# Patient Record
Sex: MALE | Race: WHITE | NOT HISPANIC OR LATINO | Employment: OTHER | URBAN - METROPOLITAN AREA
[De-identification: names, ages, dates, MRNs, and addresses within clinical notes are randomized per-mention and may not be internally consistent; named-entity substitution may affect disease eponyms.]

---

## 2017-03-17 ENCOUNTER — MYC REFILL (OUTPATIENT)
Dept: FAMILY MEDICINE | Facility: CLINIC | Age: 37
End: 2017-03-17

## 2017-03-17 DIAGNOSIS — F41.9 ANXIETY: ICD-10-CM

## 2017-03-17 DIAGNOSIS — E78.5 HYPERLIPIDEMIA LDL GOAL <130: ICD-10-CM

## 2017-03-17 RX ORDER — SIMVASTATIN 40 MG
40 TABLET ORAL AT BEDTIME
Qty: 90 TABLET | Refills: 3 | Status: CANCELLED | OUTPATIENT
Start: 2017-03-17

## 2017-03-17 RX ORDER — FLUOXETINE 40 MG/1
40 CAPSULE ORAL DAILY
Qty: 90 CAPSULE | Refills: 3 | Status: CANCELLED | OUTPATIENT
Start: 2017-03-17

## 2017-03-17 NOTE — TELEPHONE ENCOUNTER
Message from [employer]:  Original authorizing provider: Mayito Lewis MD    James Blanco would like a refill of the following medications:  simvastatin (ZOCOR) 40 MG tablet [Mayito Lewis MD]  FLUoxetine (PROZAC) 40 MG capsule [Mayito Lewis MD]    Preferred pharmacy: Other - [facility] in [place].     Comment:  Can you please forward my prescriptions to [facility] and change my default pharmacy to here. Thanks.

## 2017-03-20 ENCOUNTER — MYC REFILL (OUTPATIENT)
Dept: FAMILY MEDICINE | Facility: CLINIC | Age: 37
End: 2017-03-20

## 2017-03-20 DIAGNOSIS — F41.9 ANXIETY: ICD-10-CM

## 2017-03-20 DIAGNOSIS — E78.5 HYPERLIPIDEMIA LDL GOAL <130: ICD-10-CM

## 2017-03-20 RX ORDER — SIMVASTATIN 40 MG
40 TABLET ORAL AT BEDTIME
Qty: 90 TABLET | Refills: 3 | Status: CANCELLED | OUTPATIENT
Start: 2017-03-20

## 2017-03-20 RX ORDER — FLUOXETINE 40 MG/1
40 CAPSULE ORAL DAILY
Qty: 90 CAPSULE | Refills: 3 | Status: CANCELLED | OUTPATIENT
Start: 2017-03-20

## 2017-03-21 NOTE — TELEPHONE ENCOUNTER
Simvastatin/zocor     Last Written Prescription Date: 9/6/16  Last Fill Quantity: 90, # refills: 3  Last Office Visit with [facility] or [facility] prescribing provider: 12/13/16       Lab Results   Component Value Date    CHOL 209 09/04/2015     Lab Results   Component Value Date    HDL 46 09/04/2015     Lab Results   Component Value Date     09/04/2015     Lab Results   Component Value Date    TRIG 228 09/04/2015     Lab Results   Component Value Date    CHOLHDLRATIO 4.5 09/04/2015     Both requested meds have refills at the pharmacy, advised him via mychart.   Emma Gomes RNC

## 2017-03-21 NOTE — TELEPHONE ENCOUNTER
Message from [employer]:  Original authorizing provider: Mayito Lewis MD    James Blanco would like a refill of the following medications:  simvastatin (ZOCOR) 40 MG tablet [Mayito Lewis MD]  FLUoxetine (PROZAC) 40 MG capsule [Mayito Lewis MD]    Preferred pharmacy: [facility], [place] - [address]    Comment:

## 2017-08-30 ENCOUNTER — TRANSFERRED RECORDS (OUTPATIENT)
Dept: HEALTH INFORMATION MANAGEMENT | Facility: CLINIC | Age: 37
End: 2017-08-30

## 2017-09-11 DIAGNOSIS — E78.5 HYPERLIPIDEMIA LDL GOAL <130: ICD-10-CM

## 2017-09-11 DIAGNOSIS — F41.9 ANXIETY: ICD-10-CM

## 2017-09-11 NOTE — TELEPHONE ENCOUNTER
Simvastatin 40 mg tab     Last Written Prescription Date: 9/6/16  Last Fill Quantity: 90, # refills: 3  Last Office Visit with [facility] or  [facility] prescribing provider: 12/13/16  Next 5 appointments (look out 90 days)     Sep 18, 2017  5:00 PM CDT   MyChart Short with Hector Talbot MD   [facility]    [address] [address]   [phone]                   Lab Results   Component Value Date    CHOL 209 09/04/2015     Lab Results   Component Value Date    HDL 46 09/04/2015     Lab Results   Component Value Date     09/04/2015     Lab Results   Component Value Date    TRIG 228 09/04/2015     Lab Results   Component Value Date    CHOLHDLRATIO 4.5 09/04/2015     Fluoxetine 40 mg cap     Last Written Prescription Date: 9/6/16  Last Fill Quantity: 90, # refills: 3  Last Office Visit with [facility] primary care provider:  12/13/16   Next 5 appointments (look out 90 days)     Sep 18, 2017  5:00 PM CDT   MyChart Short with Hector Talbot MD   [facility]    [address] [address]   [phone]                   Last PHQ-9 score on record=   PHQ-9 SCORE 9/1/2015   Total Score 1

## 2017-09-16 DIAGNOSIS — F41.9 ANXIETY: ICD-10-CM

## 2017-09-17 DIAGNOSIS — E78.5 HYPERLIPIDEMIA LDL GOAL <130: ICD-10-CM

## 2017-09-17 NOTE — TELEPHONE ENCOUNTER
simvastatin (ZOCOR) 40 MG tablet     Last Written Prescription Date: 9/6/16  Last Fill Quantity: 90, # refills: 3  Last Office Visit with [facility], [facility] or [facility] prescribing provider: 12/13/16  Next 5 appointments (look out 90 days)     Sep 18, 2017  5:00 PM CDT   MyChart Short with Hector Talbot MD   [facility]    [address] [address]   [phone]                   Lab Results   Component Value Date    CHOL 209 09/04/2015     Lab Results   Component Value Date    HDL 46 09/04/2015     Lab Results   Component Value Date     09/04/2015     Lab Results   Component Value Date    TRIG 228 09/04/2015     Lab Results   Component Value Date    CHOLHDLRATIO 4.5 09/04/2015     [place])

## 2017-09-18 ENCOUNTER — OFFICE VISIT (OUTPATIENT)
Dept: FAMILY MEDICINE | Facility: CLINIC | Age: 37
End: 2017-09-18
Payer: COMMERCIAL

## 2017-09-18 VITALS
DIASTOLIC BLOOD PRESSURE: 91 MMHG | HEIGHT: 76 IN | WEIGHT: 231.2 LBS | SYSTOLIC BLOOD PRESSURE: 137 MMHG | TEMPERATURE: 96.9 F | BODY MASS INDEX: 28.15 KG/M2 | HEART RATE: 73 BPM

## 2017-09-18 DIAGNOSIS — R80.9 PROTEINURIA, UNSPECIFIED TYPE: ICD-10-CM

## 2017-09-18 DIAGNOSIS — E78.5 HYPERLIPIDEMIA LDL GOAL <130: ICD-10-CM

## 2017-09-18 DIAGNOSIS — R74.8 ELEVATED SERUM GAMMA-GLUTAMYL TRANSFERASE LEVEL: Primary | ICD-10-CM

## 2017-09-18 PROCEDURE — 99213 OFFICE O/P EST LOW 20 MIN: CPT | Performed by: FAMILY MEDICINE

## 2017-09-18 NOTE — NURSING NOTE
"Chief Complaint   Patient presents with     Results     Discuss lab results that were done for life insurance.  Done 8/2017.  Concerned about liver and kidneys.       Initial BP (!) 137/91 (BP Location: Left arm, Patient Position: Chair, Cuff Size: Adult Large)  Pulse 73  Temp 96.9  F (36.1  C) (Tympanic)  Ht 6' 3.75\" (1.924 m)  Wt 231 lb 3.2 oz (104.9 kg)  BMI 28.33 kg/m2 Estimated body mass index is 28.33 kg/(m^2) as calculated from the following:    Height as of this encounter: 6' 3.75\" (1.924 m).    Weight as of this encounter: 231 lb 3.2 oz (104.9 kg).  Medication Reconciliation: complete  "

## 2017-09-18 NOTE — TELEPHONE ENCOUNTER
Routing refill request to provider for review/approval because:  Patient has appointment today at 5pm.    Kaylee Huitron RN

## 2017-09-18 NOTE — TELEPHONE ENCOUNTER
FLUoxetine      Last Written Prescription Date: 9/6/16  Last Fill Quantity: 90, # refills: 3  Last Office Visit with [facility] primary care provider:  12/13/16   Next 5 appointments (look out 90 days)     Sep 18, 2017  5:00 PM CDT   MyChart Short with Hector Talbot MD   [facility]    [address] [address]   [phone]                   Last PHQ-9 score on record=   PHQ-9 SCORE 9/1/2015   Total Score 1

## 2017-09-18 NOTE — PATIENT INSTRUCTIONS
GGT elevation likely related top your history of heavy alcohol consumption.  Continue limiting alcohol drinks to less than 7 a week.  Schedule repeat blood test in 2 months to see trend of liver function.  If with jaundice, abdominal pain or other symptoms, see provider promptly.    Drink 6-8 glasses of water per day.  Schedule lab appointment to repeat urinalysis next week.    Be consistent with low trans fat and low saturated fat diet.  Eat 5 cups of vegetables, fruits and whole grains per day.  Limit starchy food (white rice, white bread, white pasta, white potatoes) to less than a cup per meal.  Minimize sweets, junk food and fastfood.  Exercise: moderate intensity sustained for at least 30 mins per episode, goal of 150 mins per week at least  Weight loss goal: 5-10 % of current weight in 4-6 months.    Thank you for choosing [facility].  You may be receiving a survey in the mail from [employer] regarding your visit today.  Please take a few minutes to complete and return the survey to let us know how we are doing.      If you have questions or concerns, please contact us via [employer] or you can contact your care team at [phone].    Our Clinic hours are:  Monday 6:40 am  to 7:00 pm  Tuesday -Friday 6:40 am to 5:00 pm    The [place] outpatient lab hours are:  Monday - Friday 6:10 am to 4:45 pm  Saturdays 7:00 am to 11:00 am  Appointments are required, call [phone]    If you have clinical questions after hours or would like to schedule an appointment,  call the clinic at [phone].

## 2017-09-18 NOTE — MR AVS SNAPSHOT
After Visit Summary   9/18/2017    James Blanco    MRN: [id number]           Patient Information     Date Of Birth          1980        Visit Information        Provider Department      9/18/2017 5:00 PM Hector Talbot MD [facility]        Today's Diagnoses     Elevated serum gamma-glutamyl transferase level    -  1    Hyperlipidemia LDL goal <130        Proteinuria, unspecified type        BMI 28.0-28.9,adult          Care Instructions    GGT elevation likely related top your history of heavy alcohol consumption.  Continue limiting alcohol drinks to less than 7 a week.  Schedule repeat blood test in 2 months to see trend of liver function.  If with jaundice, abdominal pain or other symptoms, see provider promptly.    Drink 6-8 glasses of water per day.  Schedule lab appointment to repeat urinalysis next week.    Be consistent with low trans fat and low saturated fat diet.  Eat 5 cups of vegetables, fruits and whole grains per day.  Limit starchy food (white rice, white bread, white pasta, white potatoes) to less than a cup per meal.  Minimize sweets, junk food and fastfood.  Exercise: moderate intensity sustained for at least 30 mins per episode, goal of 150 mins per week at least  Weight loss goal: 5-10 % of current weight in 4-6 months.    Thank you for choosing [facility].  You may be receiving a survey in the mail from [employer] regarding your visit today.  Please take a few minutes to complete and return the survey to let us know how we are doing.      If you have questions or concerns, please contact us via [employer] or you can contact your care team at [phone].    Our Clinic hours are:  Monday 6:40 am  to 7:00 pm  Tuesday -Friday 6:40 am to 5:00 pm    The [place] outpatient lab hours are:  Monday - Friday 6:10 am to 4:45 pm  Saturdays 7:00 am to 11:00 am  Appointments are required, call [phone]    If you have clinical questions after hours or  "would like to schedule an appointment,  call the clinic at [phone].            Follow-ups after your visit        Follow-up notes from your care team     Return if symptoms worsen or fail to improve.      Future tests that were ordered for you today     Open Future Orders        Priority Expected Expires Ordered    **UA reflex to Microscopic FUTURE anytime Routine 9/18/2017 9/18/2018 9/18/2017    GGT Routine 9/19/2017 9/18/2018 9/18/2017    **Hepatic panel FUTURE 2mo Routine 11/17/2017 1/16/2018 9/18/2017            Who to contact     If you have questions or need follow up information about today's clinic visit or your schedule please contact [facility] directly at [phone].  Normal or non-critical lab and imaging results will be communicated to you by MyChart, letter or phone within 4 business days after the clinic has received the results. If you do not hear from us within 7 days, please contact the clinic through [employer] or phone. If you have a critical or abnormal lab result, we will notify you by phone as soon as possible.  Submit refill requests through [employer] or call your pharmacy and they will forward the refill request to us. Please allow 3 business days for your refill to be completed.          Additional Information About Your Visit        [employer] Information     [employer] gives you secure access to your electronic health record. If you see a primary care provider, you can also send messages to your care team and make appointments. If you have questions, please call your primary care clinic.  If you do not have a primary care provider, please call [phone] and they will assist you.        Care EveryWhere ID     This is your Care EveryWhere ID. This could be used by other organizations to access your [place] medical records  [id number]        Your Vitals Were     Pulse Temperature Height BMI (Body Mass Index)          73 96.9  F (36.1  C) (Tympanic) 6' 3.75\" (1.924 m) 28.33 " kg/m2         Blood Pressure from Last 3 Encounters:   09/18/17 (!) 137/91   12/13/16 132/72   08/15/16 123/79    Weight from Last 3 Encounters:   09/18/17 231 lb 3.2 oz (104.9 kg)   12/13/16 247 lb 6.4 oz (112.2 kg)   08/15/16 239 lb 3.2 oz (108.5 kg)               Primary Care Provider Office Phone # Fax #    Hector Alexis Talbot [phone] [address] [address]        Equal Access to Services     KRYSTLE MIRELES : Hadii gabriella cruz Sojunito, wafilemonda deborah, qaybta kaalmada merlene, asiya hair . So [facility] [phone].    ATENCIÓN: Si habla español, tiene a carter disposición servicios gratuitos de asistencia lingüística. Llame al [phone].    We comply with applicable federal civil rights laws and [place] laws. We do not discriminate on the basis of race, color, national origin, age, disability sex, sexual orientation or gender identity.            Thank you!     Thank you for choosing [facility]  for your care. Our goal is always to provide you with excellent care. Hearing back from our patients is one way we can continue to improve our services. Please take a few minutes to complete the written survey that you may receive in the mail after your visit with us. Thank you!             Your Updated Medication List - Protect others around you: Learn how to safely use, store and throw away your medicines at www.disposemymeds.org.          This list is accurate as of: 9/18/17  5:23 PM.  Always use your most recent med list.                   Brand Name Dispense Instructions for use Diagnosis    famotidine 40 MG tablet    PEPCID    30 tablet    Take 1 tablet (40 mg) by mouth At Bedtime    Gastroesophageal reflux disease without esophagitis       FLUoxetine 40 MG capsule    PROzac    90 capsule    Take 1 capsule (40 mg) by mouth daily    Anxiety       hydrocortisone 1 % ointment     30 g    Apply sparingly to affected  area 2-3 times daily for not more than 7 days    Perianal itch       omeprazole 20 MG tablet      Take 20 mg by mouth daily        simvastatin 40 MG tablet    ZOCOR    90 tablet    Take 1 tablet (40 mg) by mouth At Bedtime    Hyperlipidemia LDL goal <130

## 2017-09-18 NOTE — PROGRESS NOTES
SUBJECTIVE:   James Blanco is a 36 year old male who presents to clinic today for the following health issues:      Chief Complaint   Patient presents with     Results     Discuss lab results that were done for life insurance.  Done 2017.  Concerned about liver and kidneys.     Patient had a panel of labs done by his life insurance company in 2016.  Patient comes in today to discuss the followin)  (high). Patient admits to heavy drinking for many years. In last 6 months states ETOH drinks of 6 per week. Patient denies abd pain, jaundice, tremors, depression, rashes, GI bleed or confusion. Patient denies hx of DUI.    2) triglycerides 209 - patient states no specific dietary restrictions followed currently. Patient admits he lacks exercise. Patient denies chest pain, abd pain, dyspnea, jaundice, nausea, or BM changes.    3) urinalysis with protein and slightly high USG. Patient denies any urinary symptoms. Patient denies hx of renal or urinary disease.      Problem list and histories reviewed & adjusted, as indicated.  Additional history: as documented    Patient Active Problem List   Diagnosis     Anxiety     Hyperlipidemia LDL goal <130     Past Surgical History:   Procedure Laterality Date     COLONOSCOPY N/A 3/21/2016    Procedure: COLONOSCOPY;  Surgeon: Tavo Wilcox MD;  Location: [facility]     SURGICAL HISTORY OF -       plasty surgery on face, due to injury     SURGICAL HISTORY OF -       left elbow nerve decompression, sound like ulnar     SURGICAL HISTORY OF -       left knee surgery, scope and allograph for cartilege     SURGICAL HISTORY OF -       lasix surgery       Social History   Substance Use Topics     Smoking status: Former Smoker     Packs/day: 1.00     Years: 13.00     Smokeless tobacco: Former User     Alcohol use Yes      Comment: 12-18 weekly     Family History   Problem Relation Age of Onset     Hyperlipidemia Mother      Coronary Artery Disease Father       "Hyperlipidemia Father      Other Cancer Sister      lymphoma-non hodgkins         Current Outpatient Prescriptions   Medication Sig Dispense Refill     omeprazole 20 MG tablet Take 20 mg by mouth daily       FLUoxetine (PROZAC) 40 MG capsule Take 1 capsule (40 mg) by mouth daily 90 capsule 3     simvastatin (ZOCOR) 40 MG tablet Take 1 tablet (40 mg) by mouth At Bedtime 90 tablet 3     famotidine (PEPCID) 40 MG tablet Take 1 tablet (40 mg) by mouth At Bedtime (Patient not taking: Reported on 9/18/2017) 30 tablet 0     hydrocortisone 1 % ointment Apply sparingly to affected area 2-3 times daily for not more than 7 days 30 g 0     Allergies   Allergen Reactions     Ceclor [Cefaclor]      Penicillins          Reviewed and updated as needed this visit by clinical staffTobacco  Allergies  Meds  Problems  Med Hx  Surg Hx  Fam Hx  Soc Hx        Reviewed and updated as needed this visit by Provider  Allergies  Meds  Problems         ROS:  C: NEGATIVE for fever, chills, change in weight  I: NEGATIVE for worrisome rashes, moles or lesions  E: NEGATIVE for vision changes or irritation  E/M: NEGATIVE for ear, mouth and throat problems  R: NEGATIVE for significant cough or SOB  CV: NEGATIVE for chest pain, palpitations or peripheral edema  GI: NEGATIVE for nausea, abdominal pain, heartburn, or change in bowel habits  : NEGATIVE for frequency, dysuria, or hematuria  M: NEGATIVE for significant arthralgias or myalgia  N: NEGATIVE for weakness, dizziness or paresthesias  E: NEGATIVE for temperature intolerance, skin/hair changes  H: NEGATIVE for bleeding problems  P: NEGATIVE for changes in mood or affect    OBJECTIVE:                                                    BP (!) 137/91 (BP Location: Left arm, Patient Position: Chair, Cuff Size: Adult Large)  Pulse 73  Temp 96.9  F (36.1  C) (Tympanic)  Ht 6' 3.75\" (1.924 m)  Wt 231 lb 3.2 oz (104.9 kg)  BMI 28.33 kg/m2  Body mass index is 28.33 kg/(m^2).  GEN: alert, " oriented x 3, NAD  EYES; no icterus; pink conjunctivae  SKIN: no rash/jaundice  ABD: rounded, nontender.    Diagnostic test results:  Diagnostic Test Results:  none        ASSESSMENT/PLAN:                                                        ICD-10-CM    1. Elevated serum gamma-glutamyl transferase level R74.8 GGT     **Hepatic panel FUTURE 2mo  More likely related to hx of heavy alcohol consumption.  Discussed with patient risks of heavy consumption.  Advised to decrease to <7 drinks per week.  Patient states he will keep it at that if not even less.  Repeat labs in 2 months as patient continues to decrease consumption.  Refer to GI if increasing or if symptomatic; possible liver US too.     2. Hyperlipidemia LDL goal <130 E78.5 Discussed course and treatment of condition.    Advised low cholesterol diet.  Increase dietary fiber.  Discussed exercise recommendations.     3. Proteinuria, unspecified type R80.9 **UA reflex to Microscopic FUTURE anytime  Isolated finding.  Push oral fluids.  Repeat test next week.  Return precautions discussed and given to patient.     4. BMI 28.0-28.9,adult Z68.28 Patient was advised healthy diet recommendations.  Patient was advised weekly exercise recommendations and goals.         Follow up with lab 1 week for u/a, 2 months for hepatic panel  Provider follow up prn.   Patient Instructions   GGT elevation likely related top your history of heavy alcohol consumption.  Continue limiting alcohol drinks to less than 7 a week.  Schedule repeat blood test in 2 months to see trend of liver function.  If with jaundice, abdominal pain or other symptoms, see provider promptly.    Drink 6-8 glasses of water per day.  Schedule lab appointment to repeat urinalysis next week.    Be consistent with low trans fat and low saturated fat diet.  Eat 5 cups of vegetables, fruits and whole grains per day.  Limit starchy food (white rice, white bread, white pasta, white potatoes) to less than a cup per  meal.  Minimize sweets, junk food and fastfood.  Exercise: moderate intensity sustained for at least 30 mins per episode, goal of 150 mins per week at least  Weight loss goal: 5-10 % of current weight in 4-6 months.    Thank you for choosing [facility].  You may be receiving a survey in the mail from [employer] regarding your visit today.  Please take a few minutes to complete and return the survey to let us know how we are doing.      If you have questions or concerns, please contact us via [employer] or you can contact your care team at [phone].    Our Clinic hours are:  Monday 6:40 am  to 7:00 pm  Tuesday -Friday 6:40 am to 5:00 pm    The [place] outpatient lab hours are:  Monday - Friday 6:10 am to 4:45 pm  Saturdays 7:00 am to 11:00 am  Appointments are required, call [phone]    If you have clinical questions after hours or would like to schedule an appointment,  call the clinic at [phone].        Hector Talbot MD  [facility]

## 2017-09-19 RX ORDER — FLUOXETINE 40 MG/1
40 CAPSULE ORAL DAILY
Qty: 90 CAPSULE | Refills: 3 | Status: SHIPPED | OUTPATIENT
Start: 2017-09-19 | End: 2018-10-08

## 2017-09-19 RX ORDER — SIMVASTATIN 40 MG
40 TABLET ORAL AT BEDTIME
Qty: 90 TABLET | Refills: 3 | Status: SHIPPED | OUTPATIENT
Start: 2017-09-19 | End: 2018-10-08

## 2017-09-20 RX ORDER — SIMVASTATIN 40 MG
40 TABLET ORAL AT BEDTIME
Qty: 90 TABLET | Refills: 3 | OUTPATIENT
Start: 2017-09-20

## 2017-09-20 RX ORDER — FLUOXETINE 40 MG/1
CAPSULE ORAL
Qty: 90 CAPSULE | Refills: 0 | OUTPATIENT
Start: 2017-09-20

## 2017-09-20 NOTE — TELEPHONE ENCOUNTER
Medication already prescribed by the provider on 9-19-17, will remove the medication due to duplicate.  FRANK Deng

## 2017-09-25 ENCOUNTER — MYC MEDICAL ADVICE (OUTPATIENT)
Dept: FAMILY MEDICINE | Facility: CLINIC | Age: 37
End: 2017-09-25

## 2017-09-28 DIAGNOSIS — R80.9 PROTEINURIA, UNSPECIFIED TYPE: ICD-10-CM

## 2017-09-28 DIAGNOSIS — R74.8 ELEVATED SERUM GAMMA-GLUTAMYL TRANSFERASE LEVEL: ICD-10-CM

## 2017-09-28 LAB
ALBUMIN SERPL-MCNC: 4 G/DL (ref 3.4–5)
ALBUMIN UR-MCNC: NEGATIVE MG/DL
ALP SERPL-CCNC: 64 U/L (ref 40–150)
ALT SERPL W P-5'-P-CCNC: 64 U/L (ref 0–70)
APPEARANCE UR: CLEAR
AST SERPL W P-5'-P-CCNC: 36 U/L (ref 0–45)
BILIRUB DIRECT SERPL-MCNC: 0.1 MG/DL (ref 0–0.2)
BILIRUB SERPL-MCNC: 0.5 MG/DL (ref 0.2–1.3)
BILIRUB UR QL STRIP: NEGATIVE
COLOR UR AUTO: YELLOW
GGT SERPL-CCNC: 355 U/L (ref 0–75)
GLUCOSE UR STRIP-MCNC: NEGATIVE MG/DL
HGB UR QL STRIP: NEGATIVE
KETONES UR STRIP-MCNC: NEGATIVE MG/DL
LEUKOCYTE ESTERASE UR QL STRIP: NEGATIVE
NITRATE UR QL: NEGATIVE
PH UR STRIP: 5.5 PH (ref 5–7)
PROT SERPL-MCNC: 7.2 G/DL (ref 6.8–8.8)
SOURCE: NORMAL
SP GR UR STRIP: >1.03 (ref 1–1.03)
UROBILINOGEN UR STRIP-ACNC: 0.2 EU/DL (ref 0.2–1)

## 2017-09-28 PROCEDURE — 36415 COLL VENOUS BLD VENIPUNCTURE: CPT | Performed by: FAMILY MEDICINE

## 2017-09-28 PROCEDURE — 81003 URINALYSIS AUTO W/O SCOPE: CPT | Performed by: FAMILY MEDICINE

## 2017-09-28 PROCEDURE — 82977 ASSAY OF GGT: CPT | Performed by: FAMILY MEDICINE

## 2017-09-28 PROCEDURE — 80076 HEPATIC FUNCTION PANEL: CPT | Performed by: FAMILY MEDICINE

## 2017-10-27 ENCOUNTER — TRANSFERRED RECORDS (OUTPATIENT)
Dept: HEALTH INFORMATION MANAGEMENT | Facility: CLINIC | Age: 37
End: 2017-10-27

## 2017-10-27 DIAGNOSIS — R79.89 ABNORMAL LIVER FUNCTION TEST: Primary | ICD-10-CM

## 2017-10-27 DIAGNOSIS — Z71.3 DIETARY COUNSELING AND SURVEILLANCE: ICD-10-CM

## 2017-11-06 ENCOUNTER — MYC MEDICAL ADVICE (OUTPATIENT)
Dept: FAMILY MEDICINE | Facility: CLINIC | Age: 37
End: 2017-11-06

## 2017-12-22 ENCOUNTER — APPOINTMENT (OUTPATIENT)
Dept: GENERAL RADIOLOGY | Facility: CLINIC | Age: 37
End: 2017-12-22
Attending: PHYSICIAN ASSISTANT
Payer: OTHER MISCELLANEOUS

## 2017-12-22 ENCOUNTER — HOSPITAL ENCOUNTER (EMERGENCY)
Facility: CLINIC | Age: 37
Discharge: HOME | End: 2017-12-22
Attending: PHYSICIAN ASSISTANT | Admitting: PHYSICIAN ASSISTANT
Payer: OTHER MISCELLANEOUS

## 2017-12-22 VITALS
SYSTOLIC BLOOD PRESSURE: 136 MMHG | WEIGHT: 230 LBS | HEART RATE: 71 BPM | HEIGHT: 76 IN | DIASTOLIC BLOOD PRESSURE: 90 MMHG | RESPIRATION RATE: 18 BRPM | OXYGEN SATURATION: 95 % | TEMPERATURE: 98.1 F | BODY MASS INDEX: 28.01 KG/M2

## 2017-12-22 DIAGNOSIS — S59.912A INJURY OF LEFT FOREARM, INITIAL ENCOUNTER: ICD-10-CM

## 2017-12-22 DIAGNOSIS — S50.12XA CONTUSION OF LEFT FOREARM: ICD-10-CM

## 2017-12-22 DIAGNOSIS — S50.12XS CONTUSION OF LEFT FOREARM, SEQUELA: ICD-10-CM

## 2017-12-22 DIAGNOSIS — S50.12XA TRAUMATIC HEMATOMA OF LEFT FOREARM, INITIAL ENCOUNTER: ICD-10-CM

## 2017-12-22 PROCEDURE — 73090 X-RAY EXAM OF FOREARM: CPT | Mod: LT

## 2017-12-22 PROCEDURE — 99213 OFFICE O/P EST LOW 20 MIN: CPT | Performed by: PHYSICIAN ASSISTANT

## 2017-12-22 PROCEDURE — 99214 OFFICE O/P EST MOD 30 MIN: CPT | Mod: Z6 | Performed by: PHYSICIAN ASSISTANT

## 2017-12-22 RX ORDER — HYDROCODONE BITARTRATE AND ACETAMINOPHEN 5; 325 MG/1; MG/1
1-2 TABLET ORAL EVERY 6 HOURS PRN
Qty: 10 TABLET | Refills: 0 | Status: SHIPPED | OUTPATIENT
Start: 2017-12-22 | End: 2018-01-29

## 2017-12-22 ASSESSMENT — ENCOUNTER SYMPTOMS
CONSTITUTIONAL NEGATIVE: 1
NEUROLOGICAL NEGATIVE: 1

## 2017-12-22 NOTE — ED AVS SNAPSHOT
[facility] Emergency Department    [address] [address]    Phone:  [phone]    Fax:  [phone]                                       James Blanco   MRN: [id number]    Department:  [facility] Emergency Department   Date of Visit:  12/22/2017           After Visit Summary Signature Page     I have received my discharge instructions, and my questions have been answered. I have discussed any challenges I see with this plan with the nurse or doctor.    ..........................................................................................................................................  Patient/Patient Representative Signature      ..........................................................................................................................................  Patient Representative Print Name and Relationship to Patient    ..................................................               ................................................  Date                                            Time    ..........................................................................................................................................  Reviewed by Signature/Title    ...................................................              ..............................................  Date                                                            Time

## 2017-12-22 NOTE — ED AVS SNAPSHOT
[facility] Emergency Department    [address] [facility] [address]    Phone:  [phone]    Fax:  [phone]                                       James Blanco   MRN: [id number]    Department:  [facility] Emergency Department   Date of Visit:  12/22/2017           Patient Information     Date Of Birth          1980        Your diagnoses for this visit were:     Injury of left forearm, initial encounter     Traumatic hematoma of left forearm, initial encounter        You were seen by Juany Morris PA-C.      Follow-up Information     Follow up with [facility]. Call in 5 days.    Specialty:  Orthopedics    Why:  As needed, For persistent symptoms    Contact information:    [facility] [address] [phone]    Additional information:    The medical center is located at   [address] [place] [address] (between [address]   [address] [facility], [place] miles north   of [place]        Follow up with [facility] Emergency Department.    Specialty:  EMERGENCY MEDICINE    Why:  As needed, If symptoms worsen    Contact information:    [address] [facility] [address] [phone]    Additional information:    The medical center is located at   [address] [place] [address] ([address]  in [place], four miles north   of [place]).      Discharge References/Attachments     HEMATOMA (ENGLISH)      Future Appointments        Provider Department Dept Phone Center    1/2/2018 6:15 AM [facility] [phone] [facility]      24 Hour Appointment Hotline       To make an appointment at any [facility], call [phone]). If you don't have a family doctor or clinic, we will help you find one. [facility] are conveniently located to serve the needs of you and your family.             Review of your medicines      START taking        Dose / Directions Last dose taken     HYDROcodone-acetaminophen 5-325 MG per tablet   Commonly known as:  NORCO   Dose:  1-2 tablet   Quantity:  10 tablet        Take 1-2 tablets by mouth every 6 hours as needed for moderate to severe pain   Refills:  0          Our records show that you are taking the medicines listed below. If these are incorrect, please call your family doctor or clinic.        Dose / Directions Last dose taken    famotidine 40 MG tablet   Commonly known as:  PEPCID   Dose:  40 mg   Quantity:  30 tablet        Take 1 tablet (40 mg) by mouth At Bedtime   Refills:  0        FLUoxetine 40 MG capsule   Commonly known as:  PROzac   Dose:  40 mg   Quantity:  90 capsule        Take 1 capsule (40 mg) by mouth daily   Refills:  3        hydrocortisone 1 % ointment   Quantity:  30 g        Apply sparingly to affected area 2-3 times daily for not more than 7 days   Refills:  0        omeprazole 20 MG tablet   Dose:  20 mg        Take 20 mg by mouth daily   Refills:  0        simvastatin 40 MG tablet   Commonly known as:  ZOCOR   Dose:  40 mg   Quantity:  90 tablet        Take 1 tablet (40 mg) by mouth At Bedtime   Refills:  3                Prescriptions were sent or printed at these locations (1 Prescription)                   [facility] [place], [place] - [address] [facility]   [address] [facility] [address]    Telephone:  [phone]   Fax:  [phone]   Hours:                  Printed at Department/Unit printer (1 of 1)         HYDROcodone-acetaminophen (NORCO) 5-325 MG per tablet                Procedures and tests performed during your visit     Radius/Ulna XR,  PA &LAT, left      Orders Needing Specimen Collection     None      Pending Results     No orders found from 12/20/2017 to 12/23/2017.            Pending Culture Results     No orders found from 12/20/2017 to 12/23/2017.            Pending Results Instructions     If you had any lab results that were not finalized at the time of your Discharge, you can call the  ED Lab Result RN at [phone]. You will be contacted by this team for any positive Lab results or changes in treatment. The nurses are available 7 days a week from 10A to 6:30P.  You can leave a message 24 hours per day and they will return your call.        Test Results From Your Hospital Stay        12/22/2017  2:02 PM      Narrative     LEFT FOREARM TWO VIEWS  12/22/2017 1:34 PM    HISTORY:  Arm hit with wood ejected from a table saw at work. Large  hematoma on medial aspect of lower arm.      COMPARISON:  None.        Impression     IMPRESSION:  No evidence for fracture. Soft tissue swelling over the  medial  forearm.    VERNELL MEZA MD                Thank you for choosing [facility]       Thank you for choosing [facility] for your care. Our goal is always to provide you with excellent care. Hearing back from our patients is one way we can continue to improve our services. Please take a few minutes to complete the written survey that you may receive in the mail after you visit with us. Thank you!        [employer] Information     [employer] gives you secure access to your electronic health record. If you see a primary care provider, you can also send messages to your care team and make appointments. If you have questions, please call your primary care clinic.  If you do not have a primary care provider, please call [phone] and they will assist you.        Care EveryWhere ID     This is your Care EveryWhere ID. This could be used by other organizations to access your [facility] medical records  [id number]        Equal Access to Services     KRYSTLE MIRELES : Hadii gabriella Delcid, sri cabrera, asiya woodward. So [facility] [phone].    ATENCIÓN: Si habla español, tiene a carter disposición servicios gratuitos de asistencia lingüística. Llame al [phone].    We comply with applicable federal civil rights laws and [place] laws. We do not discriminate  on the basis of race, color, national origin, age, disability, sex, sexual orientation, or gender identity.            After Visit Summary       This is your record. Keep this with you and show to your community pharmacist(s) and doctor(s) at your next visit.

## 2017-12-22 NOTE — ED PROVIDER NOTES
History     Chief Complaint   Patient presents with     Arm Injury     L forearm hit with saw at work     HPI  James Blanco is a 37 year old male who presents with complaints of left forearm injury.  Patient states he was at work pushing a large piece of wood through a table saw when the wood kicked-back and struck him with significant force in his left forearm.  This occurred a couple hours prior to arrival.  He states that he developed an area of swelling almost immediately to his forearm.  Pt reports worsening pain of this area since the injury.  He denies paresthesias and is moving his fingers and wrist with some associated pain.      Problem List:    Patient Active Problem List    Diagnosis Date Noted     Anxiety 09/01/2015     Priority: Medium     Hyperlipidemia LDL goal <130 09/01/2015     Priority: Medium        Past Medical History:    Past Medical History:   Diagnosis Date     Anxiety      Hyperlipidaemia LDL goal <130        Past Surgical History:    Past Surgical History:   Procedure Laterality Date     COLONOSCOPY N/A 3/21/2016    Procedure: COLONOSCOPY;  Surgeon: Tavo Wilcox MD;  Location: [facility]     SURGICAL HISTORY OF -       plasty surgery on face, due to injury     SURGICAL HISTORY OF -       left elbow nerve decompression, sound like ulnar     SURGICAL HISTORY OF -       left knee surgery, scope and allograph for cartilege     SURGICAL HISTORY OF -       lasix surgery       Family History:    Family History   Problem Relation Age of Onset     Hyperlipidemia Mother      Coronary Artery Disease Father      Hyperlipidemia Father      Other Cancer Sister      lymphoma-non hodgkins       Social History:  Marital Status:   [2]  Social History   Substance Use Topics     Smoking status: Former Smoker     Packs/day: 1.00     Years: 13.00     Smokeless tobacco: Former User     Alcohol use Yes      Comment: 12-18 weekly        Medications:      HYDROcodone-acetaminophen (NORCO) 5-325 MG per  "tablet   FLUoxetine (PROZAC) 40 MG capsule   simvastatin (ZOCOR) 40 MG tablet   famotidine (PEPCID) 40 MG tablet   hydrocortisone 1 % ointment   omeprazole 20 MG tablet         Review of Systems   Constitutional: Negative.    Musculoskeletal:        Left forearm pain and swelling   Skin: Negative.    Neurological: Negative.    All other systems reviewed and are negative.      Physical Exam   BP: 136/90  Pulse: 71  Temp: 98.1  F (36.7  C)  Resp: 18  Height: 193 cm (6' 4\")  Weight: 104.3 kg (230 lb)  SpO2: 95 %      Physical Exam   Constitutional: He appears well-developed and well-nourished. No distress.   HENT:   Head: Normocephalic and atraumatic.   Cardiovascular: Intact distal pulses.    Pulmonary/Chest: Effort normal.   Musculoskeletal:        Left elbow: Normal.        Left wrist: Normal.        Left forearm: He exhibits tenderness and swelling. He exhibits no deformity and no laceration.        Arms:       Left hand: Normal.   Localized hematoma to left forearm.  The area is swollen, tender, and ecchymotic.  Patient is able to move his left wrist but describes some pain in his forearm with doing so.  Compartments of forearm are soft.  No other tenderness or swelling of the forearm.   Neurological: He is alert. He has normal strength. No sensory deficit.   Skin: Skin is warm and dry.       ED Course     ED Course     Procedures    Results for orders placed or performed during the hospital encounter of 12/22/17   Radius/Ulna XR,  PA &LAT, left    Narrative    LEFT FOREARM TWO VIEWS  12/22/2017 1:34 PM    HISTORY:  Arm hit with wood ejected from a table saw at work. Large  hematoma on medial aspect of lower arm.      COMPARISON:  None.      Impression    IMPRESSION:  No evidence for fracture. Soft tissue swelling over the  medial  forearm.    VERNELL MEZA MD       Assessments & Plan (with Medical Decision Making)     Pt is a 37 year old male who presents with complaints of left forearm injury.  Patient states " he was at work pushing a large piece of wood through a table saw when the wood kicked-back and struck him with significant force in his left forearm.  This occurred a couple hours prior to arrival.  He states that he developed an area of swelling almost immediately to his forearm.  Pt reports worsening pain of this area since the injury.  He denies paresthesias and is moving his fingers and wrist with some associated pain.  Pt is afebrile on arrival.  Exam as above.  X-rays of left forearm are negative for fracture or acute pathology.  Soft tissue swelling is noted over the medial forearm.  Discussed results with patient.  No evidence of compartment syndrome on exam today.  We discussed signs and symptoms suggestive of compartment syndrome the patient should be monitoring for.  Encouraged rest, ice, compression, and elevation as well as NSAID use for pain and inflammation.  Hand-outs provided.    Patient was sent with [place] for severe pain and was instructed to follow-up with PCP if no improvement in 5-7 days for continued care and management or sooner if new or worsening symptoms.  He is to return to the ED for persistent and/or worsening symptoms.  Patient expressed understanding of the diagnosis and plan and was discharged home in good condition.    I have reviewed the nursing notes.    I have reviewed the findings, diagnosis, plan and need for follow up with the patient.    Discharge Medication List as of 12/22/2017  2:15 PM      START taking these medications    Details   HYDROcodone-acetaminophen (NORCO) 5-325 MG per tablet Take 1-2 tablets by mouth every 6 hours as needed for moderate to severe pain, Disp-10 tablet, R-0, Local Print             Final diagnoses:   Injury of left forearm, initial encounter   Traumatic hematoma of left forearm, initial encounter       12/22/2017   [facility] EMERGENCY DEPARTMENT     Juany Morris PA-C  12/22/17 2241       Juany Morris PA-C  12/22/17 [id number]        Juany Morris PA-C  12/22/17 4583

## 2018-01-02 DIAGNOSIS — Z71.3 DIETARY COUNSELING AND SURVEILLANCE: ICD-10-CM

## 2018-01-02 DIAGNOSIS — R79.89 ABNORMAL LIVER FUNCTION TEST: ICD-10-CM

## 2018-01-02 LAB
ALBUMIN SERPL-MCNC: 4 G/DL (ref 3.4–5)
ALP SERPL-CCNC: 71 U/L (ref 40–150)
ALT SERPL W P-5'-P-CCNC: 60 U/L (ref 0–70)
AST SERPL W P-5'-P-CCNC: 32 U/L (ref 0–45)
BILIRUB DIRECT SERPL-MCNC: 0.2 MG/DL (ref 0–0.2)
BILIRUB SERPL-MCNC: 0.8 MG/DL (ref 0.2–1.3)
PROT SERPL-MCNC: 7.2 G/DL (ref 6.8–8.8)

## 2018-01-02 PROCEDURE — 36415 COLL VENOUS BLD VENIPUNCTURE: CPT | Performed by: NURSE PRACTITIONER

## 2018-01-02 PROCEDURE — 80076 HEPATIC FUNCTION PANEL: CPT | Performed by: NURSE PRACTITIONER

## 2018-01-04 ENCOUNTER — TRANSFERRED RECORDS (OUTPATIENT)
Dept: HEALTH INFORMATION MANAGEMENT | Facility: CLINIC | Age: 38
End: 2018-01-04

## 2018-01-29 ENCOUNTER — OFFICE VISIT (OUTPATIENT)
Dept: FAMILY MEDICINE | Facility: CLINIC | Age: 38
End: 2018-01-29
Payer: COMMERCIAL

## 2018-01-29 VITALS
HEIGHT: 76 IN | BODY MASS INDEX: 28.48 KG/M2 | TEMPERATURE: 97.1 F | HEART RATE: 83 BPM | WEIGHT: 233.9 LBS | SYSTOLIC BLOOD PRESSURE: 130 MMHG | DIASTOLIC BLOOD PRESSURE: 89 MMHG

## 2018-01-29 DIAGNOSIS — M79.631 PAIN OF RIGHT FOREARM: Primary | ICD-10-CM

## 2018-01-29 PROCEDURE — 99213 OFFICE O/P EST LOW 20 MIN: CPT | Performed by: FAMILY MEDICINE

## 2018-01-29 NOTE — PROGRESS NOTES
SUBJECTIVE:   James Blanco is a 37 year old male who presents to clinic today for the following health issues:  Chief Complaint   Patient presents with     Musculoskeletal Problem     Pt here for right arm pain.         Joint Pain    Onset: 1 month    Description:   Location: right wrist and hand  Character: Sharp, Dull ache, Stabbing, Burning and throbbing  Patient states     Intensity: 3-8/10    Progression of Symptoms: overall worse, felt better for a couple days but pain is back the past couple days again    Accompanying Signs & Symptoms:  Other symptoms: radiation of pain to up arm a little, tingling and weakness of hand    History:   Previous similar pain: nothing recently       Precipitating factors:     Trauma or overuse: no     Patient denies FOOSH.    Alleviating factors:  Improved by: ice and Ibuprofen - temporary    Therapies Tried and outcome: ace wrap helps a little    Verified above history with patient.    Patient is a .  Hobbies: patient denies any  Patient denies playing sports.  Patient denies new activities.    Problem list and histories reviewed & adjusted, as indicated.  Additional history: as documented    Patient Active Problem List   Diagnosis     Anxiety     Hyperlipidemia LDL goal <130     Past Surgical History:   Procedure Laterality Date     COLONOSCOPY N/A 3/21/2016    Procedure: COLONOSCOPY;  Surgeon: Tavo Wilcox MD;  Location: [facility]     SURGICAL HISTORY OF -       plasty surgery on face, due to injury     SURGICAL HISTORY OF -       left elbow nerve decompression, sound like ulnar     SURGICAL HISTORY OF -       left knee surgery, scope and allograph for cartilege     SURGICAL HISTORY OF -       lasix surgery       Social History   Substance Use Topics     Smoking status: Former Smoker     Packs/day: 1.00     Years: 13.00     Smokeless tobacco: Former User     Alcohol use Yes      Comment: 12-18 weekly     Family History   Problem Relation Age of Onset      "Hyperlipidemia Mother      Coronary Artery Disease Father      Hyperlipidemia Father      DIABETES Father      Other Cancer Sister      lymphoma-non hodgkins         Current Outpatient Prescriptions   Medication Sig Dispense Refill     FLUoxetine (PROZAC) 40 MG capsule Take 1 capsule (40 mg) by mouth daily 90 capsule 3     simvastatin (ZOCOR) 40 MG tablet Take 1 tablet (40 mg) by mouth At Bedtime 90 tablet 3     omeprazole 20 MG tablet Take 20 mg by mouth daily       Allergies   Allergen Reactions     Ceclor [Cefaclor]      Penicillins        Reviewed and updated as needed this visit by clinical staff  Tobacco  Allergies  Meds  Problems  Med Hx  Surg Hx  Fam Hx  Soc Hx        Reviewed and updated as needed this visit by Provider  Allergies  Meds  Problems         ROS:  C: NEGATIVE for fever, chills,or change in weight  I: NEGATIVE for worrisome rashes, moles or lesions  MUSCULOSKELETAL:see above  N: NEGATIVE for weakness, dizziness or paresthesias  H: NEGATIVE for bleeding problems    OBJECTIVE:                                                    /89  Pulse 83  Temp 97.1  F (36.2  C) (Tympanic)  Ht 6' 3.75\" (1.924 m)  Wt 233 lb 14.4 oz (106.1 kg)  BMI 28.66 kg/m2  Body mass index is 28.66 kg/(m^2).  GENERAL: healthy, alert and no distress  RUE: no swelling/discoloration; mild TTP ulnar aspect dorsum of distal forearm; full range of motion of all joints with moderate pain on resisted supination/pronation; strong radial pulse; good  strength but with pain on the area mentioned; no radial/ulnar head, medial/lateral epicondyle and olecranon TTP.  SKIN: no suspicious lesions, no rashes    Diagnostic test results:  Diagnostic Test Results:  none      ASSESSMENT/PLAN:                                                        ICD-10-CM    1. Pain of right forearm M79.631 ORTHO  REFERRAL     With no direct trauma, doubt fracture is present.  Strain? Tendinosis?  Ortho consult discussed and " patient concurred.  Supportive and symptomatic measures discussed in detail as below.  Return precautions discussed and given to patient.      Follow up with Provider - at ortho consult   Patient Instructions   Due to persistent symptom and no relief from conservative measures, consult orthopedics.  You will be contacted in 1-2 business days to get a schedule for the Sports Medicine specialist.    May continue elastic wrap to support forearm.    Ibuprofen 200 mg 2-3 tablets with food every 8 hrs as needed for pain.    Icy Hot or Bengay topically as needed for pain.    Warm or Ice compress as needed for pain.    Avoid activities that increase pain.          Hector Talbot MD  [facility]

## 2018-01-29 NOTE — NURSING NOTE
"Chief Complaint   Patient presents with     Musculoskeletal Problem     Pt here for right arm pain.       Initial /89  Pulse 83  Temp 97.1  F (36.2  C) (Tympanic)  Ht 6' 3.75\" (1.924 m)  Wt 233 lb 14.4 oz (106.1 kg)  BMI 28.66 kg/m2 Estimated body mass index is 28.66 kg/(m^2) as calculated from the following:    Height as of this encounter: 6' 3.75\" (1.924 m).    Weight as of this encounter: 233 lb 14.4 oz (106.1 kg).  Medication Reconciliation: complete  Mary Limon CMA    "

## 2018-01-29 NOTE — MR AVS SNAPSHOT
After Visit Summary   1/29/2018    James Blanco    MRN: [id number]           Patient Information     Date Of Birth          1980        Visit Information        Provider Department      1/29/2018 6:00 PM Hector Talbot MD [facility]        Today's Diagnoses     Pain of right forearm    -  1      Care Instructions    Due to persistent symptom and no relief from conservative measures, consult orthopedics.  You will be contacted in 1-2 business days to get a schedule for the Sports Medicine specialist.    May continue elastic wrap to support forearm.    Ibuprofen 200 mg 2-3 tablets with food every 8 hrs as needed for pain.    Icy Hot or Bengay topically as needed for pain.    Warm or Ice compress as needed for pain.    Avoid activities that increase pain.              Follow-ups after your visit        Additional Services     ORTHO  REFERRAL       [facility] is referring you to the Orthopedic  Services at [facility].       The  Representative will assist you in the coordination of your Orthopedic and Musculoskeletal Care as prescribed by your physician.    The  Representative will call you within 1 business day to help schedule your appointment, or you may contact the  Representative at:    All areas ~ [phone]     Type of Referral : Non Surgical - Sports Medicine      Timeframe requested: 1 - 2 days    Coverage of these services is subject to the terms and limitations of your health insurance plan.  Please call member services at your health plan with any benefit or coverage questions.      If X-rays, CT or MRI's have been performed, please contact the facility where they were done to arrange for , prior to your scheduled appointment.  Please bring this referral request to your appointment and present it to your specialist.                  Follow-up notes from your care team   "   Return if symptoms worsen or fail to improve.      Who to contact     If you have questions or need follow up information about today's clinic visit or your schedule please contact [facility] directly at [phone].  Normal or non-critical lab and imaging results will be communicated to you by MyChart, letter or phone within 4 business days after the clinic has received the results. If you do not hear from us within 7 days, please contact the clinic through MyChart or phone. If you have a critical or abnormal lab result, we will notify you by phone as soon as possible.  Submit refill requests through [employer] or call your pharmacy and they will forward the refill request to us. Please allow 3 business days for your refill to be completed.          Additional Information About Your Visit        [employer] Information     [employer] gives you secure access to your electronic health record. If you see a primary care provider, you can also send messages to your care team and make appointments. If you have questions, please call your primary care clinic.  If you do not have a primary care provider, please call [phone] and they will assist you.        Care EveryWhere ID     This is your Care EveryWhere ID. This could be used by other organizations to access your [facility] medical records  [id number]        Your Vitals Were     Pulse Temperature Height BMI (Body Mass Index)          83 97.1  F (36.2  C) (Tympanic) 6' 3.75\" (1.924 m) 28.66 kg/m2         Blood Pressure from Last 3 Encounters:   01/29/18 130/89   12/22/17 136/90   09/18/17 (!) 137/91    Weight from Last 3 Encounters:   01/29/18 233 lb 14.4 oz (106.1 kg)   12/22/17 230 lb (104.3 kg)   09/18/17 231 lb 3.2 oz (104.9 kg)              We Performed the Following     ORTHO  REFERRAL        Primary Care Provider Office Phone # Fax #    Hector Talbot [phone] [address] [facility] " [phone]        Equal Access to Services     [facility]ist Health VallejoFRANCES : Hadii aad ku hadmarcinboogie Miguelangelali, wafilemonda lujezcarmenha, qagrazynacarmen medinajose davidasiya win. So [facility] [phone].    ATENCIÓN: Si habla español, tiene a carter disposición servicios gratuitos de asistencia lingüística. Llame al [phone].    We comply with applicable federal civil rights laws and [place] laws. We do not discriminate on the basis of race, color, national origin, age, disability, sex, sexual orientation, or gender identity.            Thank you!     Thank you for choosing [facility]  for your care. Our goal is always to provide you with excellent care. Hearing back from our patients is one way we can continue to improve our services. Please take a few minutes to complete the written survey that you may receive in the mail after your visit with us. Thank you!             Your Updated Medication List - Protect others around you: Learn how to safely use, store and throw away your medicines at www.disposemymeds.org.          This list is accurate as of 1/29/18  6:30 PM.  Always use your most recent med list.                   Brand Name Dispense Instructions for use Diagnosis    FLUoxetine 40 MG capsule    PROzac    90 capsule    Take 1 capsule (40 mg) by mouth daily    Anxiety       omeprazole 20 MG tablet      Take 20 mg by mouth daily        simvastatin 40 MG tablet    ZOCOR    90 tablet    Take 1 tablet (40 mg) by mouth At Bedtime    Hyperlipidemia LDL goal <130

## 2018-01-30 NOTE — PATIENT INSTRUCTIONS
Due to persistent symptom and no relief from conservative measures, consult orthopedics.  You will be contacted in 1-2 business days to get a schedule for the Sports Medicine specialist.    May continue elastic wrap to support forearm.    Ibuprofen 200 mg 2-3 tablets with food every 8 hrs as needed for pain.    Icy Hot or Bengay topically as needed for pain.    Warm or Ice compress as needed for pain.    Avoid activities that increase pain.

## 2018-01-31 ENCOUNTER — OFFICE VISIT (OUTPATIENT)
Dept: ORTHOPEDICS | Facility: CLINIC | Age: 38
End: 2018-01-31
Payer: COMMERCIAL

## 2018-01-31 VITALS
BODY MASS INDEX: 29.58 KG/M2 | SYSTOLIC BLOOD PRESSURE: 132 MMHG | WEIGHT: 242.9 LBS | DIASTOLIC BLOOD PRESSURE: 88 MMHG | HEIGHT: 76 IN

## 2018-01-31 DIAGNOSIS — S63.091A SUBLUXATION OF RIGHT EXTENSOR CARPI ULNARIS TENDON, INITIAL ENCOUNTER: Primary | ICD-10-CM

## 2018-01-31 PROCEDURE — 99243 OFF/OP CNSLTJ NEW/EST LOW 30: CPT | Performed by: PEDIATRICS

## 2018-01-31 NOTE — PATIENT INSTRUCTIONS
Plan:  - Today's Plan of Care:  Medical Equipment: wrist brace    -We also discussed other future treatment options:  Occupational therapy  MRI/xray    Follow Up: as needed

## 2018-01-31 NOTE — PROGRESS NOTES
"Sports Medicine Clinic Visit    PCP: Hector Talbot    James Blanco is a 37 year old male who is seen  in consultation at the request of  Hector Talbot M.D. presenting with right wrist pain.    Injury: Patient denied injury. Reports gradual onset of ulnar sided wrist pain, does remember one specific instance in the shower scrubbing where he felt a \"pop\" and more pain.    Location of Pain: right wrist  Duration of Pain: 1 month(s)  Rating of Pain at worst: 8/10  Rating of Pain Currently: 6/10  Symptoms are better with: Ice, Ibuprofen and ACE wrap  Symptoms are worse with: rotation, gripping  Additional Features:   Positive: popping and weakness   Negative: swelling, bruising, grinding, catching, locking, instability, paresthesias and numbness  Other evaluation and/or treatments so far consists of: Ice, Heat and ACE wrap  Prior History of related problems: Right forearm fracture in high school    Social History:     Review of Systems  Skin: no bruising, no swelling  Musculoskeletal: as above  Neurologic: no numbness, paresthesias  Remainder of review of systems is negative including constitutional, CV, pulmonary, GI, except as noted in HPI or medical history.    Patient's current problem list, past medical and surgical history, and family history were reviewed.    Patient Active Problem List   Diagnosis     Anxiety     Hyperlipidemia LDL goal <130     Past Medical History:   Diagnosis Date     Anxiety      Hyperlipidaemia LDL goal <130      Past Surgical History:   Procedure Laterality Date     COLONOSCOPY N/A 3/21/2016    Procedure: COLONOSCOPY;  Surgeon: Tavo Wilcox MD;  Location: [facility]     SURGICAL HISTORY OF -       plasty surgery on face, due to injury     SURGICAL HISTORY OF -       left elbow nerve decompression, sound like ulnar     SURGICAL HISTORY OF -       left knee surgery, scope and allograph for cartilege     SURGICAL HISTORY OF -       lasix surgery     Family " "History   Problem Relation Age of Onset     Hyperlipidemia Mother      Coronary Artery Disease Father      Hyperlipidemia Father      DIABETES Father      Other Cancer Sister      lymphoma-non hodgkins         Objective  /88 (BP Location: Left arm, Patient Position: Sitting, Cuff Size: Adult Large)  Ht 6' 4\" (1.93 m)  Wt 242 lb 14.4 oz (110.2 kg)  BMI 29.57 kg/m2    GENERAL APPEARANCE: healthy, alert and no distress   GAIT: NORMAL  SKIN: no suspicious lesions or rashes  HEENT: Sclera clear, anicteric  CV: good peripheral pulses  RESP: Breathing not labored  NEURO: Normal strength and tone, mentation intact and speech normal  PSYCH:  mentation appears normal and affect normal/bright    Bilateral Wrist and Hand exam    Inspection:       No swelling, bruising or deformity bilateral    Tender:       Ulnar wrist over ECU tendon - right    Non Tender:       Remainder of the Wrist and Hand right    ROM:       Full and symmetric active and passive range of motion of the forearm, wrist and digits right    Strength:       5/5 strength in the muscles of the hand, wrist and forearm right  - pain with resisted extension of 4th and 5th tendons    Special Tests:        neg (-) TFCC compression test right    Neurovascular:       2+ radial pulses bilaterally with brisk capillary refill and      normal sensation to light touch in the radial, median and ulnar nerve distributions      Radiology  None    Assessment:  1. Subluxation of right extensor carpi ulnaris tendon, initial encounter      Symptoms localizing to ECU.  We discussed the following treatment options: symptom treatment, activity modification/rest, imaging, rehab and referral. Following discussion, plan: will trial bracing and consider OT pending course.    Plan:  - Today's Plan of Care:  Medical Equipment: wrist brace    -We also discussed other future treatment options:  Occupational therapy  MRI/xray    Follow Up: as needed    Concerning signs and symptoms " were reviewed.  The patient expressed understanding of this management plan and all questions were answered at this time.    Thanks for the opportunity to participate in the care of this patient, I will keep you updated on their progress.    CC: Hector Mon MD CAQ  Primary Care Sports Medicine  [facility]

## 2018-01-31 NOTE — LETTER
"    1/31/2018         RE: James Blanco  [address] [facility] [address]        Dear Colleague,    Thank you for referring your patient, James Blanco, to the [facility]. Please see a copy of my visit note below.    Sports Medicine Clinic Visit    PCP: Hector Talbot    James Blanco is a 37 year old male who is seen  in consultation at the request of  Hector Talbot M.D. presenting with right wrist pain.    Injury: Patient denied injury. Reports gradual onset of ulnar sided wrist pain, does remember one specific instance in the shower scrubbing where he felt a \"pop\" and more pain.    Location of Pain: right wrist  Duration of Pain: 1 month(s)  Rating of Pain at worst: 8/10  Rating of Pain Currently: 6/10  Symptoms are better with: Ice, Ibuprofen and ACE wrap  Symptoms are worse with: rotation, gripping  Additional Features:   Positive: popping and weakness   Negative: swelling, bruising, grinding, catching, locking, instability, paresthesias and numbness  Other evaluation and/or treatments so far consists of: Ice, Heat and ACE wrap  Prior History of related problems: Right forearm fracture in high school    Social History:     Review of Systems  Skin: no bruising, no swelling  Musculoskeletal: as above  Neurologic: no numbness, paresthesias  Remainder of review of systems is negative including constitutional, CV, pulmonary, GI, except as noted in HPI or medical history.    Patient's current problem list, past medical and surgical history, and family history were reviewed.    Patient Active Problem List   Diagnosis     Anxiety     Hyperlipidemia LDL goal <130     Past Medical History:   Diagnosis Date     Anxiety      Hyperlipidaemia LDL goal <130      Past Surgical History:   Procedure Laterality Date     COLONOSCOPY N/A 3/21/2016    Procedure: COLONOSCOPY;  Surgeon: Tavo Wilcox MD;  Location: [facility]     SURGICAL HISTORY OF -       plasty " "surgery on face, due to injury     SURGICAL HISTORY OF -       left elbow nerve decompression, sound like ulnar     SURGICAL HISTORY OF -       left knee surgery, scope and allograph for cartilege     SURGICAL HISTORY OF -       lasix surgery     Family History   Problem Relation Age of Onset     Hyperlipidemia Mother      Coronary Artery Disease Father      Hyperlipidemia Father      DIABETES Father      Other Cancer Sister      lymphoma-non hodgkins         Objective  /88 (BP Location: Left arm, Patient Position: Sitting, Cuff Size: Adult Large)  Ht 6' 4\" (1.93 m)  Wt 242 lb 14.4 oz (110.2 kg)  BMI 29.57 kg/m2    GENERAL APPEARANCE: healthy, alert and no distress   GAIT: NORMAL  SKIN: no suspicious lesions or rashes  HEENT: Sclera clear, anicteric  CV: good peripheral pulses  RESP: Breathing not labored  NEURO: Normal strength and tone, mentation intact and speech normal  PSYCH:  mentation appears normal and affect normal/bright    Bilateral Wrist and Hand exam    Inspection:       No swelling, bruising or deformity bilateral    Tender:       Ulnar wrist over ECU tendon - right    Non Tender:       Remainder of the Wrist and Hand right    ROM:       Full and symmetric active and passive range of motion of the forearm, wrist and digits right    Strength:       5/5 strength in the muscles of the hand, wrist and forearm right  - pain with resisted extension of 4th and 5th tendons    Special Tests:        neg (-) TFCC compression test right    Neurovascular:       2+ radial pulses bilaterally with brisk capillary refill and      normal sensation to light touch in the radial, median and ulnar nerve distributions      Radiology  None    Assessment:  1. Subluxation of right extensor carpi ulnaris tendon, initial encounter      Symptoms localizing to ECU.  We discussed the following treatment options: symptom treatment, activity modification/rest, imaging, rehab and referral. Following discussion, plan: will " trial bracing and consider OT pending course.    Plan:  - Today's Plan of Care:  Medical Equipment: wrist brace    -We also discussed other future treatment options:  Occupational therapy  MRI/xray    Follow Up: as needed    Concerning signs and symptoms were reviewed.  The patient expressed understanding of this management plan and all questions were answered at this time.    Thanks for the opportunity to participate in the care of this patient, I will keep you updated on their progress.    CC: Hector Mon MD CA  Primary Care Sports Medicine  [facility]    Again, thank you for allowing me to participate in the care of your patient.        Sincerely,        Jena Mon MD

## 2018-01-31 NOTE — MR AVS SNAPSHOT
After Visit Summary   1/31/2018    James Blanco    MRN: [id number]           Patient Information     Date Of Birth          1980        Visit Information        Provider Department      1/31/2018 2:20 PM Jena Mon MD [facility]        Today's Diagnoses     Subluxation of right extensor carpi ulnaris tendon, initial encounter    -  1      Care Instructions    Plan:  - Today's Plan of Care:  Medical Equipment: wrist brace    -We also discussed other future treatment options:  Occupational therapy  MRI/xray    Follow Up: as needed              Follow-ups after your visit        Who to contact     If you have questions or need follow up information about today's clinic visit or your schedule please contact [facility] directly at [phone].  Normal or non-critical lab and imaging results will be communicated to you by [employer], letter or phone within 4 business days after the clinic has received the results. If you do not hear from us within 7 days, please contact the clinic through [employer] or phone. If you have a critical or abnormal lab result, we will notify you by phone as soon as possible.  Submit refill requests through [employer] or call your pharmacy and they will forward the refill request to us. Please allow 3 business days for your refill to be completed.          Additional Information About Your Visit        MyChart Information     [employer] gives you secure access to your electronic health record. If you see a primary care provider, you can also send messages to your care team and make appointments. If you have questions, please call your primary care clinic.  If you do not have a primary care provider, please call [phone] and they will assist you.        Care EveryWhere ID     This is your Care EveryWhere ID. This could be used by other organizations to access your [place] medical records  [id number]       "  Your Vitals Were     Height BMI (Body Mass Index)                6' 4\" (1.93 m) 29.57 kg/m2           Blood Pressure from Last 3 Encounters:   01/31/18 132/88   01/29/18 130/89   12/22/17 136/90    Weight from Last 3 Encounters:   01/31/18 242 lb 14.4 oz (110.2 kg)   01/29/18 233 lb 14.4 oz (106.1 kg)   12/22/17 230 lb (104.3 kg)              Today, you had the following     No orders found for display       Primary Care Provider Office Phone # Fax #    Hector Alexis Talbot [phone] [address] [facility] [address]        Equal Access to Services     KRYSTLE MIRELES : Saida Delcid, waaxda luqadaha, qaybta kaalmada adeegyayoung, asiya hair . So [facility] [phone].    ATENCIÓN: Si habla español, tiene a carter disposición servicios gratuitos de asistencia lingüística. [place] al [phone].    We comply with applicable federal civil rights laws and [place] laws. We do not discriminate on the basis of race, color, national origin, age, disability, sex, sexual orientation, or gender identity.            Thank you!     Thank you for choosing [facility]  for your care. Our goal is always to provide you with excellent care. Hearing back from our patients is one way we can continue to improve our services. Please take a few minutes to complete the written survey that you may receive in the mail after your visit with us. Thank you!             Your Updated Medication List - Protect others around you: Learn how to safely use, store and throw away your medicines at www.disposemymeds.org.          This list is accurate as of 1/31/18  2:54 PM.  Always use your most recent med list.                   Brand Name Dispense Instructions for use Diagnosis    FLUoxetine 40 MG capsule    PROzac    90 capsule    Take 1 capsule (40 mg) by mouth daily    Anxiety       omeprazole 20 MG tablet      Take 20 mg by mouth " daily        simvastatin 40 MG tablet    ZOCOR    90 tablet    Take 1 tablet (40 mg) by mouth At Bedtime    Hyperlipidemia LDL goal <130

## 2018-02-01 ENCOUNTER — TRANSFERRED RECORDS (OUTPATIENT)
Dept: HEALTH INFORMATION MANAGEMENT | Facility: CLINIC | Age: 38
End: 2018-02-01

## 2018-08-01 ENCOUNTER — OFFICE VISIT (OUTPATIENT)
Dept: FAMILY MEDICINE | Facility: CLINIC | Age: 38
End: 2018-08-01
Payer: COMMERCIAL

## 2018-08-01 ENCOUNTER — MYC MEDICAL ADVICE (OUTPATIENT)
Dept: FAMILY MEDICINE | Facility: CLINIC | Age: 38
End: 2018-08-01

## 2018-08-01 VITALS
HEIGHT: 76 IN | SYSTOLIC BLOOD PRESSURE: 128 MMHG | DIASTOLIC BLOOD PRESSURE: 88 MMHG | TEMPERATURE: 97.6 F | BODY MASS INDEX: 28.02 KG/M2 | WEIGHT: 230.13 LBS | RESPIRATION RATE: 16 BRPM | HEART RATE: 80 BPM

## 2018-08-01 DIAGNOSIS — T63.441A BEE STING REACTION, ACCIDENTAL OR UNINTENTIONAL, INITIAL ENCOUNTER: Primary | ICD-10-CM

## 2018-08-01 PROCEDURE — 99213 OFFICE O/P EST LOW 20 MIN: CPT | Performed by: FAMILY MEDICINE

## 2018-08-01 RX ORDER — PREDNISONE 20 MG/1
40 TABLET ORAL DAILY
Qty: 10 TABLET | Refills: 0 | Status: SHIPPED | OUTPATIENT
Start: 2018-08-01 | End: 2018-08-06

## 2018-08-01 RX ORDER — EPINEPHRINE 0.3 MG/.3ML
0.3 INJECTION SUBCUTANEOUS PRN
Qty: 0.6 ML | Refills: 1 | Status: SHIPPED | OUTPATIENT
Start: 2018-08-01

## 2018-08-01 NOTE — NURSING NOTE
"Chief Complaint   Patient presents with     Insect Bites     Patient here with wife, Antonette. Was working outside last night at 5:00 pm was stung by bee (believes it was a ground bee).  Has been stung in the past by bees, however, never with this reaction.       Initial /88  Pulse 80  Temp 97.6  F (36.4  C) (Tympanic)  Resp 16  Ht 6' 4\" (1.93 m)  Wt 230 lb 2 oz (104.4 kg)  BMI 28.01 kg/m2 Estimated body mass index is 28.01 kg/(m^2) as calculated from the following:    Height as of this encounter: 6' 4\" (1.93 m).    Weight as of this encounter: 230 lb 2 oz (104.4 kg).    Medication Reconciliation:  complete    Sherlyn Marcum CMA (AAMA)  "

## 2018-08-01 NOTE — PATIENT INSTRUCTIONS
Local Reaction to an Insect Sting   You have been stung or bitten by an insect. The insect s venom or body fluid is causing your skin to react in the area where you were stung or bitten. This often causes redness, itching and swelling. This reaction will fade over a few hours, but it can last a few days. An insect bite or sting can become infected 1 to 3 days later, so watch for the signs below. Sometimes it is hard to tell the difference between a local reaction to the insect bite or sting and an early infection, so you may be given antibiotics.  Common insect stings causing problems are from wasps, bees, yellow jackets, and hornets. Common bites are from spiders, mosquitoes, fleas, or ticks. Other types of insects may be more common in different parts of the country or world.  Most people think of allergic reactions when someone has a rash or itchy skin. Symptoms can include:    Rash, hives, redness, welts, or blisters    Itching, burning, stinging, or pain    Swelling around the sting area.  Sometimes swelling spreads to other areas.  Home care  Medicines  The healthcare provider may prescribe medicines to relieve swelling, itching, and pain. Follow the provider s instructions when taking these medicines.    If you had a severe reaction, the provider may prescribe an epinephrine kit. Epinephrine will stop an allergic reaction from getting worse. Before you leave the hospital, be sure that you understand when and how to use this medicine.    Diphenhydramine is an oral antihistamine available at drugstores and groceries. Unless a prescription antihistamine was given, you can use this medicine to reduce itching if large areas of the skin are involved. The medicine may make you sleepy, so be careful using it in the daytime or when going to school, working, or driving. Don t use diphenhydramine if you have glaucoma or if you are a man with trouble urinating because of an enlarged prostate. Other antihistamines cause  less drowsiness and are good choices for daytime use. Ask your pharmacist for suggestions.    Don t use diphenhydramine cream on your skin. In some people it can cause additional reaction and make you allergic to this medicine.    Calamine lotion or oatmeal baths sometimes help with itching.    You may use acetaminophen or ibuprofen to control pain, unless another pain medicine was prescribed. Talk with your healthcare provider before using these medicines if you have chronic liver or kidney disease. Also talk with your provider if you ve had a stomach ulcer or GI bleeding.  General care      If itching is a problem, don t take hot showers or baths. Stay out of direct sunlight. These heat up your skin and will make the itching worse.    Use an ice pack to reduce local areas of redness and itching. You can make your own ice pack by putting ice cubes in a bag that seals and wrapping it in a thin towel. Don t put the ice directly on your skin, because it can damage the skin.    Try not to scratch any affected areas and damage the skin. This will help prevent an infection.    If oral antibiotics were prescribed, be sure to take them until finished.  Preventing future reactions    Future reactions could be worse than this one, so try to stay away from places where you might be stung again.    Be aware that honeybees nest in trees. Wasps and yellow jackets nest in the ground, trees, or roof eaves.    If you are stung by a honeybee, a stinger will remain in your skin. Wasps, yellow jackets, and hornets don t leave a stinger behind. Move away from the nest area right away. The stinger of a honeybee releases a substance that will attract other bees to you. Once you are away from the nest, then remove the stinger as quickly as possible.    After any sting, you may apply ice and take diphenhydramine or another antihistamine. If you develop any of the warning signs below, seek help right away.    If you are at high risk for  another sting, or if your reaction included dizziness, fainting, or trouble breathing or swallowing, ask your doctor for an insect allergy kit.    Remove any ticks on the skin with a set of fine tweezers.  the tick as close to the skin as possible. Pull back gently but firmly. Use an even, steady pressure. Don t jerk or twist. Don t squeeze, crush, or puncture the body of the tick. The bodily fluids may contain infection-causing germs. Don t use a smoldering match or cigarette, nail polish, petroleum jelly, liquid soap, or kerosene. These may irritate the tick. If any mouthparts of the tick remain in the skin, these should be left alone. They will fall off on their own. Trying to remove these parts may damage the skin unless they can be removed very easily. After the tick is removed, wash the bite area with rubbing alcohol, iodine, or soap and water.  Follow-up care  Follow up with your doctor in 2 days, or as advised, if your symptoms don t start to get better.  Call 911  Call 911 if any of these occur:    Trouble breathing or swallowing, or wheezing    New or worsening swelling in the mouth, throat, or tongue    Hoarse voice or trouble speaking    Confused    Very drowsy or trouble awakening    Fainting or loss of consciousness    Rapid heart rate    Low blood pressure    Feeling of doom    Nausea, vomiting, abdominal pain, or diarrhea    Vomiting blood, or large amounts of blood in stool    Seizure  When to seek medical advice  Call your healthcare provider right away if any of these occur:    Spreading areas of itching, redness or swelling    New or worse swelling in the face, eyelids, or  lips    Dizziness or weakness  Also call your provider right away if you have signs of infection:    Spreading redness    Increased pain or swelling    Fever of 100.4 F (38 C) or higher, or as directed by your healthcare provider    Colored fluid draining from the sting area   Date Last Reviewed: 10/1/2016    3063-1434 [employer]  [employer] [address] [place] [address], [place] [address]. All rights reserved. This information is not intended as a substitute for professional medical care. Always follow your healthcare professional's instructions.

## 2018-08-01 NOTE — MR AVS SNAPSHOT
After Visit Summary   8/1/2018    James Blanco    MRN: [id number]           Patient Information     Date Of Birth          1980        Visit Information        Provider Department      8/1/2018 9:40 AM Hector Talbot MD [facility]        Today's Diagnoses     Bee sting reaction, accidental or unintentional, initial encounter    -  1      Care Instructions      Local Reaction to an Insect Sting   You have been stung or bitten by an insect. The insect s venom or body fluid is causing your skin to react in the area where you were stung or bitten. This often causes redness, itching and swelling. This reaction will fade over a few hours, but it can last a few days. An insect bite or sting can become infected 1 to 3 days later, so watch for the signs below. Sometimes it is hard to tell the difference between a local reaction to the insect bite or sting and an early infection, so you may be given antibiotics.  Common insect stings causing problems are from wasps, bees, yellow jackets, and hornets. Common bites are from spiders, mosquitoes, fleas, or ticks. Other types of insects may be more common in different parts of the country or world.  Most people think of allergic reactions when someone has a rash or itchy skin. Symptoms can include:    Rash, hives, redness, welts, or blisters    Itching, burning, stinging, or pain    Swelling around the sting area.  Sometimes swelling spreads to other areas.  Home care  Medicines  The healthcare provider may prescribe medicines to relieve swelling, itching, and pain. Follow the provider s instructions when taking these medicines.    If you had a severe reaction, the provider may prescribe an epinephrine kit. Epinephrine will stop an allergic reaction from getting worse. Before you leave the hospital, be sure that you understand when and how to use this medicine.    Diphenhydramine is an oral antihistamine available at drugstores and  groceries. Unless a prescription antihistamine was given, you can use this medicine to reduce itching if large areas of the skin are involved. The medicine may make you sleepy, so be careful using it in the daytime or when going to school, working, or driving. Don t use diphenhydramine if you have glaucoma or if you are a man with trouble urinating because of an enlarged prostate. Other antihistamines cause less drowsiness and are good choices for daytime use. Ask your pharmacist for suggestions.    Don t use diphenhydramine cream on your skin. In some people it can cause additional reaction and make you allergic to this medicine.    Calamine lotion or oatmeal baths sometimes help with itching.    You may use acetaminophen or ibuprofen to control pain, unless another pain medicine was prescribed. Talk with your healthcare provider before using these medicines if you have chronic liver or kidney disease. Also talk with your provider if you ve had a stomach ulcer or GI bleeding.  General care      If itching is a problem, don t take hot showers or baths. Stay out of direct sunlight. These heat up your skin and will make the itching worse.    Use an ice pack to reduce local areas of redness and itching. You can make your own ice pack by putting ice cubes in a bag that seals and wrapping it in a thin towel. Don t put the ice directly on your skin, because it can damage the skin.    Try not to scratch any affected areas and damage the skin. This will help prevent an infection.    If oral antibiotics were prescribed, be sure to take them until finished.  Preventing future reactions    Future reactions could be worse than this one, so try to stay away from places where you might be stung again.    Be aware that honeybees nest in trees. Wasps and yellow jackets nest in the ground, trees, or roof eaves.    If you are stung by a honeybee, a stinger will remain in your skin. Wasps, yellow jackets, and hornets don t leave a  stinger behind. Move away from the nest area right away. The stinger of a honeybee releases a substance that will attract other bees to you. Once you are away from the nest, then remove the stinger as quickly as possible.    After any sting, you may apply ice and take diphenhydramine or another antihistamine. If you develop any of the warning signs below, seek help right away.    If you are at high risk for another sting, or if your reaction included dizziness, fainting, or trouble breathing or swallowing, ask your doctor for an insect allergy kit.    Remove any ticks on the skin with a set of fine tweezers.  the tick as close to the skin as possible. Pull back gently but firmly. Use an even, steady pressure. Don t jerk or twist. Don t squeeze, crush, or puncture the body of the tick. The bodily fluids may contain infection-causing germs. Don t use a smoldering match or cigarette, nail polish, petroleum jelly, liquid soap, or kerosene. These may irritate the tick. If any mouthparts of the tick remain in the skin, these should be left alone. They will fall off on their own. Trying to remove these parts may damage the skin unless they can be removed very easily. After the tick is removed, wash the bite area with rubbing alcohol, iodine, or soap and water.  Follow-up care  Follow up with your doctor in 2 days, or as advised, if your symptoms don t start to get better.  Call 911  Call 911 if any of these occur:    Trouble breathing or swallowing, or wheezing    New or worsening swelling in the mouth, throat, or tongue    Hoarse voice or trouble speaking    Confused    Very drowsy or trouble awakening    Fainting or loss of consciousness    Rapid heart rate    Low blood pressure    Feeling of doom    Nausea, vomiting, abdominal pain, or diarrhea    Vomiting blood, or large amounts of blood in stool    Seizure  When to seek medical advice  Call your healthcare provider right away if any of these occur:    Spreading  areas of itching, redness or swelling    New or worse swelling in the face, eyelids, or  lips    Dizziness or weakness  Also call your provider right away if you have signs of infection:    Spreading redness    Increased pain or swelling    Fever of 100.4 F (38 C) or higher, or as directed by your healthcare provider    Colored fluid draining from the sting area   Date Last Reviewed: 10/1/2016    0219-6891 [employer]. [address] [place] [address]. All rights reserved. This information is not intended as a substitute for professional medical care. Always follow your healthcare professional's instructions.                Follow-ups after your visit        Follow-up notes from your care team     Return if symptoms worsen or fail to improve.      Who to contact     If you have questions or need follow up information about today's clinic visit or your schedule please contact [facility] directly at [phone].  Normal or non-critical lab and imaging results will be communicated to you by MyChart, letter or phone within 4 business days after the clinic has received the results. If you do not hear from us within 7 days, please contact the clinic through [employer] or phone. If you have a critical or abnormal lab result, we will notify you by phone as soon as possible.  Submit refill requests through [facility] or call your pharmacy and they will forward the refill request to us. Please allow 3 business days for your refill to be completed.          Additional Information About Your Visit        MyChart Information     [facility] gives you secure access to your electronic health record. If you see a primary care provider, you can also send messages to your care team and make appointments. If you have questions, please call your primary care clinic.  If you do not have a primary care provider, please call [phone] and they will assist you.        Care EveryWhere ID     This is your Care  "EveryWhere ID. This could be used by other organizations to access your [facility] medical records  [id number]        Your Vitals Were     Pulse Temperature Respirations Height BMI (Body Mass Index)       80 97.6  F (36.4  C) (Tympanic) 16 6' 4\" (1.93 m) 28.01 kg/m2        Blood Pressure from Last 3 Encounters:   08/01/18 128/88   01/31/18 132/88   01/29/18 130/89    Weight from Last 3 Encounters:   08/01/18 230 lb 2 oz (104.4 kg)   01/31/18 242 lb 14.4 oz (110.2 kg)   01/29/18 233 lb 14.4 oz (106.1 kg)              Today, you had the following     No orders found for display         Today's Medication Changes          These changes are accurate as of 8/1/18 10:28 AM.  If you have any questions, ask your nurse or doctor.               Start taking these medicines.        Dose/Directions    EPINEPHrine 0.3 MG/0.3ML injection 2-pack   Commonly known as:  EPIPEN 2-CHAIM   Used for:  Bee sting reaction, accidental or unintentional, initial encounter   Started by:  Hector Talbot MD        Dose:  0.3 mg   Inject 0.3 mLs (0.3 mg) into the muscle as needed for anaphylaxis   Quantity:  0.6 mL   Refills:  1       predniSONE 20 MG tablet   Commonly known as:  DELTASONE   Used for:  Bee sting reaction, accidental or unintentional, initial encounter   Started by:  Hector Talbot MD        Dose:  40 mg   Take 2 tablets (40 mg) by mouth daily for 5 days   Quantity:  10 tablet   Refills:  0            Where to get your medicines      These medications were sent to [facility] [place], [place] - [address] [facility]  [address] [place] [address]     Phone:  [phone]     predniSONE 20 MG tablet         Call your pharmacy to confirm that your medication is ready for pickup. It may take up to 24 hours for them to receive the prescription. If the prescription is not ready within 3 business days, please contact your clinic or your provider.     We will let you know when these medications are " ready. If you don't hear back within 3 business days, please contact us.     EPINEPHrine 0.3 MG/0.3ML injection 2-pack                Primary Care Provider Office Phone # Fax #    Hector Talbot [phone] [address] [facility] [address]        Equal Access to Services     KRYSTLE MIRELES : Hadii aad ku hadasho Soomaali, waaxda luqadaha, qaybta kaalmada adeegyada, waxay julianain hayaan adegonzalez khclarash lanadira . So [facility] [phone].    ATENCIÓN: Si habla español, tiene a carter disposición servicios gratuitos de asistencia lingüística. Marg[place] al [phone].    We comply with applicable federal civil rights laws and [place] laws. We do not discriminate on the basis of race, color, national origin, age, disability, sex, sexual orientation, or gender identity.            Thank you!     Thank you for choosing [facility]  for your care. Our goal is always to provide you with excellent care. Hearing back from our patients is one way we can continue to improve our services. Please take a few minutes to complete the written survey that you may receive in the mail after your visit with us. Thank you!             Your Updated Medication List - Protect others around you: Learn how to safely use, store and throw away your medicines at www.disposemymeds.org.          This list is accurate as of 8/1/18 10:28 AM.  Always use your most recent med list.                   Brand Name Dispense Instructions for use Diagnosis    EPINEPHrine 0.3 MG/0.3ML injection 2-pack    EPIPEN 2-CHAIM    0.6 mL    Inject 0.3 mLs (0.3 mg) into the muscle as needed for anaphylaxis    Bee sting reaction, accidental or unintentional, initial encounter       FLUoxetine 40 MG capsule    PROzac    90 capsule    Take 1 capsule (40 mg) by mouth daily    Anxiety       omeprazole 20 MG tablet      Take 20 mg by mouth daily        order for DME     1 Device    Equipment being ordered: wrist quickfit right     Subluxation of right extensor carpi ulnaris tendon, initial encounter       predniSONE 20 MG tablet    DELTASONE    10 tablet    Take 2 tablets (40 mg) by mouth daily for 5 days    Bee sting reaction, accidental or unintentional, initial encounter       simvastatin 40 MG tablet    ZOCOR    90 tablet    Take 1 tablet (40 mg) by mouth At Bedtime    Hyperlipidemia LDL goal <130

## 2018-08-01 NOTE — PROGRESS NOTES
SUBJECTIVE:   James Blanco is a 37 year old male who presents to clinic today for the following health issues:      Bee Sting      Duration: Last night at 5:00 pm    Description (location/character/radiation): stung in left eyelid.    Intensity:  severe    Accompanying signs and symptoms: Left eye is totally shut; swelling along left jaw line and cheek.    History (similar episodes/previous evaluation): Has been stung before by bees, however, stings have been on patient's back.      Precipitating or alleviating factors: None    Therapies tried and outcome: OTC Benadryl, icing, ibuprofen.       Verified above history with patient.  Patient denies throat symptoms, dyspnea, wheezing, dysphagia, nasal congestion, dizziness, chest pain, palpitation or body rash    Problem list and histories reviewed & adjusted, as indicated.  Additional history: as documented    Patient Active Problem List   Diagnosis     Anxiety     Hyperlipidemia LDL goal <130     Past Surgical History:   Procedure Laterality Date     COLONOSCOPY N/A 3/21/2016    Procedure: COLONOSCOPY;  Surgeon: Tavo Wilcox MD;  Location: [facility]     SURGICAL HISTORY OF -       plasty surgery on face, due to injury     SURGICAL HISTORY OF -       left elbow nerve decompression, sound like ulnar     SURGICAL HISTORY OF -       left knee surgery, scope and allograph for cartilege     SURGICAL HISTORY OF -       lasix surgery       Social History   Substance Use Topics     Smoking status: Former Smoker     Packs/day: 1.00     Years: 13.00     Smokeless tobacco: Former User     Alcohol use Yes      Comment: 12-18 weekly     Family History   Problem Relation Age of Onset     Hyperlipidemia Mother      Coronary Artery Disease Father      Hyperlipidemia Father      Diabetes Father      Other Cancer Sister      lymphoma-non hodgkins         Current Outpatient Prescriptions   Medication Sig Dispense Refill     EPINEPHrine (EPIPEN 2-CHAIM) 0.3 MG/0.3ML injection 2-pack Inject  "0.3 mLs (0.3 mg) into the muscle as needed for anaphylaxis 0.6 mL 1     FLUoxetine (PROZAC) 40 MG capsule Take 1 capsule (40 mg) by mouth daily 90 capsule 3     omeprazole 20 MG tablet Take 20 mg by mouth daily       predniSONE (DELTASONE) 20 MG tablet Take 2 tablets (40 mg) by mouth daily for 5 days 10 tablet 0     simvastatin (ZOCOR) 40 MG tablet Take 1 tablet (40 mg) by mouth At Bedtime 90 tablet 3     order for DME Equipment being ordered: wrist quickfit right 1 Device 0     Allergies   Allergen Reactions     Ceclor [Cefaclor]      Penicillins        Reviewed and updated as needed this visit by clinical staff  Tobacco  Allergies       Reviewed and updated as needed this visit by Provider         ROS:  C: NEGATIVE for fever, chills or change in weight  I: NEGATIVE for worrisome rashes, moles or lesions  E: NEGATIVE for vision changes except for the swelling of the left eye  ENT/MOUTH: see above  RESP:as above  CV: NEGATIVE for chest pain, palpitations or peripheral edema  GI: NEGATIVE for nausea, abdominal pain, heartburn, or change in bowel habits  M: NEGATIVE for significant arthralgias or myalgia    OBJECTIVE:                                                    /88  Pulse 80  Temp 97.6  F (36.4  C) (Tympanic)  Resp 16  Ht 6' 4\" (1.93 m)  Wt 230 lb 2 oz (104.4 kg)  BMI 28.01 kg/m2  Body mass index is 28.01 kg/(m^2).  GEN: alert, oriented x 3, NAD  EYES: moderate left periorbital hyperemia with complete closure of eyelids with only mild tenderness on palpation, swelling extends to left forehead to left TMJ; right eye with no swelling at all; left eye with mild bulbar conjunctival injection with no active discharge, good pupillary reflex  FACE: as above; no nasal swelling  ENT: no nasal congestion, throat clear with no swelling/hypermia  LUNGS: clear to auscultation bilaterally, no wheezing  CV: normal rate, regular rhythm, no murmur      Diagnostic test results:  Diagnostic Test Results:  none  "     ASSESSMENT/PLAN:                                                        ICD-10-CM    1. Bee sting reaction, accidental or unintentional, initial encounter T63.441A predniSONE (DELTASONE) 20 MG tablet     EPINEPHrine (EPIPEN 2-CHAIM) 0.3 MG/0.3ML injection 2-pack     Moderate-severe local reaction to bee sting, more than his usual reaction.  Discussed role of prednisone in treatment.  No anaphylaxis to warrant emergent treatment.  Discussed prevention of future reactions.  Discussed use of epinephrine injection - patient concurred to obtain supply for possible more severe reaction in future.  Cold packs to left eye.  Return precautions discussed and given to patient.      Follow up with Provider - 2 days if no improvement   Patient Instructions     Local Reaction to an Insect Sting   You have been stung or bitten by an insect. The insect s venom or body fluid is causing your skin to react in the area where you were stung or bitten. This often causes redness, itching and swelling. This reaction will fade over a few hours, but it can last a few days. An insect bite or sting can become infected 1 to 3 days later, so watch for the signs below. Sometimes it is hard to tell the difference between a local reaction to the insect bite or sting and an early infection, so you may be given antibiotics.  Common insect stings causing problems are from wasps, bees, yellow jackets, and hornets. Common bites are from spiders, mosquitoes, fleas, or ticks. Other types of insects may be more common in different parts of the country or world.  Most people think of allergic reactions when someone has a rash or itchy skin. Symptoms can include:    Rash, hives, redness, welts, or blisters    Itching, burning, stinging, or pain    Swelling around the sting area.  Sometimes swelling spreads to other areas.  Home care  Medicines  The healthcare provider may prescribe medicines to relieve swelling, itching, and pain. Follow the provider s  instructions when taking these medicines.    If you had a severe reaction, the provider may prescribe an epinephrine kit. Epinephrine will stop an allergic reaction from getting worse. Before you leave the hospital, be sure that you understand when and how to use this medicine.    Diphenhydramine is an oral antihistamine available at drugstores and groceries. Unless a prescription antihistamine was given, you can use this medicine to reduce itching if large areas of the skin are involved. The medicine may make you sleepy, so be careful using it in the daytime or when going to school, working, or driving. Don t use diphenhydramine if you have glaucoma or if you are a man with trouble urinating because of an enlarged prostate. Other antihistamines cause less drowsiness and are good choices for daytime use. Ask your pharmacist for suggestions.    Don t use diphenhydramine cream on your skin. In some people it can cause additional reaction and make you allergic to this medicine.    Calamine lotion or oatmeal baths sometimes help with itching.    You may use acetaminophen or ibuprofen to control pain, unless another pain medicine was prescribed. Talk with your healthcare provider before using these medicines if you have chronic liver or kidney disease. Also talk with your provider if you ve had a stomach ulcer or GI bleeding.  General care      If itching is a problem, don t take hot showers or baths. Stay out of direct sunlight. These heat up your skin and will make the itching worse.    Use an ice pack to reduce local areas of redness and itching. You can make your own ice pack by putting ice cubes in a bag that seals and wrapping it in a thin towel. Don t put the ice directly on your skin, because it can damage the skin.    Try not to scratch any affected areas and damage the skin. This will help prevent an infection.    If oral antibiotics were prescribed, be sure to take them until finished.  Preventing future  reactions    Future reactions could be worse than this one, so try to stay away from places where you might be stung again.    Be aware that honeybees nest in trees. Wasps and yellow jackets nest in the ground, trees, or roof eaves.    If you are stung by a honeybee, a stinger will remain in your skin. Wasps, yellow jackets, and hornets don t leave a stinger behind. Move away from the nest area right away. The stinger of a honeybee releases a substance that will attract other bees to you. Once you are away from the nest, then remove the stinger as quickly as possible.    After any sting, you may apply ice and take diphenhydramine or another antihistamine. If you develop any of the warning signs below, seek help right away.    If you are at high risk for another sting, or if your reaction included dizziness, fainting, or trouble breathing or swallowing, ask your doctor for an insect allergy kit.    Remove any ticks on the skin with a set of fine tweezers.  the tick as close to the skin as possible. Pull back gently but firmly. Use an even, steady pressure. Don t jerk or twist. Don t squeeze, crush, or puncture the body of the tick. The bodily fluids may contain infection-causing germs. Don t use a smoldering match or cigarette, nail polish, petroleum jelly, liquid soap, or kerosene. These may irritate the tick. If any mouthparts of the tick remain in the skin, these should be left alone. They will fall off on their own. Trying to remove these parts may damage the skin unless they can be removed very easily. After the tick is removed, wash the bite area with rubbing alcohol, iodine, or soap and water.  Follow-up care  Follow up with your doctor in 2 days, or as advised, if your symptoms don t start to get better.  Call 911  Call 911 if any of these occur:    Trouble breathing or swallowing, or wheezing    New or worsening swelling in the mouth, throat, or tongue    Hoarse voice or trouble  speaking    Confused    Very drowsy or trouble awakening    Fainting or loss of consciousness    Rapid heart rate    Low blood pressure    Feeling of doom    Nausea, vomiting, abdominal pain, or diarrhea    Vomiting blood, or large amounts of blood in stool    Seizure  When to seek medical advice  Call your healthcare provider right away if any of these occur:    Spreading areas of itching, redness or swelling    New or worse swelling in the face, eyelids, or  lips    Dizziness or weakness  Also call your provider right away if you have signs of infection:    Spreading redness    Increased pain or swelling    Fever of 100.4 F (38 C) or higher, or as directed by your healthcare provider    Colored fluid draining from the sting area   Date Last Reviewed: 10/1/2016    9294-7960 [employer]. [address] [place] [address], [place] [address]. All rights reserved. This information is not intended as a substitute for professional medical care. Always follow your healthcare professional's instructions.            Hector Talbot MD  [facility]

## 2018-08-04 ENCOUNTER — MYC REFILL (OUTPATIENT)
Dept: FAMILY MEDICINE | Facility: CLINIC | Age: 38
End: 2018-08-04

## 2018-08-04 DIAGNOSIS — T63.441A BEE STING REACTION, ACCIDENTAL OR UNINTENTIONAL, INITIAL ENCOUNTER: ICD-10-CM

## 2018-08-04 RX ORDER — EPINEPHRINE 0.3 MG/.3ML
0.3 INJECTION SUBCUTANEOUS PRN
Qty: 0.6 ML | Refills: 1 | Status: CANCELLED | OUTPATIENT
Start: 2018-08-04

## 2018-08-06 NOTE — TELEPHONE ENCOUNTER
Message from [employer]:  Original authorizing provider: Hector Talbot MD    James Blanco would like a refill of the following medications:  EPINEPHrine (EPIPEN 2-CHAIM) 0.3 MG/0.3ML injection 2-pack [Hector Talbot MD]    Preferred pharmacy: [facility], [place] - [address] [facility]    Comment:  would like to get this filled with you guys. was quoted around $340. please let me know when you have it ready. would like to  later today. thanks.

## 2018-08-22 DIAGNOSIS — K70.0 ALCOHOLIC FATTY LIVER: Primary | ICD-10-CM

## 2018-08-22 DIAGNOSIS — K76.0 FATTY METAMORPHOSIS OF LIVER: ICD-10-CM

## 2018-09-05 ENCOUNTER — MYC MEDICAL ADVICE (OUTPATIENT)
Dept: FAMILY MEDICINE | Facility: CLINIC | Age: 38
End: 2018-09-05

## 2018-09-06 DIAGNOSIS — K76.0 FATTY METAMORPHOSIS OF LIVER: ICD-10-CM

## 2018-09-06 DIAGNOSIS — K70.0 ALCOHOLIC FATTY LIVER: ICD-10-CM

## 2018-09-06 LAB
ALBUMIN SERPL-MCNC: 4.2 G/DL (ref 3.4–5)
ALP SERPL-CCNC: 68 U/L (ref 40–150)
ALT SERPL W P-5'-P-CCNC: 62 U/L (ref 0–70)
AST SERPL W P-5'-P-CCNC: 41 U/L (ref 0–45)
BILIRUB DIRECT SERPL-MCNC: 0.2 MG/DL (ref 0–0.2)
BILIRUB SERPL-MCNC: 0.9 MG/DL (ref 0.2–1.3)
PROT SERPL-MCNC: 7.6 G/DL (ref 6.8–8.8)

## 2018-09-06 PROCEDURE — 36415 COLL VENOUS BLD VENIPUNCTURE: CPT | Performed by: NURSE PRACTITIONER

## 2018-09-06 PROCEDURE — 80076 HEPATIC FUNCTION PANEL: CPT | Performed by: NURSE PRACTITIONER

## 2018-09-11 ENCOUNTER — TELEPHONE (OUTPATIENT)
Dept: FAMILY MEDICINE | Facility: CLINIC | Age: 38
End: 2018-09-11

## 2018-09-11 DIAGNOSIS — E78.5 HYPERLIPIDEMIA LDL GOAL <130: ICD-10-CM

## 2018-09-11 DIAGNOSIS — F41.9 ANXIETY: ICD-10-CM

## 2018-09-12 ENCOUNTER — TRANSFERRED RECORDS (OUTPATIENT)
Dept: HEALTH INFORMATION MANAGEMENT | Facility: CLINIC | Age: 38
End: 2018-09-12

## 2018-09-12 NOTE — TELEPHONE ENCOUNTER
"Requested Prescriptions   Pending Prescriptions Disp Refills     simvastatin (ZOCOR) 40 MG tablet [Pharmacy Med Name: SIMVASTATIN 40 MG   TAB ACCO]  Last Written Prescription Date:  09/19/2017  Last Fill Quantity: 90,  # refills: 3   Last office visit: 8/1/2018 with prescribing provider:  junie   Future Office Visit:     90 tablet 2     Sig: TAKE ONE TABLET BY MOUTH AT BEDTIME    Statins Protocol Failed    9/11/2018  1:42 PM       Failed - LDL on file in past 12 months    Recent Labs   Lab Test  09/04/15   0818   LDL  117            Passed - No abnormal creatine kinase in past 12 months    No lab results found.            Passed - Recent (12 mo) or future (30 days) visit within the authorizing provider's specialty    Patient had office visit in the last 12 months or has a visit in the next 30 days with authorizing provider or within the authorizing provider's specialty.  See \"Patient Info\" tab in inbasket, or \"Choose Columns\" in Meds & Orders section of the refill encounter.           Passed - Patient is age 18 or older        FLUoxetine (PROZAC) 40 MG capsule [Pharmacy Med Name: FLUOXETINE 40 MG    CAP AURO]  Last Written Prescription Date:  09/19/2017  Last Fill Quantity: 90,  # refills: 3   Last office visit: 8/1/2018 with prescribing provider:  junie   Future Office Visit:     90 capsule 2     Sig: TAKE ONE CAPSULE BY MOUTH ONE TIME DAILY    SSRIs Protocol Passed    9/11/2018  1:42 PM       Passed - Recent (12 mo) or future (30 days) visit within the authorizing provider's specialty    Patient had office visit in the last 12 months or has a visit in the next 30 days with authorizing provider or within the authorizing provider's specialty.  See \"Patient Info\" tab in inbasket, or \"Choose Columns\" in Meds & Orders section of the refill encounter.           Passed - Patient is age 18 or older        Jose Raul LANDRUM (R)    "

## 2018-09-13 RX ORDER — FLUOXETINE 40 MG/1
CAPSULE ORAL
Qty: 90 CAPSULE | Refills: 2 | OUTPATIENT
Start: 2018-09-13

## 2018-09-13 RX ORDER — SIMVASTATIN 40 MG
TABLET ORAL
Qty: 90 TABLET | Refills: 2 | OUTPATIENT
Start: 2018-09-13

## 2018-09-13 NOTE — TELEPHONE ENCOUNTER
Patient is due for anxiety recheck on prozac med - appt scheduled.  He has enough med to get to appt.  He is also due for FLP and states he will schedule this.  Also has enough med to get to appt.  Liz LANE RN

## 2018-09-20 DIAGNOSIS — E78.5 HYPERLIPIDEMIA LDL GOAL <130: ICD-10-CM

## 2018-09-20 LAB
CHOLEST SERPL-MCNC: 214 MG/DL
HDLC SERPL-MCNC: 52 MG/DL
LDLC SERPL CALC-MCNC: 113 MG/DL
NONHDLC SERPL-MCNC: 162 MG/DL
TRIGL SERPL-MCNC: 244 MG/DL

## 2018-09-20 PROCEDURE — 36415 COLL VENOUS BLD VENIPUNCTURE: CPT | Performed by: FAMILY MEDICINE

## 2018-09-20 PROCEDURE — 80061 LIPID PANEL: CPT | Performed by: FAMILY MEDICINE

## 2018-09-26 ENCOUNTER — OFFICE VISIT (OUTPATIENT)
Dept: FAMILY MEDICINE | Facility: CLINIC | Age: 38
End: 2018-09-26
Payer: COMMERCIAL

## 2018-09-26 VITALS
WEIGHT: 236.8 LBS | TEMPERATURE: 97.9 F | OXYGEN SATURATION: 98 % | DIASTOLIC BLOOD PRESSURE: 78 MMHG | HEIGHT: 76 IN | BODY MASS INDEX: 28.84 KG/M2 | RESPIRATION RATE: 16 BRPM | SYSTOLIC BLOOD PRESSURE: 124 MMHG | HEART RATE: 90 BPM

## 2018-09-26 DIAGNOSIS — Z23 NEED FOR PROPHYLACTIC VACCINATION AND INOCULATION AGAINST INFLUENZA: ICD-10-CM

## 2018-09-26 DIAGNOSIS — K70.0 FATTY LIVER, ALCOHOLIC: ICD-10-CM

## 2018-09-26 DIAGNOSIS — Z00.00 ENCOUNTER FOR ROUTINE ADULT HEALTH EXAMINATION WITHOUT ABNORMAL FINDINGS: Primary | ICD-10-CM

## 2018-09-26 DIAGNOSIS — E78.2 MIXED HYPERLIPIDEMIA: ICD-10-CM

## 2018-09-26 DIAGNOSIS — Z11.4 SCREENING FOR HUMAN IMMUNODEFICIENCY VIRUS: ICD-10-CM

## 2018-09-26 DIAGNOSIS — F41.9 ANXIETY: ICD-10-CM

## 2018-09-26 PROCEDURE — 90686 IIV4 VACC NO PRSV 0.5 ML IM: CPT | Performed by: FAMILY MEDICINE

## 2018-09-26 PROCEDURE — 99395 PREV VISIT EST AGE 18-39: CPT | Mod: 25 | Performed by: FAMILY MEDICINE

## 2018-09-26 PROCEDURE — 90471 IMMUNIZATION ADMIN: CPT | Performed by: FAMILY MEDICINE

## 2018-09-26 RX ORDER — LOPERAMIDE HCL 2 MG
2 CAPSULE ORAL 4 TIMES DAILY PRN
COMMUNITY
End: 2020-01-09

## 2018-09-26 RX ORDER — BACLOFEN 10 MG/1
TABLET ORAL
COMMUNITY
Start: 2018-09-14 | End: 2019-10-16

## 2018-09-26 RX ORDER — OMEGA-3 FATTY ACIDS/FISH OIL 300-1000MG
200 CAPSULE ORAL EVERY 4 HOURS PRN
COMMUNITY
End: 2019-10-16 | Stop reason: ALTCHOICE

## 2018-09-26 RX ORDER — MAGNESIUM OXIDE 500 MG
5 TABLET ORAL AT BEDTIME
COMMUNITY
Start: 2018-09-26 | End: 2020-01-09

## 2018-09-26 ASSESSMENT — PATIENT HEALTH QUESTIONNAIRE - PHQ9: 5. POOR APPETITE OR OVEREATING: SEVERAL DAYS

## 2018-09-26 ASSESSMENT — ANXIETY QUESTIONNAIRES
2. NOT BEING ABLE TO STOP OR CONTROL WORRYING: NOT AT ALL
6. BECOMING EASILY ANNOYED OR IRRITABLE: NOT AT ALL
1. FEELING NERVOUS, ANXIOUS, OR ON EDGE: NEARLY EVERY DAY
7. FEELING AFRAID AS IF SOMETHING AWFUL MIGHT HAPPEN: NOT AT ALL
IF YOU CHECKED OFF ANY PROBLEMS ON THIS QUESTIONNAIRE, HOW DIFFICULT HAVE THESE PROBLEMS MADE IT FOR YOU TO DO YOUR WORK, TAKE CARE OF THINGS AT HOME, OR GET ALONG WITH OTHER PEOPLE: NOT DIFFICULT AT ALL
GAD7 TOTAL SCORE: 5
5. BEING SO RESTLESS THAT IT IS HARD TO SIT STILL: SEVERAL DAYS
3. WORRYING TOO MUCH ABOUT DIFFERENT THINGS: NOT AT ALL

## 2018-09-26 NOTE — PATIENT INSTRUCTIONS
Schedule fasting lab appointment in [place] [address] for cholesterol and HIV testing.    Be consistent with low trans fat and saturated fat diet.  Eat food rich in omega-3-fatty acids as you tolerate. (salmon, olive oil)  Eat 5 cups of vegetables, fruits and whole grains per day.  Limit starchy food (white rice, white bread, white pasta, white potatoes) to less than a cup per meal.  Minimize sweets, junk food and fastfood. Limit soda beverages to one serving per day; best to avoid it altogether though.  Exercise: moderate intensity sustained for at least 30 mins per episode, goal of 150 mins per week at least  Combine cardiovascular and resistance exercises.  These exercise recommendations are in addition to your daily activity at work or home.  Work on losing weight if you are above your goal body mass index.    Continue  to decrease alcohol intake as discussed by your liver specialist.    Flu shot today.    Feeling of anxiety can be from decreasing alcohol intake.  Continue taking the muscle relaxer.  Continue fluoxetine for anxiety.  If worsening after next few weeks, see provider again.  Preventive Health Recommendations  Male Ages 26 - 39    Yearly exam:             See your health care provider every year in order to  o   Review health changes.   o   Discuss preventive care.    o   Review your medicines if your doctor has prescribed any.    You should be tested each year for STDs (sexually transmitted diseases), if you re at risk.     After age 35, talk to your provider about cholesterol testing. If you are at risk for heart disease, have your cholesterol tested at least every 5 years.     If you are at risk for diabetes, you should have a diabetes test (fasting glucose).  Shots: Get a flu shot each year. Get a tetanus shot every 10 years.     Nutrition:    Eat at least 5 servings of fruits and vegetables daily.     Eat whole-grain bread, whole-wheat pasta and brown rice instead of white grains and rice.     Get  adequate Calcium and Vitamin D.     Lifestyle    Exercise for at least 150 minutes a week (30 minutes a day, 5 days a week). This will help you control your weight and prevent disease.     Limit alcohol to one drink per day.     No smoking.     Wear sunscreen to prevent skin cancer.     See your dentist every six months for an exam and cleaning.           Thank you for choosing [facility].  You may be receiving a survey in the mail from [email or web address] regarding your visit today.  Please take a few minutes to complete and return the survey to let us know how we are doing.      If you have questions or concerns, please contact us via [employer] or you can contact your care team at [phone].    Our Clinic hours are:  Monday 6:40 am  to 7:00 pm  Tuesday -Friday 6:40 am to 5:00 pm    The [place] outpatient lab hours are:  Monday - Friday 6:10 am to 4:45 pm  Saturdays 7:00 am to 11:00 am  Appointments are required, call [phone]    If you have clinical questions after hours or would like to schedule an appointment,  call the clinic at [phone].

## 2018-09-26 NOTE — MR AVS SNAPSHOT
After Visit Summary   9/26/2018    James Blanco    MRN: [id number]           Patient Information     Date Of Birth          1980        Visit Information        Provider Department      9/26/2018 4:00 PM Hector Talbot MD [facility]        Today's Diagnoses     Encounter for routine adult health examination without abnormal findings    -  1    Mixed hyperlipidemia        Anxiety        Fatty liver, alcoholic        Screening for human immunodeficiency virus          Care Instructions    Schedule fasting lab appointment in [facility] 2019 for cholesterol and HIV testing.    Be consistent with low trans fat and saturated fat diet.  Eat food rich in omega-3-fatty acids as you tolerate. (salmon, olive oil)  Eat 5 cups of vegetables, fruits and whole grains per day.  Limit starchy food (white rice, white bread, white pasta, white potatoes) to less than a cup per meal.  Minimize sweets, junk food and fastfood. Limit soda beverages to one serving per day; best to avoid it altogether though.  Exercise: moderate intensity sustained for at least 30 mins per episode, goal of 150 mins per week at least  Combine cardiovascular and resistance exercises.  These exercise recommendations are in addition to your daily activity at work or home.  Work on losing weight if you are above your goal body mass index.    Continue  to decrease alcohol intake as discussed by your liver specialist.    Flu shot today.    Feeling of anxiety can be from decreasing alcohol intake.  Continue taking the muscle relaxer.  Continue fluoxetine for anxiety.  If worsening after next few weeks, see provider again.  Preventive Health Recommendations  Male Ages 26 - 39    Yearly exam:             See your health care provider every year in order to  o   Review health changes.   o   Discuss preventive care.    o   Review your medicines if your doctor has prescribed any.    You should be tested each year for STDs  (sexually transmitted diseases), if you re at risk.     After age 35, talk to your provider about cholesterol testing. If you are at risk for heart disease, have your cholesterol tested at least every 5 years.     If you are at risk for diabetes, you should have a diabetes test (fasting glucose).  Shots: Get a flu shot each year. Get a tetanus shot every 10 years.     Nutrition:    Eat at least 5 servings of fruits and vegetables daily.     Eat whole-grain bread, whole-wheat pasta and brown rice instead of white grains and rice.     Get adequate Calcium and Vitamin D.     Lifestyle    Exercise for at least 150 minutes a week (30 minutes a day, 5 days a week). This will help you control your weight and prevent disease.     Limit alcohol to one drink per day.     No smoking.     Wear sunscreen to prevent skin cancer.     See your dentist every six months for an exam and cleaning.           Thank you for choosing [facility].  You may be receiving a survey in the mail from [facility] regarding your visit today.  Please take a few minutes to complete and return the survey to let us know how we are doing.      If you have questions or concerns, please contact us via [facility] or you can contact your care team at [phone].    Our Clinic hours are:  Monday 6:40 am  to 7:00 pm  Tuesday -Friday 6:40 am to 5:00 pm    The [place] outpatient lab hours are:  Monday - Friday 6:10 am to 4:45 pm  Saturdays 7:00 am to 11:00 am  Appointments are required, call [phone]    If you have clinical questions after hours or would like to schedule an appointment,  call the clinic at [phone].            Follow-ups after your visit        Future tests that were ordered for you today     Open Future Orders        Priority Expected Expires Ordered    **HIV Antigen Antibody Combo FUTURE anytime Routine 1/1/2019 9/26/2019 9/26/2018            Who to contact     If you have questions or need follow up information about today's  "clinic visit or your schedule please contact [facility] directly at [phone].  Normal or non-critical lab and imaging results will be communicated to you by MyChart, letter or phone within 4 business days after the clinic has received the results. If you do not hear from us within 7 days, please contact the clinic through "[employer]hart or phone. If you have a critical or abnormal lab result, we will notify you by phone as soon as possible.  Submit refill requests through [employer] or call your pharmacy and they will forward the refill request to us. Please allow 3 business days for your refill to be completed.          Additional Information About Your Visit        "[employer]har[employer]itani Information     [employer] gives you secure access to your electronic health record. If you see a primary care provider, you can also send messages to your care team and make appointments. If you have questions, please call your primary care clinic.  If you do not have a primary care provider, please call [phone] and they will assist you.        Care EveryWhere ID     This is your Care EveryWhere ID. This could be used by other organizations to access your [place] medical records  [id number]        Your Vitals Were     Pulse Temperature Respirations Height Pulse Oximetry BMI (Body Mass Index)    90 97.9  F (36.6  C) (Tympanic) 16 6' 4\" (1.93 m) 98% 28.82 kg/m2       Blood Pressure from Last 3 Encounters:   09/26/18 124/78   08/01/18 128/88   01/31/18 132/88    Weight from Last 3 Encounters:   09/26/18 236 lb 12.8 oz (107.4 kg)   08/01/18 230 lb 2 oz (104.4 kg)   01/31/18 242 lb 14.4 oz (110.2 kg)               Primary Care Provider Office Phone # Fax #    Hector Talbot [phone] [address] [facility] [address]        Equal Access to Services     KRYSTLE MIRELES AH: Saida Delcid, wafilemonda luqadaha, qaybta asiya phan. " So [facility] [phone].    ATENCIÓN: Si wilfredo matthews, tiene a carter disposición servicios gratuitos de asistencia lingüística. Margoth panchal [phone].    We comply with applicable federal civil rights laws and [place] laws. We do not discriminate on the basis of race, color, national origin, age, disability, sex, sexual orientation, or gender identity.            Thank you!     Thank you for choosing [facility]  for your care. Our goal is always to provide you with excellent care. Hearing back from our patients is one way we can continue to improve our services. Please take a few minutes to complete the written survey that you may receive in the mail after your visit with us. Thank you!             Your Updated Medication List - Protect others around you: Learn how to safely use, store and throw away your medicines at www.disposemymeds.org.          This list is accurate as of 9/26/18  4:36 PM.  Always use your most recent med list.                   Brand Name Dispense Instructions for use Diagnosis    baclofen 10 MG tablet    LIORESAL          EPINEPHrine 0.3 MG/0.3ML injection 2-pack    EPIPEN 2-CHAIM    0.6 mL    Inject 0.3 mLs (0.3 mg) into the muscle as needed for anaphylaxis    Bee sting reaction, accidental or unintentional, initial encounter       FLUoxetine 40 MG capsule    PROzac    90 capsule    Take 1 capsule (40 mg) by mouth daily    Anxiety       ibuprofen 200 MG capsule      Take 200 mg by mouth every 4 hours as needed for pain        IMODIUM A-D 2 MG capsule   Generic drug:  loperamide      Take 2 mg by mouth 4 times daily as needed for diarrhea        Melatonin ER 5 MG Tbcr      Take 5 mg by mouth At Bedtime        omeprazole 20 MG tablet      Take 20 mg by mouth daily        order for DME     1 Device    Equipment being ordered: wrist quickfit right    Subluxation of right extensor carpi ulnaris tendon, initial encounter       simvastatin 40 MG tablet    ZOCOR    90 tablet    Take 1 tablet  (40 mg) by mouth At Bedtime    Hyperlipidemia LDL goal <130

## 2018-09-26 NOTE — PROGRESS NOTES
SUBJECTIVE:   CC: James Blanco is an 37 year old male who presents for preventative health visit.     Healthy Habits:    Do you get at least three servings of calcium containing foods daily (dairy, green leafy vegetables, etc.)? yes    Amount of exercise or daily activities, outside of work: working on increasing activity    Problems taking medications regularly No    Medication side effects: No    Have you had an eye exam in the past two years? yes    Do you see a dentist twice per year? yes    Do you have sleep apnea, excessive snoring or daytime drowsiness? yes    Has been seeing provider at [facility], has had some scans done.     Hyperlipidemia Follow-Up      Rate your low fat/cholesterol diet?: fair    Taking statin?  Yes, no muscle aches from statin    Other lipid medications/supplements?:  none    Anxiety Follow-Up    Status since last visit: Worsened in the last month    Other associated symptoms: anxious feeling in chest - feels like pressure and jittery feeling from epigastrium going up middle of chest to neck    Complicating factors:   Significant life event: No   Current substance abuse: None  Depression symptoms: No  LINNEA-7 SCORE 9/1/2015 9/26/2018   Total Score 0 5     Patient denies significant stressors, life changes or worries.  Patient denies specific triggers or instance that increase symptoms.    LINNEA-7    Today's PHQ-2 Score:   PHQ-2 ( 1999 [employer]) 9/18/2017 12/13/2016   Q1: Little interest or pleasure in doing things 0 0   Q2: Feeling down, depressed or hopeless 0 0   PHQ-2 Score 0 0       Abuse: Current or Past(Physical, Sexual or Emotional)- No  Do you feel safe in your environment - Yes    Social History   Substance Use Topics     Smoking status: Former Smoker     Packs/day: 1.00     Years: 13.00     Smokeless tobacco: Former User     Alcohol use Yes      Comment: 6-10 drinks weekly      If you drink alcohol do you typically have >3 drinks per day or >7 drinks per week? Yes - AUDIT SCORE:   16  AUDIT - Alcohol Use Disorders Identification Test - Reproduced from the World Health Organization Audit 2001 (Second Edition) 9/26/2018   1.  How often do you have a drink containing alcohol? 2 to 3 times a week   2.  How many drinks containing alcohol do you have on a typical day when you are drinking? 3 or 4   3.  How often do you have five or more drinks on one occasion? Weekly   4.  How often during the last year have you found that you were not able to stop drinking once you had started? Weekly   5.  How often during the last year have you failed to do what was normally expected of you because of drinking? Never   6.  How often during the last year have you needed a first drink in the morning to get yourself going after a heavy drinking session? Never   7.  How often during the last year have you had a feeling of guilt or remorse after drinking? Less than monthly   8.  How often during the last year have you been unable to remember what happened the night before because of your drinking? Less than monthly   9.  Have you or someone else been injured because of your drinking? No   10. Has a relative, friend, doctor or other health care worker been concerned about your drinking or suggested you cut down? Yes, during the last year   TOTAL SCORE 16                         Last PSA: No results found for: PSA    Reviewed orders with patient. Reviewed health maintenance and updated orders accordingly - Yes  Labs reviewed in EPIC  BP Readings from Last 3 Encounters:   09/26/18 124/78   08/01/18 128/88   01/31/18 132/88    Wt Readings from Last 3 Encounters:   09/26/18 236 lb 12.8 oz (107.4 kg)   08/01/18 230 lb 2 oz (104.4 kg)   01/31/18 242 lb 14.4 oz (110.2 kg)                  Patient Active Problem List   Diagnosis     Anxiety     Mixed hyperlipidemia     Fatty liver, alcoholic     Past Surgical History:   Procedure Laterality Date     COLONOSCOPY N/A 3/21/2016    Procedure: COLONOSCOPY;  Surgeon: Tavo Wilcox  MD;  Location: [facility]     SURGICAL HISTORY OF -       plasty surgery on face, due to injury     SURGICAL HISTORY OF -       left elbow nerve decompression, sound like ulnar     SURGICAL HISTORY OF -       left knee surgery, scope and allograph for cartilege     SURGICAL HISTORY OF -       lasix surgery       Social History   Substance Use Topics     Smoking status: Former Smoker     Packs/day: 1.00     Years: 13.00     Smokeless tobacco: Former User     Alcohol use Yes      Comment: 6-10 drinks weekly     Family History   Problem Relation Age of Onset     Hyperlipidemia Mother      Coronary Artery Disease Father      Hyperlipidemia Father      Diabetes Father      Other Cancer Sister      lymphoma-non hodgkins         Current Outpatient Prescriptions   Medication Sig Dispense Refill     baclofen (LIORESAL) 10 MG tablet        EPINEPHrine (EPIPEN 2-CHAIM) 0.3 MG/0.3ML injection 2-pack Inject 0.3 mLs (0.3 mg) into the muscle as needed for anaphylaxis 0.6 mL 1     FLUoxetine (PROZAC) 40 MG capsule Take 1 capsule (40 mg) by mouth daily 90 capsule 3     ibuprofen 200 MG capsule Take 200 mg by mouth every 4 hours as needed for pain       loperamide (IMODIUM A-D) 2 MG capsule Take 2 mg by mouth 4 times daily as needed for diarrhea       Melatonin ER 5 MG TBCR Take 5 mg by mouth At Bedtime       omeprazole 20 MG tablet Take 20 mg by mouth daily       simvastatin (ZOCOR) 40 MG tablet Take 1 tablet (40 mg) by mouth At Bedtime 90 tablet 3     order for DME Equipment being ordered: wrist quickfit right 1 Device 0     Allergies   Allergen Reactions     Ceclor [Cefaclor]      Penicillins        Reviewed and updated as needed this visit by clinical staff  Tobacco  Allergies  Meds  Problems  Med Hx  Surg Hx  Fam Hx  Soc Hx          Reviewed and updated as needed this visit by Provider  Allergies  Meds  Problems        Past Medical History:   Diagnosis Date     Anxiety      Hyperlipidaemia LDL goal <130       Past Surgical  "History:   Procedure Laterality Date     COLONOSCOPY N/A 3/21/2016    Procedure: COLONOSCOPY;  Surgeon: Tavo Wilcox MD;  Location: [facility]     SURGICAL HISTORY OF -       plasty surgery on face, due to injury     SURGICAL HISTORY OF -       left elbow nerve decompression, sound like ulnar     SURGICAL HISTORY OF -       left knee surgery, scope and allograph for cartilege     SURGICAL HISTORY OF -       lasix surgery       ROS:  CONSTITUTIONAL: NEGATIVE for fever, chills, change in weight  INTEGUMENTARY/SKIN: NEGATIVE for worrisome rashes, moles or lesions  EYES: NEGATIVE for vision changes or irritation  ENT: NEGATIVE for ear, mouth and throat problems  RESP: NEGATIVE for significant cough or SOB  CV: NEGATIVE for chest pain, palpitations or peripheral edema  GI: NEGATIVE for nausea, abdominal pain, heartburn, or change in bowel habits   male: negative for dysuria, hematuria, decreased urinary stream, erectile dysfunction, urethral discharge  MUSCULOSKELETAL: NEGATIVE for significant arthralgias or myalgia  NEURO: NEGATIVE for weakness, dizziness or paresthesias  ENDOCRINE: NEGATIVE for temperature intolerance, skin/hair changes  HEME/ALLERGY/IMMUNE: NEGATIVE for bleeding problems  PSYCHIATRIC: see above    OBJECTIVE:   /78 (BP Location: Right arm, Patient Position: Chair, Cuff Size: Adult Large)  Pulse 90  Temp 97.9  F (36.6  C) (Tympanic)  Resp 16  Ht 6' 4\" (1.93 m)  Wt 236 lb 12.8 oz (107.4 kg)  SpO2 98%  BMI 28.82 kg/m2  EXAM:  GENERAL APPEARANCE: overweight,  alert and no distress  EYES: pink conj, no icterus, PERRL, EOMI  HENT: ear canals and TM's normal, nose and mouth without ulcers or lesions, oropharynx clear and oral mucous membranes moist  NECK: no adenopathy, no asymmetry, masses, or scars and thyroid normal to palpation  RESP: lungs clear to auscultation - no rales, rhonchi or wheezes  CV: regular rates and rhythm, normal S1 S2, no S3 or S4, no murmur, click or rub, no peripheral edema " and peripheral pulses strong  ABDOMEN: soft, nontender, no hepatosplenomegaly, no masses and bowel sounds normal  RECTAL: normal sphincter tone, no rectal masses, prostate slightly enlarged but smooth, soft and nontender  MS: no musculoskeletal defects are noted and gait is age appropriate without ataxia  SKIN: no suspicious lesions or rashes  NEURO: Normal strength and tone, sensory exam grossly normal, mentation intact and speech normal    Diagnostic Test Results:  Results for orders placed or performed in visit on 09/20/18   Lipid panel reflex to direct LDL Fasting   Result Value Ref Range    Cholesterol 214 (H) <200 mg/dL    Triglycerides 244 (H) <150 mg/dL    HDL Cholesterol 52 >39 mg/dL    LDL Cholesterol Calculated 113 (H) <100 mg/dL    Non HDL Cholesterol 162 (H) <130 mg/dL       ASSESSMENT/PLAN:   James was seen today for physical, anxiety, lipids and flu shot.    Diagnoses and all orders for this visit:    Encounter for routine adult health examination without abnormal findings  Patient was advised on recommended screening and preventive health recommendations.  He verbalized understanding and agreed to the plans below.    Mixed hyperlipidemia  Patient was advised in detail of nutrition changes recommended to reduce his cholesterol levels.  Exercise: patient will be starting to use Nordic trac regularly; advised to add resistance exercises.  Continue statin.    Anxiety  Continue fluoxetine.  Patient said mood overall controlled.  The reported symptom above can be from decreasing alcohol consumption after patient and this provider discussed his sx and history.  Gradually decrease alcohol further.  Take muscle relaxer given by GI - may help with jitteriness.    Fatty liver, alcoholic  Reinforced nutrition and exercise and lifestyle recommendations advised by GI.    Screening for human immunodeficiency virus  -     **HIV Antigen Antibody Combo FUTURE anytime; Future    Need for prophylactic vaccination and  "inoculation against influenza  -     FLU VACCINE, SPLIT VIRUS, IM (QUADRIVALENT) [30417]- >3 YRS  -     Vaccine Administration, Initial [52326]        COUNSELING:  Reviewed preventive health counseling, as reflected in patient instructions       Regular exercise       Healthy diet/nutrition       Vision screening       Hearing screening       Immunizations    Vaccinated for: Influenza             Alcohol Use       Safe sex practices/STD prevention       HIV screeninx in teen years, 1x in adult years, and at intervals if high risk       Osteoporosis Prevention/Bone Health    BP Readings from Last 1 Encounters:   18 124/78     Estimated body mass index is 28.82 kg/(m^2) as calculated from the following:    Height as of this encounter: 6' 4\" (1.93 m).    Weight as of this encounter: 236 lb 12.8 oz (107.4 kg).    BP Screening:   Last 3 BP Readings:    BP Readings from Last 3 Encounters:   18 124/78   18 128/88   18 132/88       The following was recommended to the patient:  Re-screen BP within a year and recommended lifestyle modifications  Weight management plan: Discussed healthy diet and exercise guidelines and patient will follow up in 12 months in clinic to re-evaluate.     reports that he has quit smoking. He has a 13.00 pack-year smoking history. He has quit using smokeless tobacco.      Counseling Resources:  ATP IV Guidelines  Pooled Cohorts Equation Calculator  FRAX Risk Assessment  ICSI Preventive Guidelines  Dietary Guidelines for Americans, 2010  USDA's MyPlate  ASA Prophylaxis  Lung CA Screening    Hector Talbot MD  [facility]  "

## 2018-09-27 ASSESSMENT — PATIENT HEALTH QUESTIONNAIRE - PHQ9: SUM OF ALL RESPONSES TO PHQ QUESTIONS 1-9: 7

## 2018-09-27 ASSESSMENT — ANXIETY QUESTIONNAIRES: GAD7 TOTAL SCORE: 5

## 2018-10-08 ENCOUNTER — MYC MEDICAL ADVICE (OUTPATIENT)
Dept: FAMILY MEDICINE | Facility: CLINIC | Age: 38
End: 2018-10-08

## 2018-10-08 DIAGNOSIS — F41.9 ANXIETY: ICD-10-CM

## 2018-10-08 DIAGNOSIS — E78.5 HYPERLIPIDEMIA LDL GOAL <130: ICD-10-CM

## 2018-10-08 RX ORDER — SIMVASTATIN 40 MG
40 TABLET ORAL AT BEDTIME
Qty: 90 TABLET | Refills: 0 | Status: SHIPPED | OUTPATIENT
Start: 2018-10-08 | End: 2019-01-13

## 2018-10-08 RX ORDER — FLUOXETINE 40 MG/1
40 CAPSULE ORAL DAILY
Qty: 90 CAPSULE | Refills: 3 | Status: SHIPPED | OUTPATIENT
Start: 2018-10-08 | End: 2019-10-20

## 2018-10-08 NOTE — TELEPHONE ENCOUNTER
Prescription for simvastatin approved per [facility] Refill Protocol.    Lab Results   Component Value Date    CHOL 214 09/20/2018     Lab Results   Component Value Date    HDL 52 09/20/2018     Lab Results   Component Value Date     09/20/2018     Lab Results   Component Value Date    TRIG 244 09/20/2018     Lab Results   Component Value Date    CHOLHDLRATIO 4.5 09/04/2015     Result note:  Schedule repeat fasting cholesterol lab appointment blood draw in January 2019.     Prescription for fluoxetine approved per [facility] Refill Protocol.  Assoc dx: ángel BRITTON RN

## 2019-01-13 DIAGNOSIS — E78.5 HYPERLIPIDEMIA LDL GOAL <130: ICD-10-CM

## 2019-01-14 NOTE — TELEPHONE ENCOUNTER
"Requested Prescriptions   Pending Prescriptions Disp Refills     simvastatin (ZOCOR) 40 MG tablet [Pharmacy Med Name: SIMVASTATIN 40MG    TAB] 90 tablet 0    Last Written Prescription Date:  10/8/18  Last Fill Quantity: 90,  # refills: 0   Last office visit: 9/26/2018 with prescribing provider:  Hector Talbot     Future Office Visit:     Sig: TAKE 1 TABLET BY MOUTH AT BEDTIME *DUE  FOR  LABS  AND  BLOOD  DRAW*    Statins Protocol Passed - 1/13/2019  6:01 PM       Passed - LDL on file in past 12 months    Recent Labs   Lab Test 09/20/18  0608   *            Passed - No abnormal creatine kinase in past 12 months    No lab results found.            Passed - Recent (12 mo) or future (30 days) visit within the authorizing provider's specialty    Patient had office visit in the last 12 months or has a visit in the next 30 days with authorizing provider or within the authorizing provider's specialty.  See \"Patient Info\" tab in inbasket, or \"Choose Columns\" in Meds & Orders section of the refill encounter.             Passed - Medication is active on med list       Passed - Patient is age 18 or older          "

## 2019-01-15 NOTE — TELEPHONE ENCOUNTER
Patient to follow up for fasting labs January 2019, labs ordered by MD at [facility]  Attempted to call patient, busy signal x2  Sending my chart message to patient today regarding labs and med request    Lillian LANE Rn

## 2019-01-17 DIAGNOSIS — E78.5 HYPERLIPIDEMIA LDL GOAL <130: ICD-10-CM

## 2019-01-17 DIAGNOSIS — Z11.4 SCREENING FOR HUMAN IMMUNODEFICIENCY VIRUS: ICD-10-CM

## 2019-01-17 LAB
CHOLEST SERPL-MCNC: 237 MG/DL
HDLC SERPL-MCNC: 23 MG/DL
LDLC SERPL CALC-MCNC: ABNORMAL MG/DL
LDLC SERPL DIRECT ASSAY-MCNC: 77 MG/DL
NONHDLC SERPL-MCNC: 214 MG/DL
TRIGL SERPL-MCNC: 2454 MG/DL

## 2019-01-17 PROCEDURE — 36415 COLL VENOUS BLD VENIPUNCTURE: CPT | Performed by: FAMILY MEDICINE

## 2019-01-17 PROCEDURE — 83721 ASSAY OF BLOOD LIPOPROTEIN: CPT | Performed by: FAMILY MEDICINE

## 2019-01-17 PROCEDURE — 80061 LIPID PANEL: CPT | Performed by: FAMILY MEDICINE

## 2019-01-17 PROCEDURE — 87389 HIV-1 AG W/HIV-1&-2 AB AG IA: CPT | Performed by: FAMILY MEDICINE

## 2019-01-18 LAB — HIV 1+2 AB+HIV1 P24 AG SERPL QL IA: NONREACTIVE

## 2019-01-18 RX ORDER — SIMVASTATIN 40 MG
TABLET ORAL
Qty: 90 TABLET | Refills: 0 | Status: SHIPPED | OUTPATIENT
Start: 2019-01-18 | End: 2019-02-06

## 2019-01-18 NOTE — TELEPHONE ENCOUNTER
Routing refill request to provider for review/approval because:  Recently had LDL done.    Notes recorded by Bina Quiroz, ELHAM CNP on 1/17/2019 at 11:34 AM CST  Please let patient know that his cholesterol and triglycerides are greatly elevated  Patient needs appointment with PCP or other provider to discuss and start medications.     JEIMY SanchezN, RN

## 2019-02-06 ENCOUNTER — OFFICE VISIT (OUTPATIENT)
Dept: FAMILY MEDICINE | Facility: CLINIC | Age: 39
End: 2019-02-06
Payer: COMMERCIAL

## 2019-02-06 VITALS
TEMPERATURE: 96.5 F | WEIGHT: 234 LBS | OXYGEN SATURATION: 96 % | DIASTOLIC BLOOD PRESSURE: 82 MMHG | RESPIRATION RATE: 12 BRPM | HEIGHT: 76 IN | SYSTOLIC BLOOD PRESSURE: 130 MMHG | HEART RATE: 91 BPM | BODY MASS INDEX: 28.49 KG/M2

## 2019-02-06 DIAGNOSIS — E78.5 HYPERLIPIDEMIA LDL GOAL <130: ICD-10-CM

## 2019-02-06 DIAGNOSIS — E78.1 HYPERTRIGLYCERIDEMIA: Primary | ICD-10-CM

## 2019-02-06 PROCEDURE — 99214 OFFICE O/P EST MOD 30 MIN: CPT | Performed by: FAMILY MEDICINE

## 2019-02-06 RX ORDER — SIMVASTATIN 40 MG
40 TABLET ORAL AT BEDTIME
Qty: 90 TABLET | Refills: 3 | Status: SHIPPED | OUTPATIENT
Start: 2019-02-06 | End: 2020-04-13

## 2019-02-06 RX ORDER — FENOFIBRATE 145 MG/1
145 TABLET, COATED ORAL DAILY
Qty: 90 TABLET | Refills: 0 | Status: SHIPPED | OUTPATIENT
Start: 2019-02-06 | End: 2019-04-29

## 2019-02-06 ASSESSMENT — MIFFLIN-ST. JEOR: SCORE: 2077.92

## 2019-02-06 ASSESSMENT — PAIN SCALES - GENERAL: PAINLEVEL: NO PAIN (0)

## 2019-02-06 NOTE — PATIENT INSTRUCTIONS
Be consistent with low trans fat and saturated fat diet.  Eat food rich in omega-3-fatty acids as you tolerate. (salmon, olive oil)  Eat 5 cups of vegetables, fruits and whole grains per day.  Limit starchy food (white rice, white bread, white pasta, white potatoes) to less than a cup per meal.  Minimize sweets, junk food and fastfood. Limit soda beverages to one serving per day; best to avoid it altogether though.  Exercise: moderate intensity sustained for at least 30 mins per episode, goal of 150 mins per week at least  Combine cardiovascular and resistance exercises.  These exercise recommendations are in addition to your daily activity at work or home.  Work on losing weight if you are above your goal body mass index.

## 2019-02-06 NOTE — PROGRESS NOTES
SUBJECTIVE:   James Blanco is a 38 year old male who presents to clinic today for the following health issues:      Pt is here to go over lab results.    Hyperlipidemia Follow-Up      Rate your low fat/cholesterol diet?: good    Taking statin?  Yes, no muscle aches from statin    Other lipid medications/supplements?:  None    See lab tests below.  Severely elevated triglycerides.    Patient denies chest pain, abd pain, dyspnea, jaundice, nausea, or BM changes.  Patient is unaware of familial hypercholesterolemia in his family.      Problem list and histories reviewed & adjusted, as indicated.  Additional history: as documented    Patient Active Problem List   Diagnosis     Anxiety     Mixed hyperlipidemia     Fatty liver, alcoholic     Past Surgical History:   Procedure Laterality Date     COLONOSCOPY N/A 3/21/2016    Procedure: COLONOSCOPY;  Surgeon: Tavo Wilcox MD;  Location: [facility]     SURGICAL HISTORY OF -       plasty surgery on face, due to injury     SURGICAL HISTORY OF -       left elbow nerve decompression, sound like ulnar     SURGICAL HISTORY OF -       left knee surgery, scope and allograph for cartilege     SURGICAL HISTORY OF -       lasix surgery       Social History     Tobacco Use     Smoking status: Former Smoker     Packs/day: 1.00     Years: 13.00     Pack years: 13.00     Smokeless tobacco: Former User   Substance Use Topics     Alcohol use: Yes     Comment: 6-10 drinks weekly     Family History   Problem Relation Age of Onset     Hyperlipidemia Mother      Coronary Artery Disease Father      Hyperlipidemia Father      Diabetes Father      Other Cancer Sister         lymphoma-non hodgkins         Current Outpatient Medications   Medication Sig Dispense Refill     baclofen (LIORESAL) 10 MG tablet        fenofibrate (TRICOR) 145 MG tablet Take 1 tablet (145 mg) by mouth daily 90 tablet 0     FLUoxetine (PROZAC) 40 MG capsule Take 1 capsule (40 mg) by mouth daily 90 capsule 3     ibuprofen 200  "MG capsule Take 200 mg by mouth every 4 hours as needed for pain       loperamide (IMODIUM A-D) 2 MG capsule Take 2 mg by mouth 4 times daily as needed for diarrhea       Melatonin ER 5 MG TBCR Take 5 mg by mouth At Bedtime       omeprazole 20 MG tablet Take 20 mg by mouth daily       order for DME Equipment being ordered: wrist quickfit right 1 Device 0     simvastatin (ZOCOR) 40 MG tablet Take 1 tablet (40 mg) by mouth At Bedtime 90 tablet 3     EPINEPHrine (EPIPEN 2-CHAIM) 0.3 MG/0.3ML injection 2-pack Inject 0.3 mLs (0.3 mg) into the muscle as needed for anaphylaxis (Patient not taking: Reported on 2/6/2019) 0.6 mL 1     Allergies   Allergen Reactions     Ceclor [Cefaclor]      Penicillins        Reviewed and updated as needed this visit by clinical staff  Tobacco  Allergies  Meds  Problems  Med Hx  Surg Hx  Fam Hx  Soc Hx        Reviewed and updated as needed this visit by Provider  Tobacco  Allergies  Meds  Problems  Med Hx  Surg Hx  Fam Hx         ROS:  C: NEGATIVE for fever, chills, change in weight  I: NEGATIVE for worrisome rashes, moles or lesions  E: NEGATIVE for vision changes or irritation  R: NEGATIVE for significant cough or SOB  CV: NEGATIVE for chest pain, palpitations or peripheral edema  GI: NEGATIVE for nausea, abdominal pain, heartburn, or change in bowel habits  : NEGATIVE for frequency, dysuria, or hematuria  M: NEGATIVE for significant arthralgias or myalgia  N: NEGATIVE for weakness, dizziness or paresthesias  E: NEGATIVE for temperature intolerance, skin/hair changes  H: NEGATIVE for bleeding problems    OBJECTIVE:                                                    /82 (BP Location: Right arm, Patient Position: Chair, Cuff Size: Adult Large)   Pulse 91   Temp 96.5  F (35.8  C) (Tympanic)   Resp 12   Ht 1.93 m (6' 4\")   Wt 106.1 kg (234 lb)   SpO2 96%   BMI 28.48 kg/m    Body mass index is 28.48 kg/m .   GEN: alert, oriented x 3, overweight, NAD  EYES: no " icterus  SKIN: no jaundice  ABD: rounded  EXT: no edema; full range of motion x 4    Diagnostic test results:  Diagnostic Test Results:  Results for orders placed or performed in visit on 01/17/19   **HIV Antigen Antibody Combo FUTURE anytime   Result Value Ref Range    HIV Antigen Antibody Combo Nonreactive NR^Nonreactive       Lipid panel reflex to direct LDL Fasting   Result Value Ref Range    Cholesterol 237 (H) <200 mg/dL    Triglycerides 2,454 (H) <150 mg/dL    HDL Cholesterol 23 (L) >39 mg/dL    LDL Cholesterol Calculated  <100 mg/dL     Cannot estimate LDL when triglyceride exceeds 400 mg/dL    Non HDL Cholesterol 214 (H) <130 mg/dL   LDL cholesterol direct   Result Value Ref Range    LDL Cholesterol Direct 77 <100 mg/dL        ASSESSMENT/PLAN:                                                        ICD-10-CM    1. Hypertriglyceridemia E78.1 Lipid panel reflex to direct LDL Fasting     **ALT FUTURE 2mo     **AST FUTURE 2mo     fenofibrate (TRICOR) 145 MG tablet  Severe elevation. Discussed possible complication include pancreatitis, ischemic heart and other vessel disease, fatty liver.  Discussed causes,course, possible complications, and treatment of condition.    Advised low cholesterol diet.  Increase dietary fiber.  Advised exercise recommendations.     2. Hyperlipidemia LDL goal <130 E78.5 simvastatin (ZOCOR) 40 MG tablet       Follow up with lab - 2 months   Patient Instructions   Be consistent with low trans fat and saturated fat diet.  Eat food rich in omega-3-fatty acids as you tolerate. (salmon, olive oil)  Eat 5 cups of vegetables, fruits and whole grains per day.  Limit starchy food (white rice, white bread, white pasta, white potatoes) to less than a cup per meal.  Minimize sweets, junk food and fastfood. Limit soda beverages to one serving per day; best to avoid it altogether though.  Exercise: moderate intensity sustained for at least 30 mins per episode, goal of 150 mins per week at  least  Combine cardiovascular and resistance exercises.  These exercise recommendations are in addition to your daily activity at work or home.  Work on losing weight if you are above your goal body mass index.      Hector Talbot MD  [facility]

## 2019-04-05 DIAGNOSIS — K70.0 ALCOHOLIC FATTY LIVER: ICD-10-CM

## 2019-04-05 DIAGNOSIS — K76.0 FATTY METAMORPHOSIS OF LIVER: ICD-10-CM

## 2019-04-05 DIAGNOSIS — E78.1 HYPERTRIGLYCERIDEMIA: ICD-10-CM

## 2019-04-05 LAB
ALBUMIN SERPL-MCNC: 4.2 G/DL (ref 3.4–5)
ALP SERPL-CCNC: 45 U/L (ref 40–150)
ALT SERPL W P-5'-P-CCNC: 35 U/L (ref 0–70)
AST SERPL W P-5'-P-CCNC: 24 U/L (ref 0–45)
BILIRUB DIRECT SERPL-MCNC: 0.2 MG/DL (ref 0–0.2)
BILIRUB SERPL-MCNC: 0.5 MG/DL (ref 0.2–1.3)
CHOLEST SERPL-MCNC: 177 MG/DL
HDLC SERPL-MCNC: 50 MG/DL
LDLC SERPL CALC-MCNC: 99 MG/DL
NONHDLC SERPL-MCNC: 127 MG/DL
PROT SERPL-MCNC: 7.2 G/DL (ref 6.8–8.8)
TRIGL SERPL-MCNC: 140 MG/DL

## 2019-04-05 PROCEDURE — 80061 LIPID PANEL: CPT | Performed by: NURSE PRACTITIONER

## 2019-04-05 PROCEDURE — 36415 COLL VENOUS BLD VENIPUNCTURE: CPT | Performed by: NURSE PRACTITIONER

## 2019-04-05 PROCEDURE — 80076 HEPATIC FUNCTION PANEL: CPT | Performed by: NURSE PRACTITIONER

## 2019-04-09 ENCOUNTER — TRANSFERRED RECORDS (OUTPATIENT)
Dept: HEALTH INFORMATION MANAGEMENT | Facility: CLINIC | Age: 39
End: 2019-04-09

## 2019-04-27 ENCOUNTER — MYC MEDICAL ADVICE (OUTPATIENT)
Dept: FAMILY MEDICINE | Facility: CLINIC | Age: 39
End: 2019-04-27

## 2019-04-27 DIAGNOSIS — E78.1 HYPERTRIGLYCERIDEMIA: ICD-10-CM

## 2019-04-29 RX ORDER — FENOFIBRATE 145 MG/1
145 TABLET, COATED ORAL DAILY
Qty: 90 TABLET | Refills: 2 | Status: SHIPPED | OUTPATIENT
Start: 2019-04-29 | End: 2020-02-14

## 2019-04-29 NOTE — TELEPHONE ENCOUNTER
Per last lab results: Your cholesterol panel is now within normal range! This is great news!     Please continue the advice and treatment we discussed on your visit.     No change in medications.

## 2019-09-16 ENCOUNTER — MYC MEDICAL ADVICE (OUTPATIENT)
Dept: FAMILY MEDICINE | Facility: CLINIC | Age: 39
End: 2019-09-16

## 2019-10-16 ENCOUNTER — OFFICE VISIT (OUTPATIENT)
Dept: FAMILY MEDICINE | Facility: CLINIC | Age: 39
End: 2019-10-16
Payer: COMMERCIAL

## 2019-10-16 VITALS
DIASTOLIC BLOOD PRESSURE: 86 MMHG | WEIGHT: 236.5 LBS | HEIGHT: 76 IN | BODY MASS INDEX: 28.8 KG/M2 | SYSTOLIC BLOOD PRESSURE: 119 MMHG | OXYGEN SATURATION: 97 % | RESPIRATION RATE: 18 BRPM | HEART RATE: 78 BPM | TEMPERATURE: 97.3 F

## 2019-10-16 DIAGNOSIS — Z23 NEED FOR PROPHYLACTIC VACCINATION AND INOCULATION AGAINST INFLUENZA: ICD-10-CM

## 2019-10-16 DIAGNOSIS — S39.012A LOW BACK STRAIN, INITIAL ENCOUNTER: Primary | ICD-10-CM

## 2019-10-16 PROCEDURE — 99213 OFFICE O/P EST LOW 20 MIN: CPT | Mod: 25 | Performed by: FAMILY MEDICINE

## 2019-10-16 PROCEDURE — 90686 IIV4 VACC NO PRSV 0.5 ML IM: CPT | Performed by: FAMILY MEDICINE

## 2019-10-16 PROCEDURE — 90471 IMMUNIZATION ADMIN: CPT | Performed by: FAMILY MEDICINE

## 2019-10-16 RX ORDER — ACETAMINOPHEN 500 MG
500-1000 TABLET ORAL EVERY 4 HOURS PRN
Start: 2019-10-16 | End: 2020-02-10

## 2019-10-16 RX ORDER — BACLOFEN 10 MG/1
10 TABLET ORAL 3 TIMES DAILY PRN
Qty: 40 TABLET | Refills: 0 | Status: SHIPPED | OUTPATIENT
Start: 2019-10-16 | End: 2020-01-09

## 2019-10-16 RX ORDER — NAPROXEN SODIUM 220 MG
220 TABLET ORAL 2 TIMES DAILY PRN
Start: 2019-10-16 | End: 2020-02-10

## 2019-10-16 ASSESSMENT — MIFFLIN-ST. JEOR: SCORE: 2084.26

## 2019-10-16 NOTE — PATIENT INSTRUCTIONS
Baclofen as prescribed for spasms. Do not take when driving or operating machines.  Acetaminophen 500 mg orally 1-2 tabs every 4-6 hrs as needed for pain.  May take Naproxen 220 mg orally with food every 12 hrs as needed for pain not relieved by tylenol. Do not take ibuprofen if taking naproxen      Patient Education     Lumbar Stretch (Flexibility)    1. Lie on your back on the floor, with your knees bent and your feet flat on the floor. Don t press your neck or lower back to the floor.  2. Pull one knee up toward your chest. Clasp your hands under your thigh to help pull.  3. Hold for 30 to 60 seconds. Lower your leg back down to the floor.  4. Repeat 2 times, or as instructed.  5. Switch legs and repeat.  Date Last Reviewed: 3/10/2016    [id number] [employer]. [address] [place] [address]. All rights reserved. This information is not intended as a substitute for professional medical care. Always follow your healthcare professional's instructions.           Patient Education     Lumbar Flexion (Flexibility)    1. Lie on your back on the floor, with your knees bent and your feet flat on the floor.  2. Gently pull your knees up toward your chest. Put your hands under your thighs to help pull your knees up.  3. Press your lower back down to the floor. Hold for 20 seconds.  4. Lower your legs back down to the floor and relax.  5. Repeat 2 times, or as instructed.  Date Last Reviewed: 3/10/2016    6617-1230 [employer]. [address] [place] [place] [address]. All rights reserved. This information is not intended as a substitute for professional medical care. Always follow your healthcare professional's instructions.           Patient Education     Lumbar Extension (Flexibility)    1. Lie face down on your stomach, forehead on the floor. You can lie on a mat or towel.  2. Bend your arms next to your body and lift your upper body up on your forearms. Your palms and forearms  should be flat on the floor. Keep your stomach and hips on the floor.  3. Hold your upper body up with your forearms for 20 seconds. Slowly lower back down to the floor.  4. Repeat 2 times, or as instructed.  Date Last Reviewed: 3/10/2016    8858-4331 [employer]. [address] [place] [id number]. All rights reserved. This information is not intended as a substitute for professional medical care. Always follow your healthcare professional's instructions.           Patient Education     Back Sprain or Strain    Injury to the muscles (strain) or ligaments (sprain) around the spine can be troubling. Injury may occur after a sudden forceful twisting or bending force such as in a car accident, after a simple awkward movement, or after lifting something heavy with poor body positioning. In any case, muscle spasm is often present and adds to the pain.  Thankfully, most people feel better in 1 to 2 weeks, and most of the rest in 1 to 2 months. Most people can remain active. Unless you had a forceful or traumatic physical injury such as a car accident or fall, X-rays may not be ordered for the first evaluation of a back sprain or strain. If pain continues and does not respond to medical treatment, your healthcare provider may then order X-rays and other tests.  Home care  The following guidelines will help you care for your injury at home:    When in bed, try to find a comfortable position. A firm mattress is best. Try lying flat on your back with pillows under your knees. You can also try lying on your side with your knees bent up toward your chest and a pillow between your knees.    Don't sit for long periods. Try not to take long car rides or take other trips that have you sitting for a long time. This puts more stress on the lower back than standing or walking.    During the first 24 to 72 hours after an injury or flare-up, apply an ice pack to the painful area for 20 minutes. Then remove it for 20  minutes. Do this for 60 to 90 minutes, or several times a day. This will reduce swelling and pain. Be sure to wrap the ice pack in a thin towel or plastic to protect your skin.    You can start with ice, then switch to heat. Heat from a hot shower, hot bath, or heating pad reduces pain and works well for muscle spasms. Put heat on the painful area for 20 minutes, then remove for 20 minutes. Do this for 60 to 90 minutes, or several times a day. Do not use a heating pad while sleeping. It can burn the skin.    You can alternate the ice and heat. Talk with your healthcare provider to find out the best treatment or therapy for your back pain.    Therapeutic massage will help relax the back muscles without stretching them.    Be aware of safe lifting methods. Do not lift anything over 15 pounds until all of the pain is gone.  Medicines  Talk to your healthcare provider before using medicines, especially if you have other health problems or are taking other medicines.    You may use acetaminophen or ibuprofen to control pain, unless another pain medicine was prescribed. If you have chronic conditions like diabetes, liver or kidney disease, stomach ulcers, or gastrointestinal bleeding, or are taking blood-thinner medicines, talk with your doctor before taking any medicines.    Be careful if you are given prescription medicines, narcotics, or medicine for muscle spasm. They can cause drowsiness, and affect your coordination, reflexes, and judgment. Do not drive or operate heavy machinery when taking these types of medicines. Only take pain medicine as prescribed by your healthcare provider.  Follow-up care  Follow up with your healthcare provider, or as advised. You may need physical therapy or more tests if your symptoms get worse.  If you had X-rays your healthcare provider may be checking for any broken bones, breaks, or fractures. Bruises and sprains can sometimes hurt as much as a fracture. These injuries can take time  to heal completely. If your symptoms don t improve or they get worse, talk with your healthcare provider. You may need a repeat X-ray or other tests.  Call 911  Call 911 if any of the following occur:    Trouble breathing    Confused    Very drowsy or trouble awakening    Fainting or loss of consciousness    Rapid or very slow heart rate    Loss of bowel or bladder control  When to seek medical advice  Call your healthcare provider right away if any of the following occur:    Pain gets worse or spreads to your arms or legs    Weakness or numbness in one or both arms or legs    Numbness in the groin or genital area  Date Last Reviewed: 6/1/2016 2000-2018 [employer]. [address] [place] [id number]. All rights reserved. This information is not intended as a substitute for professional medical care. Always follow your healthcare professional's instructions.           Patient Education     Patient Education    Baclofen Oral tablet    Baclofen Solution for injection  Baclofen Oral tablet  What is this medicine?  BACLOFEN (FILI penelope fen) helps relieve spasms and cramping of muscles. It may be used to treat symptoms of multiple sclerosis or spinal cord injury.  This medicine may be used for other purposes; ask your health care provider or pharmacist if you have questions.  What should I tell my health care provider before I take this medicine?  They need to know if you have any of these conditions:    kidney disease    seizures    stroke    an unusual or allergic reaction to baclofen, other medicines, foods, dyes, or preservatives    pregnant or trying to get pregnant    breast-feeding  How should I use this medicine?  Take this medicine by mouth. Swallow it with a drink of water. Follow the directions on the prescription label. Do not take more medicine than you are told to take.  Talk to your pediatrician regarding the use of this medicine in children. Special care may be needed.  Overdosage: If  you think you have taken too much of this medicine contact a poison control center or emergency room at once.  NOTE: This medicine is only for you. Do not share this medicine with others.  What if I miss a dose?  If you miss a dose, take it as soon as you can. If it is almost time for your next dose, take only that dose. Do not take double or extra doses.  What may interact with this medicine?    alcohol    antihistamines    medicines for depression, anxiety, and other mental conditions    medicines for pain like codeine, oxycodone, tramadol, and propoxyphene    medicines for sleep    phenobarbital  This list may not describe all possible interactions. Give your health care provider a list of all the medicines, herbs, non-prescription drugs, or dietary supplements you use. Also tell them if you smoke, drink alcohol, or use illegal drugs. Some items may interact with your medicine.  What should I watch for while using this medicine?  Do not suddenly stop taking your medicine. If you do, you may develop a severe reaction. If your doctor wants you to stop the medicine, the dose will be slowly lowered over time to avoid any side effects. Follow the advice of your doctor.  Baclofen can affect blood sugar levels. If you have diabetes, talk with your doctor or health care professional before you take the medicine.  You may get drowsy or dizzy when you first start taking the medicine or change doses. Do not drive, use machinery, or do anything that may be dangerous until you know how the medicine affects you. Stand or sit up slowly.  What side effects may I notice from receiving this medicine?  Side effects that you should report to your doctor or health care professional as soon as possible:    allergic reactions like skin rash, itching or hives, swelling of the face, lips, or tongue    chest pain    hallucinations    seizure  Side effects that usually do not require medical attention (report to your doctor or health care  professional if they continue or are bothersome):    confusion    difficulty sleeping    headache    nausea  This list may not describe all possible side effects. Call your doctor for medical advice about side effects. You may report side effects to FDA at 7-871-UIB-1195.  Where should I keep my medicine?  Keep out of the reach of children.  Store at room temperature between 15 and 30 degrees C (59 and 86 degrees F). Keep container tightly closed. Throw away any unused medicine after the expiration date.  NOTE:This sheet is a summary. It may not cover all possible information. If you have questions about this medicine, talk to your doctor, pharmacist, or health care provider. Copyright  2016 [employer]

## 2019-10-16 NOTE — PROGRESS NOTES
Subjective     James Blanco is a 39 year old male who presents to clinic today for the following health issues:    HPI   Back Pain       Duration: 10/11/2019        Specific cause: lifting a large potted citrus tree, turning/bending    Description:   Location of pain: low back bilateral  Character of pain: sharp, dull ache, stabbing, cramping and constant  Pain radiation:radiates into the right buttocks and radiates into the left buttocks  New numbness or weakness in legs, not attributed to pain:  no     Intensity: Currently 7/10, At its worst 10/10 while struggling to get out of bed in the mornings and driving for an hour.    History:   Pain interferes with job: YES  History of back problems: recurrent self limited episodes of low back pain in the past  Any previous MRI or X-rays: None  Sees a specialist for back pain:  No  Therapies tried without relief: Icy Hot, cold, heat and NSAIDs    Alleviating factors:   Improved by: very short term relief      Precipitating factors:  Worsened by: Lifting, Bending, Standing, Sitting and Lying Flat          Accompanying Signs & Symptoms:  Risk of Fracture:  None  Risk of Cauda Equina:  None  Risk of Infection:  None  Risk of Cancer:  None  Risk of Ankylosing Spondylitis:  Onset at age <35, male, AND morning back stiffness. no       Verified above history with patient.      Patient Active Problem List   Diagnosis     Anxiety     Mixed hyperlipidemia     Fatty liver, alcoholic     Past Surgical History:   Procedure Laterality Date     COLONOSCOPY N/A 3/21/2016    Procedure: COLONOSCOPY;  Surgeon: Tavo Wilcox MD;  Location: [facility]     SURGICAL HISTORY OF -       plasty surgery on face, due to injury     SURGICAL HISTORY OF -       left elbow nerve decompression, sound like ulnar     SURGICAL HISTORY OF -       left knee surgery, scope and allograph for cartilege     SURGICAL HISTORY OF -       lasix surgery       Social History     Tobacco Use     Smoking status: Former  Smoker     Packs/day: 1.00     Years: 13.00     Pack years: 13.00     Smokeless tobacco: Former User   Substance Use Topics     Alcohol use: Yes     Comment: 6-10 drinks weekly     Family History   Problem Relation Age of Onset     Hyperlipidemia Mother      Coronary Artery Disease Father      Hyperlipidemia Father      Diabetes Father      Other Cancer Sister         lymphoma-non hodgkins         Current Outpatient Medications   Medication Sig Dispense Refill     fenofibrate (TRICOR) 145 MG tablet Take 1 tablet (145 mg) by mouth daily 90 tablet 2     FLUoxetine (PROZAC) 40 MG capsule Take 1 capsule (40 mg) by mouth daily 90 capsule 3     ibuprofen 200 MG capsule Take 200 mg by mouth every 4 hours as needed for pain       loperamide (IMODIUM A-D) 2 MG capsule Take 2 mg by mouth 4 times daily as needed for diarrhea       omeprazole 20 MG tablet Take 20 mg by mouth daily       simvastatin (ZOCOR) 40 MG tablet Take 1 tablet (40 mg) by mouth At Bedtime 90 tablet 3     baclofen (LIORESAL) 10 MG tablet        EPINEPHrine (EPIPEN 2-CHAIM) 0.3 MG/0.3ML injection 2-pack Inject 0.3 mLs (0.3 mg) into the muscle as needed for anaphylaxis (Patient not taking: Reported on 2/6/2019) 0.6 mL 1     Melatonin ER 5 MG TBCR Take 5 mg by mouth At Bedtime       order for DME Equipment being ordered: wrist quickfit right (Patient not taking: Reported on 10/16/2019) 1 Device 0     Allergies   Allergen Reactions     Ceclor [Cefaclor]      Penicillins        Reviewed and updated as needed this visit by Provider         Review of Systems   C: NEGATIVE for fever, chills, change in weight  I: NEGATIVE for worrisome rashes, moles or lesions  GI: NEGATIVE for nausea, abdominal pain, heartburn, or change in bowel habits  : NEGATIVE for frequency, dysuria, or hematuria  MUSCULOSKELETAL: see above  N: NEGATIVE for weakness, dizziness or paresthesias  H: NEGATIVE for bleeding problems      Objective    /86   Pulse 78   Temp 97.3  F (36.3  C)  "(Tympanic)   Resp 18   Ht 1.93 m (6' 4\")   Wt 107.3 kg (236 lb 8 oz)   SpO2 97%   BMI 28.79 kg/m    Body mass index is 28.79 kg/m .  Physical Exam   GENERAL: overweight, alert and no distress  NECK: no tenderness, no adenopathy, no asymmetry, no masses, no stiffness  MS: extremities- no gross deformities noted, no edema  SKIN: no suspicious lesions, no rashes  NEURO: strength and tone- normal, sensory exam- grossly normal, reflexes- symmetric  BACK: normal curvature, no deformity, no skin changes, mild diffuse bilateral sacroiliac tendernes on palpation, range of motion full but moderately painful on extension, mildly painful on forward flexion, and mild pain on lateral flexion; SLR negative bilaterally    Diagnostic Test Results:  none         Assessment & Plan     James was seen today for back pain, health maintenance and imm/inj.    Diagnoses and all orders for this visit:    Low back strain, initial encounter  -     baclofen (LIORESAL) 10 MG tablet; Take 1 tablet (10 mg) by mouth 3 times daily as needed for muscle spasms  -     naproxen sodium (ANAPROX) 220 MG tablet; Take 1 tablet (220 mg) by mouth 2 times daily as needed for moderate pain (if not relieved by acetaminophen initially)  -     acetaminophen (TYLENOL) 500 MG tablet; Take 1-2 tablets (500-1,000 mg) by mouth every 4 hours as needed for pain  No cauda equina red flags.  Discussed course and tx of muscle spasms.  Symptomatic measures as in AVS.  Advised judicious use of baclofen, including possible ADR to the med.  Consider imaging if with new symptoms or if worsening in spite of 2-3 weeks of conservative treatment.    Need for prophylactic vaccination and inoculation against influenza  -     INFLUENZA VACCINE IM > 6 MONTHS VALENT IIV4 [25343]  -     Vaccine Administration, Initial [26721]           Patient Instructions   Baclofen as prescribed for spasms. Do not take when driving or operating machines.  Acetaminophen 500 mg orally 1-2 tabs " every 4-6 hrs as needed for pain.  May take Naproxen 220 mg orally with food every 12 hrs as needed for pain not relieved by tylenol. Do not take ibuprofen if taking naproxen      Patient Education     Lumbar Stretch (Flexibility)    1. Lie on your back on the floor, with your knees bent and your feet flat on the floor. Don t press your neck or lower back to the floor.  2. Pull one knee up toward your chest. Clasp your hands under your thigh to help pull.  3. Hold for 30 to 60 seconds. Lower your leg back down to the floor.  4. Repeat 2 times, or as instructed.  5. Switch legs and repeat.  Date Last Reviewed: 3/10/2016    2925-4749 [employer]. [address] [place] [address]. All rights reserved. This information is not intended as a substitute for professional medical care. Always follow your healthcare professional's instructions.           Patient Education     Lumbar Flexion (Flexibility)    1. Lie on your back on the floor, with your knees bent and your feet flat on the floor.  2. Gently pull your knees up toward your chest. Put your hands under your thighs to help pull your knees up.  3. Press your lower back down to the floor. Hold for 20 seconds.  4. Lower your legs back down to the floor and relax.  5. Repeat 2 times, or as instructed.  Date Last Reviewed: 3/10/2016    5546-7509 [employer]. [address] [place] [address]. All rights reserved. This information is not intended as a substitute for professional medical care. Always follow your healthcare professional's instructions.           Patient Education     Lumbar Extension (Flexibility)    1. Lie face down on your stomach, forehead on the floor. You can lie on a mat or towel.  2. Bend your arms next to your body and lift your upper body up on your forearms. Your palms and forearms should be flat on the floor. Keep your stomach and hips on the floor.  3. Hold your upper body up with your forearms  for 20 seconds. Slowly lower back down to the floor.  4. Repeat 2 times, or as instructed.  Date Last Reviewed: 3/10/2016    4393-2483 [employer]. [address] [place] [address], [place] [address]. All rights reserved. This information is not intended as a substitute for professional medical care. Always follow your healthcare professional's instructions.           Patient Education     Back Sprain or Strain    Injury to the muscles (strain) or ligaments (sprain) around the spine can be troubling. Injury may occur after a sudden forceful twisting or bending force such as in a car accident, after a simple awkward movement, or after lifting something heavy with poor body positioning. In any case, muscle spasm is often present and adds to the pain.  Thankfully, most people feel better in 1 to 2 weeks, and most of the rest in 1 to 2 months. Most people can remain active. Unless you had a forceful or traumatic physical injury such as a car accident or fall, X-rays may not be ordered for the first evaluation of a back sprain or strain. If pain continues and does not respond to medical treatment, your healthcare provider may then order X-rays and other tests.  Home care  The following guidelines will help you care for your injury at home:    When in bed, try to find a comfortable position. A firm mattress is best. Try lying flat on your back with pillows under your knees. You can also try lying on your side with your knees bent up toward your chest and a pillow between your knees.    Don't sit for long periods. Try not to take long car rides or take other trips that have you sitting for a long time. This puts more stress on the lower back than standing or walking.    During the first 24 to 72 hours after an injury or flare-up, apply an ice pack to the painful area for 20 minutes. Then remove it for 20 minutes. Do this for 60 to 90 minutes, or several times a day. This will reduce swelling and pain. Be sure to wrap  the ice pack in a thin towel or plastic to protect your skin.    You can start with ice, then switch to heat. Heat from a hot shower, hot bath, or heating pad reduces pain and works well for muscle spasms. Put heat on the painful area for 20 minutes, then remove for 20 minutes. Do this for 60 to 90 minutes, or several times a day. Do not use a heating pad while sleeping. It can burn the skin.    You can alternate the ice and heat. Talk with your healthcare provider to find out the best treatment or therapy for your back pain.    Therapeutic massage will help relax the back muscles without stretching them.    Be aware of safe lifting methods. Do not lift anything over 15 pounds until all of the pain is gone.  Medicines  Talk to your healthcare provider before using medicines, especially if you have other health problems or are taking other medicines.    You may use acetaminophen or ibuprofen to control pain, unless another pain medicine was prescribed. If you have chronic conditions like diabetes, liver or kidney disease, stomach ulcers, or gastrointestinal bleeding, or are taking blood-thinner medicines, talk with your doctor before taking any medicines.    Be careful if you are given prescription medicines, narcotics, or medicine for muscle spasm. They can cause drowsiness, and affect your coordination, reflexes, and judgment. Do not drive or operate heavy machinery when taking these types of medicines. Only take pain medicine as prescribed by your healthcare provider.  Follow-up care  Follow up with your healthcare provider, or as advised. You may need physical therapy or more tests if your symptoms get worse.  If you had X-rays your healthcare provider may be checking for any broken bones, breaks, or fractures. Bruises and sprains can sometimes hurt as much as a fracture. These injuries can take time to heal completely. If your symptoms don t improve or they get worse, talk with your healthcare provider. You may  need a repeat X-ray or other tests.  Call 911  Call 911 if any of the following occur:    Trouble breathing    Confused    Very drowsy or trouble awakening    Fainting or loss of consciousness    Rapid or very slow heart rate    Loss of bowel or bladder control  When to seek medical advice  Call your healthcare provider right away if any of the following occur:    Pain gets worse or spreads to your arms or legs    Weakness or numbness in one or both arms or legs    Numbness in the groin or genital area  Date Last Reviewed: 6/1/2016 2000-2018 [employer]. [address] [place] [address]. All rights reserved. This information is not intended as a substitute for professional medical care. Always follow your healthcare professional's instructions.           Patient Education     Patient Education    Baclofen Oral tablet    Baclofen Solution for injection  Baclofen Oral tablet  What is this medicine?  BACLOFEN (FILI penelope fen) helps relieve spasms and cramping of muscles. It may be used to treat symptoms of multiple sclerosis or spinal cord injury.  This medicine may be used for other purposes; ask your health care provider or pharmacist if you have questions.  What should I tell my health care provider before I take this medicine?  They need to know if you have any of these conditions:    kidney disease    seizures    stroke    an unusual or allergic reaction to baclofen, other medicines, foods, dyes, or preservatives    pregnant or trying to get pregnant    breast-feeding  How should I use this medicine?  Take this medicine by mouth. Swallow it with a drink of water. Follow the directions on the prescription label. Do not take more medicine than you are told to take.  Talk to your pediatrician regarding the use of this medicine in children. Special care may be needed.  Overdosage: If you think you have taken too much of this medicine contact a poison control center or emergency room at  once.  NOTE: This medicine is only for you. Do not share this medicine with others.  What if I miss a dose?  If you miss a dose, take it as soon as you can. If it is almost time for your next dose, take only that dose. Do not take double or extra doses.  What may interact with this medicine?    alcohol    antihistamines    medicines for depression, anxiety, and other mental conditions    medicines for pain like codeine, oxycodone, tramadol, and propoxyphene    medicines for sleep    phenobarbital  This list may not describe all possible interactions. Give your health care provider a list of all the medicines, herbs, non-prescription drugs, or dietary supplements you use. Also tell them if you smoke, drink alcohol, or use illegal drugs. Some items may interact with your medicine.  What should I watch for while using this medicine?  Do not suddenly stop taking your medicine. If you do, you may develop a severe reaction. If your doctor wants you to stop the medicine, the dose will be slowly lowered over time to avoid any side effects. Follow the advice of your doctor.  Baclofen can affect blood sugar levels. If you have diabetes, talk with your doctor or health care professional before you take the medicine.  You may get drowsy or dizzy when you first start taking the medicine or change doses. Do not drive, use machinery, or do anything that may be dangerous until you know how the medicine affects you. Stand or sit up slowly.  What side effects may I notice from receiving this medicine?  Side effects that you should report to your doctor or health care professional as soon as possible:    allergic reactions like skin rash, itching or hives, swelling of the face, lips, or tongue    chest pain    hallucinations    seizure  Side effects that usually do not require medical attention (report to your doctor or health care professional if they continue or are bothersome):    confusion    difficulty  sleeping    headache    nausea  This list may not describe all possible side effects. Call your doctor for medical advice about side effects. You may report side effects to FDA at 7-940-JZR-2578.  Where should I keep my medicine?  Keep out of the reach of children.  Store at room temperature between 15 and 30 degrees C (59 and 86 degrees F). Keep container tightly closed. Throw away any unused medicine after the expiration date.  NOTE:This sheet is a summary. It may not cover all possible information. If you have questions about this medicine, talk to your doctor, pharmacist, or health care provider. Copyright  2016 [employer]               Return in about 2 weeks (around 10/30/2019) for if worsening pain; sooner if with shooting pain to legs/foot, numbness or urinary changes.    Hector Talbot MD  [facility]

## 2019-10-20 ENCOUNTER — MYC REFILL (OUTPATIENT)
Dept: FAMILY MEDICINE | Facility: CLINIC | Age: 39
End: 2019-10-20

## 2019-10-20 DIAGNOSIS — F41.9 ANXIETY: ICD-10-CM

## 2019-10-21 NOTE — TELEPHONE ENCOUNTER
"Requested Prescriptions   Pending Prescriptions Disp Refills     FLUoxetine (PROZAC) 40 MG capsule 90 capsule 3     Sig: Take 1 capsule (40 mg) by mouth daily       SSRIs Protocol Passed - 10/21/2019  3:39 PM  LINNEA-7 SCORE 9/1/2015 9/26/2018   Total Score 0 5     PHQ-9 SCORE 9/1/2015 9/26/2018   PHQ-9 Total Score 1 7           Passed - Recent (12 mo) or future (30 days) visit within the authorizing provider's specialty     Patient has had an office visit with the authorizing provider or a provider within the authorizing providers department within the previous 12 mos or has a future within next 30 days. See \"Patient Info\" tab in inbasket, or \"Choose Columns\" in Meds & Orders section of the refill encounter.              Passed - Medication is active on med list        Passed - Patient is age 18 or older        Last Written Prescription Date:  10/8/2018  Last Fill Quantity: 90,  # refills: 3   Last office visit: 10/16/2019 with prescribing provider:  Talbot    Future Office Visit:      "

## 2019-10-22 ENCOUNTER — MYC MEDICAL ADVICE (OUTPATIENT)
Dept: FAMILY MEDICINE | Facility: CLINIC | Age: 39
End: 2019-10-22

## 2019-10-22 RX ORDER — FLUOXETINE 40 MG/1
40 CAPSULE ORAL DAILY
Qty: 90 CAPSULE | Refills: 1 | Status: SHIPPED | OUTPATIENT
Start: 2019-10-22 | End: 2020-04-07

## 2019-10-22 ASSESSMENT — ANXIETY QUESTIONNAIRES
7. FEELING AFRAID AS IF SOMETHING AWFUL MIGHT HAPPEN: NOT AT ALL
1. FEELING NERVOUS, ANXIOUS, OR ON EDGE: NOT AT ALL
2. NOT BEING ABLE TO STOP OR CONTROL WORRYING: NOT AT ALL
4. TROUBLE RELAXING: NOT AT ALL
6. BECOMING EASILY ANNOYED OR IRRITABLE: NOT AT ALL
GAD7 TOTAL SCORE: 0
GAD7 TOTAL SCORE: 0
5. BEING SO RESTLESS THAT IT IS HARD TO SIT STILL: NOT AT ALL
3. WORRYING TOO MUCH ABOUT DIFFERENT THINGS: NOT AT ALL
GAD7 TOTAL SCORE: 0
7. FEELING AFRAID AS IF SOMETHING AWFUL MIGHT HAPPEN: NOT AT ALL

## 2019-10-22 ASSESSMENT — PATIENT HEALTH QUESTIONNAIRE - PHQ9
10. IF YOU CHECKED OFF ANY PROBLEMS, HOW DIFFICULT HAVE THESE PROBLEMS MADE IT FOR YOU TO DO YOUR WORK, TAKE CARE OF THINGS AT HOME, OR GET ALONG WITH OTHER PEOPLE: NOT DIFFICULT AT ALL
SUM OF ALL RESPONSES TO PHQ QUESTIONS 1-9: 1
SUM OF ALL RESPONSES TO PHQ QUESTIONS 1-9: 1

## 2019-10-22 NOTE — TELEPHONE ENCOUNTER
Left message for the patient to call the clinic.  Sent PHQ-9 and LINNEA 7 through my chart also.  Needs to update these questionnaires for refill. Holley MOSES RN

## 2019-10-22 NOTE — TELEPHONE ENCOUNTER
LINNEA-7   [employer], 2002; Used with Permission) 10/22/2019   1. Feeling nervous, anxious, or on edge Not at all   2. Not being able to stop or control worrying Not at all   3. Worrying too much about different things Not at all   4. Trouble relaxing Not at all   5. Being so restless that it is hard to sit still Not at all   6. Becoming easily annoyed or irritable Not at all   7. Feeling afraid, as if something awful might happen Not at all   LINNEA 7 TOTAL SCORE 0 (minimal anxiety)   1. Feeling nervous, anxious, or on edge 0   2. Not being able to stop or control worrying 0   3. Worrying too much about different things 0   4. Trouble relaxing 0   5. Being so restless that it is hard to sit still 0   6. Becoming easily annoyed or irritable 0   7. Feeling afraid, as if something awful might happen 0   LINNEA-7 Total Score 0   If you checked any problems, how difficult have they made it for you to do your work, take care of things at home, or get along with other people?    PHQ-9 ([employer]) 10/22/2019   1.  Little interest or pleasure in doing things 0   2.  Feeling down, depressed, or hopeless 0   3.  Trouble falling or staying asleep, or sleeping too much 0   4.  Feeling tired or having little energy 1   5.  Poor appetite or overeating 0   6.  Feeling bad about yourself 0   7.  Trouble concentrating 0   8.  Moving slowly or restless 0   9.  Suicidal or self-harm thoughts 0   PHQ-9 Total Score 1   Difficulty at work, home, or with people    1.  Little interest or pleasure in doing things Not at all   2.  Feeling down, depressed, or hopeless Not at all   3.  Trouble falling or staying asleep, or sleeping too much Not at all   4.  Feeling tired or having little energy Several days   5.  Poor appetite or overeating Not at all   6.  Feeling bad about yourself Not at all   7.  Trouble concentrating Not at all   8.  Moving slowly or restless Not at all   9.  Suicidal or self-harm thoughts Not at all   PHQ-9 via [employer] TOTAL  SCORE-----> 1 (Minimal depression)   Difficulty at work, home, or with people Not difficult at all

## 2019-10-23 ASSESSMENT — ANXIETY QUESTIONNAIRES: GAD7 TOTAL SCORE: 0

## 2019-10-23 ASSESSMENT — PATIENT HEALTH QUESTIONNAIRE - PHQ9: SUM OF ALL RESPONSES TO PHQ QUESTIONS 1-9: 1

## 2020-01-06 ENCOUNTER — MYC MEDICAL ADVICE (OUTPATIENT)
Dept: FAMILY MEDICINE | Facility: CLINIC | Age: 40
End: 2020-01-06

## 2020-01-07 NOTE — TELEPHONE ENCOUNTER
Dr. Talbot,    Patient sends a my chart message stating his depression is worse since putting his pet to sleep. Wondering if you can adjust his medication.  Office visit, telephone visit or my chart visit with you?  Holley MOSES RN

## 2020-01-07 NOTE — TELEPHONE ENCOUNTER
Attempted to contact patient, no answer, left voice message to call back.    Also sent patient a [employer] message to call back.

## 2020-01-08 NOTE — TELEPHONE ENCOUNTER
Left message for the patient to call the clinic. He has reviewed the my chart message.  No telephone appt is made yet. Holley MOSES RN

## 2020-01-09 ENCOUNTER — TELEPHONE (OUTPATIENT)
Dept: FAMILY MEDICINE | Facility: CLINIC | Age: 40
End: 2020-01-09

## 2020-01-09 ENCOUNTER — VIRTUAL VISIT (OUTPATIENT)
Dept: FAMILY MEDICINE | Facility: CLINIC | Age: 40
End: 2020-01-09
Payer: COMMERCIAL

## 2020-01-09 DIAGNOSIS — F43.21 GRIEF REACTION: Primary | ICD-10-CM

## 2020-01-09 DIAGNOSIS — F41.9 ANXIETY: ICD-10-CM

## 2020-01-09 DIAGNOSIS — F10.90 HEAVY ALCOHOL CONSUMPTION: ICD-10-CM

## 2020-01-09 PROBLEM — F43.20 GRIEF REACTION: Status: ACTIVE | Noted: 2020-01-09

## 2020-01-09 PROCEDURE — 99442 ZZC PHYSICIAN TELEPHONE EVALUATION 11-20 MIN: CPT | Performed by: FAMILY MEDICINE

## 2020-01-09 RX ORDER — BUPROPION HYDROCHLORIDE 150 MG/1
150 TABLET ORAL EVERY MORNING
Qty: 30 TABLET | Refills: 0 | Status: SHIPPED | OUTPATIENT
Start: 2020-01-09 | End: 2020-02-10

## 2020-01-09 ASSESSMENT — PATIENT HEALTH QUESTIONNAIRE - PHQ9
SUM OF ALL RESPONSES TO PHQ QUESTIONS 1-9: 7
5. POOR APPETITE OR OVEREATING: NOT AT ALL

## 2020-01-09 ASSESSMENT — ANXIETY QUESTIONNAIRES
IF YOU CHECKED OFF ANY PROBLEMS ON THIS QUESTIONNAIRE, HOW DIFFICULT HAVE THESE PROBLEMS MADE IT FOR YOU TO DO YOUR WORK, TAKE CARE OF THINGS AT HOME, OR GET ALONG WITH OTHER PEOPLE: NOT DIFFICULT AT ALL
5. BEING SO RESTLESS THAT IT IS HARD TO SIT STILL: SEVERAL DAYS
7. FEELING AFRAID AS IF SOMETHING AWFUL MIGHT HAPPEN: NOT AT ALL
6. BECOMING EASILY ANNOYED OR IRRITABLE: NOT AT ALL
1. FEELING NERVOUS, ANXIOUS, OR ON EDGE: SEVERAL DAYS
GAD7 TOTAL SCORE: 2
3. WORRYING TOO MUCH ABOUT DIFFERENT THINGS: NOT AT ALL
2. NOT BEING ABLE TO STOP OR CONTROL WORRYING: NOT AT ALL

## 2020-01-09 NOTE — Clinical Note
Ramya Rothman.[facility] has been [facility]. However, he agreed to meet you in the interim as it may take a few weeks to get into counseling.  He agreed to be called and eventually see you in clinic. Thanks!

## 2020-01-09 NOTE — TELEPHONE ENCOUNTER
Patient is referred to Ramya Echevarria for therapy as a new patient.  When he returns call, please assist him in scheduling a 60 minute appointment with Ramya as a new patient.  Thanks.

## 2020-01-09 NOTE — PROGRESS NOTES
Subjective     James Blanco is a 39 year old male who presents to clinic today for the following health issues:  Chief Complaint   Patient presents with     Anxiety     f/u         HPI   Start: 11:10 am    Anxiety Follow-Up    How are you doing with your anxiety since your last visit? No change    Are you having other symptoms that might be associated with anxiety? No    Have you had a significant life event? Grief or Loss, loss of dog back in June     Are you feeling depressed? Yes:  getting worse    Do you have any concerns with your use of alcohol or other drugs? Yes:  pt feels he should drink less,  drinking about 6 beers daily    Social History     Tobacco Use     Smoking status: Former Smoker     Packs/day: 1.00     Years: 13.00     Pack years: 13.00     Smokeless tobacco: Former User   Substance Use Topics     Alcohol use: Yes     Comment: 6-10 drinks weekly     Drug use: No     Comment: h/o street drunk use 15 yrs ago     LINNEA-7 SCORE 9/26/2018 10/22/2019 1/9/2020   Total Score - 0 (minimal anxiety) -   Total Score 5 0 2     PHQ 9/26/2018 10/22/2019 1/9/2020   PHQ-9 Total Score 7 1 7   Q9: Thoughts of better off dead/self-harm past 2 weeks Not at all Not at all Not at all     Last PHQ-9 1/9/2020   1.  Little interest or pleasure in doing things 1   2.  Feeling down, depressed, or hopeless 2   3.  Trouble falling or staying asleep, or sleeping too much 1   4.  Feeling tired or having little energy 1   5.  Poor appetite or overeating 0   6.  Feeling bad about yourself 2   7.  Trouble concentrating 0   8.  Moving slowly or restless 0   Q9: Thoughts of better off dead/self-harm past 2 weeks 0   PHQ-9 Total Score 7   Difficulty at work, home, or with people Somewhat difficult     LINNEA-7  1/9/2020   1. Feeling nervous, anxious, or on edge 1   2. Not being able to stop or control worrying 0   3. Worrying too much about different things 0   4. Trouble relaxing 0   5. Being so restless that it is hard to sit still 1    6. Becoming easily annoyed or irritable 0   7. Feeling afraid, as if something awful might happen 0   LINNEA-7 Total Score 2   If you checked any problems, how difficult have they made it for you to do your work, take care of things at home, or get along with other people? Not difficult at all       Patient Active Problem List   Diagnosis     Anxiety     Mixed hyperlipidemia     Fatty liver, alcoholic     Grief reaction     Heavy alcohol consumption     Past Surgical History:   Procedure Laterality Date     COLONOSCOPY N/A 3/21/2016    Procedure: COLONOSCOPY;  Surgeon: Tavo Wilcox MD;  Location: [facility]     SURGICAL HISTORY OF -       plasty surgery on face, due to injury     SURGICAL HISTORY OF -       left elbow nerve decompression, sound like ulnar     SURGICAL HISTORY OF -       left knee surgery, scope and allograph for cartilege     SURGICAL HISTORY OF -       lasix surgery       Social History     Tobacco Use     Smoking status: Former Smoker     Packs/day: 1.00     Years: 13.00     Pack years: 13.00     Smokeless tobacco: Former User   Substance Use Topics     Alcohol use: Yes     Comment: 6-10 drinks weekly     Family History   Problem Relation Age of Onset     Hyperlipidemia Mother      Coronary Artery Disease Father      Hyperlipidemia Father      Diabetes Father      Other Cancer Sister         lymphoma-non hodgkins         Current Outpatient Medications   Medication Sig Dispense Refill     buPROPion (WELLBUTRIN XL) 150 MG 24 hr tablet Take 1 tablet (150 mg) by mouth every morning 30 tablet 0     fenofibrate (TRICOR) 145 MG tablet Take 1 tablet (145 mg) by mouth daily 90 tablet 2     FLUoxetine (PROZAC) 40 MG capsule Take 1 capsule (40 mg) by mouth daily 90 capsule 1     omeprazole 20 MG tablet Take 20 mg by mouth daily       simvastatin (ZOCOR) 40 MG tablet Take 1 tablet (40 mg) by mouth At Bedtime 90 tablet 3     acetaminophen (TYLENOL) 500 MG tablet Take 1-2 tablets (500-1,000 mg) by mouth every 4  hours as needed for pain (Patient not taking: Reported on 1/9/2020)       EPINEPHrine (EPIPEN 2-CHAIM) 0.3 MG/0.3ML injection 2-pack Inject 0.3 mLs (0.3 mg) into the muscle as needed for anaphylaxis (Patient not taking: Reported on 2/6/2019) 0.6 mL 1     naproxen sodium (ANAPROX) 220 MG tablet Take 1 tablet (220 mg) by mouth 2 times daily as needed for moderate pain (if not relieved by acetaminophen initially) (Patient not taking: Reported on 1/9/2020)       order for DME Equipment being ordered: wrist quickfit right (Patient not taking: Reported on 10/16/2019) 1 Device 0     Allergies   Allergen Reactions     Ceclor [Cefaclor]      Penicillins        Reviewed and updated as needed this visit by Provider  Tobacco  Allergies  Meds  Problems  Med Hx  Surg Hx  Fam Hx         Review of Systems   C: NEGATIVE for fever, chills, change in weight  I: NEGATIVE for worrisome rashes, moles or lesions  E: NEGATIVE for vision changes or irritation  E/M: NEGATIVE for ear, mouth and throat problems  R: NEGATIVE for significant cough or SOB  CV: NEGATIVE for chest pain, palpitations or peripheral edema  GI: NEGATIVE for nausea, abdominal pain, heartburn, or change in bowel habits  : NEGATIVE for frequency, dysuria, or hematuria  N: NEGATIVE for weakness, dizziness or paresthesias  E: NEGATIVE for temperature intolerance, skin/hair changes  PSYCHIATRIC: see above      Objective    There were no vitals taken for this visit.  There is no height or weight on file to calculate BMI.  Physical Exam   Patient was very tearful on phone encounter when he talked about losing his pet dog, how he felt in the last several months, and how it has affected his ADLs.  Patient denied suicidal thoughts on encounter.      Diagnostic Test Results:  none         Assessment & Plan     James was seen today for anxiety.    Diagnoses and all orders for this visit:    Grief reaction  -     buPROPion (WELLBUTRIN XL) 150 MG 24 hr tablet; Take 1 tablet  (150 mg) by mouth every morning    Anxiety  -     buPROPion (WELLBUTRIN XL) 150 MG 24 hr tablet; Take 1 tablet (150 mg) by mouth every morning    Heavy alcohol consumption      Significantly increased symptoms since loss of pet.  Discussed with patient cause, course, and treatment of grief reaction.  Maxed out on serotonin specific reuptake inhibitor. Discussed adjunct bupropion XL. Patient concurred.  Advised grief counseling. Patient concurred.  [facility] in the interim.  Suicidal precautions discussed in detail.  Advised to reduce alcohol consumption to 0-1 drink a day, and not to drink alcohol when med is taken. Patient states he will follow advice.  Return precautions discussed and given to patient.         END 11:23 am    Patient Instructions   After our discussion today, treatment plan is as follows:    1) continue fluoxetine 40 mg daily  2) start bupropion  mg daily.  3) you will be referred for grief counseling - you will be called to schedule this.]  4) Ramya Echevarria will be calling you in next few days for help with your symptoms while waiting for the behavior therapist appointment.  5) reduce alcohol consumption; avoid consuming alcohol at same time of taking meds.      Patient Education     Grief Reaction  Grief is the feeling that we all have when we lose someone or something that has been important in our life. Grief is an unavoidable and normal reaction to this loss, and can last from months to years. The amount of time depends on different factors. These include how close the person was to you, and how much support you have through the grief process. Symptoms can be both physical and emotional.  Physical reactions  Reactions to grief of a physical nature include:     Loss of appetite or overeating    Changes in weight    Trouble getting to sleep or staying asleep    Hair loss    Upset stomach, indigestion, heart burn, belly pain, cramping, diarrhea    Sense of trouble breathing    Trembling,  shakiness  Emotional reactions  Reactions to grief of an emotional nature include:     Sadness    Anxiety    Feeling depressed or helpless    Difficulty concentrating    Detachment or withdrawal from those around you    Loss of interest in your normal life and work  Home care  Suggestions to care for yourself at home include the following:     Allow yourself to feel the pain of your loss. For some, this can be a key part of healing grief. Talk about your pain with others who understand. Share good memories that involve the person, pet, or possession you lost.    Take time for yourself. Make it a point to do things that you enjoy (gardening, walking in nature, going to a movie, etc.).    Take care of your physical body. Eat a balanced diet (low in saturated fat and high in fruits and vegetables) and establish an exercise plan at least 3 times a week for 30 minutes. Even mild-moderate exercise (like brisk walking) can make you feel better. Get plenty of sleep.    Don't use alcohol or drugs to cover your emotional pain. This only slows down the emotional healing process.    Don't isolate yourself from others. Have daily contact with family or friends. Talk about your loss to those closest to you.    For additional support, meet with your , , or rabbi, a counselor or therapist, or your own healthcare provider.    Consider joining a grief support group. Ask your healthcare provider or our staff for information on how to find one in your area.    If you have been prescribed a medicine to help with your symptoms, take it only as directed. Do not use it with alcohol.  Follow-up care  Follow up with your healthcare provider, or as advised.  Call 911  Call 911 if any of these happen:    Trouble breathing    Very confused    Very drowsy or trouble awakening    Fainting or loss of consciousness    Rapid heart rate    Seizure    New chest pain that becomes more severe, lasts longer, or spreads into your shoulder,  arm, neck, jaw, or back    Have suicidal thoughts, a suicide plan, and the means to carry out the plan; or serious thoughts of hurting someone else   When to seek medical advice  Call your healthcare provider right away if any of these happen:    Worsening symptoms    Unable to eat or sleep for three days in a row    Feeling extreme depression, fear, anxiety, or anger toward yourself or others    Feeling out of control    Feeling that you may try to harm yourself    Family or friends express concern over your behavior and ask you to get help  Date Last Reviewed: 10/1/2017    1588-7838 [employer]. [address] [place] [address]. All rights reserved. This information is not intended as a substitute for professional medical care. Always follow your healthcare professional's instructions.               Return in about 3 weeks (around 1/30/2020) for telephone visit with Dr. Talbot for follow up on medication .    Hector Talbot MD  [facility]

## 2020-01-09 NOTE — PATIENT INSTRUCTIONS
After our discussion today, treatment plan is as follows:    1) continue fluoxetine 40 mg daily  2) start bupropion  mg daily.  3) you will be referred for grief counseling - you will be called to schedule this.]  4) Ramya Echevarria will be calling you in next few days for help with your symptoms while waiting for the behavior therapist appointment.  5) reduce alcohol consumption; avoid consuming alcohol at same time of taking meds.      Patient Education     Grief Reaction  Grief is the feeling that we all have when we lose someone or something that has been important in our life. Grief is an unavoidable and normal reaction to this loss, and can last from months to years. The amount of time depends on different factors. These include how close the person was to you, and how much support you have through the grief process. Symptoms can be both physical and emotional.  Physical reactions  Reactions to grief of a physical nature include:     Loss of appetite or overeating    Changes in weight    Trouble getting to sleep or staying asleep    Hair loss    Upset stomach, indigestion, heart burn, belly pain, cramping, diarrhea    Sense of trouble breathing    Trembling, shakiness  Emotional reactions  Reactions to grief of an emotional nature include:     Sadness    Anxiety    Feeling depressed or helpless    Difficulty concentrating    Detachment or withdrawal from those around you    Loss of interest in your normal life and work  Home care  Suggestions to care for yourself at home include the following:     Allow yourself to feel the pain of your loss. For some, this can be a key part of healing grief. Talk about your pain with others who understand. Share good memories that involve the person, pet, or possession you lost.    Take time for yourself. Make it a point to do things that you enjoy (gardening, walking in nature, going to a movie, etc.).    Take care of your physical body. Eat a balanced diet (low in  saturated fat and high in fruits and vegetables) and establish an exercise plan at least 3 times a week for 30 minutes. Even mild-moderate exercise (like brisk walking) can make you feel better. Get plenty of sleep.    Don't use alcohol or drugs to cover your emotional pain. This only slows down the emotional healing process.    Don't isolate yourself from others. Have daily contact with family or friends. Talk about your loss to those closest to you.    For additional support, meet with your , , or rabbi, a counselor or therapist, or your own healthcare provider.    Consider joining a grief support group. Ask your healthcare provider or our staff for information on how to find one in your area.    If you have been prescribed a medicine to help with your symptoms, take it only as directed. Do not use it with alcohol.  Follow-up care  Follow up with your healthcare provider, or as advised.  Call 911  Call 911 if any of these happen:    Trouble breathing    Very confused    Very drowsy or trouble awakening    Fainting or loss of consciousness    Rapid heart rate    Seizure    New chest pain that becomes more severe, lasts longer, or spreads into your shoulder, arm, neck, jaw, or back    Have suicidal thoughts, a suicide plan, and the means to carry out the plan; or serious thoughts of hurting someone else   When to seek medical advice  Call your healthcare provider right away if any of these happen:    Worsening symptoms    Unable to eat or sleep for three days in a row    Feeling extreme depression, fear, anxiety, or anger toward yourself or others    Feeling out of control    Feeling that you may try to harm yourself    Family or friends express concern over your behavior and ask you to get help  Date Last Reviewed: 10/1/2017    0637-1940 [employer]. [address] [place] [address], [place] [address]. All rights reserved. This information is not intended as a substitute for professional medical  care. Always follow your healthcare professional's instructions.

## 2020-01-10 ASSESSMENT — ANXIETY QUESTIONNAIRES: GAD7 TOTAL SCORE: 2

## 2020-01-13 NOTE — TELEPHONE ENCOUNTER
Message given to patient he will call back for the number to schedule he was driving at the time.  Anna Ray on 1/13/2020 at 9:17 AM

## 2020-01-27 ENCOUNTER — OFFICE VISIT (OUTPATIENT)
Dept: BEHAVIORAL HEALTH | Facility: CLINIC | Age: 40
End: 2020-01-27
Payer: COMMERCIAL

## 2020-01-27 DIAGNOSIS — F41.1 GAD (GENERALIZED ANXIETY DISORDER): Primary | ICD-10-CM

## 2020-01-27 DIAGNOSIS — Z63.4 BEREAVEMENT: ICD-10-CM

## 2020-01-27 DIAGNOSIS — F10.10 ALCOHOL USE DISORDER, MILD, ABUSE: ICD-10-CM

## 2020-01-27 PROCEDURE — 90832 PSYTX W PT 30 MINUTES: CPT | Performed by: SOCIAL WORKER

## 2020-01-27 SDOH — SOCIAL STABILITY - SOCIAL INSECURITY: DISSAPEARANCE AND DEATH OF FAMILY MEMBER: Z63.4

## 2020-01-27 ASSESSMENT — ANXIETY QUESTIONNAIRES
7. FEELING AFRAID AS IF SOMETHING AWFUL MIGHT HAPPEN: NOT AT ALL
6. BECOMING EASILY ANNOYED OR IRRITABLE: SEVERAL DAYS
IF YOU CHECKED OFF ANY PROBLEMS ON THIS QUESTIONNAIRE, HOW DIFFICULT HAVE THESE PROBLEMS MADE IT FOR YOU TO DO YOUR WORK, TAKE CARE OF THINGS AT HOME, OR GET ALONG WITH OTHER PEOPLE: NOT DIFFICULT AT ALL
3. WORRYING TOO MUCH ABOUT DIFFERENT THINGS: NOT AT ALL
5. BEING SO RESTLESS THAT IT IS HARD TO SIT STILL: SEVERAL DAYS
GAD7 TOTAL SCORE: 4
2. NOT BEING ABLE TO STOP OR CONTROL WORRYING: NOT AT ALL
1. FEELING NERVOUS, ANXIOUS, OR ON EDGE: SEVERAL DAYS

## 2020-01-27 ASSESSMENT — PATIENT HEALTH QUESTIONNAIRE - PHQ9
SUM OF ALL RESPONSES TO PHQ QUESTIONS 1-9: 13
5. POOR APPETITE OR OVEREATING: SEVERAL DAYS

## 2020-01-28 ASSESSMENT — ANXIETY QUESTIONNAIRES: GAD7 TOTAL SCORE: 4

## 2020-02-01 ENCOUNTER — MYC MEDICAL ADVICE (OUTPATIENT)
Dept: FAMILY MEDICINE | Facility: CLINIC | Age: 40
End: 2020-02-01

## 2020-02-03 ENCOUNTER — MYC MEDICAL ADVICE (OUTPATIENT)
Dept: FAMILY MEDICINE | Facility: CLINIC | Age: 40
End: 2020-02-03

## 2020-02-05 ENCOUNTER — OFFICE VISIT (OUTPATIENT)
Dept: PSYCHOLOGY | Facility: CLINIC | Age: 40
End: 2020-02-05
Payer: COMMERCIAL

## 2020-02-05 DIAGNOSIS — F43.23 ADJUSTMENT DISORDER WITH MIXED ANXIETY AND DEPRESSED MOOD: ICD-10-CM

## 2020-02-05 DIAGNOSIS — F33.1 MAJOR DEPRESSIVE DISORDER, RECURRENT EPISODE, MODERATE (H): Primary | ICD-10-CM

## 2020-02-05 PROCEDURE — 90791 PSYCH DIAGNOSTIC EVALUATION: CPT | Performed by: MARRIAGE & FAMILY THERAPIST

## 2020-02-05 ASSESSMENT — ANXIETY QUESTIONNAIRES
GAD7 TOTAL SCORE: 1
7. FEELING AFRAID AS IF SOMETHING AWFUL MIGHT HAPPEN: NOT AT ALL
3. WORRYING TOO MUCH ABOUT DIFFERENT THINGS: NOT AT ALL
6. BECOMING EASILY ANNOYED OR IRRITABLE: NOT AT ALL
2. NOT BEING ABLE TO STOP OR CONTROL WORRYING: NOT AT ALL
1. FEELING NERVOUS, ANXIOUS, OR ON EDGE: SEVERAL DAYS
5. BEING SO RESTLESS THAT IT IS HARD TO SIT STILL: NOT AT ALL
IF YOU CHECKED OFF ANY PROBLEMS ON THIS QUESTIONNAIRE, HOW DIFFICULT HAVE THESE PROBLEMS MADE IT FOR YOU TO DO YOUR WORK, TAKE CARE OF THINGS AT HOME, OR GET ALONG WITH OTHER PEOPLE: NOT DIFFICULT AT ALL

## 2020-02-05 ASSESSMENT — PATIENT HEALTH QUESTIONNAIRE - PHQ9
SUM OF ALL RESPONSES TO PHQ QUESTIONS 1-9: 10
5. POOR APPETITE OR OVEREATING: NOT AT ALL

## 2020-02-05 ASSESSMENT — COLUMBIA-SUICIDE SEVERITY RATING SCALE - C-SSRS
1. IN THE PAST MONTH, HAVE YOU WISHED YOU WERE DEAD OR WISHED YOU COULD GO TO SLEEP AND NOT WAKE UP?: NO
1. IN THE PAST MONTH, HAVE YOU WISHED YOU WERE DEAD OR WISHED YOU COULD GO TO SLEEP AND NOT WAKE UP?: NO
ATTEMPT PAST THREE MONTHS: NO
2. HAVE YOU ACTUALLY HAD ANY THOUGHTS OF KILLING YOURSELF?: NO
2. HAVE YOU ACTUALLY HAD ANY THOUGHTS OF KILLING YOURSELF LIFETIME?: NO
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
## 2020-02-05 NOTE — Clinical Note
Client is getting started in the counseling center and will be addressing grief and loss.  Thank you!

## 2020-02-06 ASSESSMENT — ANXIETY QUESTIONNAIRES: GAD7 TOTAL SCORE: 1

## 2020-02-08 NOTE — PROGRESS NOTES
[facility] Primary Care: Integrated Behavioral Health  January 27 , 2020      Behavioral Health Clinician Progress Note    Patient Name: James Blanco           Service Type: Individual      Service Location:  in clinic       Session Start Time: 5:30 PM session End Time: 6:05 PM      Session Length: 16 - 37      Attendees: Patient    Visit Activities (Refresh list every visit): NEW and [facility] Only    Diagnostic Assessment Date: Not completed  Treatment Plan Review Date: Not completed  See Flowsheets for today's PHQ-9 and LINNEA-7 results  Previous PHQ-9:   PHQ-9 SCORE 1/9/2020 1/27/2020 2/5/2020   PHQ-9 Total Score MyChart - - -   PHQ-9 Total Score 7 13 10     Previous LINNEA-7:   LINNEA-7 SCORE 1/9/2020 1/27/2020 2/5/2020   Total Score - - -   Total Score 2 4 1       SHUBHAM LEVEL:  No flowsheet data found.    DATA  Extended Session (60+ minutes): No  Interactive Complexity: No  Crisis: No    Treatment Objective(s) Addressed in This Session:  Target Behavior(s): alcohol use and disease management/lifestyle changes Decreased depression    Depressed Mood: Increase interest, engagement, and pleasure in doing things  Decrease frequency and intensity of feeling down, depressed, hopeless  Improve quantity and quality of night time sleep / decrease daytime naps  Improve diet, appetite, mindful eating, and / or meal planning  Identify negative self-talk and behaviors: challenge core beliefs, myths, and actions  Improve concentration, focus, and mindfulness in daily activities   Feel less fidgety, restless or slow in daily activities / interpersonal interactions  Functional Impairment: will effectively address problems that interfere with adaptive functioning  Grief / Loss: will increase understanding of normal grieving process  Alcohol / Substance Use: will make healthier choices in regard to substance use    Current Stressors / Issues:  First session with patient.  He is referred by primary care provider.  Patient will  "see [facility] therapist in approximately 10 days.  Patient has increased depression due to loss of long term pet.  He reports struggling at another time in his life with the loss of a pet.  Patient reports daily alcohol use and that it is \"too much\".  He also reports not being sure if he wants to decrease at this time.  Patient reports no suicidal ideation or plan at this time.  He reports he will go to the emergency room if this changed.     Progress on Treatment Objective(s) / Homework:  None established    Motivational Interviewing    MI Intervention: Expressed Empathy/Understanding, Supported Autonomy, Collaboration, Evocation, Open-ended questions, Reflections: simple and complex, Change talk (evoked) and Reframe     Change Talk Expressed by the Patient: Reasons to change Need to change    Provider Response to Change Talk: E - Evoked more info from patient about behavior change, A - Affirmed patient's thoughts, decisions, or attempts at behavior change, R - Reflected patient's change talk and S - Summarized patient's change talk statements      Care Plan review completed: No    Medication Review:  No changes to current psychiatric medication(s)    Medication Compliance:  Yes    Changes in Health Issues:   None reported    Chemical Use Review:   Substance Use: First time assessed in alcohol .  Patient reports frequency of use Daily.  Patient declined discussion at this time        Tobacco Use: No current tobacco use.      Assessment: Current Emotional / Mental Status (status of significant symptoms):  Risk status (Self / Other harm or suicidal ideation)  Patient has had a history of suicidal ideation: In high school not since that time  Patient denies current fears or concerns for personal safety.  Patient denies current or recent suicidal ideation or behaviors.  Patient denies current or recent homicidal ideation or behaviors.  Patient denies current or recent self injurious behavior or ideation.  Patient denies " other safety concerns.  A safety and risk management plan has not been developed at this time, however patient was encouraged to call [facility] should there be a change in any of these risk factors.    Appearance:   Appropriate   Eye Contact:   Good   Psychomotor Behavior: Normal   Attitude:   Cooperative   Orientation:   All  Speech   Rate / Production: Normal    Volume:  Soft   Mood:    Normal Sad   Affect:    Appropriate  Subdued   Thought Content:  Clear   Thought Form:  Coherent  Logical   Insight:    Good     Diagnoses:  1. LINNEA (generalized anxiety disorder)    2. Bereavement    3. Alcohol use disorder, mild, abuse        Collateral Reports Completed:  Communicated with: Primary care provider as needed    Plan: (Homework, other):  Patient was given information about behavioral services and encouraged to schedule a follow up appointment with the clinic [facility] as needed.  He was also given information about mental health symptoms and treatment options .  CD Recommendations: Practice Harm Reduction: Decrease alcohol. Victor (M[facility]

## 2020-02-10 ENCOUNTER — VIRTUAL VISIT (OUTPATIENT)
Dept: FAMILY MEDICINE | Facility: CLINIC | Age: 40
End: 2020-02-10
Payer: COMMERCIAL

## 2020-02-10 DIAGNOSIS — F43.21 GRIEF REACTION: ICD-10-CM

## 2020-02-10 DIAGNOSIS — F41.9 ANXIETY: ICD-10-CM

## 2020-02-10 PROCEDURE — 99441 ZZC PHYSICIAN TELEPHONE EVALUATION 5-10 MIN: CPT | Performed by: FAMILY MEDICINE

## 2020-02-10 RX ORDER — BUPROPION HYDROCHLORIDE 150 MG/1
150 TABLET ORAL EVERY MORNING
Qty: 90 TABLET | Refills: 0 | Status: SHIPPED | OUTPATIENT
Start: 2020-02-10 | End: 2020-02-24

## 2020-02-10 ASSESSMENT — ANXIETY QUESTIONNAIRES
7. FEELING AFRAID AS IF SOMETHING AWFUL MIGHT HAPPEN: NOT AT ALL
3. WORRYING TOO MUCH ABOUT DIFFERENT THINGS: NOT AT ALL
IF YOU CHECKED OFF ANY PROBLEMS ON THIS QUESTIONNAIRE, HOW DIFFICULT HAVE THESE PROBLEMS MADE IT FOR YOU TO DO YOUR WORK, TAKE CARE OF THINGS AT HOME, OR GET ALONG WITH OTHER PEOPLE: NOT DIFFICULT AT ALL
6. BECOMING EASILY ANNOYED OR IRRITABLE: NOT AT ALL
2. NOT BEING ABLE TO STOP OR CONTROL WORRYING: NOT AT ALL
GAD7 TOTAL SCORE: 0
1. FEELING NERVOUS, ANXIOUS, OR ON EDGE: NOT AT ALL
5. BEING SO RESTLESS THAT IT IS HARD TO SIT STILL: NOT AT ALL

## 2020-02-10 ASSESSMENT — PATIENT HEALTH QUESTIONNAIRE - PHQ9
SUM OF ALL RESPONSES TO PHQ QUESTIONS 1-9: 4
5. POOR APPETITE OR OVEREATING: NOT AT ALL

## 2020-02-10 NOTE — PROGRESS NOTES
Subjective   START: 8:37 am  James Blanco is a 39 year old male who presents to clinic today for the following health issues:  Chief Complaint   Patient presents with     Depression     f/u       HPI   Depression Followup    How are you doing with your depression since your last visit? Improved quite a bit    Are you having other symptoms that might be associated with depression? No    Have you had a significant life event?  No     Are you feeling anxious or having panic attacks?   No    Do you have any concerns with your use of alcohol or other drugs? Yes:  trying to cut back on alcohol, just started therapy     Patient reports he feels great now.    Patient states cannot pinpoint specifics that can still improve.    Tolerates med well.    Social History     Tobacco Use     Smoking status: Former Smoker     Packs/day: 1.00     Years: 13.00     Pack years: 13.00     Smokeless tobacco: Former User   Substance Use Topics     Alcohol use: Yes     Comment: 6 beers daily      Drug use: No     Comment: h/o street drunk use 15 yrs ago     PHQ 1/27/2020 2/5/2020 2/10/2020   PHQ-9 Total Score 13 10 4   Q9: Thoughts of better off dead/self-harm past 2 weeks Not at all Not at all Not at all     LINNEA-7 SCORE 1/27/2020 2/5/2020 2/10/2020   Total Score - - -   Total Score 4 1 0     Last PHQ-9 2/10/2020   1.  Little interest or pleasure in doing things 0   2.  Feeling down, depressed, or hopeless 1   3.  Trouble falling or staying asleep, or sleeping too much 1   4.  Feeling tired or having little energy 0   5.  Poor appetite or overeating 1   6.  Feeling bad about yourself 1   7.  Trouble concentrating 0   8.  Moving slowly or restless 0   Q9: Thoughts of better off dead/self-harm past 2 weeks 0   PHQ-9 Total Score 4   Difficulty at work, home, or with people Not difficult at all     LINNEA-7  2/10/2020   1. Feeling nervous, anxious, or on edge 0   2. Not being able to stop or control worrying 0   3. Worrying too much about  different things 0   4. Trouble relaxing 0   5. Being so restless that it is hard to sit still 0   6. Becoming easily annoyed or irritable 0   7. Feeling afraid, as if something awful might happen 0   LINNEA-7 Total Score 0   If you checked any problems, how difficult have they made it for you to do your work, take care of things at home, or get along with other people? Not difficult at all     In the past two weeks have you had thoughts of suicide or self-harm?  No.    Do you have concerns about your personal safety or the safety of others?   No    Suicide Assessment Five-step Evaluation and Treatment (SAFE-T)      How many servings of fruits and vegetables do you eat daily?  0-1    On average, how many sweetened beverages do you drink each day (Examples: soda, juice, sweet tea, etc.  Do NOT count diet or artificially sweetened beverages)?   1-2    How many days per week do you exercise enough to make your heart beat faster? 5    How many minutes a day do you exercise enough to make your heart beat faster? 30 minutes    How many days per week do you miss taking your medication? 0      Patient Active Problem List   Diagnosis     Anxiety     Mixed hyperlipidemia     Fatty liver, alcoholic     Grief reaction     Heavy alcohol consumption     Past Surgical History:   Procedure Laterality Date     COLONOSCOPY N/A 3/21/2016    Procedure: COLONOSCOPY;  Surgeon: Tavo Wilcox MD;  Location: [facility]     SURGICAL HISTORY OF -       plasty surgery on face, due to injury     SURGICAL HISTORY OF -       left elbow nerve decompression, sound like ulnar     SURGICAL HISTORY OF -       left knee surgery, scope and allograph for cartilege     SURGICAL HISTORY OF -       lasix surgery       Social History     Tobacco Use     Smoking status: Former Smoker     Packs/day: 1.00     Years: 13.00     Pack years: 13.00     Smokeless tobacco: Former User   Substance Use Topics     Alcohol use: Yes     Comment: 6 beers daily      Family History    Problem Relation Age of Onset     Hyperlipidemia Mother      Coronary Artery Disease Father      Hyperlipidemia Father      Diabetes Father      Other Cancer Sister         lymphoma-non hodgkins     Depression Sister      Anxiety Disorder Sister      Substance Abuse Sister      Substance Abuse Brother          Current Outpatient Medications   Medication Sig Dispense Refill     buPROPion (WELLBUTRIN XL) 150 MG 24 hr tablet Take 1 tablet (150 mg) by mouth every morning 90 tablet 0     fenofibrate (TRICOR) 145 MG tablet Take 1 tablet (145 mg) by mouth daily 90 tablet 2     FLUoxetine (PROZAC) 40 MG capsule Take 1 capsule (40 mg) by mouth daily 90 capsule 1     omeprazole 20 MG tablet Take 20 mg by mouth daily       simvastatin (ZOCOR) 40 MG tablet Take 1 tablet (40 mg) by mouth At Bedtime 90 tablet 3     EPINEPHrine (EPIPEN 2-CHAIM) 0.3 MG/0.3ML injection 2-pack Inject 0.3 mLs (0.3 mg) into the muscle as needed for anaphylaxis 0.6 mL 1     order for DME Equipment being ordered: wrist quickfit right 1 Device 0     Allergies   Allergen Reactions     Ceclor [Cefaclor]      Penicillins        Reviewed and updated as needed this visit by Provider         Review of Systems   C: NEGATIVE for fever, chills, change in weight  I: NEGATIVE for worrisome rashes, moles or lesions  E: NEGATIVE for vision changes or irritation  E/M: NEGATIVE for ear, mouth and throat problems  R: NEGATIVE for significant cough or SOB  CV: NEGATIVE for chest pain, palpitations or peripheral edema  GI: NEGATIVE for nausea, abdominal pain, heartburn, or change in bowel habits  : NEGATIVE for frequency, dysuria, or hematuria  N: NEGATIVE for weakness, dizziness or paresthesias  E: NEGATIVE for temperature intolerance, skin/hair changes  PSYCHIATRIC: see above      Objective    There were no vitals taken for this visit.  There is no height or weight on file to calculate BMI.  Physical Exam   Patient was calm, collected and coherent on phone  conversation.,    Diagnostic Test Results:  none         Assessment & Plan     James was seen today for depression.    Diagnoses and all orders for this visit:    Grief reaction  -     buPROPion (WELLBUTRIN XL) 150 MG 24 hr tablet; Take 1 tablet (150 mg) by mouth every morning    Anxiety  -     buPROPion (WELLBUTRIN XL) 150 MG 24 hr tablet; Take 1 tablet (150 mg) by mouth every morning      Improved but still feels may still feel emotional at times.  Will continue CBT with Shu.  Discussed his med - patient prefers to stay on same dose for now.  Advised suicidal precautions.      END: 8:43 am        There are no Patient Instructions on file for this visit.    No follow-ups on file.    Hector Talbot MD  [facility]

## 2020-02-10 NOTE — PATIENT INSTRUCTIONS
Improving moods.  You preferred to stay at Bupropion  mg daily for now.  Continue behavior therapy with Shu.  Exercise regularly and eat healthily.  If you have thoughts of self-harm or harming others, see a doctor promptly or have yourself brought to the ER.

## 2020-02-11 ENCOUNTER — OFFICE VISIT (OUTPATIENT)
Dept: PSYCHOLOGY | Facility: CLINIC | Age: 40
End: 2020-02-11
Payer: COMMERCIAL

## 2020-02-11 ENCOUNTER — FCC EXTENDED DOCUMENTATION (OUTPATIENT)
Dept: PSYCHOLOGY | Facility: CLINIC | Age: 40
End: 2020-02-11

## 2020-02-11 DIAGNOSIS — F33.1 MAJOR DEPRESSIVE DISORDER, RECURRENT EPISODE, MODERATE (H): Primary | ICD-10-CM

## 2020-02-11 DIAGNOSIS — F43.23 ADJUSTMENT DISORDER WITH MIXED ANXIETY AND DEPRESSED MOOD: ICD-10-CM

## 2020-02-11 PROCEDURE — 90834 PSYTX W PT 45 MINUTES: CPT | Performed by: MARRIAGE & FAMILY THERAPIST

## 2020-02-11 ASSESSMENT — ANXIETY QUESTIONNAIRES: GAD7 TOTAL SCORE: 0

## 2020-02-11 NOTE — PROGRESS NOTES
"                                           Progress Note    Patient Name: James Blanco  Date: 2-11-20         Service Type: Individual  Video Visit: No     Session Start Time: 4pm  Session End Time: 450pm     Session Length: 50min    Session #: 2    Attendees: Client attended alone     Treatment Plan Last Reviewed: 2-11-20  PHQ-9 / LINNEA-7 : 2-11-20  CGI- 2-11-20    DATA  Interactive Complexity: No  Crisis: No       Progress Since Last Session (Related to Symptoms / Goals / Homework):   Symptoms: Improving - Client has been adjusting to having a new pet at home while he grieves the loss of his other dog who was \"like a child to him and his wife.\"      Homework: Completed in session      Episode of Care Goals: Minimal progress - ACTION (Actively working towards change); Intervened by reinforcing change plan / affirming steps taken     Current / Ongoing Stressors and Concerns:   -Client reports some improvement in his symptoms this week.  They did get a new dog and this has been a good distraction.  Client reports he also had a winter friend/ family gathering this weekend and it went really well and people who he is close with met his new dog and had positive feedback.       Treatment Objective(s) Addressed in This Session:   Define treatment plan goals  Grief and loss     Intervention:   -Wrote treatment plan goals.   -Discussed tips for coping with loss of a pet.  Client feels he has done well letting himself express his feelings and is tearful when discussing this in session.  He reports he has had the most difficulty with saying goodbye.          ASSESSMENT: Current Emotional / Mental Status (status of significant symptoms):   Risk status (Self / Other harm or suicidal ideation)   Patient denies current fears or concerns for personal safety.   Patient denies current or recent suicidal ideation or behaviors.   Patientdenies current or recent homicidal ideation or behaviors.   Patient denies current or recent self " injurious behavior or ideation.   Patient denies other safety concerns.   Patient reports there has been no change in risk factors since their last session.     Patientreports there has been no change in protective factors since their last session.     Recommended that patient call 911 or go to the local ED should there be a change in any of these risk factors.     Appearance:   Appropriate    Eye Contact:   Good    Psychomotor Behavior: Normal    Attitude:   Cooperative    Orientation:   All   Speech    Rate / Production: Normal     Volume:  Normal    Mood:    Normal   Affect:    Appropriate    Thought Content:  Clear    Thought Form:  Coherent  Logical    Insight:    Good      Medication Review:   No changes to current psychiatric medication(s)     Medication Compliance:   Yes     Changes in Health Issues:   None reported     Chemical Use Review:   Substance Use: Chemical use reviewed, no active concerns identified      Tobacco Use: No current tobacco use.      Diagnosis:  296.32 (F33.1) Major Depressive Disorder, Recurrent Episode, Moderate _ and With melancholic features  Adjustment Disorders  309.28 (F43.23) With mixed anxiety and depressed mood    Collateral Reports Completed:   Routed note to PCP    PLAN: (Patient Tasks / Therapist Tasks / Other)  Client will return in two weeks.  He will review grief and loss tips and give himself permission to enjoy his new pet without feelings guilty.          Shu Monte,                                                          ______________________________________________________________________    Treatment Plan    Patient's Name: James Blanco  YOB: 1980    Date: 2-11-20    Diagnoses:      296.32 (F33.1) Major Depressive Disorder, Recurrent Episode, Moderate _ and With melancholic features  Adjustment Disorders  309.28 (F43.23) With mixed anxiety and depressed mood  Psychosocial & Contextual Factors: Loss of pet   WHODAS:   WHODAS 2.0 Total Score  1/27/2020 2/5/2020   Total Score 14 12       Referral / Collaboration:  Referral to another professional/service is not indicated at this time..    Anticipated number of session or this episode of care: 3-5      MeasurableTreatment Goal(s) related to diagnosis / functional impairment(s)  Goal 1: Client will address grief and loss tied to loss of pet.                 I will know I've met my goal when I am feeling less reactive to the grief.       Objective #A (Client Action)                Client will increase understanding of steps in the grief process.  Status: New - Date: 2-11-20      Intervention(s)  Therapist will use CBT and solution focused therapy .     Objective #B  Client will talk to at least two others about losses and coping.                        Status: New - Date: 2-11-20      Intervention(s)  Therapist will use solution focused therapy .     Objective #C  Client will work on routine at night to help with sadness.    Status: New - Date: 2-11-20      Intervention(s)  Therapist will use CBT therapy .        Patient has reviewed and agreed to the above plan.      Shu Monte, TH February 11, 2020

## 2020-02-11 NOTE — PROGRESS NOTES
Goal 1: Client will address grief and loss tied to loss of pet.       I will know I've met my goal when I am feeling less reactive to the grief.      Objective #A (Client Action)    Client will increase understanding of steps in the grief process.  Status: New - Date: 2-11-20     Intervention(s)  Therapist will use CBT and solution focused therapy .    Objective #B  Client will talk to at least two others about losses and coping.    Status: New - Date: 2-11-20     Intervention(s)  Therapist will use solution focused therapy .    Objective #C  Client will work on routine at night to help with sadness.    Status: New - Date: 2-11-20     Intervention(s)  Therapist will use CBT therapy .

## 2020-02-12 DIAGNOSIS — E78.1 HYPERTRIGLYCERIDEMIA: ICD-10-CM

## 2020-02-12 ASSESSMENT — ANXIETY QUESTIONNAIRES
GAD7 TOTAL SCORE: 1
5. BEING SO RESTLESS THAT IT IS HARD TO SIT STILL: NOT AT ALL
6. BECOMING EASILY ANNOYED OR IRRITABLE: NOT AT ALL
1. FEELING NERVOUS, ANXIOUS, OR ON EDGE: SEVERAL DAYS
2. NOT BEING ABLE TO STOP OR CONTROL WORRYING: NOT AT ALL
7. FEELING AFRAID AS IF SOMETHING AWFUL MIGHT HAPPEN: NOT AT ALL
3. WORRYING TOO MUCH ABOUT DIFFERENT THINGS: NOT AT ALL
IF YOU CHECKED OFF ANY PROBLEMS ON THIS QUESTIONNAIRE, HOW DIFFICULT HAVE THESE PROBLEMS MADE IT FOR YOU TO DO YOUR WORK, TAKE CARE OF THINGS AT HOME, OR GET ALONG WITH OTHER PEOPLE: NOT DIFFICULT AT ALL

## 2020-02-12 ASSESSMENT — PATIENT HEALTH QUESTIONNAIRE - PHQ9
5. POOR APPETITE OR OVEREATING: NOT AT ALL
SUM OF ALL RESPONSES TO PHQ QUESTIONS 1-9: 6

## 2020-02-13 ASSESSMENT — ANXIETY QUESTIONNAIRES: GAD7 TOTAL SCORE: 1

## 2020-02-13 NOTE — TELEPHONE ENCOUNTER
"Requested Prescriptions   Pending Prescriptions Disp Refills     fenofibrate (TRICOR) 145 MG tablet [Pharmacy Med Name: Fenofibrate 145 MG Oral Tablet]  Last Written Prescription Date:  4/29/19  Last Fill Quantity: 90,  # refills: 2   Last Office Visit with G, P or [facility] prescribing provider:  10/16/19   Future Office Visit:    Next 5 appointments (look out 90 days)    Mar 05, 2020  2:00 PM CST  Return Visit with Shu Monte [address] [address]  [phone]   Mar 24, 2020  4:00 PM CDT  Return Visit with Shu Monte [address] [address]  [phone]           0     Sig: TAKE 1 TABLET BY MOUTH ONCE DAILY       Fibrates Passed - 2/12/2020  6:07 PM        Passed - Lipid panel on file in past 12 months     Recent Labs   Lab Test 04/05/19  0608  09/04/15  0818   CHOL 177   < > 209*   TRIG 140   < > 228*   HDL 50   < > 46   LDL 99   < > 117   NHDL 127   < >  --    VLDL  --   --  46*   CHOLHDLRATIO  --   --  4.5    < > = values in this interval not displayed.               Passed - No abnormal creatine kinase in past 12 months     No lab results found.             Passed - Recent (12 mo) or future (30 days) visit within the authorizing provider's specialty     Patient has had an office visit with the authorizing provider or a provider within the authorizing providers department within the previous 12 mos or has a future within next 30 days. See \"Patient Info\" tab in inbasket, or \"Choose Columns\" in Meds & Orders section of the refill encounter.              Passed - Medication is active on med list        Passed - Patient is age 18 or older          "

## 2020-02-14 RX ORDER — FENOFIBRATE 145 MG/1
145 TABLET, COATED ORAL DAILY
Qty: 30 TABLET | Refills: 1 | Status: SHIPPED | OUTPATIENT
Start: 2020-02-14 | End: 2020-04-13

## 2020-02-14 NOTE — TELEPHONE ENCOUNTER
Prescription approved per [facility] Refill Protocol.  Due for lipids around 4/5/2020, along with following up with Dr. Talbot for anxiety around that same time.     Linda BRITTON RN

## 2020-02-21 ENCOUNTER — MYC MEDICAL ADVICE (OUTPATIENT)
Dept: FAMILY MEDICINE | Facility: CLINIC | Age: 40
End: 2020-02-21

## 2020-02-21 DIAGNOSIS — F43.21 GRIEF REACTION: ICD-10-CM

## 2020-02-21 DIAGNOSIS — F41.9 ANXIETY: ICD-10-CM

## 2020-02-24 RX ORDER — BUPROPION HYDROCHLORIDE 150 MG/1
150 TABLET ORAL EVERY MORNING
Qty: 90 TABLET | Refills: 3 | Status: SHIPPED | OUTPATIENT
Start: 2020-02-24 | End: 2020-02-24

## 2020-02-24 RX ORDER — BUPROPION HYDROCHLORIDE 150 MG/1
150 TABLET ORAL EVERY MORNING
Qty: 90 TABLET | Refills: 3 | Status: SHIPPED | OUTPATIENT
Start: 2020-02-24 | End: 2020-03-03

## 2020-02-24 NOTE — TELEPHONE ENCOUNTER
Patient is requesting a dose increase from 150 mg daily.  Had virtual visit 2-10-20.  Are you ok with this?    Routing to provider.  Liz LANE RN

## 2020-02-24 NOTE — TELEPHONE ENCOUNTER
Dr Talbot, please see his mychart notes requesting possible increase in Wellbutrin,   Thanks,   Emma Gomes RNC

## 2020-02-25 NOTE — TELEPHONE ENCOUNTER
Increase bupropion  mg to 2 tablets a day.  Schedule telephone visit with me within the next 7 days.

## 2020-02-25 NOTE — TELEPHONE ENCOUNTER
Dr Talbot,   Please see maru note, and notes below.     He has been on Bupropion/  Wellbutrin  mg every day since 1/9/2020.   He is asking to increase that dose,   Is that not recommended? Or would he need a clinic visit to increase?     (I think you sent the same dose in and I want to clarify before sending note to the patient, ) thank you!   Emma Gomes RNC

## 2020-03-02 ENCOUNTER — APPOINTMENT (OUTPATIENT)
Dept: GENERAL RADIOLOGY | Facility: CLINIC | Age: 40
End: 2020-03-02
Attending: EMERGENCY MEDICINE
Payer: COMMERCIAL

## 2020-03-02 ENCOUNTER — HOSPITAL ENCOUNTER (EMERGENCY)
Facility: CLINIC | Age: 40
Discharge: HOME | End: 2020-03-02
Attending: EMERGENCY MEDICINE | Admitting: EMERGENCY MEDICINE
Payer: COMMERCIAL

## 2020-03-02 VITALS
TEMPERATURE: 98.3 F | BODY MASS INDEX: 28 KG/M2 | WEIGHT: 230 LBS | DIASTOLIC BLOOD PRESSURE: 100 MMHG | SYSTOLIC BLOOD PRESSURE: 150 MMHG | RESPIRATION RATE: 16 BRPM | OXYGEN SATURATION: 99 % | HEART RATE: 86 BPM

## 2020-03-02 DIAGNOSIS — M54.2 NECK PAIN: ICD-10-CM

## 2020-03-02 DIAGNOSIS — M79.5 FOREIGN BODY (FB) IN SOFT TISSUE: ICD-10-CM

## 2020-03-02 LAB
DEPRECATED S PYO AG THROAT QL EIA: NEGATIVE
SPECIMEN SOURCE: NORMAL
SPECIMEN SOURCE: NORMAL
STREP GROUP A PCR: NOT DETECTED

## 2020-03-02 PROCEDURE — 87651 STREP A DNA AMP PROBE: CPT | Performed by: EMERGENCY MEDICINE

## 2020-03-02 PROCEDURE — 99282 EMERGENCY DEPT VISIT SF MDM: CPT | Mod: Z6 | Performed by: EMERGENCY MEDICINE

## 2020-03-02 PROCEDURE — 99284 EMERGENCY DEPT VISIT MOD MDM: CPT | Performed by: EMERGENCY MEDICINE

## 2020-03-02 PROCEDURE — 40001204 ZZHCL STATISTIC STREP A RAPID: Performed by: EMERGENCY MEDICINE

## 2020-03-02 PROCEDURE — 70360 X-RAY EXAM OF NECK: CPT

## 2020-03-02 RX ORDER — LOPERAMIDE HYDROCHLORIDE 2 MG/1
2 TABLET ORAL 4 TIMES DAILY PRN
COMMUNITY
End: 2021-05-11

## 2020-03-02 NOTE — ED PROVIDER NOTES
History     Chief Complaint   Patient presents with     Pharyngitis     2 weeks of ST R side. HA today, nausea today. SOA started 2 hours ago. No having no noted resp distress.     Headache     Shortness of Breath     HPI  James Blanco is a 39 year old male who presents with right-sided neck pain with swallowing.  He describes approximately 3 weeks of sore throat on the right side without associated fever, swollen glands, upper respiratory congestion, trauma or strain.  He noted no external swelling or redness throughout the past 3 weeks.  He is not had such symptoms in the past.  Yesterday when he was out for lunch he was having a drink and developed abrupt severe worsening of sharp pain in his right anterior neck, he points just lateral to his upper thyroid cartilage.  Describes the pain is severe.  He became anxious while driving today prompting further evaluation.  Medication list reviewed, Wellbutrin dose is doubled in the last week, other meds baseline and stable with compliance reported.  Ex-smoker.  Denies 2 beers daily.  Denies illicit drug use.    Allergies:  Allergies   Allergen Reactions     Ceclor [Cefaclor]      Penicillins        Problem List:    Patient Active Problem List    Diagnosis Date Noted     Grief reaction 01/09/2020     Priority: Medium     Heavy alcohol consumption 01/09/2020     Priority: Medium     Fatty liver, alcoholic 09/26/2018     Priority: Medium     Anxiety 09/01/2015     Priority: Medium     Mixed hyperlipidemia 09/01/2015     Priority: Medium        Past Medical History:    Past Medical History:   Diagnosis Date     Anxiety      Hyperlipidaemia LDL goal <130        Past Surgical History:    Past Surgical History:   Procedure Laterality Date     COLONOSCOPY N/A 3/21/2016    Procedure: COLONOSCOPY;  Surgeon: Tavo Wilcox MD;  Location: [facility]     SURGICAL HISTORY OF -       plasty surgery on face, due to injury     SURGICAL HISTORY OF -       left elbow nerve decompression,  sound like ulnar     SURGICAL HISTORY OF -       left knee surgery, scope and allograph for cartilege     SURGICAL HISTORY OF -       lasix surgery       Family History:    Family History   Problem Relation Age of Onset     Hyperlipidemia Mother      Coronary Artery Disease Father      Hyperlipidemia Father      Diabetes Father      Other Cancer Sister         lymphoma-non hodgkins     Depression Sister      Anxiety Disorder Sister      Substance Abuse Sister      Substance Abuse Brother        Social History:  Marital Status:   [2]  Social History     Tobacco Use     Smoking status: Former Smoker     Packs/day: 1.00     Years: 13.00     Pack years: 13.00     Smokeless tobacco: Former User   Substance Use Topics     Alcohol use: Yes     Comment: 6 beers daily      Drug use: No     Comment: h/o street drunk use 15 yrs ago        Medications:    buPROPion (WELLBUTRIN XL) 150 MG 24 hr tablet  fenofibrate (TRICOR) 145 MG tablet  FLUoxetine (PROZAC) 40 MG capsule  loperamide (IMODIUM A-D) 2 MG tablet  Multiple Vitamins-Minerals (MENS MULTI VITAMIN & MINERAL PO)  omeprazole 20 MG tablet  simvastatin (ZOCOR) 40 MG tablet  EPINEPHrine (EPIPEN 2-CHAIM) 0.3 MG/0.3ML injection 2-pack          Review of Systems  Problem focused review of systems otherwise negative    Physical Exam   BP: (!) 161/108  Pulse: 89  Temp: 98.3  F (36.8  C)  Resp: 16  Weight: 104.3 kg (230 lb)  SpO2: 100 %      Physical Exam  Nontoxic-appearing no respiratory distress alert and oriented  Head atraumatic normocephalic  EACs/TMs are unremarkable  Oropharynx moist without lesions erythema or exudate  There is no cervical adenopathy  Mandible full range of motion without trismus or malocclusion  There is no facial swelling or redness, no neck swelling or redness.  He has tenderness to palpation along right neck anteriorly just anterior and medial to carotid artery at the level of the thyroid cartilage.  ED Course        Procedures                Critical Care time:  none               Results for orders placed or performed during the hospital encounter of 03/02/20 (from the past 24 hour(s))   Streptococcus A Rapid Scr w Reflx to PCR   Result Value Ref Range    Strep Specimen Description Throat     Streptococcus Group A Rapid Screen Negative NEG^Negative   Neck soft tissue XR    Narrative    NECK SOFT TISSUE 3/2/2020 1:47 PM     HISTORY: Pain with swallowing right anterior neck at level of cricoid  cartilage.    COMPARISON: None.      Impression    IMPRESSION: There is a small round metallic density foreign body  measuring 1-2 mm projecting within the right anterior neck soft  tissues at the level of the thyroid cartilage. Query whether this may  be symptomatic for the patient; clinical correlation is recommended.  The radiographic appearance of the base of tongue, epiglottis,  aryepiglottic folds and prevertebral/retropharyngeal soft tissues is  normal.       Medications - No data to display    Assessments & Plan (with Medical Decision Making)  Right anterior neck pain details per HPI, associated tenderness without redness, induration warmth or fluctuance.  X-ray exam as above by my review as well as radiology reading 1 to 2 mm metallic foreign body at site of pain complaint.  Patient does not recall ever being exposed to shotgun fire or BB gun.  Images and case reviewed with ENT on-call recommend watchful waiting, follow-up ENT, possible CT outpatient.  No indication for further evaluation at this time.  Tylenol/ibuprofen for discomfort.  Return criteria reviewed     I have reviewed the nursing notes.    I have reviewed the findings, diagnosis, plan and need for follow up with the patient.          Discharge Medication List as of 3/2/2020  2:50 PM          Final diagnoses:   Foreign body (FB) in soft tissue   Neck pain       3/2/2020   [facility] EMERGENCY DEPARTMENT     Akil Flores MD  03/02/20 7727

## 2020-03-02 NOTE — ED AVS SNAPSHOT
[facility]  [address] [address]  Phone:  [phone]  Fax:  [phone]                                    James Blanco   MRN: [id number]    Department:  [facility]   Date of Visit:  3/2/2020           After Visit Summary Signature Page    I have received my discharge instructions, and my questions have been answered. I have discussed any challenges I see with this plan with the nurse or doctor.    ..........................................................................................................................................  Patient/Patient Representative Signature      ..........................................................................................................................................  Patient Representative Print Name and Relationship to Patient    ..................................................               ................................................  Date                                   Time    ..........................................................................................................................................  Reviewed by Signature/Title    ...................................................              ..............................................  Date                                               Time          22EPIC Rev 08/18

## 2020-03-02 NOTE — ED NOTES
[facility] and stated to feel nauseated and shakey-has has right sided st x 2 weeks-worse since yesterday-noted right sided head pressure and had teeth checked at dentist office which was normal-started having a panic attack in waiting room here-hx of anxiety and had med increase last week-has also had soa in past related to anxiety pt states-soa started while driving to ED-no cough or fever

## 2020-03-03 ENCOUNTER — VIRTUAL VISIT (OUTPATIENT)
Dept: FAMILY MEDICINE | Facility: CLINIC | Age: 40
End: 2020-03-03
Payer: COMMERCIAL

## 2020-03-03 DIAGNOSIS — F43.21 GRIEF REACTION: ICD-10-CM

## 2020-03-03 DIAGNOSIS — F41.9 ANXIETY: Primary | ICD-10-CM

## 2020-03-03 PROCEDURE — 99441 ZZC PHYSICIAN TELEPHONE EVALUATION 5-10 MIN: CPT | Performed by: FAMILY MEDICINE

## 2020-03-03 RX ORDER — BUPROPION HYDROCHLORIDE 150 MG/1
150 TABLET ORAL EVERY MORNING
Qty: 90 TABLET | Refills: 3
Start: 2020-03-03 | End: 2020-03-17 | Stop reason: ALTCHOICE

## 2020-03-03 ASSESSMENT — ANXIETY QUESTIONNAIRES
IF YOU CHECKED OFF ANY PROBLEMS ON THIS QUESTIONNAIRE, HOW DIFFICULT HAVE THESE PROBLEMS MADE IT FOR YOU TO DO YOUR WORK, TAKE CARE OF THINGS AT HOME, OR GET ALONG WITH OTHER PEOPLE: SOMEWHAT DIFFICULT
6. BECOMING EASILY ANNOYED OR IRRITABLE: SEVERAL DAYS
2. NOT BEING ABLE TO STOP OR CONTROL WORRYING: SEVERAL DAYS
GAD7 TOTAL SCORE: 7
1. FEELING NERVOUS, ANXIOUS, OR ON EDGE: SEVERAL DAYS
5. BEING SO RESTLESS THAT IT IS HARD TO SIT STILL: SEVERAL DAYS
7. FEELING AFRAID AS IF SOMETHING AWFUL MIGHT HAPPEN: SEVERAL DAYS
3. WORRYING TOO MUCH ABOUT DIFFERENT THINGS: SEVERAL DAYS

## 2020-03-03 ASSESSMENT — PATIENT HEALTH QUESTIONNAIRE - PHQ9
SUM OF ALL RESPONSES TO PHQ QUESTIONS 1-9: 3
5. POOR APPETITE OR OVEREATING: SEVERAL DAYS
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
## 2020-03-03 NOTE — PATIENT INSTRUCTIONS
Reduce Bupropion XL to 150 mg once a day. You mentioned tolerating this dose better.    Continue behavior therapy appointments.    Keep active.  Avoid caffeinated products.  Minimize  or avoid alcohol consumption.    See a provider promptly if with severe symptoms.  Go to ER if with thoughts of self-harm, thoughts of hurting others, or hallucinations.

## 2020-03-04 ASSESSMENT — ANXIETY QUESTIONNAIRES: GAD7 TOTAL SCORE: 7

## 2020-03-05 ENCOUNTER — OFFICE VISIT (OUTPATIENT)
Dept: PSYCHOLOGY | Facility: CLINIC | Age: 40
End: 2020-03-05
Payer: COMMERCIAL

## 2020-03-05 DIAGNOSIS — F43.23 ADJUSTMENT DISORDER WITH MIXED ANXIETY AND DEPRESSED MOOD: ICD-10-CM

## 2020-03-05 DIAGNOSIS — F33.1 MAJOR DEPRESSIVE DISORDER, RECURRENT EPISODE, MODERATE (H): Primary | ICD-10-CM

## 2020-03-05 PROCEDURE — 90834 PSYTX W PT 45 MINUTES: CPT | Performed by: MARRIAGE & FAMILY THERAPIST
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
## 2020-03-10 ENCOUNTER — HEALTH MAINTENANCE LETTER (OUTPATIENT)
Age: 40
End: 2020-03-10

## 2020-03-17 ENCOUNTER — VIRTUAL VISIT (OUTPATIENT)
Dept: FAMILY MEDICINE | Facility: CLINIC | Age: 40
End: 2020-03-17
Payer: COMMERCIAL

## 2020-03-17 DIAGNOSIS — F41.9 ANXIETY: Primary | ICD-10-CM

## 2020-03-17 PROCEDURE — 99441 ZZC PHYSICIAN TELEPHONE EVALUATION 5-10 MIN: CPT | Performed by: FAMILY MEDICINE

## 2020-03-17 RX ORDER — BUPROPION HYDROCHLORIDE 100 MG/1
100 TABLET, EXTENDED RELEASE ORAL 2 TIMES DAILY
Qty: 60 TABLET | Refills: 0 | Status: SHIPPED | OUTPATIENT
Start: 2020-03-17 | End: 2020-04-07

## 2020-03-17 ASSESSMENT — ANXIETY QUESTIONNAIRES
2. NOT BEING ABLE TO STOP OR CONTROL WORRYING: NOT AT ALL
5. BEING SO RESTLESS THAT IT IS HARD TO SIT STILL: NOT AT ALL
IF YOU CHECKED OFF ANY PROBLEMS ON THIS QUESTIONNAIRE, HOW DIFFICULT HAVE THESE PROBLEMS MADE IT FOR YOU TO DO YOUR WORK, TAKE CARE OF THINGS AT HOME, OR GET ALONG WITH OTHER PEOPLE: NOT DIFFICULT AT ALL
7. FEELING AFRAID AS IF SOMETHING AWFUL MIGHT HAPPEN: NOT AT ALL
GAD7 TOTAL SCORE: 1
1. FEELING NERVOUS, ANXIOUS, OR ON EDGE: SEVERAL DAYS
6. BECOMING EASILY ANNOYED OR IRRITABLE: NOT AT ALL
3. WORRYING TOO MUCH ABOUT DIFFERENT THINGS: NOT AT ALL

## 2020-03-17 ASSESSMENT — PATIENT HEALTH QUESTIONNAIRE - PHQ9
5. POOR APPETITE OR OVEREATING: NOT AT ALL
SUM OF ALL RESPONSES TO PHQ QUESTIONS 1-9: 4

## 2020-03-17 NOTE — PROGRESS NOTES
"James Blanco is a 39 year old male who is being evaluated via a billable telephone visit.      The patient has been notified of following:     \"This telephone visit will be conducted via a call between you and your physician/provider. We have found that certain health care needs can be provided without the need for a physical exam.  This service lets us provide the care you need with a short phone conversation.  If a prescription is necessary we can send it directly to your pharmacy.  If lab work is needed we can place an order for that and you can then stop by our lab to have the test done at a later time.    If during the course of the call the physician/provider feels a telephone visit is not appropriate, you will not be charged for this service.\"       James Blanco complains of    Chief Complaint   Patient presents with     Anxiety     f/u         I have reviewed and updated the patient's Past Medical History, Social History, Family History and Medication List.    ALLERGIES  Ceclor [cefaclor] and Penicillins    Mary Limon CMA   (MA signature)    Additional provider notes:   Anxiety Follow-Up    How are you doing with your anxiety since your last visit? Improved since dose change    Are you having other symptoms that might be associated with anxiety? No    Have you had a significant life event? No     Are you feeling depressed? Yes:  1 episode the other night    Do you have any concerns with your use of alcohol or other drugs? Has not had any a few different nights    Social History     Tobacco Use     Smoking status: Former Smoker     Packs/day: 1.00     Years: 13.00     Pack years: 13.00     Smokeless tobacco: Former User   Substance Use Topics     Alcohol use: Yes     Comment: 6 beers daily      Drug use: No     Comment: h/o street drunk use 15 yrs ago     LINNEA-7 SCORE 2/10/2020 2/12/2020 3/3/2020   Total Score - - -   Total Score 0 1 7     PHQ 2/10/2020 2/12/2020 3/3/2020   PHQ-9 Total Score 4 6 3   Q9: " Thoughts of better off dead/self-harm past 2 weeks Not at all Not at all Not at all     Last PHQ-9 3/17/2020   1.  Little interest or pleasure in doing things 0   2.  Feeling down, depressed, or hopeless 1   3.  Trouble falling or staying asleep, or sleeping too much 1   4.  Feeling tired or having little energy 1   5.  Poor appetite or overeating 0   6.  Feeling bad about yourself 1   7.  Trouble concentrating 0   8.  Moving slowly or restless 0   Q9: Thoughts of better off dead/self-harm past 2 weeks 0   PHQ-9 Total Score 4   Difficulty at work, home, or with people Not difficult at all     LINNEA-7  3/17/2020   1. Feeling nervous, anxious, or on edge 1   2. Not being able to stop or control worrying 0   3. Worrying too much about different things 0   4. Trouble relaxing 0   5. Being so restless that it is hard to sit still 0   6. Becoming easily annoyed or irritable 0   7. Feeling afraid, as if something awful might happen 0   LINNEA-7 Total Score 1   If you checked any problems, how difficult have they made it for you to do your work, take care of things at home, or get along with other people? Not difficult at all         Assessment/Plan:  Anxiety  (primary encounter diagnosis)    I have reviewed the note as documented above.  This accurately captures the substance of my conversation with the patient.    Patient reports a lot of improvement but statse he feels he can improve a lot more.  He asked for a dose between 150 and 300 mg of Bupropion XL. Unfortunately the preparation does not come in 200 mg.  Discussed changing to bupropion  mg BID. He would like to try this.  Advised behavior coping with stress.  Advised not to consume alcohol when he takes the medication.  Suicidal precautions discussed.  Return precautions discussed and given to patient.      Phone call contact time  Call Started at 3:51 pm  Call Ended at 3:57 pm    Hector Talbot MD

## 2020-03-17 NOTE — PATIENT INSTRUCTIONS
You concurred to zahra ge to bupropion  mg twice a day.  Do not combine you rmedicaitons with alcoholic beverages.  Keep active. Use other means to cope with stress.  E visit or telephone visit in 1 month.

## 2020-03-18 ASSESSMENT — ANXIETY QUESTIONNAIRES: GAD7 TOTAL SCORE: 1

## 2020-03-24 ENCOUNTER — VIRTUAL VISIT (OUTPATIENT)
Dept: PSYCHOLOGY | Facility: CLINIC | Age: 40
End: 2020-03-24
Payer: COMMERCIAL

## 2020-03-24 DIAGNOSIS — F43.23 ADJUSTMENT DISORDER WITH MIXED ANXIETY AND DEPRESSED MOOD: ICD-10-CM

## 2020-03-24 DIAGNOSIS — F33.1 MAJOR DEPRESSIVE DISORDER, RECURRENT EPISODE, MODERATE (H): Primary | ICD-10-CM

## 2020-03-24 PROCEDURE — 90832 PSYTX W PT 30 MINUTES: CPT | Mod: TEL | Performed by: MARRIAGE & FAMILY THERAPIST

## 2020-03-24 ASSESSMENT — ANXIETY QUESTIONNAIRES
5. BEING SO RESTLESS THAT IT IS HARD TO SIT STILL: NOT AT ALL
GAD7 TOTAL SCORE: 1
3. WORRYING TOO MUCH ABOUT DIFFERENT THINGS: NOT AT ALL
2. NOT BEING ABLE TO STOP OR CONTROL WORRYING: NOT AT ALL
6. BECOMING EASILY ANNOYED OR IRRITABLE: NOT AT ALL
7. FEELING AFRAID AS IF SOMETHING AWFUL MIGHT HAPPEN: NOT AT ALL
1. FEELING NERVOUS, ANXIOUS, OR ON EDGE: SEVERAL DAYS
IF YOU CHECKED OFF ANY PROBLEMS ON THIS QUESTIONNAIRE, HOW DIFFICULT HAVE THESE PROBLEMS MADE IT FOR YOU TO DO YOUR WORK, TAKE CARE OF THINGS AT HOME, OR GET ALONG WITH OTHER PEOPLE: SOMEWHAT DIFFICULT

## 2020-03-24 ASSESSMENT — PATIENT HEALTH QUESTIONNAIRE - PHQ9
5. POOR APPETITE OR OVEREATING: NOT AT ALL
SUM OF ALL RESPONSES TO PHQ QUESTIONS 1-9: 3

## 2020-03-24 NOTE — PROGRESS NOTES
"  Telephone Visit Note    Patient Name: James Blanco  Date:3-24-20         Service Type: Telephone Visit     The patient has been notified of following:     \"This telephone visit will be conducted via a call between you and your provider. We have found that certain health care needs can be provided without the need for an office visit.  This service lets us provide the care you need with a short phone conversation.    If during the course of the call the provider feels a telephone visit is not appropriate, you will not be charged for this service.\"      Session Start Time: 4pm  Session End Time:430pm     Session Length: 30min    Session #: 4    Attendees: Client     DATA      Progress Since Last Session (Related to Symptoms / Goals / Homework):   Symptoms: Improving Client reports improvement and was able to control a panic attack using skills discussed in session.      Episode of Care Goals: Satisfactory progress - ACTION (Actively working towards change); Intervened by reinforcing change plan / affirming steps taken     Current / Ongoing Stressors and Concerns:   -Client continues to grieve the loss of his dog but has been doing a lot of activity with his new dog including going to the dog park.   -Client is having a hard time not being able to be social.  He and his wife enjoy spending time with their family and having gatherings at their home.       Intervention:   CBT: - Discussed getting the garrett headspace.   Continue to use tips from grounding technique handout to managing anxiety.    Work on developing a \"temporary new norm.\"  Ex. Taking the dog to the dog park to get out in the community while also practicing social distancing.       ASSESSMENT: Current Emotional / Mental Status (status of significant symptoms):   Risk status (Self / Other harm or suicidal ideation)   Patient denies current fears or concerns for personal safety.   Patient denies current or recent suicidal ideation or behaviors.   Patient " "denies current or recent homicidal ideation or behaviors.   Patient denies current or recent self injurious behavior or ideation.   Patient denies other safety concerns.   Patient reports there has been no change in risk factors since their last session.     Patient reports there has been no change in protective factors since their last session.     Recommended that patient call 911 or go to the local ED should there be a change in any of these risk factors.     Attitude:   Cooperative    Orientation:   All   Speech    Rate / Production: Normal     Volume:  Normal    Mood:    Normal   Thought Content:  Clear    Thought Form:  Coherent  Logical    Insight:    Good      Medication Compliance:   Yes     Changes in Health Issues:   None reported     Chemical Use Review:   Substance Use: Chemical use reviewed, no active concerns identified      Tobacco Use: No current tobacco use.      Diagnosis:  296.32 (F33.1) Major Depressive Disorder, Recurrent Episode, Moderate _ and With melancholic features  Adjustment Disorders  309.28 (F43.23) With mixed anxiety and depressed mood       PLAN: (Patient Tasks / Therapist Tasks / Other)  Client will work on the following goals from now until next session:  Get the garrett headspace.  Continue to use tips from grounding technique handout to managing anxiety.  Work on developing a \"temporary new norm.\"  Ex. Taking the dog to the dog park to get out in the community while also practicing social distancing.        I have reviewed the note as documented above.  This accurately captures the substance of my conversation with the patient.  Shu Monte, TH  "

## 2020-03-25 ASSESSMENT — ANXIETY QUESTIONNAIRES: GAD7 TOTAL SCORE: 1

## 2020-04-02 ENCOUNTER — MYC MEDICAL ADVICE (OUTPATIENT)
Dept: FAMILY MEDICINE | Facility: CLINIC | Age: 40
End: 2020-04-02

## 2020-04-02 ENCOUNTER — VIRTUAL VISIT (OUTPATIENT)
Dept: FAMILY MEDICINE | Facility: OTHER | Age: 40
End: 2020-04-02

## 2020-04-02 NOTE — PROGRESS NOTES
"Date: 2020 15:23:01  Clinician: Daniela Pascal  Clinician NPI: [id number]  Patient: James Blanco  Patient : 1980  Patient Address: [address] [place] [address]  Patient Phone: [phone]  Visit Protocol: URI  Patient Summary:  James is a 39 year old ( : 1980 ) male who initiated a Visit for COVID-19 (Coronavirus) evaluation and screening. When asked the question \"Please sign me up to receive news, health information and promotions. \", James responded \"No\".    James states his symptoms started today.   His symptoms consist of a headache, facial pain or pressure, myalgia, a sore throat, a cough, nasal congestion, and malaise. He is experiencing mild difficulty breathing with activities but can speak normally in full sentences. James also feels feverish.   Symptom details     Nasal secretions: The color of his mucus is clear.    Cough: James coughs a few times an hour and his cough is not more bothersome at night. Phlegm does not come into his throat when he coughs. He does not believe his cough is caused by post-nasal drip.     Sore throat: James reports having moderate throat pain (4-6 on a 10 point pain scale), does not have exudate on his tonsils, and can swallow liquids. The lymph nodes in his neck are not enlarged. A rash has not appeared on the skin since the sore throat started.     Temperature: His current temperature is 99.1 degrees Fahrenheit.     Facial pain or pressure: The facial pain or pressure does not feel worse when bending or leaning forward.     Headache: He states the headache is mild (1-3 on a 10 point pain scale).      James denies having teeth pain, chills, wheezing, ear pain, enlarged lymph nodes, and rhinitis. He also denies having a sinus infection within the past year, taking antibiotic medication for the symptoms, and having recent facial or sinus surgery in the past 60 days.   Precipitating events  James is not sure if he has been " exposed to someone with strep throat. He has not recently been exposed to someone with influenza. James has not been in close contact with any high risk individuals.   Pertinent COVID-19 (Coronavirus) information  James has not traveled internationally or to the areas where COVID-19 (Coronavirus) is widespread, including cruise ship travel in the last 14 days before the start of his symptoms.   James has had a close contact with a laboratory-confirmed COVID-19 patient within 14 days of symptom onset. He has not had a close contact with a suspected COVID-19 patient within 14 days of symptom onset. 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 I had been doing work in his house.   James is not a healthcare worker or a  and does not work in a healthcare facility. He does not live with a healthcare worker.   Pertinent medical history  James needs a return to work/school note.   Weight: 225 lbs   James does not smoke or use smokeless tobacco.   Additional information as reported by the patient (free text): I woke up feeling great this morning at 5 but by 7 I knew that I was sick and didn't want to expose my coworkers to anything. So I went right home without getting out of my truck.   Weight: 225 lbs    MEDICATIONS: bupropion HCl oral, One-A-Day Men's Multivitamin oral, Imodium A-D oral, omeprazole-sodium bicarbonate oral, fluoxetine oral, fenofibrate oral, simvastatin oral, ALLERGIES: Penicillins, Ceclor  Clinician Response:  Dear James,   Based on the information you have provided, you do have symptoms that are consistent with Coronavirus (COVID-19).  The coronavirus causes mild to severe respiratory illness with the most common symptoms including fever, cough and difficulty breathing. Unfortunately, many viruses cause  similar symptoms and it can be difficult to distinguish between viruses, especially in mild cases, so we are presuming that anyone with cough or fever has coronavirus at this time.  Coronavirus/COVID-19 has reached the point of community spread in [place], meaning that we are finding the virus in people with no known exposure risk for constantino the virus. Given the increasing commonness of coronavirus in the community we are no longer testing patients who are not critically ill.  If you are a health care worker, you should refer to your employee health office for instructions about testing and returning to work.  For everyone else who has cough or fever, you should assume you are infected with coronavirus. Since you will not be tested but have symptoms that may be consistent with coronavirus, the CDC recommends you stay in self-isolation until these three things have happened:    You have had no fever for at least 72 hours (that is three full days of no fever without the use of medicine that reduces fevers)    AND   Other symptoms have improved (for example, when your cough or shortness of breath have improved)   AND   At least 7 days have passed since your symptoms first appeared.   How to Isolate:   Isolate yourself at home.  Do Not allow any visitors  Do Not go to work or school  Do Not go to [facility],  centers, shopping, or other public places.  Do Not shake hands.  Avoid close contact with others (hugging, kissing).   Protect Others:   Cover Your Mouth and Nose with a mask, disposable tissue or wash cloth to avoid spreading germs to others.  Wash your hands and face frequently with soap and water.   We know it can be scary to hear that you might have COVID-19. Our team can help track your symptoms and make sure you are doing ok over the next two weeks using a program called [employer] to keep in touch. When you receive an email from [employer], please consider enrolling in our monitoring  program. There is no cost to you for monitoring. Here is a URL where you can learn more: [email or web address]  Managing Symptoms:   At this time, we primarily recommend Tylenol (Acetaminophen) for fever or pain. If you have liver or kidney problems, contact your primary care provider for instructions on use of tylenol. Adults can take 650 mg (two 325 mg pills) by mouth every 4-6 hours as needed OR 1,000 mg (two 500 mg pills) every 8 hours as needed. MAXIMUM DAILY DOSE: 3,000mg. For children, refer to dosing on bottle based on age or weight.   If you develop significant shortness of breath that prevents you from doing normal activities, please call 911 or proceed to the nearest emergency room and alert them immediately that you have been in self-isolation for possible coronavirus.  If you have a higher risk medical condition such as cancer, heart failure, end stage renal disease on dialysis or have a transplant, please reach out to your specialist's clinic to advise them of your [facility] visit should you not improve within the next two days.   For more information about COVID19 and options for caring for yourself at home, please visit the CDC website at https://www.cdc.gov/coronavirus/2019-ncov/about/steps-when-sick.htmlFor more options for care at [facility] [place] [facility], please visit our website at [email or web address]    Diagnosis: Cough  Diagnosis ICD: R05

## 2020-04-07 ENCOUNTER — VIRTUAL VISIT (OUTPATIENT)
Dept: FAMILY MEDICINE | Facility: CLINIC | Age: 40
End: 2020-04-07
Payer: COMMERCIAL

## 2020-04-07 ENCOUNTER — E-VISIT (OUTPATIENT)
Dept: FAMILY MEDICINE | Facility: CLINIC | Age: 40
End: 2020-04-07
Payer: COMMERCIAL

## 2020-04-07 ENCOUNTER — MYC MEDICAL ADVICE (OUTPATIENT)
Dept: FAMILY MEDICINE | Facility: CLINIC | Age: 40
End: 2020-04-07

## 2020-04-07 DIAGNOSIS — J06.9 VIRAL UPPER RESPIRATORY TRACT INFECTION: ICD-10-CM

## 2020-04-07 DIAGNOSIS — Z20.822 EXPOSURE TO COVID-19 VIRUS: ICD-10-CM

## 2020-04-07 DIAGNOSIS — F32.1 MODERATE MAJOR DEPRESSION (H): Primary | ICD-10-CM

## 2020-04-07 DIAGNOSIS — F41.9 ANXIETY: ICD-10-CM

## 2020-04-07 DIAGNOSIS — M79.671 RIGHT FOOT PAIN: Primary | ICD-10-CM

## 2020-04-07 PROCEDURE — 99214 OFFICE O/P EST MOD 30 MIN: CPT | Mod: TEL | Performed by: FAMILY MEDICINE

## 2020-04-07 PROCEDURE — 99207 ZZC NO BILLABLE SERVICE THIS VISIT: CPT | Performed by: FAMILY MEDICINE

## 2020-04-07 RX ORDER — BUPROPION HYDROCHLORIDE 100 MG/1
100 TABLET, EXTENDED RELEASE ORAL 2 TIMES DAILY
Qty: 60 TABLET | Refills: 2 | Status: SHIPPED | OUTPATIENT
Start: 2020-04-07 | End: 2020-06-18

## 2020-04-07 RX ORDER — FLUOXETINE 40 MG/1
40 CAPSULE ORAL DAILY
Qty: 90 CAPSULE | Refills: 0 | Status: SHIPPED | OUTPATIENT
Start: 2020-04-07 | End: 2020-06-18

## 2020-04-07 ASSESSMENT — ANXIETY QUESTIONNAIRES
3. WORRYING TOO MUCH ABOUT DIFFERENT THINGS: NOT AT ALL
GAD7 TOTAL SCORE: 0
GAD7 TOTAL SCORE: 0
7. FEELING AFRAID AS IF SOMETHING AWFUL MIGHT HAPPEN: NOT AT ALL
2. NOT BEING ABLE TO STOP OR CONTROL WORRYING: NOT AT ALL
4. TROUBLE RELAXING: NOT AT ALL
GAD7 TOTAL SCORE: 0
7. FEELING AFRAID AS IF SOMETHING AWFUL MIGHT HAPPEN: NOT AT ALL
5. BEING SO RESTLESS THAT IT IS HARD TO SIT STILL: NOT AT ALL
6. BECOMING EASILY ANNOYED OR IRRITABLE: NOT AT ALL
1. FEELING NERVOUS, ANXIOUS, OR ON EDGE: NOT AT ALL

## 2020-04-07 ASSESSMENT — PATIENT HEALTH QUESTIONNAIRE - PHQ9
SUM OF ALL RESPONSES TO PHQ QUESTIONS 1-9: 1
SUM OF ALL RESPONSES TO PHQ QUESTIONS 1-9: 1
10. IF YOU CHECKED OFF ANY PROBLEMS, HOW DIFFICULT HAVE THESE PROBLEMS MADE IT FOR YOU TO DO YOUR WORK, TAKE CARE OF THINGS AT HOME, OR GET ALONG WITH OTHER PEOPLE: NOT DIFFICULT AT ALL

## 2020-04-07 NOTE — PROGRESS NOTES
"  SUBJECTIVE:                                                    James Blanco is a 39 year old male who is being evaluated via a billable telephone visit.    Chief Complaint   Patient presents with     Foot Pain         States that he heard a snap in his foot last night, no injury/incident. Foot is now swollen. Feels \"hinging and sharp pain \" when flexing toes. Increased pain with weightbearing, no radiation of pain. No numbness/tingling.       States Marching fractures in the past on 3rd and 4th metatarsal of right foot. Was told that other fractures would happen on this foot.      Has applied ice to site,no other hoe treatment to date.    Pain level 3/10 today, 9/10 when walking.        \"I've been experiencing pain in my right foot for a few weeks. Right on the outside behind the first bulge behind my little toe. I heard an auditory snap last night and now my foot is screaming in pain. I've broken 3 bones in this foot before. It was designated as a \"marching fracture\". There was nothing that they could do at that time so I just had to wait for them to heal. I don't remember hearing any of the other breaks though. What can I do?\"        The patient has been notified of following:     \"This telephone visit will be conducted via a call between you and your physician/provider. We have found that certain health care needs can be provided without the need for a physical exam.  This service lets us provide the care you need with a short phone conversation.  If a prescription is necessary we can send it directly to your pharmacy.  If lab work is needed we can place an order for that and you can then stop by our lab to have the test done at a later time.    If during the course of the call the physician/provider feels a telephone visit is not appropriate, you will not be charged for this service.\"     I have reviewed and updated the patient's Past Medical History, Social History, Family History and Medication " List.  Brooke GARCIAS CMA        Problem list and histories reviewed & adjusted, as indicated.  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 Thursday morning about 5 AM driving to work and felt great by the time he got to work at 7 AM he felt horrible, fever, sweats, chills.   Still has fever but feeling better. His wife is also sick.  He prefers not to go out at this time so will defer xray.    Patient Active Problem List   Diagnosis     Anxiety     Mixed hyperlipidemia     Fatty liver, alcoholic     Grief reaction     Heavy alcohol consumption     Moderate major depression (H)     Past Surgical History:   Procedure Laterality Date     COLONOSCOPY N/A 3/21/2016    Procedure: COLONOSCOPY;  Surgeon: Tavo Wilcox MD;  Location: [facility]     SURGICAL HISTORY OF -       plasty surgery on face, due to injury     SURGICAL HISTORY OF -       left elbow nerve decompression, sound like ulnar     SURGICAL HISTORY OF -       left knee surgery, scope and allograph for cartilege     SURGICAL HISTORY OF -       lasix surgery       Social History     Tobacco Use     Smoking status: Former Smoker     Packs/day: 1.00     Years: 13.00     Pack years: 13.00     Smokeless tobacco: Former User   Substance Use Topics     Alcohol use: Yes     Comment: 6 beers daily      Family History   Problem Relation Age of Onset     Hyperlipidemia Mother      Coronary Artery Disease Father      Hyperlipidemia Father      Diabetes Father      Other Cancer Sister         lymphoma-non hodgkins     Depression Sister      Anxiety Disorder Sister      Substance Abuse Sister      Substance Abuse Brother      Bipolar Disorder Brother          Current Outpatient Medications   Medication Sig Dispense Refill     buPROPion (WELLBUTRIN SR) 100 MG 12 hr tablet Take 1 tablet (100 mg) by mouth 2 times daily 60 tablet 2      EPINEPHrine (EPIPEN 2-CHAIM) 0.3 MG/0.3ML injection 2-pack Inject 0.3 mLs (0.3 mg) into the muscle as needed for anaphylaxis 0.6 mL 1     fenofibrate (TRICOR) 145 MG tablet Take 1 tablet (145 mg) by mouth daily 30 tablet 1     FLUoxetine (PROZAC) 40 MG capsule Take 1 capsule (40 mg) by mouth daily 90 capsule 0     loperamide (IMODIUM A-D) 2 MG tablet Take 2 mg by mouth 4 times daily as needed for diarrhea       Multiple Vitamins-Minerals (MENS MULTI VITAMIN & MINERAL PO) Take 1 tablet by mouth every evening       omeprazole 20 MG tablet Take 20 mg by mouth daily       simvastatin (ZOCOR) 40 MG tablet Take 1 tablet (40 mg) by mouth At Bedtime 90 tablet 3     Allergies   Allergen Reactions     Ceclor [Cefaclor]      Penicillins      Recent Labs   Lab Test 04/05/19  0608 01/17/19  0620 09/20/18  0608 09/06/18  0610 01/02/18  0618   LDL 99 Cannot estimate LDL when triglyceride exceeds 400 mg/dL  77 113*  --   --    HDL 50 23* 52  --   --    TRIG 140 2,454* 244*  --   --    ALT 35  --   --  62 60      BP Readings from Last 3 Encounters:   03/02/20 (!) 150/100   10/16/19 119/86   02/06/19 130/82    Wt Readings from Last 3 Encounters:   03/02/20 104.3 kg (230 lb)   10/16/19 107.3 kg (236 lb 8 oz)   02/06/19 106.1 kg (234 lb)         ROS:  Constitutional, HEENT, cardiovascular, pulmonary, gi and gu systems are negative, except as otherwise noted.    OBJECTIVE:                                                    There were no vitals taken for this visit.     Phone call duration:  16 minutes      ASSESSMENT/PLAN:                                                    1. Right foot pain  Probable right 5th distal metatarsal stress fracture.  Non-weight bearing, elevate and ice.  If not better in 2 weeks will get xray and see ortho  - XR Foot Right G/E 3 Views; Future  - Orthopedic [facility] Sp[facility]  Referral; Future  Will email letter for work to [email or web address]  2. Exposure to Covid-19 Virus  3. Viral upper respiratory tract  infection  At home on isolation, work exposure.  Wife sick as well.  No signs of respiratory distress.      Alma Coker MD  [facility]

## 2020-04-07 NOTE — LETTER
[facility]  [address] [place] [address]  [phone]    RE:  James Blanco  [address] [address] [phone] (home)   April 7, 2020    TO WHOM IT MAY CONCERN:    James Blanco was seen on 4/7/2020 by phone visit.  Please excuse him until better due to injury.  He should be non-weight bearing for 2 weeks.    Cordially,      DEDRICK MARTINEZ MD.

## 2020-04-07 NOTE — PATIENT INSTRUCTIONS
Depression: Tips to Help Yourself    As your healthcare providers help treat your depression, you can also help yourself. Keep in mind that your illness affects you emotionally, physically, mentally, and socially. So full recovery will take time. Take care of your body and your soul, and be patient with yourself as you get better.  Self-care    Educate yourself. Read about treatment and medicine options. If you have the energy, attend local conferences or support groups. Keep a list of useful websites and helpful books and use them as needed. This illness is not your fault. Don t blame yourself for your depression.    Manage early symptoms. If you notice symptoms returning, experience triggers, or identify other factors that may lead to a depressive episode, get help as soon as possible. Ask trusted friends and family to monitor your behavior and let you know if they see anything of concern.    Work with your provider. Find a provider you can trust. Communicate honestly with that person and share information on your treatment for depression and your reaction to medicines.    Be prepared for a crisis. Know what to do if you experience a crisis. Keep the phone number of a crisis hotline and know the location of your community's urgent care centers and the closest emergency department.    Hold off on big decisions. Depression can cloud your judgment. So wait until you feel better before making major life decisions, such as changing jobs, moving, or getting  or .    Be patient. Recovering from depression is a process. Don t be discouraged if it takes some time to feel better.    Keep it simple. Depression saps your energy and concentration. So you won t be able to do all the things you used to do. Set small goals and do what you can.    Be with others. Don t isolate yourself--you ll only feel worse. Try to be with other people. And take part in fun activities when you can. Go to a movie, ballgame,  Scientology service, or social event. Talk openly with people you can trust. And accept help when it s offered.  Take care of your body  People with depression often lose the desire to take care of themselves. That only makes their problems worse. During treatment and afterward, make a point to:    Exercise. It s a great way to take care of your body. And studies have shown that exercise helps fight depression.    Avoid drugs and alcohol. These may ease the pain in the short term. But they ll only make your problems worse in the long run.    Get relief from stress. Ask your healthcare provider for relaxation exercises and techniques to help relieve stress.    Eat right. A balanced and healthy diet helps keep your body healthy.  Date Last Reviewed: 1/1/2017 2000-2019 [employer]. [address] [place] [address], [place] [address]. All rights reserved. This information is not intended as a substitute for professional medical care. Always follow your healthcare professional's instructions.

## 2020-04-08 ENCOUNTER — VIRTUAL VISIT (OUTPATIENT)
Dept: PSYCHOLOGY | Facility: CLINIC | Age: 40
End: 2020-04-08
Payer: COMMERCIAL

## 2020-04-08 DIAGNOSIS — F43.23 ADJUSTMENT DISORDER WITH MIXED ANXIETY AND DEPRESSED MOOD: Primary | ICD-10-CM

## 2020-04-08 PROCEDURE — 90832 PSYTX W PT 30 MINUTES: CPT | Mod: 95 | Performed by: MARRIAGE & FAMILY THERAPIST

## 2020-04-08 ASSESSMENT — ANXIETY QUESTIONNAIRES
7. FEELING AFRAID AS IF SOMETHING AWFUL MIGHT HAPPEN: NOT AT ALL
GAD7 TOTAL SCORE: 0
3. WORRYING TOO MUCH ABOUT DIFFERENT THINGS: NOT AT ALL
1. FEELING NERVOUS, ANXIOUS, OR ON EDGE: NOT AT ALL
2. NOT BEING ABLE TO STOP OR CONTROL WORRYING: NOT AT ALL
GAD7 TOTAL SCORE: 0
IF YOU CHECKED OFF ANY PROBLEMS ON THIS QUESTIONNAIRE, HOW DIFFICULT HAVE THESE PROBLEMS MADE IT FOR YOU TO DO YOUR WORK, TAKE CARE OF THINGS AT HOME, OR GET ALONG WITH OTHER PEOPLE: NOT DIFFICULT AT ALL
6. BECOMING EASILY ANNOYED OR IRRITABLE: NOT AT ALL
5. BEING SO RESTLESS THAT IT IS HARD TO SIT STILL: NOT AT ALL

## 2020-04-08 ASSESSMENT — PATIENT HEALTH QUESTIONNAIRE - PHQ9
SUM OF ALL RESPONSES TO PHQ QUESTIONS 1-9: 1
SUM OF ALL RESPONSES TO PHQ QUESTIONS 1-9: 0
5. POOR APPETITE OR OVEREATING: NOT AT ALL

## 2020-04-08 NOTE — Clinical Note
Client has finished his episode of care at the counseling center and has had no anxiety flare ups even with having a broken foot and possibly COVID-19.  He is managing grief and loss tied to his previous dog and is enjoying his new dog!  Thank you, Shu Monte

## 2020-04-08 NOTE — PROGRESS NOTES
,                      Discharge Summary  Multiple Sessions    Client Name: James Blanco MRN#: [id number] YOB: 1980    Discharge Date:   April 8, 2020      Service Type: Individual      Session Start Time: 407pm  Session End Time:  440pm      Session Length: 33min     Session #: 5     Attendees: Client    Telemedicine Visit: The patient's condition can be safely assessed and treated via synchronous audio and visual telemedicine encounter.      Reason for Telemedicine Visit: COVID-19    Originating Site (Patient Location): Patients home    Distant Site (Provider Location): Provider remote location    Consent:  The patient/guardian has verbally consented to: the potential risks and benefits of telemedicine (video visit) versus in person care; bill my insurance or make self-payment for services provided; and responsibility for payment of non-covered services.     Focus of Treatment Objective(s):  Client's presenting concerns included: Anxiety  Stage of Change at time of Discharge: Maintenance     Client reports he has had no anxiety or depression despite breaking his foot and most likely having COVID-19.  He is feeling good about grief and loss tied to the passing of a beloved pet and has been enjoying his new dog and participating in a lot of activity with him.    Medication Adherence:  Yes    Chemical Use:  No current issues    Assessment: Current Emotional / Mental Status (status of significant symptoms):    Risk status (Self / Other harm or suicidal ideation)  Client denies current fears or concerns for personal safety.  Client denies current or recent suicidal ideation or behaviors.  Client denies current or recent homicidal ideation or behaviors.  Client denies current or recent self injurious behavior or ideation.  Client denies other safety concerns.  A safety and risk management plan has not been developed at this time, however client was given the after-hours number should there be a change  in any of these risk factors.    Appearance:   Appropriate  Telephone visit   Eye Contact:   Telephone visit   Psychomotor Behavior: Reports broken foot   Attitude:   Cooperative   Orientation:   All  Speech   Rate / Production: Normal    Volume:  Normal   Mood:    Normal  Affect:    Appropriate   Thought Content:  Clear   Thought Form:  Coherent  Logical   Insight:   Good     DSM5 Diagnoses: (Sustained by DSM5 Criteria Listed Above)  296.32 (F33.1) Major Depressive Disorder, Recurrent Episode, Moderate _ and With melancholic features  Adjustment Disorders  309.28 (F43.23) With mixed anxiety and depressed mood    Reason for Discharge:  Client is satisfied with progress      Aftercare Plan:  Client may resume counseling services at any time in the future by calling the [facility] Intake Office, [phone].      Shu Monte, TH

## 2020-04-09 ASSESSMENT — ANXIETY QUESTIONNAIRES: GAD7 TOTAL SCORE: 0

## 2020-04-13 ENCOUNTER — MYC REFILL (OUTPATIENT)
Dept: FAMILY MEDICINE | Facility: CLINIC | Age: 40
End: 2020-04-13

## 2020-04-13 DIAGNOSIS — F41.9 ANXIETY: ICD-10-CM

## 2020-04-13 DIAGNOSIS — F32.1 MODERATE MAJOR DEPRESSION (H): ICD-10-CM

## 2020-04-13 DIAGNOSIS — E78.1 HYPERTRIGLYCERIDEMIA: ICD-10-CM

## 2020-04-13 DIAGNOSIS — E78.5 HYPERLIPIDEMIA LDL GOAL <130: ICD-10-CM

## 2020-04-13 NOTE — LETTER
April 17, 2020      James Blanco  [address] [address]        Dear James,     We received a refill request for your simvastatin and fenofibrate medication.  This medication has been refilled for 30 days as you are due for a telephone visit for further refills.  Please call [phone] to schedule a telephone visit appointment.        Sincerely,        Hector Talbot MD

## 2020-04-14 ENCOUNTER — ANCILLARY PROCEDURE (OUTPATIENT)
Dept: GENERAL RADIOLOGY | Facility: CLINIC | Age: 40
End: 2020-04-14
Attending: PODIATRIST
Payer: COMMERCIAL

## 2020-04-14 ENCOUNTER — OFFICE VISIT (OUTPATIENT)
Dept: PODIATRY | Facility: CLINIC | Age: 40
End: 2020-04-14
Payer: COMMERCIAL

## 2020-04-14 VITALS
TEMPERATURE: 98.6 F | BODY MASS INDEX: 28 KG/M2 | WEIGHT: 230 LBS | DIASTOLIC BLOOD PRESSURE: 90 MMHG | HEART RATE: 85 BPM | OXYGEN SATURATION: 99 % | SYSTOLIC BLOOD PRESSURE: 144 MMHG

## 2020-04-14 DIAGNOSIS — S92.354A CLOSED NONDISPLACED FRACTURE OF FIFTH METATARSAL BONE OF RIGHT FOOT, INITIAL ENCOUNTER: Primary | ICD-10-CM

## 2020-04-14 DIAGNOSIS — M79.671 RIGHT FOOT PAIN: ICD-10-CM

## 2020-04-14 PROCEDURE — 99203 OFFICE O/P NEW LOW 30 MIN: CPT | Performed by: PODIATRIST

## 2020-04-14 PROCEDURE — 73630 X-RAY EXAM OF FOOT: CPT | Mod: RT

## 2020-04-14 NOTE — LETTER
4/14/2020         RE: James Blanco  [address] [facility] [address]        Dear Colleague,    Thank you for referring your patient, James Blanco, to the [facility]. Please see a copy of my visit note below.    Subjective:    Pt is seen today in consult from Dr. Coker for painful right foot.  On 4/6/2020 patient walking and he heard a snap.  He immediately had pain and swelling.  The pain is aggravated by activity and relieved by rest.  For the last week patient has been in a walking cast.  He states the pain and swelling are decreasing.  He denies any calf pain or shortness of breath.  He works in the construction industry.  He has a history of third and fourth metatarsal stress fractures.  Patient drinks 6 beers daily.  He is currently not smoking.      ROS:  A 10-point review of systems was performed and is positive for that noted in the HPI and as seen above.  All other areas are negative.          Allergies   Allergen Reactions     Ceclor [Cefaclor]      Penicillins        Current Outpatient Medications   Medication Sig Dispense Refill     buPROPion (WELLBUTRIN SR) 100 MG 12 hr tablet Take 1 tablet (100 mg) by mouth 2 times daily 60 tablet 2     EPINEPHrine (EPIPEN 2-CHAIM) 0.3 MG/0.3ML injection 2-pack Inject 0.3 mLs (0.3 mg) into the muscle as needed for anaphylaxis 0.6 mL 1     fenofibrate (TRICOR) 145 MG tablet Take 1 tablet (145 mg) by mouth daily 30 tablet 1     FLUoxetine (PROZAC) 40 MG capsule Take 1 capsule (40 mg) by mouth daily 90 capsule 0     loperamide (IMODIUM A-D) 2 MG tablet Take 2 mg by mouth 4 times daily as needed for diarrhea       Multiple Vitamins-Minerals (MENS MULTI VITAMIN & MINERAL PO) Take 1 tablet by mouth every evening       omeprazole 20 MG tablet Take 20 mg by mouth daily       simvastatin (ZOCOR) 40 MG tablet Take 1 tablet (40 mg) by mouth At Bedtime 90 tablet 3       Patient Active Problem List   Diagnosis     Anxiety     Mixed  hyperlipidemia     Fatty liver, alcoholic     Grief reaction     Heavy alcohol consumption     Moderate major depression (H)     Closed nondisplaced fracture of fifth metatarsal bone of right foot, initial encounter       Past Medical History:   Diagnosis Date     Anxiety      Hyperlipidaemia LDL goal <130        Past Surgical History:   Procedure Laterality Date     COLONOSCOPY N/A 3/21/2016    Procedure: COLONOSCOPY;  Surgeon: Tavo Wilcox MD;  Location: [facility]     SURGICAL HISTORY OF -       plasty surgery on face, due to injury     SURGICAL HISTORY OF -       left elbow nerve decompression, sound like ulnar     SURGICAL HISTORY OF -       left knee surgery, scope and allograph for cartilege     SURGICAL HISTORY OF -       lasix surgery       Family History   Problem Relation Age of Onset     Hyperlipidemia Mother      Coronary Artery Disease Father      Hyperlipidemia Father      Diabetes Father      Other Cancer Sister         lymphoma-non hodgkins     Depression Sister      Anxiety Disorder Sister      Substance Abuse Sister      Substance Abuse Brother      Bipolar Disorder Brother        Social History     Tobacco Use     Smoking status: Former Smoker     Packs/day: 1.00     Years: 13.00     Pack years: 13.00     Smokeless tobacco: Former User   Substance Use Topics     Alcohol use: Yes     Comment: 6 beers daily          Exam:    Vitals: BP (!) 144/90 (BP Location: Right arm)   Pulse 85   Temp 98.6  F (37  C)   Wt 104.3 kg (230 lb)   SpO2 99%   BMI 28.00 kg/m    BMI: Body mass index is 28 kg/m .  Height: Data Unavailable    Constitutional/ general:  Pt is in no apparent distress, appears well-nourished.  Cooperative with history and physical exam.     Psych:  The patient answered questions appropriately.  Normal affect.  Seems to have reasonable expectations, in terms of treatment.     Eyes:  Visual scanning/ tracking without deficit.     Ears:  Response to auditory stimuli is normal.  negative  hearing aid devices.  Auricles in proper alignment.     Lymphatic:  Popliteal lymph nodes not enlarged.     Lungs:  Non labored breathing, non labored speech. No cough.  No audible wheezing. Even, quiet breathing.       Vascular:  positive pedal pulses bilaterally for both the DP and PT arteries.  CFT < 3 sec.  negative ankle edema.  positive pedal hair growth.    Neuro:  Alert and oriented x 3. Coordinated gait.  Light touch sensation is intact to the L4, L5, S1 distributions. No obvious deficits.  No evidence of neurological-based weakness, spasticity, or contracture in the lower extremities.     Derm: Normal texture and turgor.  No erythema, ecchymosis, or cyanosis.      Musculoskeletal:    Lower extremity muscle strength is normal.  Patient is ambulatory without an assistive device or brace.  No gross deformities.  Normal arch.  Pain upon palpation  of right 5th metatarsal at the metaphyseal diaphyseal junction.  Edema noted.  No erythema or ecchymosis.  No sign of ulceration, infection, or drainage.      Radiographic Exam:    RIGHT FOOT THREE OR MORE VIEWS  4/14/2020 2:06 PM      HISTORY: Right foot pain.     COMPARISON: None.                                                                      IMPRESSION: There is an acute slightly comminuted transverse  nondisplaced fracture of the proximal diaphysis of the fifth  metatarsal bone. Also seen is chronic cortical thickening in the mid  diaphysis of the third metatarsal bone consistent with an old healed  fracture. No other bony or soft tissue abnormalities.    Assessment: Right fifth metatarsal Olson fracture    Plan:  X-rays personally reviewed.  Discussed mechanism of this fracture.  Explained to patient that this can be slow to heal.  We discussed ORIF would be the best way to ensure that this heals as fast as possible.  Discussed with the patient that unfortunately the places where I do surgery are not open for business.  I am going to refer him to   Daniel for surgery.  Thank you for allowing me to participate in the care of this patient      Tavo Nevarez DPM DPM, FACFAS      Again, thank you for allowing me to participate in the care of your patient.        Sincerely,        Tavo Nevarez DPM

## 2020-04-14 NOTE — PATIENT INSTRUCTIONS
We wish you continued good healing. If you have any questions or concerns, please do not hesitate to contact us at [phone]    Please remember to call and schedule a follow up appointment if one was recommended at your earliest convenience.   PODIATRY CLINIC HOURS  TELEPHONE NUMBER    Dr. Tavo Nevarez D.P.M [facility]    Clinics:  [facility]    [place] [facility]   Tuesday 1PM-6PM  [place]  Wednesday 7AM-2PM  [facility]  Thursday 10AM-6PM  [place]  Friday 7AM-3PM  [place]  Specialty schedulers:   [phone] to make an appointment with any Specialty Provider.        Urgent Care locations:    [facility] Monday-Friday 5 pm - 9 pm. Saturday-Sunday 9 am -5pm    Monday-Friday 11 am - 9 pm Saturday 9 am - 5 pm     Monday-Sunday 12 noon-8PM [phone]     If you need a medication refill, please contact us you may need lab work and/or a follow up visit prior to your refill (i.e. Antifungal medications).    [email or web address] (secure e-mail communication and access to your chart) to send a message or to make an appointment.    If MRI needed please call [facility] at [phone]

## 2020-04-14 NOTE — NURSING NOTE
Weight management plan: Patient was referred to their PCP to discuss a diet and exercise plan.     James to follow up with Primary Care provider regarding elevated blood pressure.

## 2020-04-15 ENCOUNTER — OFFICE VISIT (OUTPATIENT)
Dept: FAMILY MEDICINE | Facility: CLINIC | Age: 40
End: 2020-04-15
Payer: COMMERCIAL

## 2020-04-15 ENCOUNTER — MYC MEDICAL ADVICE (OUTPATIENT)
Dept: PODIATRY | Facility: CLINIC | Age: 40
End: 2020-04-15

## 2020-04-15 ENCOUNTER — PREP FOR PROCEDURE (OUTPATIENT)
Dept: PODIATRY | Facility: CLINIC | Age: 40
End: 2020-04-15

## 2020-04-15 VITALS
TEMPERATURE: 96.7 F | BODY MASS INDEX: 28.01 KG/M2 | WEIGHT: 230 LBS | OXYGEN SATURATION: 97 % | RESPIRATION RATE: 16 BRPM | SYSTOLIC BLOOD PRESSURE: 137 MMHG | HEIGHT: 76 IN | HEART RATE: 88 BPM | DIASTOLIC BLOOD PRESSURE: 88 MMHG

## 2020-04-15 DIAGNOSIS — Z01.818 PREOP GENERAL PHYSICAL EXAM: Primary | ICD-10-CM

## 2020-04-15 DIAGNOSIS — S92.354A CLOSED NONDISPLACED FRACTURE OF FIFTH METATARSAL BONE OF RIGHT FOOT, INITIAL ENCOUNTER: Primary | ICD-10-CM

## 2020-04-15 PROCEDURE — 99214 OFFICE O/P EST MOD 30 MIN: CPT | Performed by: FAMILY MEDICINE

## 2020-04-15 ASSESSMENT — MIFFLIN-ST. JEOR: SCORE: 2054.77

## 2020-04-15 NOTE — TELEPHONE ENCOUNTER
Scheduled pt for a surgery consult tomorrow with Dr Sanchez. Advised he call to schedule a preop with a PCP today or tomorrow Surgery will likely be on Friday but we will call the OR at his appointment tomorrow once orders are placed.

## 2020-04-15 NOTE — TELEPHONE ENCOUNTER
Called patient and left a voicemail to call back and also sent a MC message.    Per Dr Sanchez please schedule a surgery consult tomorrow morning at [facility] and ask patient to get a preop done today or tomorrow with a primacy doctor. We would then plan for surgery on Friday (would need to contact OR still).    Also routed to triage in case patient calls after I leave at noon today.    Baljit Lewis CMA ([facility])  Podiatry/Foot & Ankle Surgery  [facility]

## 2020-04-15 NOTE — TELEPHONE ENCOUNTER
Type of surgery: R 5th met fx repair  Location of surgery: [facility]  Date and time of surgery: 4/17/20 @ 1200  Surgeon: Daniel  Pre-Op Appt Date: 4/15  Post-Op Appt Date: will schedule at surg consult appt   Packet sent out: No  - will give at surg consult  Pre-cert/Authorization completed:  No   Arthrex emailed 4/15  Date: 04/15/20

## 2020-04-15 NOTE — PROGRESS NOTES
Subjective:    Pt is seen today in consult from Dr. Coker for painful right foot.  On 4/6/2020 patient walking and he heard a snap.  He immediately had pain and swelling.  The pain is aggravated by activity and relieved by rest.  For the last week patient has been in a walking cast.  He states the pain and swelling are decreasing.  He denies any calf pain or shortness of breath.  He works in the construction industry.  He has a history of third and fourth metatarsal stress fractures.  Patient drinks 6 beers daily.  He is currently not smoking.      ROS:  A 10-point review of systems was performed and is positive for that noted in the HPI and as seen above.  All other areas are negative.          Allergies   Allergen Reactions     Ceclor [Cefaclor]      Penicillins        Current Outpatient Medications   Medication Sig Dispense Refill     buPROPion (WELLBUTRIN SR) 100 MG 12 hr tablet Take 1 tablet (100 mg) by mouth 2 times daily 60 tablet 2     EPINEPHrine (EPIPEN 2-CHAIM) 0.3 MG/0.3ML injection 2-pack Inject 0.3 mLs (0.3 mg) into the muscle as needed for anaphylaxis 0.6 mL 1     fenofibrate (TRICOR) 145 MG tablet Take 1 tablet (145 mg) by mouth daily 30 tablet 1     FLUoxetine (PROZAC) 40 MG capsule Take 1 capsule (40 mg) by mouth daily 90 capsule 0     loperamide (IMODIUM A-D) 2 MG tablet Take 2 mg by mouth 4 times daily as needed for diarrhea       Multiple Vitamins-Minerals (MENS MULTI VITAMIN & MINERAL PO) Take 1 tablet by mouth every evening       omeprazole 20 MG tablet Take 20 mg by mouth daily       simvastatin (ZOCOR) 40 MG tablet Take 1 tablet (40 mg) by mouth At Bedtime 90 tablet 3       Patient Active Problem List   Diagnosis     Anxiety     Mixed hyperlipidemia     Fatty liver, alcoholic     Grief reaction     Heavy alcohol consumption     Moderate major depression (H)     Closed nondisplaced fracture of fifth metatarsal bone of right foot, initial encounter       Past Medical History:   Diagnosis Date      Anxiety      Hyperlipidaemia LDL goal <130        Past Surgical History:   Procedure Laterality Date     COLONOSCOPY N/A 3/21/2016    Procedure: COLONOSCOPY;  Surgeon: Tavo Wilcox MD;  Location: [facility]     SURGICAL HISTORY OF -       plasty surgery on face, due to injury     SURGICAL HISTORY OF -       left elbow nerve decompression, sound like ulnar     SURGICAL HISTORY OF -       left knee surgery, scope and allograph for cartilege     SURGICAL HISTORY OF -       lasix surgery       Family History   Problem Relation Age of Onset     Hyperlipidemia Mother      Coronary Artery Disease Father      Hyperlipidemia Father      Diabetes Father      Other Cancer Sister         lymphoma-non hodgkins     Depression Sister      Anxiety Disorder Sister      Substance Abuse Sister      Substance Abuse Brother      Bipolar Disorder Brother        Social History     Tobacco Use     Smoking status: Former Smoker     Packs/day: 1.00     Years: 13.00     Pack years: 13.00     Smokeless tobacco: Former User   Substance Use Topics     Alcohol use: Yes     Comment: 6 beers daily          Exam:    Vitals: BP (!) 144/90 (BP Location: Right arm)   Pulse 85   Temp 98.6  F (37  C)   Wt 104.3 kg (230 lb)   SpO2 99%   BMI 28.00 kg/m    BMI: Body mass index is 28 kg/m .  Height: Data Unavailable    Constitutional/ general:  Pt is in no apparent distress, appears well-nourished.  Cooperative with history and physical exam.     Psych:  The patient answered questions appropriately.  Normal affect.  Seems to have reasonable expectations, in terms of treatment.     Eyes:  Visual scanning/ tracking without deficit.     Ears:  Response to auditory stimuli is normal.  negative hearing aid devices.  Auricles in proper alignment.     Lymphatic:  Popliteal lymph nodes not enlarged.     Lungs:  Non labored breathing, non labored speech. No cough.  No audible wheezing. Even, quiet breathing.       Vascular:  positive pedal pulses bilaterally  for both the DP and PT arteries.  CFT < 3 sec.  negative ankle edema.  positive pedal hair growth.    Neuro:  Alert and oriented x 3. Coordinated gait.  Light touch sensation is intact to the L4, L5, S1 distributions. No obvious deficits.  No evidence of neurological-based weakness, spasticity, or contracture in the lower extremities.     Derm: Normal texture and turgor.  No erythema, ecchymosis, or cyanosis.      Musculoskeletal:    Lower extremity muscle strength is normal.  Patient is ambulatory without an assistive device or brace.  No gross deformities.  Normal arch.  Pain upon palpation  of right 5th metatarsal at the metaphyseal diaphyseal junction.  Edema noted.  No erythema or ecchymosis.  No sign of ulceration, infection, or drainage.      Radiographic Exam:    RIGHT FOOT THREE OR MORE VIEWS  4/14/2020 2:06 PM      HISTORY: Right foot pain.     COMPARISON: None.                                                                      IMPRESSION: There is an acute slightly comminuted transverse  nondisplaced fracture of the proximal diaphysis of the fifth  metatarsal bone. Also seen is chronic cortical thickening in the mid  diaphysis of the third metatarsal bone consistent with an old healed  fracture. No other bony or soft tissue abnormalities.    Assessment: Right fifth metatarsal Olson fracture    Plan:  X-rays personally reviewed.  Discussed mechanism of this fracture.  Explained to patient that this can be slow to heal.  We discussed ORIF would be the best way to ensure that this heals as fast as possible.  Discussed with the patient that unfortunately the places where I do surgery are not open for business.  I am going to refer him to Dr. Sanchez for surgery.  Thank you for allowing me to participate in the care of this patient      Tavo Nevarez, SANDRA FLORES, FACFAS

## 2020-04-15 NOTE — PROGRESS NOTES
[facility]  [address] [address]  [phone]  Dept: [phone]    PRE-OP EVALUATION:  Today's date: 4/15/2020    James Blanco (: 1980) presents for pre-operative evaluation assessment as requested by Dr. Sanchez .  He requires evaluation and anesthesia risk assessment prior to undergoing surgery/procedure for treatment of Right Fifth Metatartsal Fracture. Treatment is ORIF of the fracture.     [facility] [place].   Primary Physician: Hector Talbot/Dr. Solis did the Preop.   Type of Anesthesia Anticipated: Monitor Anesthesia Care    Patient has a Health Care Directive or Living Will:  NO    Preop Questions 4/15/2020   1.  Do you have a history of Heart attack, stroke, stent, coronary bypass surgery, or other heart surgery? No   2.  Do you ever have any pain or discomfort in your chest? No   3.  Do you have a history of  Heart Failure? No   4.   Are you troubled by shortness of breath when:  walking on a level surface, or up a slight hill, or at night? No   5.  Do you currently have a cold, bronchitis or other respiratory infection? No   6.  Do you have a cough, shortness of breath, or wheezing? No   7.  Do you sometimes get pains in the calves of your legs when you walk? No   8. Do you or anyone in your family have previous history of blood clots? No   9.  Do you or does anyone in your family have a serious bleeding problem such as prolonged bleeding following surgeries or cuts? No   10. Have you ever had problems with anemia or been told to take iron pills? No   11. Have you had any abnormal blood loss such as black, tarry or bloody stools? No   12. Have you ever had a blood transfusion? No   13. Have you or any of your relatives ever had problems with anesthesia? No   14. Do you have sleep apnea, excessive snoring or daytime drowsiness? No   15. Do you have any prosthetic heart valves? No   16. Do you have prosthetic joints?  No       HPI:     HPI related to upcoming procedure: see above.       See problem list for active medical problems.  Problems all longstanding and stable, except as noted/documented.  See ROS for pertinent symptoms related to these conditions.      MEDICAL HISTORY:     Patient Active Problem List    Diagnosis Date Noted     Closed nondisplaced fracture of fifth metatarsal bone of right foot, initial encounter 04/15/2020     Priority: Medium     Added automatically from request for surgery [id number]       Moderate major depression (H) 04/07/2020     Priority: Medium     Grief reaction 01/09/2020     Priority: Medium     Heavy alcohol consumption 01/09/2020     Priority: Medium     Fatty liver, alcoholic 09/26/2018     Priority: Medium     Anxiety 09/01/2015     Priority: Medium     Mixed hyperlipidemia 09/01/2015     Priority: Medium      Past Medical History:   Diagnosis Date     Anxiety      Hyperlipidaemia LDL goal <130      Past Surgical History:   Procedure Laterality Date     COLONOSCOPY N/A 3/21/2016    Procedure: COLONOSCOPY;  Surgeon: Tavo Wilcox MD;  Location: [facility]     SURGICAL HISTORY OF -       plasty surgery on face, due to injury     SURGICAL HISTORY OF -       left elbow nerve decompression, sound like ulnar     SURGICAL HISTORY OF -       left knee surgery, scope and allograph for cartilege     SURGICAL HISTORY OF -       lasix surgery     Current Outpatient Medications   Medication Sig Dispense Refill     buPROPion (WELLBUTRIN SR) 100 MG 12 hr tablet Take 1 tablet (100 mg) by mouth 2 times daily 60 tablet 2     EPINEPHrine (EPIPEN 2-CHAIM) 0.3 MG/0.3ML injection 2-pack Inject 0.3 mLs (0.3 mg) into the muscle as needed for anaphylaxis 0.6 mL 1     fenofibrate (TRICOR) 145 MG tablet Take 1 tablet (145 mg) by mouth daily 30 tablet 1     FLUoxetine (PROZAC) 40 MG capsule Take 1 capsule (40 mg) by mouth daily 90 capsule 0     loperamide (IMODIUM A-D) 2 MG tablet Take 2 mg by mouth 4 times daily as needed  "for diarrhea       Multiple Vitamins-Minerals (MENS MULTI VITAMIN & MINERAL PO) Take 1 tablet by mouth every evening       omeprazole 20 MG tablet Take 20 mg by mouth daily       simvastatin (ZOCOR) 40 MG tablet Take 1 tablet (40 mg) by mouth At Bedtime 90 tablet 3     OTC products: None, except as noted above    Allergies   Allergen Reactions     Ceclor [Cefaclor]      Penicillins       Latex Allergy: NO    Social History     Tobacco Use     Smoking status: Former Smoker     Packs/day: 1.00     Years: 13.00     Pack years: 13.00     Smokeless tobacco: Former User   Substance Use Topics     Alcohol use: Yes     Comment: 6 beers daily      History   Drug Use No     Comment: h/o street drunk use 15 yrs ago       REVIEW OF SYSTEMS:   CONSTITUTIONAL: NEGATIVE for fever, chills, change in weight  ENT/MOUTH: NEGATIVE for ear, mouth and throat problems  RESP: NEGATIVE for significant cough or SOB  CV: NEGATIVE for chest pain, palpitations or peripheral edema    EXAM:   /88 (BP Location: Right arm, Patient Position: Chair, Cuff Size: Adult Regular)   Pulse 88   Temp 96.7  F (35.9  C) (Tympanic)   Resp 16   Ht 1.93 m (6' 4\")   Wt 104.3 kg (230 lb)   SpO2 97%   BMI 28.00 kg/m    Exam:  GENERAL APPEARANCE: healthy, alert and no distress  EYES: EOMI,  PERRL  HENT: ear canals and TM's normal and nose and mouth without ulcers or lesions  NECK: no adenopathy, no asymmetry, masses, or scars and thyroid normal to palpation  RESP: lungs clear to auscultation - no rales, rhonchi or wheezes  CV: regular rates and rhythm, normal S1 S2, no S3 or S4 and no murmur, click or rub -  ABDOMEN:  soft, nontender, no HSM or masses and bowel sounds normal  MS: right lower leg has a cam walker.   SKIN: no suspicious lesions or rashes  NEURO: Normal strength and tone, sensory exam grossly normal in the feet, mentation intact and speech normal  PSYCH: mentation appears normal and affect normal/bright  LYMPHATICS: No axillary, cervical, " inguinal, or supraclavicular nodes      DIAGNOSTICS:   No labs or EKG required for low risk surgery (cataract, skin procedure, breast biopsy, etc)    No results for input(s): HGB, PLT, INR, NA, POTASSIUM, CR, A1C in the last 74825 hours.     IMPRESSION:   Diagnosis/reason for consult: James Blanco (: 1980) presents for pre-operative evaluation assessment as requested by Dr. Sanchez .  He requires evaluation and anesthesia risk assessment prior to undergoing surgery/procedure for treatment of Right Fifth Metatartsal Fracture. Treatment is ORIF of the fracture.     The proposed surgical procedure is considered LOW risk.    REVISED CARDIAC RISK INDEX  The patient has the following serious cardiovascular risks for perioperative complications such as (MI, PE, VFib and 3  AV Block):  No serious cardiac risks  INTERPRETATION: 0 risks: Class I (very low risk - 0.4% complication rate)    The patient has the following additional risks for perioperative complications:  No identified additional risks      ICD-10-CM    1. Preop general physical exam  Z01.818        RECOMMENDATIONS:     --Patient is to take all scheduled medications on the day before surgery EXCEPT for modifications listed below.  Take no aspirin or advil or aleve before surgery. Tylenol is OK.   Your stomach should be empty for 8 hours before surgery.     APPROVAL GIVEN to proceed with proposed procedure, without further diagnostic evaluation       Signed Electronically by: Torres Solis MD    Copy of this evaluation report is provided to requesting physician.    [place] Preop Guidelines    Revised Cardiac Risk Index

## 2020-04-15 NOTE — PROGRESS NOTES
"Orders signed for \"right 5th metatarsal fracture repair\".    [facility]    [facility]    Amado Sanchez DPM FACFAS FACFAOM  Podiatric Foot & Ankle Surgeon  [facility]  [phone]      "

## 2020-04-15 NOTE — PATIENT INSTRUCTIONS
Before Your Surgery      Call your surgeon if there is any change in your health. This includes signs of a cold or flu (such as a sore throat, runny nose, cough, rash or fever).    Do not smoke, drink alcohol or take over the counter medicine (unless your surgeon or primary care doctor tells you to) for the 24 hours before and after surgery.    If you take prescribed drugs: Follow your doctor s orders about which medicines to take and which to stop until after surgery.    Eating and drinking prior to surgery: follow the instructions from your surgeon    Take a shower or bath the night before surgery. Use the soap your surgeon gave you to gently clean your skin. If you do not have soap from your surgeon, use your regular soap. Do not shave or scrub the surgery site.  Wear clean pajamas and have clean sheets on your bed.         Thank you for choosing [facility].  You may be receiving an email and/or telephone survey request from [facility] [employer] regarding your visit today.  Please take a few minutes to respond to the survey to let us know how we are doing.      If you have questions or concerns, please contact us via Realm or you can contact your care team at [phone].    Our Clinic hours are:  Monday 6:40 am  to 7:00 pm   6:40 am to 5:00 pm    The [place] outpatient lab hours are:  Monday - Friday 6:10 am to 4:45 pm   7:00 am to 11:00 am  Appointments are required, call [phone]      If you have clinical questions after hours or would like to schedule an appointment,  call the clinic at [phone].    DIAGNOSTICS:   No labs or EKG required for low risk surgery (cataract, skin procedure, breast biopsy, etc)    No results for input(s): HGB, PLT, INR, NA, POTASSIUM, CR, A1C in the last 43953 hours.     IMPRESSION:   Diagnosis/reason for consult: James Blanco (: 1980) presents for pre-operative evaluation assessment as requested by Dr. Sanchez .  He  requires evaluation and anesthesia risk assessment prior to undergoing surgery/procedure for treatment of Right Fifth Metatartsal Fracture. Treatment is ORIF of the fracture.     The proposed surgical procedure is considered LOW risk.    REVISED CARDIAC RISK INDEX  The patient has the following serious cardiovascular risks for perioperative complications such as (MI, PE, VFib and 3  AV Block):  No serious cardiac risks  INTERPRETATION: 0 risks: Class I (very low risk - 0.4% complication rate)    The patient has the following additional risks for perioperative complications:  No identified additional risks      ICD-10-CM    1. Preop general physical exam  Z01.818        RECOMMENDATIONS:     --Patient is to take all scheduled medications on the day before surgery EXCEPT for modifications listed below.  Take no aspirin or advil or aleve before surgery. Tylenol is OK.   Your stomach should be empty for 8 hours before surgery.     APPROVAL GIVEN to proceed with proposed procedure, without further diagnostic evaluation

## 2020-04-16 ENCOUNTER — OFFICE VISIT (OUTPATIENT)
Dept: PODIATRY | Facility: CLINIC | Age: 40
End: 2020-04-16
Payer: COMMERCIAL

## 2020-04-16 VITALS
BODY MASS INDEX: 28.01 KG/M2 | DIASTOLIC BLOOD PRESSURE: 88 MMHG | HEIGHT: 76 IN | WEIGHT: 230 LBS | SYSTOLIC BLOOD PRESSURE: 137 MMHG

## 2020-04-16 DIAGNOSIS — S92.354A CLOSED NONDISPLACED FRACTURE OF FIFTH METATARSAL BONE OF RIGHT FOOT, INITIAL ENCOUNTER: Primary | ICD-10-CM

## 2020-04-16 PROCEDURE — 99214 OFFICE O/P EST MOD 30 MIN: CPT | Performed by: PODIATRIST

## 2020-04-16 ASSESSMENT — MIFFLIN-ST. JEOR: SCORE: 2054.77

## 2020-04-16 NOTE — PROGRESS NOTES
"Foot & Ankle Surgery   April 16, 2020    S:  Pt is seen today for surgical consult for R 5th proximal metaphyseal-diaphyseal fracture.  States this happened just from \"stepping on my foot\".  Was seen by Dr Nevarez, where films confirmed the fracture.  Surgery was advised 2/2 to typical poor healing of the fracture, but Dr Nevarez was unable to do the surgery, and he was referred to me.  Conservative therapies that have been attempted without sufficient relief include .  Because of insufficient relief, the patient wishes to forego further non-operative cares in favor of surgical intervention.  No guarantees have been given to the outcome.      Vitals:    04/16/20 0907   BP: 137/88   Weight: 104.3 kg (230 lb)   Height: 1.93 m (6' 4\")     Body mass index is 28 kg/m .    ROS - Pos for CC.  Patient denies current nausea, vomiting, chills, fevers, belly pain, calf pain, chest pain or SOB.  Complete remainder of ROS is otherwise neg.      PE:  VASC -   Pulses are palpable, CFT minimally delayed  NEURO -   Light touch intact to all sensory nerve distributions without reported paresthesias.  DERM:    Neg for nodules, lesions or ulcerations  MSK:    Right lower extremity - tender over fracture.  No other foot or lateral ankle pain noted today  LYMPH:     Mild swelling lateral R midfoot/forefoot, but able to pinch the skin along a planned incision  CALF:   Neg for redness, swelling or tenderness.    Imaging:  IMPRESSION: There is an acute slightly comminuted transverse  nondisplaced fracture of the proximal diaphysis of the fifth  metatarsal bone. Also seen is chronic cortical thickening in the mid  diaphysis of the third metatarsal bone consistent with an old healed  fracture. No other bony or soft tissue abnormalities.    Assessment:  39 year old male comminuted Olson fracture    Plan:  The surgical procedure(s) was discussed in detail today.  Risks that were discussed include, but are not limited to, infection, wound " healing complications, temporary or permanent nerve damage/numbness, painful scarring, possible recurrence of/new deformity, over-correction, under-correction, possible abnormal bone healing, fixation/hardware failure, continued or new pain, the need to revise the procedure, as well as blood clots, limb loss, pain syndrome and death.  After a discussion of the procedure, risks, and post-operative care and course, the patient has elected to forego further non-operative management in favor of surgical intervention.  No guarantees were given.  The patient was informed that swelling and generalized discomfort can persist for months, and full recovery can often take up to a year.  I anticipate discontinuation of prescription pain medication at/shortly after suture removal.    Diagnosis:  above  Procedure:  Plan for per-q intra-medullary fixation.  If butterfly fragment displaces, will also plan for plate stabilization  Activity Level:  NWB 6 weeks  Pain Management:  Norco, atarax, ibuprofen  DVT prophylaxis:  .  Patient instructed on ankle ROM/calf massage exercises; patient advised on early frequent ambulation  Allergies:     Allergies   Allergen Reactions     Ceclor [Cefaclor]      Was an infant     Penicillins      Was an infant     Dispo:  Same day  WB assistive device:  Crutches  Smoking:  neg  Vit D status:  Unknown.  Will start 2000U daily  Clot/bleeding disorder history:   neg  Job duties/anticipated time off:  [place]; 3-5 months    Billing today is based on a time of >25 minutes, more than 50% of which was spent on counseling and coordinating care.    Body mass index is 28 kg/m .  Weight management plan: Patient was referred to their PCP to discuss a diet and exercise plan.      Amado Sanchez DPM FACFAS FACFAOM  Podiatric Foot & Ankle Surgeon  [facility]  [phone]

## 2020-04-16 NOTE — PATIENT INSTRUCTIONS
[facility]  [address]  [place] [address]  Phone: [phone]  Fax: [phone]    FOOT & ANKLE SURGICAL RISKS  Undergoing surgical procedure involves a certain amount of risks. Risks of complications vary, depending on the complexity of the surgery and how you take care of the surgical area during the healing process. Complications can range from minor infection to death. Some complications are temporary while others will be permanent. Your surgeon weighs the risk of complications versus the potential benefit of undergoing the procedure. You need to consider your tolerance for unexpected problems as you decide whether to undergo surgery.    Foot and ankle surgery involves cutting skin, bone, ligaments, blood vessels, and joints. These structures heal well but not without consequence. Any break in the skin can lead to infection. A deep infection can involve bone or joints, which can be devastating. Deep infection can lead to further surgeries such as amputation or could spread to other parts of the body. Most infections are minor and easily treated with oral antibiotics. Infections are often times from bacteria already present on your skin. Proper care of the surgical site is an essential component of avoiding infection. Do not get the bandage wet and take proper care of external pins to avoid these complications.    Joint stiffness is inherent to any foot or ankle surgery. Joint surgery is a major component of reconstructive foot and ankle procedures. The ligaments and tissue around the joint are released for exposure and/or correction of alignment. Scar tissue limits joint mobility. This can lead to hypersensitivity to touch, pain, problems wearing shoes, and need for revision surgery.    Bones do not always heal after surgery. Poor healing after a bone cut of joint fusion can lead to an extended period of casting, bone stimulators, or repeat surgery. A surgical nonunion is  when bones do not heal properly. Smoking will almost always increase your risks of having postoperative complications. So if you smoke, quit now.    Bone grafting is sometimes necessary during the original or repeat surgery. Bone is sometimes taken from other parts of the body, freeze-dried cadaveric bone, or synthetic bone grafts may be used as needed.    BLOOD CLOT PREVENTION & EDUCATION  Foot and ankle surgery can lead to blood clots in the large veins of your legs. This is called a deep venous thrombosis or DVT. A DVT can be life threatening if a portion of the clot breaks away and travels to the lungs. A clot in the lungs is called a pulmonary embolism or PE.    Your risk of developing a DVT is dependent on many factors. Risk factors associated with surgery include the type of surgery you are having (foot and ankle surgery is considered a much lower risk that knee, hip, or abdominal surgery), how long you are in a cast/boot, restricted ambulation, inability to move the ankle, and if you are hospitalized after surgery.    A number of medical conditions also increase your risk of DVT, including diabetes, use of estrogen medications such as birth control pills or postmenopausal medications, obesity, pregnancy, heart failure, cancer, etc.    Other risk factors include heavy smoking, advanced age (over 60 years old, but even those over 40 have increased risk), family of personal history of blood clots or clotting disorders. Symptoms of a DVT in the leg may include swelling, tenderness, a warm feeling or redness. A DVT can occur in both legs even if only one side is being treated. If you have these symptoms, call your doctor immediately.    Symptoms of a PE include chest pain, shortness of breath or the need to breath rapidly that is not associated with exercise, difficulty breathing, rapid heart rate, a feeling of passing out, and coughing or coughing up blood. A PE is an emergency so you need to be evaluated in the  ER immediately if any of these symptoms occur. You could die from a PE. Call 911 right away. PE and DVT can occur without any symptoms in the leg and chest.    Prevention of DVT and PE is important. Your doctor may apply various types of compression to your legs before and after surgery. In addition, high risk patients may be place on a short course of blood thinning medication after surgery.    You can reduce your risk for DVT after surgery by getting up and moving/crawling/crutching around the house once or twice each hour while awake in the first few weeks. Seated range of motion exercises of your ankle and leg may also help. Moving your legs keeps blood flowing through your leg veins and reduced any pooling of blood that may clot. Be sure to follow your doctor's instructions on elevation and weight bearing restrictions.      SURGICAL DRESSING  Your surgical dressing is a sterile dressing and should be left in place until removed by your doctor or their assistant at your first postop visit in clinic. Keep the dressing dry by covering it with a plastic bag during showers, taking baths with your surgical foot hanging outside of the tub, or by sponge bathing. If the dressing does become wet or dirty, call the clinic as it most likely needs to be changed. Do not change it yourself unless told otherwise by your surgeon. The safest plan is to wait to shower for three days after surgery. So take a long shower the morning of surgery.    Do not wear regular shoes with a surgical bandage and/or external pins in your foot. Wear loose fitting clothing that will easily slide over the dressing. Do not cover your surgical foot with blankets as it can contaminate the dressing. Also, remember to keep it away from your pets as they may try to chew on it.    If your surgeon places external pins in your foot, you must keep your foot dry until the pins are removed at six to eight weeks after surgery. Pins should be covered for  protection but still examine the pin sites for loosening, movement, infection, or drainage whenever possible. Do not apply ointment on the pin sites and never push a loose pin back into place.    PRESURGICAL MEDICATION RECOMMENDATIONS  Certain prescription, over-the-counter and herbal medications interfere with healing after an operation. The main concern related to medications that increase bleeding at the surgical site. Excess blood under the incision results in poor wound healing, excess pain, increased scarring, and a higher risk of infection.    Some medications slow the healing process of bone. Medications can also interfere with anesthesia drugs that keep you asleep during the operation. It is important to ensure that these medications are out of your system prior to the operation. The list below details a number of medications that are of concern. Pay special attention to how long you should avoid these medications prior to surgery. Please note that this list is not complete. You should ask your surgeon, pharmacist, or primary care physician if you are uncertain about other medications. Any herbal supplement not listed should be discontinued at least one week prior to surgery.    Aspirin: stop one week prior and may restart the day after surgery. This medication promotes bleeding.  Motrin/Ibuprofen/Aleve/Advil/NSAIDS: stop one week prior to surgery. These medications promote bleeding and may delay bone healing. Avoid these medications at least six weeks postop.  Coumadin: your primary care doctor will manage your coumadin in relation to surgery.  Enbrel: stop two weeks prior and may restart two weeks after. It may affect soft tissue healing and increase infection risk.  Remicade: stop eight to twelve weeks prior and may restart two weeks after. It can affect soft tissue healing and increase infection risk.  Humera: stop four weeks prior and may restart two weeks after. It can affect soft tissue healing and  increase infection risk.  Methotrexate: stop taking on dose prior to surgery. It may be restarted when the wound is healing well.  Kava: stop at least one day prior and may restart one day after. It can cause increased sedative effects of anesthetics.  Ephedra (she salas): stop at least one day prior and may restart one day after. It can increase risk of heart attack or stroke and bleeding at the surgical site.  [place]'s Wort: stop at least five days prior and may restart one day after. It can diminish the effects of medications given during surgery.  Ginseng: stop at least one week prior and may restart one day after. It lowers blood sugar and can increase bleeding at the surgical site.  Ginkgo: stop 36 hours prior and may restart one day after. It can increase bleeding at the surgical site.  Garlic: stop at least one week prior and may restart one day after.  Valerian: slowly decrease the dose over a few weeks prior to avoid withdrawal symptoms. It can increase sedative effects of anesthetics.  Echinacea: no stop/start date. It can be associated with allergic reactions and suppression of your immune system.  Vitamin E/Omgea-3/Fish Oil/Flax/Glucosamine/Chondroitin: stop two weeks prior and may restart one day after. They can increase bleeding at the surgical site.      TIPS FOR SUCCESSFUL POST-OP HEALING  How you care for your surgical site is critically important to achieve successful results. Avoidance of injury, infection, excess swelling, scar tissue, and stiffness are completely dependent on the care you provide over the next six weeks after surgery.    Your foot requires significant rest and elevation. Sitting for long hours with your foot elevated, however, will create its own problems. Expect muscle aches, back pain, cramps etc. Optimal posture, lumbar support, back exercises, ice, and heat may help with your new aches and pains. DO not apply a heating pad to your foot or leg, as this can worsen pain or  swelling. Instead apply ice packs behind the involved knee. Do not apply it directly to the skin.    Narcotic pain medications and inactivity may also cause constipation. Limiting the use of these narcotics will help minimize this problem. The pain medication will not completely alleviate your pain. The purpose of pain pills is to take the edge off and help you get through the first few days. You can substitute extra strength Tylenol if pain is milder. Do not take Tylenol and prescription pain pills at the same time. Do not take more than 4,000mg of Tylenol in 24 hours. You can also minimize constipation by drinking plenty of water, eating lots of fruits and veggies, and taking the appropriate amount of Metamucil or other fiber supplements. These measures should be continued while on narcotic pain medications and if you remain inactive.    Showering can be a major challenge after surgery. Your incision requires about three days to become sealed from water. Your bandage should not get wet or removed. Attempt to avoid showing for three days after surgery and try a sponge bath instead. It can be dangerous showering while standing on only one foot so be careful. Consider borrowing a shower chair. If the bandage does get wet, call us immediately for a dressing change as this can slow the healing process or cause infection.    External pins need to be kept dry without ointment or moisture. Keep them covered at all times. Protect them from clothing, blankets, and pets.  Any change or movement of the pins deserves a call to clinic. A loose pin will need to be removed. Never push them back into your foot.    Stiffness will develop after any operation due to scarring. The scar tissue begins to form immediately after the surgery. Inactivity can cause excess stiffness and may lead to blood clots, scar tissue, and adhesions.        SCAR CARE  Scarring is an unfortunate but unavoidable part of surgery. Every person scars  differently and there is no way to predict how an individual's scar will look. Once the sutures are removed, there are a few things you can do to help minimize the scar tissue formation.    As soon as the skin is incised during surgery, the body is taking steps to prepare for healing. After about three days, the body has sent cells to the incision to begin the healing process. These cells, called fibroblasts, make collagen, a protein in the skin that helps provide strength. Once the skin has been sufficiently strengthened, the sutures are removed. Over the next year, the body synthesizes new collagen and breaks down old collagen to help achieve a strong scar that allows the foot and ankle to function appropriately. This is where patients can help the appearance of the scare, as it will change over the next year.    1. Do not expose the scar to the sun for one year.  2. Wear shoes/socks or cover your scar with zinc oxide  3. Any sun exposure can permanently darken the scar  4. Massage the scar 2-3 times a day to break down the collagen  5. Apply lotion/Vitamin E on the scar using some pressure  6. Try over the counter products like Mederma/Scar Zone    SHOWERING BEFORE SURGERY  You must wash with the soap twice before coming to the hospital for your surgery. This will decrease bacteria (germs) on your skin. It will also help reduce your chance of infection (illness caused by germs) after surgery.  Read the directions and safety tips on the bottle of soap. Wash once the evening before surgery and once the morning of surgery. Use 4 ounces of soap each time. When showering, it is best to use 2 fresh washcloths and a fresh towel.   Items you will need for showerin newly washed washcloths    2 newly washed towels    8 ounces of soap  FOLLOW THESE INSTRUCTIONS:  The evening before surgery   1. Shower or bathe as you normally would, and use your regular soap and a clean washcloth. Give special attention to places  where your incision (surgical cut) or catheters will be. This includes your groin area. Rinse well. You may wash your hair with your regular shampoo.  2. Next, wash your entire body from your chin down with the antiseptic soap. See the next page for directions about how to do this.  3. Rinse well and dry off using a newly washed towel.  The morning of surgery  Repeat steps 1, 2 and 3.   Other suggestions:    Wear freshly washed pajamas or clothing after your evening shower.    Wear freshly washed clothes the day of surgery.    Wash and change your bed sheets the day before surgery to have clean bed sheets after you shower and when you get home from surgery.    If you have trouble washing all areas, make sure someone helps you.    Don t use any deodorant, lotion or powder after your shower.    Women who are menstruating should wear a fresh sanitary pad to the hospital.    Hibiclens - 4% CHG  1. Put about 1 ounce of soap onto a clean, damp washcloth. Wash your skin from your chin down to your toes. Pay special attention to your incision areas.  2. Gently rub your incision area and surrounding areas.  3. Repeat steps 1 and 2 until you have used 4 ounces. Make sure you gently rub your incision area and surrounding areas for a full 5 minutes.   4. Rinse with water and towel dry with a clean towel.       * If you have any post-operative questions regarding your procedure, call our triage team at the [facility] at [phone] (option 2 > option 3).      BODY WEIGHT AND YOUR FEET  The following information is included in the after visit summary for all patients. Body weight can be a sensitive issue to discuss in clinic, but we think the following information is very important. Although we focus on the feet and ankles, we do support the overall health of our patients.     Many things can cause foot and ankle problems. Foot structure, activity level, foot mechanics and injuries are common causes of  pain. One very important issue that often goes unmentioned, is body weight. Extra weight can cause increased stress on muscles, ligaments, bones and tendons. Sometimes just a few extra pounds is all it takes to put one over her/his threshold. Without reducing that stress, it can be difficult to alleviate pain. As Foot & Ankle specialists, our job is addressing the lower extremity problem and possible causes. Regarding extra body weight, we encourage patients to discuss diet and weight management plans with their primary care doctors. It is this team approach that gives you the best opportunity for pain relief and getting you back on your feet.      [facility] has a Comprehensive Weight Management Program. This program includes counseling, education, non-surgical and surgical approaches to weight loss. If you are interested in learning more either talk to you primary care provider or call [phone].

## 2020-04-16 NOTE — LETTER
4/16/2020         RE: James Blanco  [address] [address]        Dear Colleague,    Thank you for referring your patient, James Blanco, to the [facility]. Please see a copy of my visit note below.    No notes on file    Again, thank you for allowing me to participate in the care of your patient.        Sincerely,        Amado Sanchez DPM, SANDRA

## 2020-04-16 NOTE — Clinical Note
We have James scheduled for surgery tomorrow. I'm going to start with an intra-medullary screw, but if the butterfly fragment displaces, I'll slap a small plate on as well.    Thanks    Amado

## 2020-04-17 ENCOUNTER — HOSPITAL ENCOUNTER (OUTPATIENT)
Facility: CLINIC | Age: 40
Discharge: HOME | End: 2020-04-17
Attending: PODIATRIST | Admitting: PODIATRIST
Payer: COMMERCIAL

## 2020-04-17 ENCOUNTER — APPOINTMENT (OUTPATIENT)
Dept: GENERAL RADIOLOGY | Facility: CLINIC | Age: 40
End: 2020-04-17
Attending: PODIATRIST
Payer: COMMERCIAL

## 2020-04-17 ENCOUNTER — ANESTHESIA EVENT (OUTPATIENT)
Dept: SURGERY | Facility: CLINIC | Age: 40
End: 2020-04-17
Payer: COMMERCIAL

## 2020-04-17 ENCOUNTER — ANESTHESIA (OUTPATIENT)
Dept: SURGERY | Facility: CLINIC | Age: 40
End: 2020-04-17
Payer: COMMERCIAL

## 2020-04-17 VITALS
DIASTOLIC BLOOD PRESSURE: 106 MMHG | SYSTOLIC BLOOD PRESSURE: 150 MMHG | BODY MASS INDEX: 26.58 KG/M2 | TEMPERATURE: 98 F | OXYGEN SATURATION: 98 % | HEIGHT: 76 IN | RESPIRATION RATE: 16 BRPM | WEIGHT: 218.3 LBS | HEART RATE: 90 BPM

## 2020-04-17 DIAGNOSIS — S92.354A CLOSED NONDISPLACED FRACTURE OF FIFTH METATARSAL BONE OF RIGHT FOOT, INITIAL ENCOUNTER: ICD-10-CM

## 2020-04-17 DIAGNOSIS — Z98.890 S/P FOOT SURGERY, RIGHT: Primary | ICD-10-CM

## 2020-04-17 PROCEDURE — 25800030 ZZH RX IP 258 OP 636: Performed by: ANESTHESIOLOGY

## 2020-04-17 PROCEDURE — 25000125 ZZHC RX 250: Performed by: NURSE ANESTHETIST, CERTIFIED REGISTERED

## 2020-04-17 PROCEDURE — 25000128 H RX IP 250 OP 636: Performed by: PODIATRIST

## 2020-04-17 PROCEDURE — 71000013 ZZH RECOVERY PHASE 1 LEVEL 1 EA ADDTL HR: Performed by: PODIATRIST

## 2020-04-17 PROCEDURE — 27210794 ZZH OR GENERAL SUPPLY STERILE: Performed by: PODIATRIST

## 2020-04-17 PROCEDURE — 25000128 H RX IP 250 OP 636: Performed by: NURSE ANESTHETIST, CERTIFIED REGISTERED

## 2020-04-17 PROCEDURE — C1713 ANCHOR/SCREW BN/BN,TIS/BN: HCPCS | Performed by: PODIATRIST

## 2020-04-17 PROCEDURE — 37000009 ZZH ANESTHESIA TECHNICAL FEE, EACH ADDTL 15 MIN: Performed by: PODIATRIST

## 2020-04-17 PROCEDURE — 25000125 ZZHC RX 250: Performed by: PODIATRIST

## 2020-04-17 PROCEDURE — 25000132 ZZH RX MED GY IP 250 OP 250 PS 637: Performed by: PODIATRIST

## 2020-04-17 PROCEDURE — 28485 OPTX METATARSAL FX EACH: CPT | Mod: RT | Performed by: PODIATRIST

## 2020-04-17 PROCEDURE — 71000012 ZZH RECOVERY PHASE 1 LEVEL 1 FIRST HR: Performed by: PODIATRIST

## 2020-04-17 PROCEDURE — 25000128 H RX IP 250 OP 636: Performed by: ANESTHESIOLOGY

## 2020-04-17 PROCEDURE — 27211024 ZZHC OR SUPPLY OTHER OPNP: Performed by: PODIATRIST

## 2020-04-17 PROCEDURE — 40000306 ZZH STATISTIC PRE PROC ASSESS II: Performed by: PODIATRIST

## 2020-04-17 PROCEDURE — 37000008 ZZH ANESTHESIA TECHNICAL FEE, 1ST 30 MIN: Performed by: PODIATRIST

## 2020-04-17 PROCEDURE — 36000060 ZZH SURGERY LEVEL 3 W FLUORO 1ST 30 MIN: Performed by: PODIATRIST

## 2020-04-17 PROCEDURE — 71000027 ZZH RECOVERY PHASE 2 EACH 15 MINS: Performed by: PODIATRIST

## 2020-04-17 PROCEDURE — 73620 X-RAY EXAM OF FOOT: CPT | Mod: RT

## 2020-04-17 PROCEDURE — 36000058 ZZH SURGERY LEVEL 3 EA 15 ADDTL MIN: Performed by: PODIATRIST

## 2020-04-17 PROCEDURE — 40000277 XR SURGERY CARM FLUORO LESS THAN 5 MIN W STILLS: Mod: TC

## 2020-04-17 DEVICE — IMPLANTABLE DEVICE: Type: IMPLANTABLE DEVICE | Site: FOOT | Status: FUNCTIONAL

## 2020-04-17 RX ORDER — IBUPROFEN 600 MG/1
TABLET, FILM COATED ORAL
Qty: 30 TABLET | Refills: 0 | Status: SHIPPED | OUTPATIENT
Start: 2020-04-17 | End: 2020-06-01

## 2020-04-17 RX ORDER — PROPOFOL 10 MG/ML
INJECTION, EMULSION INTRAVENOUS CONTINUOUS PRN
Status: DISCONTINUED | OUTPATIENT
Start: 2020-04-17 | End: 2020-04-17

## 2020-04-17 RX ORDER — KETOROLAC TROMETHAMINE 30 MG/ML
30 INJECTION, SOLUTION INTRAMUSCULAR; INTRAVENOUS EVERY 6 HOURS PRN
Status: DISCONTINUED | OUTPATIENT
Start: 2020-04-17 | End: 2020-04-17 | Stop reason: HOSPADM

## 2020-04-17 RX ORDER — METOPROLOL TARTRATE 1 MG/ML
1-2 INJECTION, SOLUTION INTRAVENOUS EVERY 5 MIN PRN
Status: DISCONTINUED | OUTPATIENT
Start: 2020-04-17 | End: 2020-04-17 | Stop reason: HOSPADM

## 2020-04-17 RX ORDER — LIDOCAINE 40 MG/G
CREAM TOPICAL
Status: DISCONTINUED | OUTPATIENT
Start: 2020-04-17 | End: 2020-04-17 | Stop reason: HOSPADM

## 2020-04-17 RX ORDER — BUPROPION HYDROCHLORIDE 100 MG/1
100 TABLET, EXTENDED RELEASE ORAL 2 TIMES DAILY
Qty: 60 TABLET | Refills: 2 | OUTPATIENT
Start: 2020-04-17

## 2020-04-17 RX ORDER — PROPOFOL 10 MG/ML
INJECTION, EMULSION INTRAVENOUS PRN
Status: DISCONTINUED | OUTPATIENT
Start: 2020-04-17 | End: 2020-04-17

## 2020-04-17 RX ORDER — ONDANSETRON 2 MG/ML
INJECTION INTRAMUSCULAR; INTRAVENOUS PRN
Status: DISCONTINUED | OUTPATIENT
Start: 2020-04-17 | End: 2020-04-17

## 2020-04-17 RX ORDER — HYDROXYZINE HYDROCHLORIDE 25 MG/1
TABLET, FILM COATED ORAL
Qty: 20 TABLET | Refills: 0 | Status: SHIPPED | OUTPATIENT
Start: 2020-04-17 | End: 2020-06-01

## 2020-04-17 RX ORDER — HYDRALAZINE HYDROCHLORIDE 20 MG/ML
2.5-5 INJECTION INTRAMUSCULAR; INTRAVENOUS EVERY 10 MIN PRN
Status: DISCONTINUED | OUTPATIENT
Start: 2020-04-17 | End: 2020-04-17 | Stop reason: HOSPADM

## 2020-04-17 RX ORDER — ONDANSETRON 2 MG/ML
4 INJECTION INTRAMUSCULAR; INTRAVENOUS EVERY 30 MIN PRN
Status: DISCONTINUED | OUTPATIENT
Start: 2020-04-17 | End: 2020-04-17 | Stop reason: HOSPADM

## 2020-04-17 RX ORDER — CHOLECALCIFEROL (VITAMIN D3) 50 MCG
1 TABLET ORAL DAILY
Qty: 90 TABLET | Refills: 0 | Status: SHIPPED | OUTPATIENT
Start: 2020-04-17 | End: 2020-10-21

## 2020-04-17 RX ORDER — SODIUM CHLORIDE, SODIUM LACTATE, POTASSIUM CHLORIDE, CALCIUM CHLORIDE 600; 310; 30; 20 MG/100ML; MG/100ML; MG/100ML; MG/100ML
INJECTION, SOLUTION INTRAVENOUS CONTINUOUS
Status: DISCONTINUED | OUTPATIENT
Start: 2020-04-17 | End: 2020-04-17 | Stop reason: HOSPADM

## 2020-04-17 RX ORDER — CLINDAMYCIN PHOSPHATE 900 MG/50ML
900 INJECTION, SOLUTION INTRAVENOUS
Status: DISCONTINUED | OUTPATIENT
Start: 2020-04-17 | End: 2020-04-17 | Stop reason: HOSPADM

## 2020-04-17 RX ORDER — BUPIVACAINE HYDROCHLORIDE 2.5 MG/ML
INJECTION, SOLUTION EPIDURAL; INFILTRATION; INTRACAUDAL PRN
Status: DISCONTINUED | OUTPATIENT
Start: 2020-04-17 | End: 2020-04-17 | Stop reason: HOSPADM

## 2020-04-17 RX ORDER — MEPERIDINE HYDROCHLORIDE 25 MG/ML
12.5 INJECTION INTRAMUSCULAR; INTRAVENOUS; SUBCUTANEOUS
Status: DISCONTINUED | OUTPATIENT
Start: 2020-04-17 | End: 2020-04-17 | Stop reason: HOSPADM

## 2020-04-17 RX ORDER — OXYCODONE HYDROCHLORIDE 5 MG/1
5 TABLET ORAL
Status: COMPLETED | OUTPATIENT
Start: 2020-04-17 | End: 2020-04-17

## 2020-04-17 RX ORDER — SIMVASTATIN 40 MG
40 TABLET ORAL AT BEDTIME
Qty: 30 TABLET | Refills: 0 | Status: SHIPPED | OUTPATIENT
Start: 2020-04-17 | End: 2020-06-18

## 2020-04-17 RX ORDER — FENTANYL CITRATE 50 UG/ML
25-50 INJECTION, SOLUTION INTRAMUSCULAR; INTRAVENOUS
Status: DISCONTINUED | OUTPATIENT
Start: 2020-04-17 | End: 2020-04-17 | Stop reason: HOSPADM

## 2020-04-17 RX ORDER — FLUOXETINE 40 MG/1
40 CAPSULE ORAL DAILY
Qty: 90 CAPSULE | Refills: 0 | OUTPATIENT
Start: 2020-04-17

## 2020-04-17 RX ORDER — CLINDAMYCIN PHOSPHATE 900 MG/50ML
900 INJECTION, SOLUTION INTRAVENOUS SEE ADMIN INSTRUCTIONS
Status: DISCONTINUED | OUTPATIENT
Start: 2020-04-17 | End: 2020-04-17 | Stop reason: HOSPADM

## 2020-04-17 RX ORDER — HYDROCODONE BITARTRATE AND ACETAMINOPHEN 5; 325 MG/1; MG/1
TABLET ORAL
Qty: 20 TABLET | Refills: 0 | Status: SHIPPED | OUTPATIENT
Start: 2020-04-17 | End: 2020-06-01

## 2020-04-17 RX ORDER — MAGNESIUM HYDROXIDE 1200 MG/15ML
LIQUID ORAL PRN
Status: DISCONTINUED | OUTPATIENT
Start: 2020-04-17 | End: 2020-04-17 | Stop reason: HOSPADM

## 2020-04-17 RX ORDER — FENOFIBRATE 145 MG/1
145 TABLET, COATED ORAL DAILY
Qty: 30 TABLET | Refills: 0 | Status: SHIPPED | OUTPATIENT
Start: 2020-04-17 | End: 2020-06-18

## 2020-04-17 RX ORDER — FENTANYL CITRATE 50 UG/ML
INJECTION, SOLUTION INTRAMUSCULAR; INTRAVENOUS PRN
Status: DISCONTINUED | OUTPATIENT
Start: 2020-04-17 | End: 2020-04-17

## 2020-04-17 RX ORDER — ONDANSETRON 4 MG/1
4 TABLET, ORALLY DISINTEGRATING ORAL EVERY 30 MIN PRN
Status: DISCONTINUED | OUTPATIENT
Start: 2020-04-17 | End: 2020-04-17 | Stop reason: HOSPADM

## 2020-04-17 RX ORDER — DEXAMETHASONE SODIUM PHOSPHATE 4 MG/ML
4 INJECTION, SOLUTION INTRA-ARTICULAR; INTRALESIONAL; INTRAMUSCULAR; INTRAVENOUS; SOFT TISSUE EVERY 10 MIN PRN
Status: DISCONTINUED | OUTPATIENT
Start: 2020-04-17 | End: 2020-04-17 | Stop reason: HOSPADM

## 2020-04-17 RX ORDER — NALOXONE HYDROCHLORIDE 0.4 MG/ML
.1-.4 INJECTION, SOLUTION INTRAMUSCULAR; INTRAVENOUS; SUBCUTANEOUS
Status: DISCONTINUED | OUTPATIENT
Start: 2020-04-17 | End: 2020-04-17 | Stop reason: HOSPADM

## 2020-04-17 RX ADMIN — PROPOFOL 50 MG: 10 INJECTION, EMULSION INTRAVENOUS at 11:55

## 2020-04-17 RX ADMIN — PROPOFOL 50 MG: 10 INJECTION, EMULSION INTRAVENOUS at 11:58

## 2020-04-17 RX ADMIN — FENTANYL CITRATE 50 MCG: 50 INJECTION, SOLUTION INTRAMUSCULAR; INTRAVENOUS at 13:30

## 2020-04-17 RX ADMIN — FENTANYL CITRATE 50 MCG: 50 INJECTION, SOLUTION INTRAMUSCULAR; INTRAVENOUS at 13:22

## 2020-04-17 RX ADMIN — SODIUM CHLORIDE, POTASSIUM CHLORIDE, SODIUM LACTATE AND CALCIUM CHLORIDE: 600; 310; 30; 20 INJECTION, SOLUTION INTRAVENOUS at 11:20

## 2020-04-17 RX ADMIN — FENTANYL CITRATE 100 MCG: 50 INJECTION, SOLUTION INTRAMUSCULAR; INTRAVENOUS at 11:45

## 2020-04-17 RX ADMIN — ONDANSETRON HYDROCHLORIDE 4 MG: 2 INJECTION, SOLUTION INTRAVENOUS at 12:04

## 2020-04-17 RX ADMIN — PROPOFOL 50 MG: 10 INJECTION, EMULSION INTRAVENOUS at 11:50

## 2020-04-17 RX ADMIN — FENTANYL CITRATE 50 MCG: 50 INJECTION, SOLUTION INTRAMUSCULAR; INTRAVENOUS at 12:11

## 2020-04-17 RX ADMIN — MIDAZOLAM 2 MG: 1 INJECTION INTRAMUSCULAR; INTRAVENOUS at 11:42

## 2020-04-17 RX ADMIN — PROPOFOL 50 MG: 10 INJECTION, EMULSION INTRAVENOUS at 11:45

## 2020-04-17 RX ADMIN — FENTANYL CITRATE 50 MCG: 50 INJECTION, SOLUTION INTRAMUSCULAR; INTRAVENOUS at 12:20

## 2020-04-17 RX ADMIN — CLINDAMYCIN PHOSPHATE 900 MG: 900 INJECTION, SOLUTION INTRAVENOUS at 12:04

## 2020-04-17 RX ADMIN — PROPOFOL 70 MCG/KG/MIN: 10 INJECTION, EMULSION INTRAVENOUS at 11:55

## 2020-04-17 RX ADMIN — OXYCODONE HYDROCHLORIDE 5 MG: 5 TABLET ORAL at 13:01

## 2020-04-17 RX ADMIN — KETOROLAC TROMETHAMINE 30 MG: 30 INJECTION, SOLUTION INTRAMUSCULAR at 14:30

## 2020-04-17 ASSESSMENT — MIFFLIN-ST. JEOR: SCORE: 2001.7

## 2020-04-17 NOTE — OR NURSING
Discharge instructions completed with wife, Antonette via phone. She will  discharge meds at [facility] pharmacy. All questions answered.

## 2020-04-17 NOTE — ANESTHESIA CARE TRANSFER NOTE
Patient: James Blanco    Procedure(s):  Right fifth metatarsal fracture repair    Diagnosis: Closed nondisplaced fracture of fifth metatarsal bone of right foot, initial encounter [S92.354A]  Diagnosis Additional Information: No value filed.    Anesthesia Type:   MAC     Note:  Airway :Nasal Cannula  Patient transferred to:PACU  Comments: To PACU, oxygen off, report to RN.      Vitals: (Last set prior to Anesthesia Care Transfer)    CRNA VITALS  4/17/2020 1208 - 4/17/2020 1238      4/17/2020             Pulse:  104    SpO2:  98 %                Electronically Signed By: ELHAM Camacho CRNA  April 17, 2020  12:38 PM

## 2020-04-17 NOTE — DISCHARGE INSTRUCTIONS
PODIATRIC OR ANKLE DISCHARGE INSTRUCTIONS    IF YOU RECEIVED GENERAL ANESTHESIA, YOU MUST HAVE AN ADULT TAKE YOU HOME AND STAY WITH YOU FOR THE FIRST DAY AFTER YOUR SURGERY.    SIDE EFFECTS OF FOOT AND ANKLE SURGERY:  SWELLING AND BRUISING ARE NORMAL.  SOME BLOOD MAY SOAK THROUGH THE BANDAGE.    ACTIVITY  -KEEP YOUR FOOT RAISED ON A PILLOW OR CHAIR.  YOUR TOES SHOULD BE HIGHER THAN YOUR NOSE.  DO NOT BEAR WEIGHT ON THE FOOT UNTIL DIRECTED BY YOUR DOCTOR.  -RETURN TO NORMAL ACTIVITIES AND TO WORK AS DIRECTED BY YOUR DOCTOR.  -IF YOU HAVE CRUTCHES OR A SURGICAL SHOE, USE AS DIRECTED BY YOUR DOCTOR.  -TAKE IT EASY.  TOO MUCH ACTIVITY WILL CAUSE YOUR FOOT TO SWELL, THROB OR ACHE.    FOOD AND DRINK  -IF YOU DO NOT FEEL SICK TO YOUR STOMACH, SLOWLY ADD SOLID FOOD.  YOU SHOULD BE ABLE TO EAT YOUR USUAL FOOD BY THE DAY AFTER SURGERY.  AVOID FATTY AND FRIED FOODS.  -DRINK AT LEAST 8 TALL GLASSES OF FLUIDS EACH DAY.  -AVOID CONSTIPATION (STRAINING TO PASS STOOL) BY EATING FRUITS, VEGETABLES AND WHOLE GRAINS.  ASK YOUR DOCTOR IF YOU THINK YOU NEED A STOOL SOFTENER.    BLEEDING  -SOME BLOOD MAY SOAK THROUGH THE BANDAGE.  YOU MAY ALSO NOTICE PINK DRAINAGE AROUND YOUR SURGICAL SITE.  THIS IS NORMAL.  -WOUND DRAINAGE NORMALLY LASTS 1 TO 3 DAYS AFTER SURGERY.    COMFORT  -SWELLING AND BRUISING ARE NORMAL.  -TAKE YOUR PAIN MEDICINE A DIRECTED WITH FOOD.  THEY WILL HELP CONTROL YOUR PAIN BUT WILL NOT MAKE YOU PAIN FREE.  -DO NOT DRINK ALCOHOL WHILE TAKING PRESCRIBED PAIN MEDICINE.  -APPLY ICE PACKS ON TOP OF THE BANDAGE FOR 20 TO 30 MINUTES EACH HOUR YOU ARE AWAKE.  YOU MAY ALSO ICE INSIDE THE ANKLE OR BEHIND THE KNEE.  -DO NOT USE HEAT ON THE SURGERY SITE.    INCISION CARE  -KEEP THE BANDAGE CLEAN AND DRY.  -DO NOT REMOVE THE BANDAGE UNTIL DIRECTED BY YOUR DOCTOR.  -CHECK WITH YOUR DOCTOR BEFORE SWIMMING OR USING A HOT TUB.  -DO NOT RUB THE INCISION OR PUT ANY OILS OR CREAMS ON IT.  -CHECK THE COLOR OF YOUR TOES THREE TIMES A  DAY.  -SMOKING CAN SLOW WOUND HEALING.    CALL YOUR DOCTOR IF YOU HAVE:  -HEAVY BLEEDING-YOUR BANDAGE BECOMES SOAKED WITH BLOOD OR THE BLEEDING DOES NOT STOP.  -YOU HAVE A FEVER  DEGREES OR HIGHER.  FOR A CORRECT READING, HAVE NO ASPIRIN, ACETAMINOPHEN (TYLENOL) OR IBUPROFEN (MOTRIN, ADVIL) FOR 3 TO 4 HOURS BEFORE TAKING YOUR TEMPERATURE.  -CHANGE OF COLOR IN YOUR TOES.  -A FALL OR INJURY TO THE SURGERY SITE.  -UNUSUAL OR SUDDEN INCREASE IN PAIN.    Dr. Amado Sanchez, DPSILVIA  Clinic phone number:  [phone]

## 2020-04-17 NOTE — OR NURSING
BP's 140-150 systolic and  diastolic in PACU/phase 2, slightly improved with Fentanyl/Oxy. Reports feeling slightly anxious at this time. Updated MDA; ok to to discharge from phase 2 when ride here.

## 2020-04-17 NOTE — TELEPHONE ENCOUNTER
Patient will need to make appointment for further refills.  Ines Haque NP on 4/17/2020 at 10:16 AM

## 2020-04-17 NOTE — ANESTHESIA POSTPROCEDURE EVALUATION
Patient: James Blanco    Procedure(s):  Right fifth metatarsal fracture repair    Diagnosis:Closed nondisplaced fracture of fifth metatarsal bone of right foot, initial encounter [S92.354A]  Diagnosis Additional Information: No value filed.    Anesthesia Type:  MAC    Note:  Anesthesia Post Evaluation    Patient location during evaluation: PACU  Patient participation: Able to fully participate in evaluation  Level of consciousness: awake and alert  Pain management: adequate  Airway patency: patent  Cardiovascular status: acceptable  Respiratory status: acceptable  Hydration status: acceptable  PONV: controlled     Anesthetic complications: None          Last vitals:  Vitals:    04/17/20 1345 04/17/20 1353 04/17/20 1400   BP: (!) 143/101 (!) 149/108 (!) 149/108   Pulse:  85    Resp:  14    Temp:  98.5  F (36.9  C)    SpO2: 96% 98% 98%         Electronically Signed By: Lenny Avila MD  April 17, 2020  2:15 PM

## 2020-04-17 NOTE — TELEPHONE ENCOUNTER
Patient had an evisit and medications where filled for at least 90 days.  He should not need refills.  Ines Haque NP on 4/17/2020 at 2:02 PM

## 2020-04-17 NOTE — OP NOTE
Procedure Date: 04/17/2020      SURGEON:  Amado Sanchez DPM      PREOPERATIVE DIAGNOSIS:  Right fifth metatarsal proximal metaphyseal diaphyseal fracture with butterfly fragment.      POSTOPERATIVE DIAGNOSIS:  Right fifth metatarsal proximal metaphyseal diaphyseal fracture with butterfly fragment.      PROCEDURE:  Percutaneous intramedullary fixation, right fifth metatarsal fracture.      PATHOLOGY:  None.      ANESTHESIA:  MAC with local.      HEMOSTASIS:  A well-padded pneumatic ankle tourniquet.      ESTIMATED BLOOD LOSS:  Less than 1 mL.      MATERIALS:  See nursing note for details.  I utilized a 16 mm 4.0 mm partially threaded cannulated screw from [employer].      INJECTABLES:  10 mL of 0.25% bupivacaine plain preoperatively and 10 mL of 1% lidocaine plain intraoperatively.      COMPLICATIONS:  None apparent.      INDICATIONS FOR PROCEDURE:  The patient is a pleasant 39-year-old male who injured his foot and was seen by Dr. Nevarez where x-ray showed a Olson type fracture with a butterfly fragment.  Conservative and surgical treatment options were discussed.  The patient was referred to me for surgical management as Dr. Nevarez's facilities are closed for cases.      DESCRIPTION OF PROCEDURE:  After obtaining written consent, the patient was transferred to the operating room, placed in supine position on the operating table.  IV sedation was initiated.  The foot was anesthetized with preoperative local.  It was then prepped and draped in normal aseptic fashion, exsanguinated and the well-padded pneumatic ankle tourniquet was inflated.  The patient, procedure and site were correctly identified by OR staff.      Attention was directed to the lateral right foot.  The fifth metatarsal anatomy was drawn out.  Using image intensification, a guide pin was placed through a stab incision within the medullary canal of the fifth metatarsal.  Imaging including AP and lateral views of the foot showed intramedullary  placement of the pin.  The pinhole and skin was widened with a #15 blade.  The screw was placed over the pin and the screw was placed in an intramedullary fashion to 2-finger tightness.  The threads appeared to be fully crossing the fracture site.  We had discussed possible lateral plating if the butterfly fragment was significantly distracted, which it was not.  The fracture lines closed down quite nicely with excellent purchase of the screw.  No further fixation was deemed necessary.      A stab incision was flushed with copious amounts of normal saline.  Layered closure was performed with 4-0 Vicryl and 4-0 nylon.  A dry sterile dressing was applied to the patient's right foot.  He appeared to tolerate the procedure and anesthesia well and was transferred to the PACU with vital signs stable and vascular status intact to the foot.      PLAN:  The patient will be nonweightbearing and will follow up with one of my partners in approximately 1 week or sooner with any acute issues.         FERMÍN NICHOLE DPM             D: 2020   T: 2020   MT: MIRANDA      Name:     ROSA CHRISTOPHER   MRN:      [id number]        Account:        [id number]   :      1980           Procedure Date: 2020      Document: [id number]

## 2020-04-17 NOTE — BRIEF OP NOTE
[facility]    Brief Operative Note    Pre-operative diagnosis: Closed nondisplaced fracture of fifth metatarsal bone of right foot, initial encounter [W98.791P]  Post-operative diagnosis sp percutaneous right 5th metatarsal fracture repair    Procedure: Procedure(s):  Right fifth metatarsal fracture repair  Surgeon: Surgeon(s) and Role:     * Amado Sanchez DPM - Primary  Anesthesia: Monitor Anesthesia Care   Estimated blood loss: Minimal  Drains: None  Specimens: * No specimens in log *  Findings:   intra-medullary fixation, closure of fracture lines without extraction of butterfly fragment, excellent screw purchase.  Complications: None.  Implants:   Implant Name Type Inv. Item Serial No.  Lot No. LRB No. Used Action   4MM X 60MM CANNULATED, PARTIALLY THREADED SCREW    [employer] [id number], 16APR 2020 Right 1 Implanted

## 2020-04-17 NOTE — ANESTHESIA PREPROCEDURE EVALUATION
Anesthesia Pre-Procedure Evaluation    Patient: James Blanco   MRN: [id number] : 1980          Preoperative Diagnosis: Closed nondisplaced fracture of fifth metatarsal bone of right foot, initial encounter [S92.354A]    Procedure(s):  Right fifth metatarsal fracture repair    Past Medical History:   Diagnosis Date     Anxiety      Hyperlipidaemia LDL goal <130      Past Surgical History:   Procedure Laterality Date     COLONOSCOPY N/A 3/21/2016    Procedure: COLONOSCOPY;  Surgeon: Tavo Wilcox MD;  Location: [facility]     SURGICAL HISTORY OF -       plasty surgery on face, due to injury     SURGICAL HISTORY OF -       left elbow nerve decompression, sound like ulnar     SURGICAL HISTORY OF -       left knee surgery, scope and allograph for cartilege     SURGICAL HISTORY OF -       lasix surgery     Anesthesia Evaluation     . Pt has had prior anesthetic. Type: General           ROS/MED HX    ENT/Pulmonary:  - neg pulmonary ROS     Neurologic:  - neg neurologic ROS     Cardiovascular:     (+) Dyslipidemia, ----. : . . . :. .       METS/Exercise Tolerance:     Hematologic:  - neg hematologic  ROS       Musculoskeletal:   (+) fracture lower extremity: Other (Comment), -       GI/Hepatic:     (+) liver disease,       Renal/Genitourinary:  - ROS Renal section negative       Endo:  - neg endo ROS       Psychiatric:     (+) psychiatric history anxiety      Infectious Disease:  - neg infectious disease ROS       Malignancy:      - no malignancy   Other:    (+) No chance of pregnancy C-spine cleared: N/A, no H/O Chronic Pain,no other significant disability                         Physical Exam  Normal systems: cardiovascular, pulmonary and dental    Airway   Mallampati: II  TM distance: >3 FB  Neck ROM: full    Dental     Cardiovascular       Pulmonary             Lab Results   Component Value Date     (H) 2015    ALBUMIN 4.2 2019    PROTTOTAL 7.2 2019    ALT 35 2019    AST 24 2019  "    (H) 09/28/2017    ALKPHOS 45 04/05/2019    BILITOTAL 0.5 04/05/2019       Preop Vitals  BP Readings from Last 3 Encounters:   04/17/20 (!) 149/101   04/16/20 137/88   04/15/20 137/88    Pulse Readings from Last 3 Encounters:   04/15/20 88   04/14/20 85   03/02/20 86      Resp Readings from Last 3 Encounters:   04/17/20 15   04/15/20 16   03/02/20 16    SpO2 Readings from Last 3 Encounters:   04/15/20 97%   04/14/20 99%   03/02/20 99%      Temp Readings from Last 1 Encounters:   04/17/20 96.7  F (35.9  C) (Temporal)    Ht Readings from Last 1 Encounters:   04/17/20 1.93 m (6' 4\")      Wt Readings from Last 1 Encounters:   04/17/20 99 kg (218 lb 4.8 oz)    Estimated body mass index is 26.57 kg/m  as calculated from the following:    Height as of this encounter: 1.93 m (6' 4\").    Weight as of this encounter: 99 kg (218 lb 4.8 oz).       Anesthesia Plan      History & Physical Review  History and physical reviewed and following examination; no interval change.    ASA Status:  2 .    NPO Status:  > 8 hours    Plan for MAC with Propofol and Intravenous induction. Maintenance will be TIVA.    PONV prophylaxis:  Ondansetron (or other 5HT-3) and Dexamethasone or Solumedrol         Postoperative Care  Postoperative pain management:  IV analgesics.      Consents  Anesthetic plan, risks, benefits and alternatives discussed with:  Patient.  Use of blood products discussed: Yes.   Use of blood products discussed with Patient.  Consented to blood products.  .                 Lenny Avila MD                    .  "

## 2020-04-23 ENCOUNTER — OFFICE VISIT (OUTPATIENT)
Dept: PODIATRY | Facility: CLINIC | Age: 40
End: 2020-04-23
Payer: COMMERCIAL

## 2020-04-23 ENCOUNTER — TELEPHONE (OUTPATIENT)
Dept: PODIATRY | Facility: CLINIC | Age: 40
End: 2020-04-23

## 2020-04-23 VITALS
SYSTOLIC BLOOD PRESSURE: 138 MMHG | DIASTOLIC BLOOD PRESSURE: 82 MMHG | HEIGHT: 76 IN | BODY MASS INDEX: 26.58 KG/M2 | WEIGHT: 218.3 LBS

## 2020-04-23 DIAGNOSIS — Z09 SURGERY FOLLOW-UP EXAMINATION: Primary | ICD-10-CM

## 2020-04-23 PROCEDURE — 99024 POSTOP FOLLOW-UP VISIT: CPT | Performed by: PODIATRIST

## 2020-04-23 ASSESSMENT — MIFFLIN-ST. JEOR: SCORE: 2001.7

## 2020-04-23 NOTE — TELEPHONE ENCOUNTER
Reason for Call:  Form, our goal is to have forms completed with 7 days, however, some forms may require a visit or additional information.    Type of letter, form or note:  FMLA     Who is the form from?: [employer]    Where did the form come from: Patient or family brought in       Desired completion date of form: 4/23/20      How will form be returned?:  emailed via fax scanning to [email or web address]      Form was started and place in Provider Basket for provider review/ completion at [facility].

## 2020-04-23 NOTE — LETTER
4/23/2020         RE: James Blanco  [address] [facility] [address] [facility] [id number]        Dear Colleague,    Thank you for referring your patient, James Blanco, to the [facility]. Please see a copy of my visit note below.    S: James Blanco  presents 1 week post op.      POSTOPERATIVE DIAGNOSIS:  Right fifth metatarsal proximal metaphyseal diaphyseal fracture with butterfly fragment.      PROCEDURE:  Percutaneous intramedullary fixation, right fifth metatarsal fracture.       Pain is controlled   1/10 rating   No concerns.     From Operative Note: The patient is a pleasant 39-year-old male who injured his foot and was seen by Dr. Nevarez where x-ray showed a Olson type fracture with a butterfly fragment.  Conservative and surgical treatment options were discussed.  The patient was referred to me for surgical management as Dr. Nevarez's facilities are closed for cases.       O:   Vascular:  Pedal pulses are palpable.  Minimal right foot edema.    Neuro: Light touch sensation is intact distal to the incision.    Derm: The incision is well coapted.  Sutures are intact.  No carine-incisional erythema.       ASSESSMENT:  1) s/p above noted surgery.  No clinical signs of infection.  Pain is controlled.  Encounter Diagnosis   Name Primary?     Surgery follow-up examination Yes       PLAN:  1) I reviewed the XR images with the patient.  The relevant anatomy and function was discussed, in relation to the problem.      2) sterile re-dress of the right foot    3) discussed possible 8-10 weeks of non weight bearing; He asked if he can use his I-Walk.  I think it is okay, as long has he is comfortable with it and wearing the splint.    4) We discussed removing the splint for some non weight bearing ankle ROM exercises.     James Hicks DPM;      Again, thank you for allowing me to participate in the care of your patient.        Sincerely,        James Hicks DPM

## 2020-04-23 NOTE — PROGRESS NOTES
S: James Blanco  presents 1 week post op.      POSTOPERATIVE DIAGNOSIS:  Right fifth metatarsal proximal metaphyseal diaphyseal fracture with butterfly fragment.      PROCEDURE:  Percutaneous intramedullary fixation, right fifth metatarsal fracture.       Pain is controlled   1/10 rating   No concerns.     From Operative Note: The patient is a pleasant 39-year-old male who injured his foot and was seen by Dr. Nevarez where x-ray showed a Olson type fracture with a butterfly fragment.  Conservative and surgical treatment options were discussed.  The patient was referred to me for surgical management as Dr. Nevarez's facilities are closed for cases.       O:   Vascular:  Pedal pulses are palpable.  Minimal right foot edema.    Neuro: Light touch sensation is intact distal to the incision.    Derm: The incision is well coapted.  Sutures are intact.  No carine-incisional erythema.       ASSESSMENT:  1) s/p above noted surgery.  No clinical signs of infection.  Pain is controlled.  Encounter Diagnosis   Name Primary?     Surgery follow-up examination Yes       PLAN:  1) I reviewed the XR images with the patient.  The relevant anatomy and function was discussed, in relation to the problem.      2) sterile re-dress of the right foot    3) discussed possible 8-10 weeks of non weight bearing; He asked if he can use his I-Walk.  I think it is okay, as long has he is comfortable with it and wearing the splint.    4) We discussed removing the splint for some non weight bearing ankle ROM exercises.     James Hicks DPM;

## 2020-04-24 ENCOUNTER — MYC MEDICAL ADVICE (OUTPATIENT)
Dept: PODIATRY | Facility: CLINIC | Age: 40
End: 2020-04-24

## 2020-04-24 NOTE — TELEPHONE ENCOUNTER
Sent Jamin reply to pt. Awaiting reply that he is okay with us sending his office notes, OR report, and XR reports from 4/14-4/23 to his email on file: [email or web address].    Asked that he call our billing dept for UB-04 form and any invoices.

## 2020-05-11 ENCOUNTER — OFFICE VISIT (OUTPATIENT)
Dept: PODIATRY | Facility: CLINIC | Age: 40
End: 2020-05-11
Payer: COMMERCIAL

## 2020-05-11 VITALS
SYSTOLIC BLOOD PRESSURE: 132 MMHG | WEIGHT: 218 LBS | BODY MASS INDEX: 26.55 KG/M2 | DIASTOLIC BLOOD PRESSURE: 80 MMHG | HEIGHT: 76 IN

## 2020-05-11 DIAGNOSIS — Z09 SURGERY FOLLOW-UP EXAMINATION: Primary | ICD-10-CM

## 2020-05-11 DIAGNOSIS — S92.354A CLOSED NONDISPLACED FRACTURE OF FIFTH METATARSAL BONE OF RIGHT FOOT, INITIAL ENCOUNTER: ICD-10-CM

## 2020-05-11 PROCEDURE — 99024 POSTOP FOLLOW-UP VISIT: CPT | Performed by: PODIATRIST

## 2020-05-11 ASSESSMENT — MIFFLIN-ST. JEOR: SCORE: 2000.34

## 2020-05-11 NOTE — PROGRESS NOTES
"Foot & Ankle Surgery  May 11, 2020    S:  Patient in today approx 3 1/2 weeks sp right 5th metatarsal fracture repair with percutaneous intra-medullary screw placement.  Pain levels minimal.  He was seen 6 days post-op with my partner Dr Hicks, but hasn't been seen since.  Wasn't able to get in last week.  No acute concerns    /80   Ht 1.93 m (6' 4\")   Wt 98.9 kg (218 lb)   BMI 26.54 kg/m        ROS - positive for CC.  Patient denies current nausea, vomiting, chills, fevers, belly pain, calf pain, chest pain or SOB.  Complete remainder of ROS is otherwise neg.    PE - single suture intact, incision essentially fully healed.  mild edema, no SOI.  Skin shows no trophic, color or temperature changes otherwise.  No calf redness, swelling or pain noted otherwise.    Imaging - IMPRESSION: Interval surgical changes of a screw extending along the  proximal fifth metatarsal spanning the previously seen fracture which  is now in near-anatomic alignment. No other change or abnormality is  seen.     A/P - 39 year old yo patient approx 3 1/2 weeks sp above procedure  -personally reviewed procedure/surgical findings  -suture removed  -continue - NWB; DVT exercises; compression, tensogrip dispensed  -change - ok to increase activity levels, ice/elevate prn; ok to wash starting tomrrow    Follow up  -  3 weeks for films or sooner with acute issues    Plan for [id number] - once healed     Body mass index is 26.54 kg/m .  Weight management plan: Patient was referred to their PCP to discuss a diet and exercise plan.      Amado Sanchez DPM FACFAS FACFAOM  Podiatric Foot & Ankle Surgeon  [facility]  [phone]    "

## 2020-06-01 ENCOUNTER — ANCILLARY PROCEDURE (OUTPATIENT)
Dept: GENERAL RADIOLOGY | Facility: CLINIC | Age: 40
End: 2020-06-01
Attending: PODIATRIST
Payer: COMMERCIAL

## 2020-06-01 ENCOUNTER — OFFICE VISIT (OUTPATIENT)
Dept: PODIATRY | Facility: CLINIC | Age: 40
End: 2020-06-01
Payer: COMMERCIAL

## 2020-06-01 VITALS — SYSTOLIC BLOOD PRESSURE: 120 MMHG | DIASTOLIC BLOOD PRESSURE: 82 MMHG

## 2020-06-01 DIAGNOSIS — Z09 SURGERY FOLLOW-UP EXAMINATION: Primary | ICD-10-CM

## 2020-06-01 DIAGNOSIS — S92.354A CLOSED NONDISPLACED FRACTURE OF FIFTH METATARSAL BONE OF RIGHT FOOT, INITIAL ENCOUNTER: ICD-10-CM

## 2020-06-01 DIAGNOSIS — Z09 SURGERY FOLLOW-UP EXAMINATION: ICD-10-CM

## 2020-06-01 PROCEDURE — 99024 POSTOP FOLLOW-UP VISIT: CPT | Performed by: PODIATRIST

## 2020-06-01 PROCEDURE — 73630 X-RAY EXAM OF FOOT: CPT | Mod: RT

## 2020-06-01 NOTE — PROGRESS NOTES
Foot & Ankle Surgery  June 1, 2020    S:  Patient in today approx 6 weeks sp per-q fixation of left 5th metatarsal fracture.  Pain levels minimal.  Minimal weight on the heel, otherwise has been NWB with PFS and crutches.  No acute concerns    /82       ROS - positive for CC.  Patient denies current nausea, vomiting, chills, fevers, belly pain, calf pain, chest pain or SOB.  Complete remainder of ROS is otherwise neg.    PE - mild swelling.  Incision healed.  Unable to elicit pain along fracture site or with manipulation of the 5th metatarasal.  Skin shows no trophic, color or temperature changes otherwise.  No calf redness, swelling or pain noted otherwise.    Imaging - 3 views NWB - hardware intact.  Fracture lines less identifiable vs pre-surgery films, indicative of healing    A/P - 39 year old yo patient approx 6 weeks sp above procedure  -personally reviewed imaging.  Fracture appears to be healing radiographically and clinically he's doing well.    -start protected WB in boot(has) on heel with crutches.  Slowly increase weight on foot to tolerance with care to avoid exceeding pain threshold with amount of weight/time on foot, as this can hgave a neg impact on fracture healing    Follow up  -  4 weeks for films or sooner with acute issues    Plan for [id number] - consider after next visit.     There is no height or weight on file to calculate BMI.  Weight management plan: Patient was referred to their PCP to discuss a diet and exercise plan.      Amado Sanchez DPM FACFAS FACFAOM  Podiatric Foot & Ankle Surgeon  [facility], [place]  [phone]

## 2020-06-18 ENCOUNTER — VIRTUAL VISIT (OUTPATIENT)
Dept: FAMILY MEDICINE | Facility: CLINIC | Age: 40
End: 2020-06-18
Payer: COMMERCIAL

## 2020-06-18 DIAGNOSIS — E78.1 HYPERTRIGLYCERIDEMIA: ICD-10-CM

## 2020-06-18 DIAGNOSIS — F32.1 MODERATE MAJOR DEPRESSION (H): ICD-10-CM

## 2020-06-18 DIAGNOSIS — F41.9 ANXIETY: ICD-10-CM

## 2020-06-18 DIAGNOSIS — E78.5 HYPERLIPIDEMIA LDL GOAL <130: ICD-10-CM

## 2020-06-18 PROCEDURE — 96127 BRIEF EMOTIONAL/BEHAV ASSMT: CPT | Mod: 59 | Performed by: FAMILY MEDICINE

## 2020-06-18 PROCEDURE — 99214 OFFICE O/P EST MOD 30 MIN: CPT | Mod: 95 | Performed by: FAMILY MEDICINE

## 2020-06-18 RX ORDER — SIMVASTATIN 40 MG
40 TABLET ORAL AT BEDTIME
Qty: 90 TABLET | Refills: 3 | Status: SHIPPED | OUTPATIENT
Start: 2020-06-18 | End: 2021-07-27

## 2020-06-18 RX ORDER — FENOFIBRATE 145 MG/1
145 TABLET, COATED ORAL DAILY
Qty: 90 TABLET | Refills: 3 | Status: SHIPPED | OUTPATIENT
Start: 2020-06-18 | End: 2021-07-27

## 2020-06-18 RX ORDER — FLUOXETINE 40 MG/1
40 CAPSULE ORAL DAILY
Qty: 90 CAPSULE | Refills: 3 | Status: SHIPPED | OUTPATIENT
Start: 2020-06-18 | End: 2021-01-20

## 2020-06-18 RX ORDER — BUPROPION HYDROCHLORIDE 100 MG/1
100 TABLET, EXTENDED RELEASE ORAL DAILY
Qty: 90 TABLET | Refills: 3 | Status: SHIPPED | OUTPATIENT
Start: 2020-06-18 | End: 2020-12-21 | Stop reason: ALTCHOICE

## 2020-06-18 ASSESSMENT — PATIENT HEALTH QUESTIONNAIRE - PHQ9
5. POOR APPETITE OR OVEREATING: NOT AT ALL
SUM OF ALL RESPONSES TO PHQ QUESTIONS 1-9: 3

## 2020-06-18 ASSESSMENT — ANXIETY QUESTIONNAIRES
1. FEELING NERVOUS, ANXIOUS, OR ON EDGE: NOT AT ALL
3. WORRYING TOO MUCH ABOUT DIFFERENT THINGS: NOT AT ALL
2. NOT BEING ABLE TO STOP OR CONTROL WORRYING: NOT AT ALL
6. BECOMING EASILY ANNOYED OR IRRITABLE: NOT AT ALL
IF YOU CHECKED OFF ANY PROBLEMS ON THIS QUESTIONNAIRE, HOW DIFFICULT HAVE THESE PROBLEMS MADE IT FOR YOU TO DO YOUR WORK, TAKE CARE OF THINGS AT HOME, OR GET ALONG WITH OTHER PEOPLE: NOT DIFFICULT AT ALL
GAD7 TOTAL SCORE: 0
7. FEELING AFRAID AS IF SOMETHING AWFUL MIGHT HAPPEN: NOT AT ALL
5. BEING SO RESTLESS THAT IT IS HARD TO SIT STILL: NOT AT ALL

## 2020-06-18 NOTE — PROGRESS NOTES
"James Blanco is a 39 year old male who is being evaluated via a billable video visit.      The patient has been notified of following:     \"This video visit will be conducted via a call between you and your physician/provider. We have found that certain health care needs can be provided without the need for an in-person physical exam.  This service lets us provide the care you need with a video conversation.  If a prescription is necessary we can send it directly to your pharmacy.  If lab work is needed we can place an order for that and you can then stop by our lab to have the test done at a later time.    Video visits are billed at different rates depending on your insurance coverage.  Please reach out to your insurance provider with any questions.    If during the course of the call the physician/provider feels a video visit is not appropriate, you will not be charged for this service.\"    Patient has given verbal consent for Video visit? Yes    Will anyone else be joining your video visit? No       Contact patient at [phone]    Subjective     James Blanco is a 39 year old male who presents today via video visit for the following health issues:    HPI   Chief Complaint   Patient presents with     Lipids     Patient has been out of cholesterol medication X 2 months.     Depression     Medication Reconciliation     only taking wellbutrin daily verses twice a day       Hyperlipidemia Follow-Up      Are you regularly taking any medication or supplement to lower your cholesterol?   Yes- simvastatin    Are you having muscle aches or other side effects that you think could be caused by your cholesterol lowering medication?  No    Depression Followup    How are you doing with your depression since your last visit? No change    Are you having other symptoms that might be associated with depression? No    Have you had a significant life event?  Yes- broke foot and hasn't been working.      Are you feeling anxious or " having panic attacks?   No    Do you have any concerns with your use of alcohol or other drugs? No    Social History     Tobacco Use     Smoking status: Former Smoker     Packs/day: 1.00     Years: 13.00     Pack years: 13.00     Types: Cigarettes     Smokeless tobacco: Former User     Tobacco comment: Quit in 2013   Substance Use Topics     Alcohol use: Yes     Comment: 6 beers daily      Drug use: No     Comment: h/o street drunk use 15 yrs ago     PHQ 4/7/2020 4/8/2020 6/18/2020   PHQ-9 Total Score 1 0 3   Q9: Thoughts of better off dead/self-harm past 2 weeks Not at all Not at all Not at all     LINNEA-7 SCORE 4/7/2020 4/8/2020 6/18/2020   Total Score 0 (minimal anxiety) - -   Total Score 0 0 0     Last PHQ-9 6/18/2020   1.  Little interest or pleasure in doing things 1   2.  Feeling down, depressed, or hopeless 0   3.  Trouble falling or staying asleep, or sleeping too much 1   4.  Feeling tired or having little energy 1   5.  Poor appetite or overeating 0   6.  Feeling bad about yourself 0   7.  Trouble concentrating 0   8.  Moving slowly or restless 0   Q9: Thoughts of better off dead/self-harm past 2 weeks 0   PHQ-9 Total Score 3   Difficulty at work, home, or with people Not difficult at all     LNINEA-7  6/18/2020   1. Feeling nervous, anxious, or on edge 0   2. Not being able to stop or control worrying 0   3. Worrying too much about different things 0   4. Trouble relaxing 0   5. Being so restless that it is hard to sit still 0   6. Becoming easily annoyed or irritable 0   7. Feeling afraid, as if something awful might happen 0   LINNEA-7 Total Score 0   If you checked any problems, how difficult have they made it for you to do your work, take care of things at home, or get along with other people? Not difficult at all     In the past two weeks have you had thoughts of suicide or self-harm?  No.    Do you have concerns about your personal safety or the safety of others?   No    Suicide Assessment Five-step  Evaluation and Treatment (SAFE-T)      How many servings of fruits and vegetables do you eat daily?  2-3    On average, how many sweetened beverages do you drink each day (Examples: soda, juice, sweet tea, etc.  Do NOT count diet or artificially sweetened beverages)?   0-1    How many days per week do you exercise enough to make your heart beat faster? 3 or less    How many minutes a day do you exercise enough to make your heart beat faster? 9 or less  Due to broken foot    How many days per week do you miss taking your medication? 0         Video Start Time: 8:35 am      Patient Active Problem List   Diagnosis     Anxiety     Mixed hyperlipidemia     Fatty liver, alcoholic     Grief reaction     Heavy alcohol consumption     Moderate major depression (H)     Closed nondisplaced fracture of fifth metatarsal bone of right foot, initial encounter     Past Surgical History:   Procedure Laterality Date     COLONOSCOPY N/A 3/21/2016    Procedure: COLONOSCOPY;  Surgeon: Tavo Wilcox MD;  Location: [facility]     OPEN REDUCTION INTERNAL FIXATION FOOT Right 4/17/2020    Procedure: Percutaneous intramedullary fixation, right fifth metatarsal fracture;  Surgeon: Amado Sanchez DPM;  Location:  OR     SURGICAL HISTORY OF -       plasty surgery on face, due to injury     SURGICAL HISTORY OF -       left elbow nerve decompression, sound like ulnar     SURGICAL HISTORY OF -       left knee surgery, scope and allograph for cartilege     SURGICAL HISTORY OF -       lasix surgery       Social History     Tobacco Use     Smoking status: Former Smoker     Packs/day: 1.00     Years: 13.00     Pack years: 13.00     Types: Cigarettes     Smokeless tobacco: Former User     Tobacco comment: Quit in 2013   Substance Use Topics     Alcohol use: Yes     Comment: 6 beers daily      Family History   Problem Relation Age of Onset     Hyperlipidemia Mother      Coronary Artery Disease Father      Hyperlipidemia Father      Diabetes Father   "    Other Cancer Sister         lymphoma-non hodgkins     Depression Sister      Anxiety Disorder Sister      Substance Abuse Sister      Substance Abuse Brother      Bipolar Disorder Brother          Current Outpatient Medications   Medication Sig Dispense Refill     buPROPion (WELLBUTRIN SR) 100 MG 12 hr tablet Take 1 tablet (100 mg) by mouth daily 90 tablet 3     EPINEPHrine (EPIPEN 2-CHAIM) 0.3 MG/0.3ML injection 2-pack Inject 0.3 mLs (0.3 mg) into the muscle as needed for anaphylaxis 0.6 mL 1     fenofibrate (TRICOR) 145 MG tablet Take 1 tablet (145 mg) by mouth daily 90 tablet 3     FLUoxetine (PROZAC) 40 MG capsule Take 1 capsule (40 mg) by mouth daily 90 capsule 3     loperamide (IMODIUM A-D) 2 MG tablet Take 2 mg by mouth 4 times daily as needed for diarrhea       omeprazole 20 MG tablet Take 20 mg by mouth daily       simvastatin (ZOCOR) 40 MG tablet Take 1 tablet (40 mg) by mouth At Bedtime 90 tablet 3     vitamin D3 (CHOLECALCIFEROL) 2000 units (50 mcg) tablet Take 1 tablet (2,000 Units) by mouth daily 90 tablet 0     Allergies   Allergen Reactions     Bees Swelling     Has epi-pen     Ceclor [Cefaclor]      Was an infant     Penicillins      Was an infant       Reviewed and updated as needed this visit by Provider  Tobacco  Allergies  Meds  Problems  Med Hx  Surg Hx  Fam Hx         Review of Systems   Constitutional, HEENT, cardiovascular, pulmonary, GI, , musculoskeletal, neuro, skin, endocrine and psych systems are negative, except as otherwise noted.      Objective    There were no vitals taken for this visit.  Estimated body mass index is 26.54 kg/m  as calculated from the following:    Height as of 5/11/20: 1.93 m (6' 4\").    Weight as of 5/11/20: 98.9 kg (218 lb).  Physical Exam     GENERAL: Healthy, alert and no distress  EYES: Eyes grossly normal to inspection.  No discharge or erythema, or obvious scleral/conjunctival abnormalities.  RESP: No audible wheeze, cough, or visible cyanosis.  " No visible retractions or increased work of breathing.    SKIN: Visible skin clear. No significant rash, abnormal pigmentation or lesions.  NEURO: Cranial nerves grossly intact.  Mentation and speech appropriate for age.  PSYCH: Mentation appears normal, affect normal/bright, judgement and insight intact, normal speech and appearance well-groomed.      Diagnostic Test Results:  none         Assessment & Plan     James was seen today for lipids, depression and medication reconciliation.    Diagnoses and all orders for this visit:    Anxiety  -     buPROPion (WELLBUTRIN SR) 100 MG 12 hr tablet; Take 1 tablet (100 mg) by mouth daily  -     FLUoxetine (PROZAC) 40 MG capsule; Take 1 capsule (40 mg) by mouth daily    Moderate major depression (H)  -     buPROPion (WELLBUTRIN SR) 100 MG 12 hr tablet; Take 1 tablet (100 mg) by mouth daily  -     FLUoxetine (PROZAC) 40 MG capsule; Take 1 capsule (40 mg) by mouth daily    Hypertriglyceridemia  -     fenofibrate (TRICOR) 145 MG tablet; Take 1 tablet (145 mg) by mouth daily  -     Lipid panel reflex to direct LDL Fasting; Future  -     Comprehensive metabolic panel; Future    Hyperlipidemia LDL goal <130  -     simvastatin (ZOCOR) 40 MG tablet; Take 1 tablet (40 mg) by mouth At Bedtime  -     Lipid panel reflex to direct LDL Fasting; Future  -     Comprehensive metabolic panel; Future            Medications refills sent to your pharmacy.    Call to schedule your lab tests; make sure you are fasting for more than 12 hrs.    Be consistent with low trans fat and saturated fat diet.  Eat food rich in omega-3-fatty acids as you tolerate. (salmon, olive oil)  Eat 5 cups of vegetables, fruits and whole grains per day.  Limit starchy food (white rice, white bread, white pasta, white potatoes) to less than a cup per meal.  Minimize sweets, junk food and fastfood. Limit soda beverages to one serving per day; best to avoid it altogether though.  Exercise: moderate intensity sustained  for at least 30 mins per episode, goal of 150 mins per week at least  Combine cardiovascular and resistance exercises.  These exercise recommendations are in addition to your daily activity at work or home.  Work on losing weight if you are above your goal body mass index.    Your moods are stable.  Continue medication at current dose.  Use non-medical means and behavior means to cope with stress and anxiety.  If symptoms change/worsen, see doctor again.    Our Clinic hours are:  Mondays    7:20 am - 7 pm  Tues -  Fri  7:20 am - 5 pm    Clinic Phone: [phone]    The clinic lab opens at 7:30 am Mon - Fri and appointments are required.    [facility]. [phone]  Monday  8 am - 7pm  Tues - Fri 8 am - 5:30 pm             Return in about 6 months (around 12/18/2020) for depression/anxiety follow up.    Hector Talbot MD  [facility]      Video-Visit Details    Type of service:  Video Visit    Video End Time:8;42 am    Originating Location (pt. Location): Home    Distant Location (provider location):  [facility]     Platform used for Video Visit: Doximity    Return in about 6 months (around 12/18/2020) for depression/anxiety follow up.       Hector Talbot MD

## 2020-06-18 NOTE — PATIENT INSTRUCTIONS
Medications refills sent to your pharmacy.    Call to schedule your lab tests; make sure you are fasting for more than 12 hrs.    Be consistent with low trans fat and saturated fat diet.  Eat food rich in omega-3-fatty acids as you tolerate. (salmon, olive oil)  Eat 5 cups of vegetables, fruits and whole grains per day.  Limit starchy food (white rice, white bread, white pasta, white potatoes) to less than a cup per meal.  Minimize sweets, junk food and fastfood. Limit soda beverages to one serving per day; best to avoid it altogether though.  Exercise: moderate intensity sustained for at least 30 mins per episode, goal of 150 mins per week at least  Combine cardiovascular and resistance exercises.  These exercise recommendations are in addition to your daily activity at work or home.  Work on losing weight if you are above your goal body mass index.      Our Clinic hours are:  Mondays    7:20 am - 7 pm  Tues -  Fri  7:20 am - 5 pm    Clinic Phone: [phone]    The clinic lab opens at 7:30 am Mon - Fri and appointments are required.    [facility]. [phone]  Monday  8 am - 7pm  Tues - Fri 8 am - 5:30 pm

## 2020-06-19 ASSESSMENT — ANXIETY QUESTIONNAIRES: GAD7 TOTAL SCORE: 0

## 2020-07-02 ENCOUNTER — ANCILLARY PROCEDURE (OUTPATIENT)
Dept: GENERAL RADIOLOGY | Facility: CLINIC | Age: 40
End: 2020-07-02
Attending: PODIATRIST
Payer: COMMERCIAL

## 2020-07-02 ENCOUNTER — OFFICE VISIT (OUTPATIENT)
Dept: PODIATRY | Facility: CLINIC | Age: 40
End: 2020-07-02
Payer: COMMERCIAL

## 2020-07-02 VITALS — SYSTOLIC BLOOD PRESSURE: 122 MMHG | WEIGHT: 228 LBS | BODY MASS INDEX: 27.75 KG/M2 | DIASTOLIC BLOOD PRESSURE: 84 MMHG

## 2020-07-02 DIAGNOSIS — Z09 SURGERY FOLLOW-UP EXAMINATION: ICD-10-CM

## 2020-07-02 DIAGNOSIS — S92.354A CLOSED NONDISPLACED FRACTURE OF FIFTH METATARSAL BONE OF RIGHT FOOT, INITIAL ENCOUNTER: ICD-10-CM

## 2020-07-02 DIAGNOSIS — Z09 SURGERY FOLLOW-UP EXAMINATION: Primary | ICD-10-CM

## 2020-07-02 PROCEDURE — 99024 POSTOP FOLLOW-UP VISIT: CPT | Performed by: PODIATRIST

## 2020-07-02 PROCEDURE — 73630 X-RAY EXAM OF FOOT: CPT | Mod: RT

## 2020-07-02 NOTE — PATIENT INSTRUCTIONS
Thank you for choosing [facility] / Foot & Ankle Surgery!    DR. NICHOLE'S CLINIC LOCATIONS:   [place]   [address]   [place] [address] [phone]      [place]   [address] [facility] [address]   [place], [place] [id number] [phone]      SET UP SURGERY: [phone]   APPOINTMENTS: [phone]   BILLING QUESTIONS: [phone]   FAX NUMBER: [phone]     Follow up: 3 months        James to follow up with Primary Care provider regarding elevated blood pressure. (if equal or greater than 140/90)    Body Mass Index (BMI)  Many things can cause foot and ankle problems. Foot structure, activity level, foot mechanics and injuries are common causes of pain.    One very important issue that often goes unmentioned, is body weight.  Extra weight can cause increased stress on muscles, ligaments, bones and tendons. Sometimes just a few extra pounds is all it takes to put one over her/his threshold. Without reducing that stress, it can be difficult to alleviate pain.      Some people are uncomfortable addressing this issue, but we feel it is important for you to think about it. As Foot & Ankle specialists, our job is addressing the lower extremity problem and possible causes.     Regarding extra body weight, we encourage patients to discuss diet and weight management plans with their primary care doctors. It is this team approach that gives you the best opportunity for pain relief and getting you back on your feet.        James to follow up with Primary Care provider regarding elevated blood pressure. (if equal or greater than 140/90)    Body Mass Index (BMI)  Many things can cause foot and ankle problems. Foot structure, activity level, foot mechanics and injuries are common causes of pain.    One very important issue that often goes unmentioned, is body weight.  Extra weight can cause increased stress on muscles, ligaments, bones and tendons. Sometimes just a few extra pounds is all  it takes to put one over her/his threshold. Without reducing that stress, it can be difficult to alleviate pain.      Some people are uncomfortable addressing this issue, but we feel it is important for you to think about it. As Foot & Ankle specialists, our job is addressing the lower extremity problem and possible causes.     Regarding extra body weight, we encourage patients to discuss diet and weight management plans with their primary care doctors. It is this team approach that gives you the best opportunity for pain relief and getting you back on your feet.

## 2020-07-02 NOTE — PROGRESS NOTES
Foot & Ankle Surgery  July 2, 2020    S:  Patient in today approx 10 weeks sp per-q fixation R 5th metatarsal fracture.  Previous note incorrectly stated left.  Pain levels essentially 0.  Full WB in the boot without pain, he has ambulated in a surgical shoe(has from previous fractures) without issues.  Has been swimming, uses the shoe to get in/out of the pool    /84   Wt 103.4 kg (228 lb)   BMI 27.75 kg/m        ROS - positive for CC.  Patient denies current nausea, vomiting, chills, fevers, belly pain, calf pain, chest pain or SOB.  Complete remainder of ROS is otherwise neg.    PE - incision healed, minimal edema lateral R 5th met base.  No POP or pain with stressing of the metatarsal.  Skin shows no trophic, color or temperature changes otherwise.  No calf redness, swelling or pain noted otherwise.    Imaging - IMPRESSION: Some progressive healing of the internally fixated fifth  metatarsal fracture without change in position. Again there is an old  healed fracture of the third metatarsal.    A/P - 39 year old yo patient approx 10 sp above procedure  -personally reviewed imaging  -ok to transition from boot to comfortable shoe gear(avoid shoeless walking) with care to avoid exceeding pain threshold with amount of weight/time on foot, as this can have a neg impact on healing  -RICE/tylenol prn based on pain  -slowly increase activities to tolerance, again with care to avoid exceeding pain threshold.  No running, jumping, etc, x 3 months total post-op    Follow up  -  3 mo or sooner with acute issues    Plan for [id number] - July 20th without restrictions     Body mass index is 27.75 kg/m .  Weight management plan: Patient was referred to their PCP to discuss a diet and exercise plan.      Amado Sanchez DPM FACFAS FACFAOM  Podiatric Foot & Ankle Surgeon  [facility]  [phone]

## 2020-07-02 NOTE — LETTER
7/2/2020         RE: James Blanco  [address] [facility] [address]        Dear Colleague,    Thank you for referring your patient, James Blanco, to the [facility]. Please see a copy of my visit note below.    Foot & Ankle Surgery  July 2, 2020    S:  Patient in today approx 10 weeks sp per-q fixation R 5th metatarsal fracture.  Previous note incorrectly stated left.  Pain levels essentially 0.  Full WB in the boot without pain, he has ambulated in a surgical shoe(has from previous fractures) without issues.  Has been swimming, uses the shoe to get in/out of the pool    /84   Wt 103.4 kg (228 lb)   BMI 27.75 kg/m        ROS - positive for CC.  Patient denies current nausea, vomiting, chills, fevers, belly pain, calf pain, chest pain or SOB.  Complete remainder of ROS is otherwise neg.    PE - incision healed, minimal edema lateral R 5th met base.  No POP or pain with stressing of the metatarsal.  Skin shows no trophic, color or temperature changes otherwise.  No calf redness, swelling or pain noted otherwise.    Imaging - IMPRESSION: Some progressive healing of the internally fixated fifth  metatarsal fracture without change in position. Again there is an old  healed fracture of the third metatarsal.    A/P - 39 year old yo patient approx 10 sp above procedure  -personally reviewed imaging  -ok to transition from boot to comfortable shoe gear(avoid shoeless walking) with care to avoid exceeding pain threshold with amount of weight/time on foot, as this can have a neg impact on healing  -RICE/tylenol prn based on pain  -slowly increase activities to tolerance, again with care to avoid exceeding pain threshold.  No running, jumping, etc, x 3 months total post-op    Follow up  -  3 mo or sooner with acute issues    Plan for [id number] - July 20th without restrictions     Body mass index is 27.75 kg/m .  Weight management plan: Patient was referred to their PCP to discuss a diet  and exercise plan.      Amado Sanchez DPM [facility] FACFAOM  Podiatric Foot & Ankle Surgeon  [facility]  [phone]        Again, thank you for allowing me to participate in the care of your patient.        Sincerely,        Amado Sanchez DPM, DPM

## 2020-07-02 NOTE — LETTER
[facility]  [address] [address] [address]  [phone]          July 2, 2020    RE:  James Blanco                                                                                                                                                       [address] [address] [facility] [address]            To whom it may concern:    James Blanco is under my professional care after undergoing right foot surgery for a fracture.  He is cleared to return to work July 20th without restrictions.  He will follow up in 3 months for his next clinic check.      Sincerely,        Amado Sanchez DPM FACFAS FACFAOM  Podiatric Foot & Ankle Surgeon  [facility]

## 2020-09-19 ENCOUNTER — MYC MEDICAL ADVICE (OUTPATIENT)
Dept: FAMILY MEDICINE | Facility: CLINIC | Age: 40
End: 2020-09-19

## 2020-10-06 ENCOUNTER — ANCILLARY PROCEDURE (OUTPATIENT)
Dept: GENERAL RADIOLOGY | Facility: CLINIC | Age: 40
End: 2020-10-06
Attending: PODIATRIST
Payer: COMMERCIAL

## 2020-10-06 ENCOUNTER — OFFICE VISIT (OUTPATIENT)
Dept: PODIATRY | Facility: CLINIC | Age: 40
End: 2020-10-06
Payer: COMMERCIAL

## 2020-10-06 VITALS
HEIGHT: 76 IN | DIASTOLIC BLOOD PRESSURE: 90 MMHG | HEART RATE: 80 BPM | SYSTOLIC BLOOD PRESSURE: 131 MMHG | BODY MASS INDEX: 27.75 KG/M2

## 2020-10-06 DIAGNOSIS — S92.354A CLOSED NONDISPLACED FRACTURE OF FIFTH METATARSAL BONE OF RIGHT FOOT, INITIAL ENCOUNTER: ICD-10-CM

## 2020-10-06 DIAGNOSIS — Z09 SURGERY FOLLOW-UP EXAMINATION: ICD-10-CM

## 2020-10-06 DIAGNOSIS — S92.354A CLOSED NONDISPLACED FRACTURE OF FIFTH METATARSAL BONE OF RIGHT FOOT, INITIAL ENCOUNTER: Primary | ICD-10-CM

## 2020-10-06 PROCEDURE — 99213 OFFICE O/P EST LOW 20 MIN: CPT | Performed by: PODIATRIST

## 2020-10-06 PROCEDURE — 73630 X-RAY EXAM OF FOOT: CPT | Mod: TC | Performed by: RADIOLOGY

## 2020-10-06 ASSESSMENT — PAIN SCALES - GENERAL: PAINLEVEL: NO PAIN (0)

## 2020-10-06 NOTE — PROGRESS NOTES
"Foot & Ankle Surgery  October 6, 2020    S:  Patient in today sp ORIF right 5th metatarsal fracture with per-q intra-medullary screw placement on 4/17/20.  Pain levels minimal.  Can have some discomfort in the area, but is back to shoes and most \"normal\" activities    BP (!) 131/90   Pulse 80   Ht 1.93 m (6' 4\")   BMI 27.75 kg/m        ROS - positive for CC.  Patient denies current nausea, vomiting, chills, fevers, belly pain, calf pain, chest pain or SOB.  Complete remainder of ROS is otherwise neg.    PE - some discomfort on palpation of 5th metatarsal proximally.  Skin shows no trophic, color or temperature changes otherwise.  No calf redness, swelling or pain noted otherwise.    Imaging - 3 views WB - continued healing/maturation of the fracture, which is no longer readily visualized.  No hardware pathology noted.      A/P - 39 year old yo patient approx 5 1/2 months sp above procedure  -personally reviewed imaging  -RICE/NSAID vs tylenol  Prn for intermittent discomfort  -no shoe gear or activity restrictions indicated at this point.  Specifically, he asks if he can go barefoot around the house.  This should not be an issue, although I advised him to modify activities based on discomfort  Levels.    Follow up  -  6 mo or sooner with acute issues    Plan for [id number] - already working       Amado Sanchez, SANDRA FACFAS FACFAOM  Podiatric Foot & Ankle Surgeon  [facility]  [phone]      "

## 2020-10-06 NOTE — PATIENT INSTRUCTIONS
ED Provider Note    HPI: Patient is an 56-year-old female who presented to the emergency department June 11, 2018 at 5:56 PM via transfer from another facility with a chief complaint of an infection.    Patient presented to another facility after having pain in the right groin/upper thigh for the last several days. Imaging of that facility showed apparent necrotizing fasciitis and the patient was noted to be markedly hyperglycemic. The patient apparently was not aware she was diabetic. The patient was started on vancomycin and Zosyn and transferred here as the other facility did not have the capability to care for this patient. On arrival here, the patient does have pain in the medial right thigh. She has not had a fever she has had some chills. No nausea no vomiting no change in bladder or bowel habits. No chest pain or shortness of breath. No other somatic complaints    Review of Systems: Positive for right thigh pain with chills negative for fever nausea vomiting change in bladder or bowel habits chest pain shortness of breath. Review of systems reviewed with patient, all other systems negative    Past medical/surgical history: Apparently diabetic all of the patient did not know any previous history. Tobacco abuse cystocele/rectocele    Medications: None    Allergies: None    Social History: Patient smokes one pack of cigarettes per day no alcohol use      Physical exam: Constitutional: Pleasant female awake alert appeared tired  Vital signs:  Temperature 99.3 pulse 106 respirations 18 blood pressure 104/63 pulse oximetry 96%  EYES: PERRL, EOMI, Conjunctivae and sclera normal, eyelids normal bilaterally.  Neck: Trachea midline. No cervical masses seen or palpated. Normal range of motion, supple. No meningeal signs elicited.  Cardiac: Regular rate and rhythm. S1-S2 present. No S3 or S4 present. No murmurs, rubs, or gallops heard. No edema or varicosities were seen.   Lungs: Clear to auscultation with good aeration  James to follow up with Primary Care provider regarding elevated blood pressure.     throughout. No wheezes, rales, or rhonchi heard. Patient's chest wall moved symmetrically with each respiratory effort. Patient was not making use of accessory muscles of respiration in breathing.  Abdomen: Soft nontender to palpation. No rebound or guarding elicited. No organomegaly identified. No pulsatile abdominal masses identified.   Musculoskeletal: Right medial thigh there is a large erythematous indurated area that begins perhaps 8 inches below the inguinal crease. I felt to palpate a little bit of soft tissue air. No drainage is present.  Neurologic: alert and awake answers questions appropriately. Moves all four extremities independently, no gross focal abnormalities identified. Normal strength and motor.  Skin: Cellulitic/infectious changes noted medial aspect right thigh. No drainage is present. No other rash or lesion seen, no other palpable dermatologic lesions identified.  Psychiatric: not anxious, delusional, or hallucinating.    Medical decision making:  I reviewed the studies and the other facility. CT imaging shows a large area of soft tissue air in the medial aspect of the right thigh the tracks essentially from the inguinal region down to the end of the scan near the knee. The majority of soft tissue areas in the medial aspect of the thigh about a 3rd of the way down.    Laboratory studies and the other facility were significant for a moderate leukocytosis of 15.7. There was a preponderance of neutrophils at 85% INR was normal. Blood sugar severely elevated at 562. Bicarb normal at 22 CRP markedly elevated at 190.    Pharmacy immediately contacted and evaluated the patient's treatment regimen. They recommended clindamycin be started and they will arrange the next dose of Zosyn. Patient will be continued on vancomycin also.    Patient admitted to ICU by Misti. This patient is critically ill and she has a life-threatening infection and may not survive this hospitalization.    Please note that  I spent 35 minutes in the direct care of this critically ill patient. This time was spent in evaluating the patient, reviewing test results, discussion with consultants, and preparing the medical record    Impression 1) necrotizing fasciitis right lower extremity  2) hyperglycemia  3) critical care greater than 30 minutes

## 2020-10-20 ASSESSMENT — ENCOUNTER SYMPTOMS
HEADACHES: 0
FEVER: 0
HEMATOCHEZIA: 0
HEARTBURN: 0
JOINT SWELLING: 0
MYALGIAS: 0
DYSURIA: 0
SORE THROAT: 0
PARESTHESIAS: 0
SHORTNESS OF BREATH: 0
FREQUENCY: 0
EYE PAIN: 0
CHILLS: 0
COUGH: 0
DIZZINESS: 0
PALPITATIONS: 0
DIARRHEA: 1
WEAKNESS: 0
NERVOUS/ANXIOUS: 0
ARTHRALGIAS: 1
CONSTIPATION: 0
ABDOMINAL PAIN: 0
NAUSEA: 0
HEMATURIA: 0

## 2020-10-21 ENCOUNTER — ANCILLARY PROCEDURE (OUTPATIENT)
Dept: GENERAL RADIOLOGY | Facility: CLINIC | Age: 40
End: 2020-10-21
Attending: FAMILY MEDICINE
Payer: COMMERCIAL

## 2020-10-21 ENCOUNTER — OFFICE VISIT (OUTPATIENT)
Dept: FAMILY MEDICINE | Facility: CLINIC | Age: 40
End: 2020-10-21
Payer: COMMERCIAL

## 2020-10-21 VITALS
TEMPERATURE: 97.7 F | DIASTOLIC BLOOD PRESSURE: 80 MMHG | SYSTOLIC BLOOD PRESSURE: 134 MMHG | WEIGHT: 220.2 LBS | HEART RATE: 82 BPM | BODY MASS INDEX: 26.81 KG/M2 | HEIGHT: 76 IN | OXYGEN SATURATION: 98 % | RESPIRATION RATE: 16 BRPM

## 2020-10-21 DIAGNOSIS — G89.29 CHRONIC PAIN OF LEFT KNEE: ICD-10-CM

## 2020-10-21 DIAGNOSIS — F10.90 HEAVY ALCOHOL CONSUMPTION: ICD-10-CM

## 2020-10-21 DIAGNOSIS — R06.83 SNORING: ICD-10-CM

## 2020-10-21 DIAGNOSIS — Z00.00 ROUTINE GENERAL MEDICAL EXAMINATION AT A HEALTH CARE FACILITY: Primary | ICD-10-CM

## 2020-10-21 DIAGNOSIS — R25.1 TREMOR OF BOTH HANDS: ICD-10-CM

## 2020-10-21 DIAGNOSIS — Z23 NEED FOR PROPHYLACTIC VACCINATION AND INOCULATION AGAINST INFLUENZA: ICD-10-CM

## 2020-10-21 DIAGNOSIS — M25.562 CHRONIC PAIN OF LEFT KNEE: ICD-10-CM

## 2020-10-21 DIAGNOSIS — E78.1 HYPERTRIGLYCERIDEMIA: ICD-10-CM

## 2020-10-21 LAB
ALBUMIN SERPL-MCNC: 4.2 G/DL (ref 3.4–5)
ALP SERPL-CCNC: 84 U/L (ref 40–150)
ALT SERPL W P-5'-P-CCNC: 74 U/L (ref 0–70)
ANION GAP SERPL CALCULATED.3IONS-SCNC: 7 MMOL/L (ref 3–14)
AST SERPL W P-5'-P-CCNC: 77 U/L (ref 0–45)
BASOPHILS # BLD AUTO: 0 10E9/L (ref 0–0.2)
BASOPHILS NFR BLD AUTO: 0.6 %
BILIRUB SERPL-MCNC: 0.6 MG/DL (ref 0.2–1.3)
BUN SERPL-MCNC: 29 MG/DL (ref 7–30)
CALCIUM SERPL-MCNC: 9.7 MG/DL (ref 8.5–10.1)
CHLORIDE SERPL-SCNC: 102 MMOL/L (ref 94–109)
CO2 SERPL-SCNC: 29 MMOL/L (ref 20–32)
CREAT SERPL-MCNC: 1.22 MG/DL (ref 0.66–1.25)
DIFFERENTIAL METHOD BLD: ABNORMAL
EOSINOPHIL # BLD AUTO: 0.2 10E9/L (ref 0–0.7)
EOSINOPHIL NFR BLD AUTO: 3.8 %
ERYTHROCYTE [DISTWIDTH] IN BLOOD BY AUTOMATED COUNT: 11.2 % (ref 10–15)
FOLATE SERPL-MCNC: 19.6 NG/ML
GFR SERPL CREATININE-BSD FRML MDRD: 73 ML/MIN/{1.73_M2}
GLUCOSE SERPL-MCNC: 84 MG/DL (ref 70–99)
HCT VFR BLD AUTO: 42.4 % (ref 40–53)
HGB BLD-MCNC: 13.6 G/DL (ref 13.3–17.7)
LYMPHOCYTES # BLD AUTO: 1.7 10E9/L (ref 0.8–5.3)
LYMPHOCYTES NFR BLD AUTO: 33 %
MCH RBC QN AUTO: 32.2 PG (ref 26.5–33)
MCHC RBC AUTO-ENTMCNC: 32.1 G/DL (ref 31.5–36.5)
MCV RBC AUTO: 101 FL (ref 78–100)
MONOCYTES # BLD AUTO: 0.7 10E9/L (ref 0–1.3)
MONOCYTES NFR BLD AUTO: 12.9 %
NEUTROPHILS # BLD AUTO: 2.6 10E9/L (ref 1.6–8.3)
NEUTROPHILS NFR BLD AUTO: 49.7 %
PLATELET # BLD AUTO: 234 10E9/L (ref 150–450)
POTASSIUM SERPL-SCNC: 3.5 MMOL/L (ref 3.4–5.3)
PROT SERPL-MCNC: 7.6 G/DL (ref 6.8–8.8)
RBC # BLD AUTO: 4.22 10E12/L (ref 4.4–5.9)
SODIUM SERPL-SCNC: 138 MMOL/L (ref 133–144)
VIT B12 SERPL-MCNC: 307 PG/ML (ref 193–986)
WBC # BLD AUTO: 5.2 10E9/L (ref 4–11)

## 2020-10-21 PROCEDURE — 99213 OFFICE O/P EST LOW 20 MIN: CPT | Mod: 25 | Performed by: FAMILY MEDICINE

## 2020-10-21 PROCEDURE — 36415 COLL VENOUS BLD VENIPUNCTURE: CPT | Performed by: FAMILY MEDICINE

## 2020-10-21 PROCEDURE — 82746 ASSAY OF FOLIC ACID SERUM: CPT | Performed by: FAMILY MEDICINE

## 2020-10-21 PROCEDURE — 80053 COMPREHEN METABOLIC PANEL: CPT | Performed by: FAMILY MEDICINE

## 2020-10-21 PROCEDURE — 82607 VITAMIN B-12: CPT | Performed by: FAMILY MEDICINE

## 2020-10-21 PROCEDURE — 90686 IIV4 VACC NO PRSV 0.5 ML IM: CPT | Performed by: FAMILY MEDICINE

## 2020-10-21 PROCEDURE — 99396 PREV VISIT EST AGE 40-64: CPT | Mod: 25 | Performed by: FAMILY MEDICINE

## 2020-10-21 PROCEDURE — 85025 COMPLETE CBC W/AUTO DIFF WBC: CPT | Performed by: FAMILY MEDICINE

## 2020-10-21 PROCEDURE — 90471 IMMUNIZATION ADMIN: CPT | Performed by: FAMILY MEDICINE

## 2020-10-21 PROCEDURE — 73562 X-RAY EXAM OF KNEE 3: CPT | Mod: LT | Performed by: RADIOLOGY

## 2020-10-21 RX ORDER — MULTIPLE VITAMINS W/ MINERALS TAB 9MG-400MCG
1 TAB ORAL DAILY
COMMUNITY
End: 2022-12-05

## 2020-10-21 ASSESSMENT — MIFFLIN-ST. JEOR: SCORE: 2005.32

## 2020-10-21 ASSESSMENT — ENCOUNTER SYMPTOMS
CONSTIPATION: 0
PALPITATIONS: 0
NAUSEA: 0
ABDOMINAL PAIN: 0
EYE PAIN: 0
HEMATOCHEZIA: 0
CHILLS: 0
NERVOUS/ANXIOUS: 0
FREQUENCY: 0
HEMATURIA: 0
SHORTNESS OF BREATH: 0
HEARTBURN: 0
FEVER: 0
DYSURIA: 0
HEADACHES: 0
JOINT SWELLING: 0
MYALGIAS: 0
ARTHRALGIAS: 1
COUGH: 0
WEAKNESS: 0
SORE THROAT: 0
DIZZINESS: 0
DIARRHEA: 1
PARESTHESIAS: 0

## 2020-10-21 NOTE — PROGRESS NOTES
"SUBJECTIVE:   CC: James Blanco is an 40 year old male who presents for preventative health visit.       Patient has been advised of split billing requirements and indicates understanding: Yes  Healthy Habits:     Getting at least 3 servings of Calcium per day:  Yes    Bi-annual eye exam:  Yes    Dental care twice a year:  Yes    Sleep apnea or symptoms of sleep apnea:  Excessive snoring    Diet:  Other    Frequency of exercise:  None    Taking medications regularly:  Yes    Medication side effects:  None    PHQ-2 Total Score: 0    Additional concerns today:  Yes      Joint Pain    Onset: years, worse in last few months, history of surgery on knee around 2012    Description:   Location: left knee  Character: Sharp and Dull ache    Intensity: 4/10 currently, 7/10 at worst    Progression of Symptoms: worse    Accompanying Signs & Symptoms:  Other symptoms: none    History:   Previous similar pain: YES- has had previous surgery 2012 and arthroscopy prior to that  Knee - was told cartilage has worn out.    Precipitating factors:   Trauma or overuse: no     Alleviating factors:  Improved by:  Ibuprofen    Therapies Tried and outcome: ibuprofen does help    Intermittent shaking of his hands, no numbness or tingling, no weakness.  Noticed this year and has been getting worse.   for job.   No triggers.   Some days can be bad enough that he can't hold a cup/glas to mouth to drink, handwriting to be illegible.   Patient reports \"good day today\".      Today's PHQ-2 Score:   PHQ-2 ( 1999 Pf[employer]) 10/20/2020   Q1: Little interest or pleasure in doing things 0   Q2: Feeling down, depressed or hopeless 0   PHQ-2 Score 0   Q1: Little interest or pleasure in doing things Not at all   Q2: Feeling down, depressed or hopeless Not at all   PHQ-2 Score 0       Abuse: Current or Past(Physical, Sexual or Emotional)- No  Do you feel safe in your environment? Yes        Social History     Tobacco Use     Smoking status: Former " Smoker     Packs/day: 1.00     Years: 13.00     Pack years: 13.00     Types: Cigarettes     Smokeless tobacco: Former User     Tobacco comment: Quit in 2013   Substance Use Topics     Alcohol use: Yes     Comment: 6 beers daily          Alcohol Use 10/21/2020   Prescreen: >3 drinks/day or >7 drinks/week? -   Prescreen: >3 drinks/day or >7 drinks/week? -   AUDIT SCORE  18     AUDIT - Alcohol Use Disorders Identification Test - Reproduced from the World Health Organization Audit 2001 (Second Edition) 10/21/2020   1.  How often do you have a drink containing alcohol? 4 or more times a week   2.  How many drinks containing alcohol do you have on a typical day when you are drinking? 5 or 6   3.  How often do you have five or more drinks on one occasion? Weekly   4.  How often during the last year have you found that you were not able to stop drinking once you had started? Monthly   5.  How often during the last year have you failed to do what was normally expected of you because of drinking? Never   6.  How often during the last year have you needed a first drink in the morning to get yourself going after a heavy drinking session? Never   7.  How often during the last year have you had a feeling of guilt or remorse after drinking? Less than monthly   8.  How often during the last year have you been unable to remember what happened the night before because of your drinking? Monthly   9.  Have you or someone else been injured because of your drinking? No   10. Has a relative, friend, doctor or other health care worker been concerned about your drinking or suggested you cut down? Yes, during the last year   TOTAL SCORE 18       Last PSA: No results found for: PSA    Reviewed orders with patient. Reviewed health maintenance and updated orders accordingly - Yes  Patient Active Problem List   Diagnosis     Anxiety     Mixed hyperlipidemia     Fatty liver, alcoholic     Grief reaction     Heavy alcohol consumption     Moderate  major depression (H)     Closed nondisplaced fracture of fifth metatarsal bone of right foot, initial encounter     Past Surgical History:   Procedure Laterality Date     COLONOSCOPY N/A 3/21/2016    Procedure: COLONOSCOPY;  Surgeon: Tavo Wilcox MD;  Location: [facility]     OPEN REDUCTION INTERNAL FIXATION FOOT Right 4/17/2020    Procedure: Percutaneous intramedullary fixation, right fifth metatarsal fracture;  Surgeon: Amado Sanchez DPM;  Location: [facility]     SURGICAL HISTORY OF -       plasty surgery on face, due to injury     SURGICAL HISTORY OF -       left elbow nerve decompression, sound like ulnar     SURGICAL HISTORY OF -       left knee surgery, scope and allograph for cartilege     SURGICAL HISTORY OF -       lasix surgery       Social History     Tobacco Use     Smoking status: Former Smoker     Packs/day: 1.00     Years: 13.00     Pack years: 13.00     Types: Cigarettes     Smokeless tobacco: Former User     Tobacco comment: Quit in 2013   Substance Use Topics     Alcohol use: Yes     Comment: 6 beers daily      Family History   Problem Relation Age of Onset     Hyperlipidemia Mother      Coronary Artery Disease Father      Hyperlipidemia Father      Diabetes Father      Other Cancer Sister         lymphoma-non hodgkins     Depression Sister      Anxiety Disorder Sister      Substance Abuse Sister      Substance Abuse Brother      Bipolar Disorder Brother          Current Outpatient Medications   Medication Sig Dispense Refill     buPROPion (WELLBUTRIN SR) 100 MG 12 hr tablet Take 1 tablet (100 mg) by mouth daily 90 tablet 3     EPINEPHrine (EPIPEN 2-CHAIM) 0.3 MG/0.3ML injection 2-pack Inject 0.3 mLs (0.3 mg) into the muscle as needed for anaphylaxis 0.6 mL 1     fenofibrate (TRICOR) 145 MG tablet Take 1 tablet (145 mg) by mouth daily 90 tablet 3     FLUoxetine (PROZAC) 40 MG capsule Take 1 capsule (40 mg) by mouth daily 90 capsule 3     loperamide (IMODIUM A-D) 2 MG tablet Take 2 mg by mouth 4 times  daily as needed for diarrhea       multivitamin w/minerals (MULTI-VITAMIN) tablet Take 1 tablet by mouth daily       omeprazole 20 MG tablet Take 20 mg by mouth daily       simvastatin (ZOCOR) 40 MG tablet Take 1 tablet (40 mg) by mouth At Bedtime 90 tablet 3     Allergies   Allergen Reactions     Bees Swelling     Has epi-pen     Ceclor [Cefaclor]      Was an infant     Penicillins      Was an infant       Reviewed and updated as needed this visit by clinical staff  Tobacco  Allergies  Meds   Med Hx  Surg Hx  Fam Hx  Soc Hx        Reviewed and updated as needed this visit by Provider  Tobacco               Past Medical History:   Diagnosis Date     Anxiety      Hyperlipidaemia LDL goal <130       Past Surgical History:   Procedure Laterality Date     COLONOSCOPY N/A 3/21/2016    Procedure: COLONOSCOPY;  Surgeon: Tavo Wilcox MD;  Location: [facility]     OPEN REDUCTION INTERNAL FIXATION FOOT Right 4/17/2020    Procedure: Percutaneous intramedullary fixation, right fifth metatarsal fracture;  Surgeon: Amado Sanchez DPM;  Location:  OR     SURGICAL HISTORY OF -       plasty surgery on face, due to injury     SURGICAL HISTORY OF -       left elbow nerve decompression, sound like ulnar     SURGICAL HISTORY OF -       left knee surgery, scope and allograph for cartilege     SURGICAL HISTORY OF -       lasix surgery       Review of Systems   Constitutional: Negative for chills and fever.   HENT: Negative for congestion, ear pain, hearing loss and sore throat.    Eyes: Negative for pain and visual disturbance.   Respiratory: Negative for cough and shortness of breath.    Cardiovascular: Negative for chest pain, palpitations and peripheral edema.   Gastrointestinal: Positive for diarrhea. Negative for abdominal pain, constipation, heartburn, hematochezia and nausea.   Genitourinary: Negative for discharge, dysuria, frequency, genital sores, hematuria, impotence and urgency.   Musculoskeletal: Positive for  "arthralgias. Negative for joint swelling and myalgias.   Skin: Negative for rash.   Neurological: Negative for dizziness, weakness, headaches and paresthesias.   Psychiatric/Behavioral: Negative for mood changes. The patient is not nervous/anxious.        OBJECTIVE:   /80   Pulse 82   Temp 97.7  F (36.5  C) (Tympanic)   Resp 16   Ht 1.93 m (6' 4\")   Wt 99.9 kg (220 lb 3.2 oz)   SpO2 98%   BMI 26.80 kg/m      Physical Exam  GENERAL APPEARANCE:  alert and no distress  EYES: pink conj, no icterus, PERRL, EOMI  HENT: ear canals and TM's normal, nose and mouth without ulcers or lesions, oropharynx clear and oral mucous membranes moist  NECK: no adenopathy, no asymmetry, masses, or scars and thyroid normal to palpation  RESP: lungs clear to auscultation - no rales, rhonchi or wheezes  CV: regular rates and rhythm, normal S1 S2, no S3 or S4, no murmur, click or rub, no peripheral edema and peripheral pulses strong  ABDOMEN: soft, nontender, no hepatosplenomegaly, no masses and bowel sounds normal  RECTAL: deferred  MS: no musculoskeletal defects are noted and gait is age appropriate without ataxia  SKIN: no suspicious lesions or rashes; multiple tattoos  NEURO: Patient is A and O X 3; Cranial nerves 2-12 intact;  Strength 5/5 all extremities; DTR: ++ x 4; Sensory grossly intact; very mild fine resting finger tremors that did not worsen with  Intention; No problems with motor coordination    Diagnostic Test Results:  none     ASSESSMENT/PLAN:   James was seen today for physical, flu shot and imm/inj.    Diagnoses and all orders for this visit:    Routine general medical examination at a health care facility  Patient was advised on recommended screening and preventive health recommendations.  He verbalized understanding and agreed to the plans below.    Chronic pain of left knee  -     Cancel: XR Knee Standing AP [place] Left  -     XR Knee Standing 1v Ap Bilateral & 2v Left  Progressive " "DJD?  Repeat XR imaging today.  Activity as tolerated.  Likely ortho referral as patient ha shad arthroscopy of the joint in the past.    Tremor of both hands  -     CBC with platelets differential  -     Comprehensive metabolic panel  -     Vitamin B12  -     Folate  -     NEUROLOGY ADULT REFERRAL  Minimal today but patient reports coarse tremors sometimes.  DDx: essential tremor, alcohol induced tremors, occult neuro condition. No hx to suspect early parkinson's disease.  Patient concurred with work up and neuro consult.    Heavy alcohol consumption  -     Comprehensive metabolic panel  Discussed with patient risks of heavy consumption.  Advised to decrease to <7 drinks per week.  Patient was advised if needing medical treatment, contact provider.    Need for prophylactic vaccination and inoculation against influenza  -     INFLUENZA VACCINE IM > 6 MONTHS VALENT IIV4 [64576]  -     Vaccine Administration, Initial [57471]    Hypertriglyceridemia  -     Comprehensive metabolic panel  -     Lipid panel reflex to direct LDL Fasting; Future  Patient is not fasting - will schedule fffuture lab appt for lipids.    Snoring  -     SLEEP EVALUATION & MANAGEMENT REFERRAL - ADULT -[facility] [place]  [phone] (Age 15 and up); Future    In addition to preventive health visit, spent another 25 minutes in counseling and coordination of care as above.      Patient has been advised of split billing requirements and indicates understanding: Yes  COUNSELING:   Reviewed preventive health counseling, as reflected in patient instructions       Regular exercise       Healthy diet/nutrition       Vision screening       Immunizations    Vaccinated for: Influenza             Alcohol Use    Estimated body mass index is 26.8 kg/m  as calculated from the following:    Height as of this encounter: 1.93 m (6' 4\").    Weight as of this encounter: 99.9 kg (220 lb 3.2 oz).     Weight management plan: Discussed healthy diet " and exercise guidelines    He reports that he has quit smoking. His smoking use included cigarettes. He has a 13.00 pack-year smoking history. He has quit using smokeless tobacco.      Counseling Resources:  ATP IV Guidelines  Pooled Cohorts Equation Calculator  FRAX Risk Assessment  ICSI Preventive Guidelines  Dietary Guidelines for Americans, 2010  USDA's MyPlate  ASA Prophylaxis  Lung CA Screening    Hector Talbot MD  [facility]

## 2020-10-21 NOTE — PATIENT INSTRUCTIONS
You will be contacted in 1-2 days for results of your lab tests.    Schedule neurology consult.    Further recommendations when results are out.    Be consistent with low trans fat and saturated fat diet.  Eat food rich in omega-3-fatty acids as you tolerate. (salmon, olive oil)  Eat 5 cups of vegetables, fruits and whole grains per day.  Limit starchy food (white rice, white bread, white pasta, white potatoes) to less than a cup per meal.  Minimize sweets, junk food and fastfood. Limit soda beverages to one serving per day; best to avoid it altogether though.  Exercise: moderate intensity sustained for at least 30 mins per episode, goal of 150 mins per week at least  Combine cardiovascular and resistance exercises.  These exercise recommendations are in addition to your daily activity at work or home.  Work on losing weight if you are above your goal body mass index.    Reduce alcohol consumption to less than 7 drinks a week.  If needing help with decreasing alcohol consumption, let care team know or see a proider.    Schedule sleep clinic consult.    Preventive Health Recommendations  Male Ages 40 to 49    Yearly exam:             See your health care provider every year in order to  o   Review health changes.   o   Discuss preventive care.    o   Review your medicines if your doctor has prescribed any.    You should be tested each year for STDs (sexually transmitted diseases) if you re at risk.     Have a cholesterol test every 5 years.     Have a colonoscopy (test for colon cancer) if someone in your family has had colon cancer or polyps before age 50.     After age 45, have a diabetes test (fasting glucose). If you are at risk for diabetes, you should have this test every 3 years.      Talk with your health care provider about whether or not a prostate cancer screening test (PSA) is right for you.    Shots: Get a flu shot each year. Get a tetanus shot every 10 years.     Nutrition:    Eat at least 5 servings of  fruits and vegetables daily.     Eat whole-grain bread, whole-wheat pasta and brown rice instead of white grains and rice.     Get adequate Calcium and Vitamin D.     Lifestyle    Exercise for at least 150 minutes a week (30 minutes a day, 5 days a week). This will help you control your weight and prevent disease.     Limit alcohol to one drink per day.     No smoking.     Wear sunscreen to prevent skin cancer.     See your dentist every six months for an exam and cleaning.

## 2020-10-22 DIAGNOSIS — G89.29 CHRONIC PAIN OF LEFT KNEE: Primary | ICD-10-CM

## 2020-10-22 DIAGNOSIS — R71.8 ELEVATED MCV: ICD-10-CM

## 2020-10-22 DIAGNOSIS — R74.01 TRANSAMINASEMIA: Primary | ICD-10-CM

## 2020-10-22 DIAGNOSIS — F10.90 HEAVY ALCOHOL CONSUMPTION: ICD-10-CM

## 2020-10-22 DIAGNOSIS — M25.562 CHRONIC PAIN OF LEFT KNEE: Primary | ICD-10-CM

## 2020-10-22 DIAGNOSIS — Z98.890 HISTORY OF ARTHROSCOPY OF LEFT KNEE: ICD-10-CM

## 2020-10-27 ENCOUNTER — MYC MEDICAL ADVICE (OUTPATIENT)
Dept: FAMILY MEDICINE | Facility: CLINIC | Age: 40
End: 2020-10-27

## 2020-10-27 DIAGNOSIS — F10.90 HEAVY ALCOHOL CONSUMPTION: Primary | ICD-10-CM

## 2020-10-27 DIAGNOSIS — K70.0 FATTY LIVER, ALCOHOLIC: ICD-10-CM

## 2020-10-27 NOTE — TELEPHONE ENCOUNTER
Referral to addiction medicine has been signed.  Patient may contact neurology number for setting up appointment.

## 2020-10-27 NOTE — TELEPHONE ENCOUNTER
Patient requesting Outpatient treatment, pended chemical dependency referral for provider review and completion.

## 2020-10-28 ENCOUNTER — MYC MEDICAL ADVICE (OUTPATIENT)
Dept: FAMILY MEDICINE | Facility: CLINIC | Age: 40
End: 2020-10-28

## 2020-10-28 ENCOUNTER — VIRTUAL VISIT (OUTPATIENT)
Dept: FAMILY MEDICINE | Facility: OTHER | Age: 40
End: 2020-10-28

## 2020-10-28 NOTE — PROGRESS NOTES
"Date: 10/28/2020 16:11:45  Clinician: Byron Salcedo  Clinician NPI: [id number]  Patient: James Blanco  Patient : 1980  Patient Address: [address] [place] [id number]  Patient Phone: [phone]  Visit Protocol: URI  Patient Summary:  James is a 40 year old ( : 1980 ) male who initiated a [facility] Visit for COVID-19 (Coronavirus) evaluation and screening. When asked the question \"Please sign me up to receive news, health information and promotions. \", James responded \"No\".    When asked when his symptoms started, James reported that he does not have any symptoms.   He denies having recent facial or sinus surgery in the past 60 days and taking antibiotic medication in the past month.    Pertinent COVID-19 (Coronavirus) information  James does not work or volunteer as healthcare worker or a . In the past 14 days, James has not worked or volunteered at a healthcare facility or group living setting.   In the past 14 days, he also has not lived in a congregate living setting.   James has had a close contact with a laboratory-confirmed COVID-19 patient in the last 14 days. He was exposed at his work. He does not know when he was exposed to the laboratory-confirmed COVID-19 patient.   Additional information about contact with COVID-19 (Coronavirus) patient as reported by the patient (free text): Exposed to several people at work. At least 1 has tested positive so far. Other results are still pending.   James is not living in the same household with the COVID-19 positive patient. He was in an enclosed space for greater than 15 minutes with the COVID-19 patient.   During the encounter, neither were wearing masks.   Since 2019, James has not been diagnosed with lab-confirmed COVID-19 test and has had upper respiratory infection (URI) or influenza-like illness.      Date(s) of previous URI or influenza-like illness (free-text): Late march early april     Symptoms " James experienced during previous URI or influenza-like illness as reported by the patient (free-text): Low fever, soar throat, lethargy, soar chest        Pertinent medical history  James does not need a return to work/school note.   Weight: 120 lbs   James does not smoke or use smokeless tobacco.   Weight: 120 lbs    MEDICATIONS: bupropion HCl oral, omeprazole-sodium bicarbonate oral, fenofibrate oral, Imodium A-D oral, simvastatin oral, fluoxetine oral, One-A-Day Men's Multivitamin oral, ALLERGIES: Ceclor, Penicillins  Clinician Response:  Dear James,   Based on your exposure to COVID-19 (coronavirus), we would like to test you for this virus.  1. Please call [phone] to schedule your visit. Explain that you were referred by [facility] to have a COVID-19 test. Be ready to share your [facility] visit ID number.   The following will serve as your written order for this COVID Test, ordered by me, for the indication of suspected COVID [Z20.828]: The test will be ordered in [facility], our electronic health record, after you are scheduled. It will show as ordered and authorized by Noble Zambrano MD.  Order: COVID-19 (coronavirus) PCR for ASYMPTOMATIC EXPOSURE testing from [facility].   If you know you have had close contact with someone who tested positive, you should be quarantined for 14 days after this exposure. You should stay in quarantine for the14 days even if the covid test is negative, the optimal time to test after exposure is 5-7 days from the exposure  Quarantine means   What should I do?  For safety, it's very important to follow these rules. Do this for 14 days after the date you were last exposed to the virus..  Stay home and away from others. Don't go to school or anywhere else. Generally quarantine means staying home from work but there are some exceptions to this. Please contact your workplace.   No hugging, kissing or shaking hands.  Don't let anyone visit.  Cover your mouth and nose with a mask, tissue or  washcloth to avoid spreading germs.  Wash your hands and face often. Use soap and water.  What are the symptoms of COVID-19?  The most common symptoms are cough, fever and trouble breathing. Less common symptoms include headache, body aches, fatigue (feeling very tired), chills, sore throat, stuffy or runny nose, diarrhea (loose poop), loss of taste or smell, belly pain, and nausea or vomiting (feeling sick to your stomach or throwing up).  After 14 days, if you have still don't have symptoms, you likely don't have this virus.  If you develop symptoms, follow these guidelines.  If you're normally healthy: Please start another [facility] visit to report your symptoms. Go to [email or web address].  If you have a serious health problem (like cancer, heart failure, an organ transplant or kidney disease): Call your specialty clinic. Let them know that you might have COVID-19.  2. When it's time for your COVID test:  Stay at least 6 feet away from others. (If someone will drive you to your test, stay in the backseat, as far away from the  as you can.)  Cover your mouth and nose with a mask, tissue or washcloth.  Go straight to the testing site. Don't make any stops on the way there or back.  Please note  Caregivers in these groups are at risk for severe illness due to COVID-19:  o People 65 years and older  o People who live in a nursing home or long-term care facility  o People with chronic disease (lung, heart, cancer, diabetes, kidney, liver, immunologic)  o People who have a weakened immune system, including those who:  Are in cancer treatment  Take medicine that weakens the immune system, such as corticosteroids  Had a bone marrow or organ transplant  Have an immune deficiency  Have poorly controlled HIV or AIDS  Are obese (body mass index of 40 or higher)  Smoke regularly  Where can I get more information?   [facility] -- About COVID-19: [email or web address]  [facility] -- What to Do If You're Sick:  www.cdc.gov/coronavirus/2019-ncov/about/steps-when-sick.html  CDC -- Ending Home Isolation: www.cdc.gov/coronavirus/2019-ncov/hcp/disposition-in-home-patients.html  [facility] -- Caring for Someone: www.cdc.gov/coronavirus/2019-ncov/if-you-are-sick/care-for-someone.html  [facility] -- Interim Guidance for Hospital Discharge to Home: www.[facility].mn./diseases/coronavirus/hcp/hospdischarge.pdf  [facility] clinical trials (COVID-19 research studies): clinicalaffairs.[facility]-clinical-trials  Below are the COVID-19 hotlines at the [place] Department of Health ([facility]). Interpreters are available.  For health questions: Call 325-597-6172 or 1-555.831.9559 (7 a.m. to 7 p.m.)  For questions about schools and childcare: Call 393-756-0801 or 1-469.384.5891 (7 a.m. to 7 p.m.)    Diagnosis: Contact with and (suspected) exposure to other viral communicable diseases  Diagnosis ICD: Z20.828

## 2020-11-04 DIAGNOSIS — F10.90 HEAVY ALCOHOL CONSUMPTION: ICD-10-CM

## 2020-11-04 DIAGNOSIS — R71.8 ELEVATED MCV: ICD-10-CM

## 2020-11-04 DIAGNOSIS — R74.01 TRANSAMINASEMIA: ICD-10-CM

## 2020-11-04 DIAGNOSIS — E78.1 HYPERTRIGLYCERIDEMIA: ICD-10-CM

## 2020-11-04 DIAGNOSIS — E78.2 MIXED HYPERLIPIDEMIA: Primary | ICD-10-CM

## 2020-11-04 LAB
ALBUMIN SERPL-MCNC: 3.8 G/DL (ref 3.4–5)
ALP SERPL-CCNC: 75 U/L (ref 40–150)
ALT SERPL W P-5'-P-CCNC: 59 U/L (ref 0–70)
ANION GAP SERPL CALCULATED.3IONS-SCNC: 3 MMOL/L (ref 3–14)
AST SERPL W P-5'-P-CCNC: 43 U/L (ref 0–45)
BILIRUB SERPL-MCNC: 0.5 MG/DL (ref 0.2–1.3)
BUN SERPL-MCNC: 25 MG/DL (ref 7–30)
CALCIUM SERPL-MCNC: 9.6 MG/DL (ref 8.5–10.1)
CHLORIDE SERPL-SCNC: 110 MMOL/L (ref 94–109)
CHOLEST SERPL-MCNC: 215 MG/DL
CO2 SERPL-SCNC: 30 MMOL/L (ref 20–32)
CREAT SERPL-MCNC: 1.14 MG/DL (ref 0.66–1.25)
ERYTHROCYTE [DISTWIDTH] IN BLOOD BY AUTOMATED COUNT: 11.2 % (ref 10–15)
GFR SERPL CREATININE-BSD FRML MDRD: 79 ML/MIN/{1.73_M2}
GLUCOSE SERPL-MCNC: 109 MG/DL (ref 70–99)
HCT VFR BLD AUTO: 44.6 % (ref 40–53)
HDLC SERPL-MCNC: 70 MG/DL
HGB BLD-MCNC: 14.5 G/DL (ref 13.3–17.7)
LDLC SERPL CALC-MCNC: 117 MG/DL
MCH RBC QN AUTO: 32.4 PG (ref 26.5–33)
MCHC RBC AUTO-ENTMCNC: 32.5 G/DL (ref 31.5–36.5)
MCV RBC AUTO: 100 FL (ref 78–100)
NONHDLC SERPL-MCNC: 145 MG/DL
PLATELET # BLD AUTO: 282 10E9/L (ref 150–450)
POTASSIUM SERPL-SCNC: 4.2 MMOL/L (ref 3.4–5.3)
PROT SERPL-MCNC: 7 G/DL (ref 6.8–8.8)
RBC # BLD AUTO: 4.48 10E12/L (ref 4.4–5.9)
SODIUM SERPL-SCNC: 143 MMOL/L (ref 133–144)
TRIGL SERPL-MCNC: 142 MG/DL
WBC # BLD AUTO: 4.6 10E9/L (ref 4–11)

## 2020-11-04 PROCEDURE — 85027 COMPLETE CBC AUTOMATED: CPT | Performed by: FAMILY MEDICINE

## 2020-11-04 PROCEDURE — 80053 COMPREHEN METABOLIC PANEL: CPT | Performed by: FAMILY MEDICINE

## 2020-11-04 PROCEDURE — 80061 LIPID PANEL: CPT | Performed by: FAMILY MEDICINE

## 2020-11-04 PROCEDURE — 36415 COLL VENOUS BLD VENIPUNCTURE: CPT | Performed by: FAMILY MEDICINE

## 2020-11-20 ENCOUNTER — OFFICE VISIT (OUTPATIENT)
Dept: ADDICTION MEDICINE | Facility: CLINIC | Age: 40
End: 2020-11-20
Payer: COMMERCIAL

## 2020-11-20 VITALS
HEART RATE: 78 BPM | OXYGEN SATURATION: 96 % | BODY MASS INDEX: 28.24 KG/M2 | SYSTOLIC BLOOD PRESSURE: 110 MMHG | DIASTOLIC BLOOD PRESSURE: 84 MMHG | WEIGHT: 232 LBS | TEMPERATURE: 98 F

## 2020-11-20 DIAGNOSIS — F32.1 MODERATE MAJOR DEPRESSION (H): ICD-10-CM

## 2020-11-20 DIAGNOSIS — F41.9 ANXIETY: ICD-10-CM

## 2020-11-20 DIAGNOSIS — F10.20 ALCOHOL USE DISORDER, MODERATE, DEPENDENCE (H): Primary | ICD-10-CM

## 2020-11-20 PROCEDURE — 99204 OFFICE O/P NEW MOD 45 MIN: CPT | Performed by: NURSE PRACTITIONER

## 2020-11-20 RX ORDER — NALTREXONE HYDROCHLORIDE 50 MG/1
50 TABLET, FILM COATED ORAL DAILY
Qty: 30 TABLET | Refills: 0 | Status: SHIPPED | OUTPATIENT
Start: 2020-11-20 | End: 2021-01-20

## 2020-11-20 NOTE — PROGRESS NOTES
"  SUBJECTIVE                                                    CC: Patient presents with:  Addiction Problem    Primary care provider: Hector Talbot MD   Psychiatric provider or therapist: None current    [place] Board of Pharmacy Data Base Reviewed:    Yes; reviewed today and no concerns for diversionary activity.  Most recent data includes:  04/17/2020  1   04/17/2020  Hydrocodone-Acetamin 5-325 MG  20.00  2 Je Fle       HPI:    James Blanco is a 40 year old male with a history of alcohol use, alcoholic fatty liver, depression, and chronic left knee pain who presents for initial visit for addiction consultation and management referred by Dr. Talbot at [facility].  He was previously evaluated by LENNY RUTLEDGE for fatty liver in 08/2018 and recommended to reduce alcohol use, carb intake, and focus on weight loss.  Since then his alcohol use has continued.     He reports nearly 20 years of excessive alcohol use.  He used to party a lot with his wife.  He quit drinking in 2000 after getting DUI in [place].  He completed 6 month OP CD treatment and then was sober 2 years.  Hx attending AA meetings but not interested in resuming these meetings.  He relapsed after breaking off difficult engagement with finahid at that time when she saw him drinking and smoking and he has been drinking since then.  No hx alcohol withdrawal including seizures or DTs.  He has tolerance, drinks to intoxication, and has continued to drink despite negative physical effects from alcohol use.      He has been drinking about 2-4 \"99 brand mini bottles\" that are 50 mL each hard liquor and 2-4 beers a night.  He was able to reduce his alcohol use for about 1.5 week recently to 2-3 beers a night but the last two nights has drank 6 pack a night with a few of the 99 mini bottles.  He has intermittent bilateral hand tremors that do not correlate with reduced alcohol use.  He is interested in complete cessation of alcohol " use.  He prefers medication trial for AUD prior to consideration for formal CD treatment.     He reports recurrent depressive episodes throughout his lifetime.  He takes wellbutrin  mg once every night in addition to prozac 40 mg nightly.  He reports significant nighttime sweats.  Hx extreme anxiety symptoms when wellbutrin was increased to twice daily with episode of panic that led to ED visit.  Improved with reduction in wellbutrin.  He is employed as a  full-time, and is  with a dog.  He is grieving the loss of his previous dog that passed last year.  Previously in therapy and open to resuming to help manage mood and grief.  Denies SI, no evidence of psychosis, or maya.   Family history significant for substance use, anxiety, and bipolar disorder.      CD History:     SUBSTANCE(S)  OF CHOICE  - alcohol         Date of last use  - last night  IV drug use - denies, never used   Previous MAT - denies   Previous detox/treatment - 6 month OP   Longest period of sobriety - 2 years until 2012   Medical complications from substance use - fatty liver; elevated LFTs      Other Substance Use:  MARIJUANA - smoking recently to manage reduction in alcohol, insomnia   No other substance use currently/recently.  Remote hx recreational illicit substance use.      NICOTINE-Quit in 2013 - previously smoking 1 ppd cigarettes    Hepatitis C Status - never tested, never used IV    PAST PSYCHIATRIC HISTORY  Past diagnoses - anxiety, depression  Hospitalizations/commitment - none  Suicide Attempts - none  Psychotherapy/ECT/TMS - has done individual therapy in the past   Medication trials - wellbutrin XL/SR, fluoxetine 40 mg daily     MEDICAL HISTORY:  Problem list, allergies, and histories reviewed & adjusted, as indicated.  Additional history: as documented     Patient Active Problem List    Diagnosis Date Noted     Closed nondisplaced fracture of fifth metatarsal bone of right foot, initial encounter 04/15/2020      Priority: Medium     Added automatically from request for surgery [id number]       Moderate major depression (H) 04/07/2020     Priority: Medium     Grief reaction 01/09/2020     Priority: Medium     Heavy alcohol consumption 01/09/2020     Priority: Medium     Fatty liver, alcoholic 09/26/2018     Priority: Medium     Anxiety 09/01/2015     Priority: Medium     Mixed hyperlipidemia 09/01/2015     Priority: Medium     Past Medical History:   Diagnosis Date     Anxiety      Depressive disorder      Hyperlipidaemia LDL goal <130        Past Surgical History:   Procedure Laterality Date     COLONOSCOPY N/A 3/21/2016    Procedure: COLONOSCOPY;  Surgeon: Tavo Wilcox MD;  Location: [facility]     OPEN REDUCTION INTERNAL FIXATION FOOT Right 4/17/2020    Procedure: Percutaneous intramedullary fixation, right fifth metatarsal fracture;  Surgeon: Amado Sanchez DPM;  Location:  OR     SURGICAL HISTORY OF -       plasty surgery on face, due to injury     SURGICAL HISTORY OF -       left elbow nerve decompression, sound like ulnar     SURGICAL HISTORY OF -       left knee surgery, scope and allograph for cartilege     SURGICAL HISTORY OF -       lasix surgery         Family History   Problem Relation Age of Onset     Hyperlipidemia Mother      Coronary Artery Disease Father      Hyperlipidemia Father      Diabetes Father      Other Cancer Sister         lymphoma-non hodgkins     Depression Sister      Anxiety Disorder Sister      Substance Abuse Sister      Substance Abuse Brother      Bipolar Disorder Brother      Depression Brother        Social History     Social History Narrative     Not on file       ALLERGIES & CURRENT MEDICATIONS:  Allergies   Allergen Reactions     Bees Swelling     Has epi-pen     Ceclor [Cefaclor]      Was an infant     Penicillins      Was an infant       Current Outpatient Medications   Medication Sig Dispense Refill     buPROPion (WELLBUTRIN SR) 100 MG 12 hr tablet Take 1 tablet (100 mg) by  mouth daily 90 tablet 3     EPINEPHrine (EPIPEN 2-CHAIM) 0.3 MG/0.3ML injection 2-pack Inject 0.3 mLs (0.3 mg) into the muscle as needed for anaphylaxis 0.6 mL 1     fenofibrate (TRICOR) 145 MG tablet Take 1 tablet (145 mg) by mouth daily 90 tablet 3     FLUoxetine (PROZAC) 40 MG capsule Take 1 capsule (40 mg) by mouth daily 90 capsule 3     loperamide (IMODIUM A-D) 2 MG tablet Take 2 mg by mouth 4 times daily as needed for diarrhea       multivitamin w/minerals (MULTI-VITAMIN) tablet Take 1 tablet by mouth daily       omeprazole 20 MG tablet Take 20 mg by mouth daily       simvastatin (ZOCOR) 40 MG tablet Take 1 tablet (40 mg) by mouth At Bedtime 90 tablet 3       REVIEW OF SYSTEMS:  General: Denies acute withdrawal symptoms.  No recent infections or fever  Eyes:  No vision concerns.  No double vision.    Resp: No coughing, wheezing or shortness of breath  CV: No chest pains or palpitations  GI: No nausea, vomiting, abdominal pain, diarrhea.  No constipation  : No urinary frequency or dysuria    Musculoskeletal: +++chronic pain; No significant muscle or joint pains other than as above.  No edema  Neurologic: No numbness, tingling, weakness, problems with balance or coordination.  No tremors noted today.   Psychiatric: No acute concerns other than as above. See mental status exam for more information.   Skin: No rashes or areas of acute infection; +++significant body art     OBJECTIVE                                                      LABS:  Labs reviewed.  Pertinent data include:  AST (U/L)   Date Value   11/04/2020 43     ALT (U/L)   Date Value   11/04/2020 59     Bilirubin Total (mg/dL)   Date Value   11/04/2020 0.5     Creatinine (mg/dL)   Date Value   11/04/2020 1.14     GFR Estimate (mL/min/[1.73_m2])   Date Value   11/04/2020 79     Cholesterol (mg/dL)   Date Value   11/04/2020 215 (H)     HDL Cholesterol (mg/dL)   Date Value   11/04/2020 70     LDL Cholesterol Calculated (mg/dL)   Date Value   11/04/2020  117 (H)     Vitamin B12 (pg/mL)   Date Value   10/21/2020 307     Folate (ng/mL)   Date Value   10/21/2020 19.6     Urine tox not obtained today.      PHYSICAL EXAM:  /84   Pulse 78   Temp 98  F (36.7  C) (Oral)   Wt 105.2 kg (232 lb)   SpO2 96%   BMI 28.24 kg/m      GENERAL APPEARANCE: alert, comfortable appearing  EYES: Eyes grossly normal to inspection  HENT: TM's normal, mouth without ulcers or lesions, nose no rhinorrhea  NECK: no adenopathy, thyromegaly or masses  RESP: lungs clear to auscultation - no rales, rhonchi or wheezes and no resp distress  CV: regular rates and rhythm, normal S1 S2,no murmur   ABDOMEN: soft, nontender, without hepatosplenomegaly or masses  MS: extremities normal- no gross deformities noted, gait normal, peripheral pulses normal and no edema  SKIN: no rashes, no jaundice, no obvious masses.   NEURO: Normal strength and tone, sensory exam grossly normal, no tremor    MENTAL STATUS EXAM:  Appearance/Behavior:No appearant distress  Speech: Normal  Mood/Affect: normal affect  Insight: Adequate    ASSESSMENT/PLAN                                                      (F10.20) Alcohol use disorder, moderate, dependence (H)  (primary encounter diagnosis)  Comment: no hx seizures or DTs.  Not interested in CD treatment at this time.   Plan: Start taking naltrexone (DEPADE/REVIA) 50 MG tablet; take 1 tablet by mouth daily.  If causes sedation take at bedtime. #30     Discussed meeting alternatives to AA and recommended increased sober support.  Consideration for OP CD treatment after adequate trial of naltrexone initiated.     Patient to seek ED medical evaluation if develops moderate-severe withdrawal symptoms.  Patient to call clinic with more mild withdrawal symptoms or concerns related to sleep etc to limit use of marijuana.     (F32.1) Moderate major depression (H)  Comment: in addition to grief reaction from loss of dog last year.  Still grieving and open to resuming individual  therapy.   Plan: Patient to schedule individual therapy.     Continue taking prozac 40 mg daily and recommend holding wellbutrin while reducing alcohol use to reduce risk of seizure in case of alcohol withdrawal.  Also recommend trial of either higher dose prozac without wellbutrin or consideration for alternative antidepressant therapy to reduce side effect load and maximize monotherapy.     Wellbutrin SR once daily not recommended due to variable plasma levels throughout the day.  If resumed in the future recommend XR formulation if needed.     MENTAL HEALTH REFERRAL  - Adult; Outpatient         Treatment; Individual/Couples/Family/Group         Therapy/Health Psychology; [facility]: [facility] [phone]; We will         contact you to schedule the appointment or         please call with any questions    (F41.9) Anxiety  Comment: R/O alcohol-induced anxiety disorder versus panic disorder versus LINNEA  Plan: Continue to evaluate anxiety symptoms during alcohol reduction/cessation.  Continue Prozac as above with careful titration and augmentation given history of side effects from wellbutrin as well as family history of bipolar disorder in first-degree relative.          Follow-Up: 1 month video visit     Patient counseling completed today:  Reviewed s/s alcohol withdrawal and indications for higher level of medical care if symptoms arise versus when to call clinic for advisement on withdrawal symptoms.  Also discussed recommendation to hold off on continuation of wellbutrin at this time while reducing/quitting alcohol due to increased risk of seizure activity.  Medications for alcohol dependence reviewed and include the following:  Disulfiram (Antabuse), Acamprosate (Campral) and Naltrexone (Revia/Vivitrol) all reviewed.  Risk, benefit, side effects and intended purposes of each medication reviewed.  Effect of Naltrexone/Vivitrol with opioid use reviewed.       Patient is interested in  naltrexone.     Directions for use explained and patient agreeable to plan as above.           Keila Day, CNP  [facility] - Addiction Medicine  [facility]  [phone]

## 2020-11-20 NOTE — PATIENT INSTRUCTIONS
Start taking Naltrexone 50 mg once daily.   This may make you nauseous at first.  Take with food.  Okay to take any time during the day.      Hold off on starting wellbutrin again until no longer drinking.  We will talk about increasing your prozac if needed to manage your mood.      Start individual therapy to help manage mood and grief.

## 2020-11-21 PROBLEM — F10.20 ALCOHOL USE DISORDER, MODERATE, DEPENDENCE (H): Status: ACTIVE | Noted: 2020-11-21

## 2020-11-21 PROBLEM — F10.90 HEAVY ALCOHOL CONSUMPTION: Status: RESOLVED | Noted: 2020-01-09 | Resolved: 2020-11-21

## 2020-12-21 ENCOUNTER — VIRTUAL VISIT (OUTPATIENT)
Dept: ADDICTION MEDICINE | Facility: CLINIC | Age: 40
End: 2020-12-21
Payer: COMMERCIAL

## 2020-12-21 DIAGNOSIS — F41.9 ANXIETY: ICD-10-CM

## 2020-12-21 DIAGNOSIS — F10.20 ALCOHOL USE DISORDER, MODERATE, DEPENDENCE (H): Primary | ICD-10-CM

## 2020-12-21 DIAGNOSIS — F32.1 MODERATE MAJOR DEPRESSION (H): ICD-10-CM

## 2020-12-21 PROCEDURE — 99214 OFFICE O/P EST MOD 30 MIN: CPT | Mod: 95 | Performed by: NURSE PRACTITIONER

## 2020-12-21 RX ORDER — NALTREXONE HYDROCHLORIDE 50 MG/1
100 TABLET, FILM COATED ORAL DAILY
Qty: 60 TABLET | Refills: 0 | Status: SHIPPED | OUTPATIENT
Start: 2020-12-21 | End: 2021-01-20

## 2020-12-21 RX ORDER — FLUOXETINE 10 MG/1
10 CAPSULE ORAL DAILY
Qty: 30 CAPSULE | Refills: 0 | Status: SHIPPED | OUTPATIENT
Start: 2020-12-21 | End: 2021-01-20

## 2020-12-21 NOTE — PATIENT INSTRUCTIONS
Increase prozac to 50 mg daily (40 mg tab + 10 mg tab).  Reduce back to 40 mg daily if causes worsening anxiety.  You may notice reduced appetite as we increase your prozac dose.      Increase naltrexone to 100 mg (2 tablets) one daily.  Let me know if you get any abdominal pain and we will keep an eye on your liver function at this higher dose.     Call or send [employer] message with any questions before your next visit!

## 2020-12-21 NOTE — PROGRESS NOTES
"[facility] ADDICTION MEDICINE  VIDEO Progress Note                                                      SUBJECTIVE                                                    James Blanco is a 40 year old male who is being evaluated via a billable VIDEO visit due to COVID-19 pandemic in order to reduce potential unnecessary exposure to the virus.       Telemedicine Visit: The patient's condition can be safely assessed and treated via synchronous audio and visual telemedicine encounter.      The patient has been notified of following:     \"This video visit will be conducted via a call between you and your physician/provider. We have found that certain health care needs can be provided without the need for an in-person physical exam.  This service lets us provide the care you need with a video conversation.  If a prescription is necessary we can send it directly to your pharmacy.  If lab work is needed we can place an order for that and you can then stop by our lab to have the test done at a later time.    Video visits are billed at different rates depending on your insurance coverage.  Please reach out to your insurance provider with any questions.    If during the course of the call the physician/provider feels a video visit is not appropriate, you will not be charged for this service.\"    Patient has given verbal consent for Video visit? Yes  How would you like to obtain your AVS? MyChart  If you are dropped from the video visit, the video invite should be resent to: Text to cell phone: [phone]   Will anyone else be joining your video visit? No    Video-Visit Details    Type of service:  Video Visit    Video Start Time: 4:12 PM  Video End Time: 4:28 PM    Originating Location (pt. Location): Home    Distant Location (provider location):  [facility]     Platform used for Video Visit: [facility]    James Blanco complains of    Chief Complaint   Patient presents with     Video Visit "     Date of last visit:  11/20/2020    Primary Care Provider: Hector Talbot MD   [place] Board of Pharmacy Data Base Reviewed:    Yes; reviewed and no concerns for diversionary activity.     Brief History:    Initial visit 11/20/2020  Hx alcohol use, alcoholic fatty liver, depression, and chronic left knee pain.  > 20 year history of excessive alcohol use. Quit in 2000 after DUI and sober 2 years.  Hx AA meetings but didn't enjoy.  No hx alcohol withdrawal and reports intermittent bilateral hand tremors that do not correlate with alcohol use or cessation.  Father also has essential tremor.     Significant grief from loss of dog in 2019 and still distressing when he thinks of this loss.  Hx individual therapy to manage grief response and depressive symptoms.  Family hx bipolar disorder, anxiety and substance use.      HPI:    12/21/2020  Patient status since last visit: decompensated mood    Cravings: improving    MAT Dose: inadequate - will trial high dose naltrexone     Side Effects: none    Cues to use and relapse triggers: boredom and self-medicating grief     At last visit we started naltrexone 50 mg daily and advised patient to hold off on wellbutrin SR until sober from alcohol due to increased risk of seizures.  Continued his prozac 40 mg daily and referred for individual therapy.  Since then he reports drinking is down to 2-3 drinks per night and notices less interest in continuing alcohol use.     He reports recurrent low moods and grief symptoms that arise throughout the day which have increased since stopping wellbutrin SR.  When taking higher dose wellbutrin noted increased anxiety and psychomotor agitation.  Prefers trial increased prozac dose instead of wellbutrin XL trial.  He starts individual therapy in 02/2020.      Tobacco use - quit in 2013     I have reviewed and updated the patient's Past Medical History, Social History, Family History and Medication List.    Patient Active Problem List     Diagnosis Date Noted     Alcohol use disorder, moderate, dependence (H) 11/21/2020     Priority: Medium     Closed nondisplaced fracture of fifth metatarsal bone of right foot, initial encounter 04/15/2020     Priority: Medium     Added automatically from request for surgery [id number]       Moderate major depression (H) 04/07/2020     Priority: Medium     Grief reaction 01/09/2020     Priority: Medium     Fatty liver, alcoholic 09/26/2018     Priority: Medium     Anxiety 09/01/2015     Priority: Medium     Mixed hyperlipidemia 09/01/2015     Priority: Medium       Current Medications:       buPROPion (WELLBUTRIN SR) 100 MG 12 hr tablet, Take 1 tablet (100 mg) by mouth daily       EPINEPHrine (EPIPEN 2-CHAIM) 0.3 MG/0.3ML injection 2-pack, Inject 0.3 mLs (0.3 mg) into the muscle as needed for anaphylaxis       fenofibrate (TRICOR) 145 MG tablet, Take 1 tablet (145 mg) by mouth daily       FLUoxetine (PROZAC) 40 MG capsule, Take 1 capsule (40 mg) by mouth daily       loperamide (IMODIUM A-D) 2 MG tablet, Take 2 mg by mouth 4 times daily as needed for diarrhea       multivitamin w/minerals (MULTI-VITAMIN) tablet, Take 1 tablet by mouth daily       naltrexone (DEPADE/REVIA) 50 MG tablet, Take 1 tablet (50 mg) by mouth daily       omeprazole 20 MG tablet, Take 20 mg by mouth daily       simvastatin (ZOCOR) 40 MG tablet, Take 1 tablet (40 mg) by mouth At Bedtime    No current facility-administered medications on file prior to visit.         REVIEW OF SYSTEMS:  General:  No acute withdrawal symptoms.  No recent infections or fever  Eyes/ENT:  No vision concerns.  No double vision.    Resp: No coughing, wheezing or shortness of breath  CV: No chest pains or palpitations  GI: No nausea, vomiting, abdominal pain, diarrhea.  No constipation  : No urinary frequency or dysuria    Musculoskeletal: No significant muscle or joint pains other than as above.  No edema  Neurologic: No numbness, tingling, weakness, problems with  balance or coordination  Psychiatric: No acute concerns other than as above.   Skin: No rashes or areas of acute infection    OBJECTIVE                                                    LABS:  Labs reviewed. No UDS collected as patient evaluated over VIDEO visit.     PHYSICAL EXAM:  GENERAL: Healthy, alert and no apparent distress  EYES: Eyes grossly normal to inspection.  No discharge or erythema, or obvious scleral/conjunctival abnormalities.  RESP: No audible wheeze, cough, or visible cyanosis.  No visible retractions or increased work of breathing.    SKIN: Visible skin clear. No significant rash, abnormal pigmentation or lesions.  NEURO: Cranial nerves grossly intact.  Mentation and speech appropriate for age.  PSYCH: Mentation appears normal, affect normal/bright, judgement and insight intact, normal speech and appearance well-groomed.      MENTAL STATUS EXAM:  Appearance:  awake, alert and adequately groomed  Attitude:  cooperative  Mood:  sad  and depressed  Affect:  appropriate and in normal range and mood congruent  Psychomotor Behavior:  no evidence of tardive dyskinesia, dystonia, or tics  Thought Content:  no evidence of suicidal ideation or homicidal ideation, no evidence of psychotic thought, no auditory hallucinations present and no visual hallucinations present  Insight:  good  Judgement:  fair  Oriented to:  time, person, and place    ASSESSMENT/PLAN                                                      (F10.20) Alcohol use disorder, moderate, dependence (H)  (primary encounter diagnosis)  Comment: no hx seizures or DTs.  Not interested in CD treatment at this time.  Drinking less but still drinking.   Plan: Increase naltrexone (DEPADE/REVIA) 50 MG tablet; take 2 tablet by mouth daily. #60     Recommended increased sober support.  Consideration for OP CD treatment or minimum of recovery meetings after adequate trial of naltrexone initiated.      (F32.1) Moderate major depression (H)  Comment: in  addition to grief reaction from loss of dog last year.  Still grieving and open to resuming individual therapy.   Plan: Patient to attend scheduled individual therapy session.       Increase prozac 50 mg daily.  If not tolerated will consider different antidepressant trial such as duloxetine.      Wellbutrin SR once daily not recommended due to variable plasma levels throughout the day.  If resumed in the future recommend XR formulation if needed.      (F41.9) Anxiety  Comment: R/O alcohol-induced anxiety disorder versus panic disorder versus LINNEA  Plan: Continue to evaluate anxiety symptoms during alcohol reduction/cessation.  Continue Prozac as above with careful titration and augmentation given history of side effects from wellbutrin as well as family history of bipolar disorder in first-degree relative.       Follow-up: 1 month or sooner if needed        As the provider I attest to compliance with applicable laws and regulations related to telemedicine and that the above documentation accurately summarizes the assessments, treatment plan, and patient education completed during this visit.         Keila Day, CNP  [facility] - Addiction Medicine  [facility]  [phone]

## 2021-01-20 ENCOUNTER — VIRTUAL VISIT (OUTPATIENT)
Dept: ADDICTION MEDICINE | Facility: CLINIC | Age: 41
End: 2021-01-20
Payer: COMMERCIAL

## 2021-01-20 DIAGNOSIS — F32.1 MODERATE MAJOR DEPRESSION (H): ICD-10-CM

## 2021-01-20 DIAGNOSIS — F41.9 ANXIETY: ICD-10-CM

## 2021-01-20 DIAGNOSIS — F10.20 ALCOHOL USE DISORDER, MODERATE, DEPENDENCE (H): Primary | ICD-10-CM

## 2021-01-20 PROCEDURE — 99214 OFFICE O/P EST MOD 30 MIN: CPT | Mod: 95 | Performed by: NURSE PRACTITIONER

## 2021-01-20 RX ORDER — FLUOXETINE 10 MG/1
10 CAPSULE ORAL DAILY
Qty: 30 CAPSULE | Refills: 3 | Status: SHIPPED | OUTPATIENT
Start: 2021-01-20 | End: 2021-05-11

## 2021-01-20 RX ORDER — NALTREXONE HYDROCHLORIDE 50 MG/1
100 TABLET, FILM COATED ORAL DAILY
Qty: 60 TABLET | Refills: 3 | Status: SHIPPED | OUTPATIENT
Start: 2021-01-20 | End: 2021-05-11

## 2021-01-20 RX ORDER — FLUOXETINE 40 MG/1
40 CAPSULE ORAL DAILY
Qty: 90 CAPSULE | Refills: 3 | Status: SHIPPED | OUTPATIENT
Start: 2021-01-20 | End: 2021-06-15

## 2021-01-20 NOTE — PROGRESS NOTES
[facility] ADDICTION MEDICINE  VIDEO Progress Note                                                      SUBJECTIVE                                                    Today's VIDEO visit due to COVID-19 pandemic in order to reduce potential unnecessary exposure to the virus.     Telemedicine Visit: The patient's condition can be safely assessed and treated via synchronous audio and visual telemedicine encounter.      James is a 40 year old who is being evaluated via a billable video visit.      How would you like to obtain your AVS? MyChart  If the video visit is dropped, the invitation should be resent by: Text to cell phone: [phone]  Will anyone else be joining your video visit? No    Video Start Time: 4:02 PM    Video-Visit Details    Type of service:  Video Visit    Video End Time:4:31 PM    Originating Location (pt. Location): Home    Distant Location (provider location):  [facility]     Platform used for Video Visit: Arely Ramirez DIPAK Blanco complains of    Chief Complaint   Patient presents with     Video Visit     Alcohol Problem     Date of last visit:  12/21/2020    Primary Care Provider: Hector Talbot MD   [place] Board of Pharmacy Data Base Reviewed:    Yes; reviewed and no concerns for diversionary activity.     Brief History:    Initial visit 11/20/2020  Hx alcohol use, alcoholic fatty liver, depression, and chronic left knee pain.  > 20 year history of excessive alcohol use. Quit in 2000 after DUI and sober 2 years.  Hx AA meetings but didn't enjoy.  No hx alcohol withdrawal and reports intermittent bilateral hand tremors that do not correlate with alcohol use or cessation.  Father also has essential tremor.      Significant grief from loss of dog in 2019 and still distressing when he thinks of this loss.  Hx individual therapy to manage grief response and depressive symptoms.  Family hx bipolar disorder, anxiety and substance use.      HPI:     1/20/2021  Patient status since last visit: slight improvement in alcohol use     Cravings: denies     MAT Dose: adequate - high dose naltrexone     Side Effects: none     Cues to use and relapse triggers: partner drinking; self-medicating stress and tension      At last visit we increased his prozac to 50 mg once daily to better manage depressive symptoms and increased his naltrexone to 100 mg once daily for alcohol use.  No change in depressive symptoms.  Since then he reports drinking somewhat less, drinking max of 2 beers a day with a few mini 99 bottles a night.  Also drinking non-alcoholic beers.  He will be resuming individual therapy in two weeks.  Reviewed options for treatment including continuation of MAT, therapy, CD treatment and no treatment.  He would like to continue MAT and therapy at this time.  Unsure about his goals around alcohol cessation.  His partner continues to drink.      No withdrawal symptoms with reduction in drinking.  Pressure to quit from wife.  He understands risks of drinking but unsure if he wants to quit.  He asked about medication to take in the evenings after work to reduce stress and tension.  Discussed therapy as mainstay for reported concerns as well as alcohol cessation and antidepressant if continuation indicated after alcohol cessation.  No other concerns at this time.     I have reviewed and updated the patient's Past Medical History, Social History, Family History and Medication List.    Patient Active Problem List    Diagnosis Date Noted     Alcohol use disorder, moderate, dependence (H) 11/21/2020     Priority: Medium     Closed nondisplaced fracture of fifth metatarsal bone of right foot, initial encounter 04/15/2020     Priority: Medium     Added automatically from request for surgery [id number]       Moderate major depression (H) 04/07/2020     Priority: Medium     Grief reaction 01/09/2020     Priority: Medium     Fatty liver, alcoholic 09/26/2018     Priority:  Medium     Anxiety 09/01/2015     Priority: Medium     Mixed hyperlipidemia 09/01/2015     Priority: Medium       Current Medications:       EPINEPHrine (EPIPEN 2-CHAIM) 0.3 MG/0.3ML injection 2-pack, Inject 0.3 mLs (0.3 mg) into the muscle as needed for anaphylaxis       fenofibrate (TRICOR) 145 MG tablet, Take 1 tablet (145 mg) by mouth daily       FLUoxetine (PROZAC) 10 MG capsule, Take 1 capsule (10 mg) by mouth daily In addition to your 40 mg capsules.       FLUoxetine (PROZAC) 40 MG capsule, Take 1 capsule (40 mg) by mouth daily       loperamide (IMODIUM A-D) 2 MG tablet, Take 2 mg by mouth 4 times daily as needed for diarrhea       multivitamin w/minerals (MULTI-VITAMIN) tablet, Take 1 tablet by mouth daily       naltrexone (DEPADE/REVIA) 50 MG tablet, Take 2 tablets (100 mg) by mouth daily       naltrexone (DEPADE/REVIA) 50 MG tablet, Take 1 tablet (50 mg) by mouth daily       omeprazole 20 MG tablet, Take 20 mg by mouth daily       simvastatin (ZOCOR) 40 MG tablet, Take 1 tablet (40 mg) by mouth At Bedtime    No current facility-administered medications on file prior to visit.         REVIEW OF SYSTEMS:  General:  No acute withdrawal symptoms.  No recent infections or fever  Eyes/ENT:  No vision concerns.  No double vision.    Resp: No coughing, wheezing or shortness of breath  CV: No chest pains or palpitations  GI: No nausea, vomiting, abdominal pain, diarrhea.  No constipation  : No urinary frequency or dysuria    Musculoskeletal: No significant muscle or joint pains other than as above.  No edema  Neurologic: No numbness, tingling, weakness, problems with balance or coordination  Psychiatric: No acute concerns other than as above.   Skin: No rashes or areas of acute infection    OBJECTIVE                                                    LABS:  Labs reviewed. No UDS collected as patient evaluated over VIDEO visit.     PHYSICAL EXAM:  GENERAL: Healthy, alert and no distress  EYES: Eyes grossly normal to  inspection.  No discharge or erythema, or obvious scleral/conjunctival abnormalities.  RESP: No audible wheeze, cough, or visible cyanosis.  No visible retractions or increased work of breathing.    SKIN: Visible skin clear. No significant rash, abnormal pigmentation or lesions.  NEURO: Cranial nerves grossly intact.  Mentation and speech appropriate for age.  PSYCH: Mentation appears normal, affect normal/bright, judgement and insight intact, normal speech and appearance well-groomed.      MENTAL STATUS EXAM:  Appearance:  awake, alert, well groomed and no apparent distress  Attitude:  cooperative  Mood:  depressed  Affect:  appropriate and in normal range and mood congruent  Psychomotor Behavior:  no evidence of tardive dyskinesia, dystonia, or tics  Thought Content:  no evidence of suicidal ideation or homicidal ideation, no evidence of psychotic thought, no auditory hallucinations present and no visual hallucinations present  Insight:  good  Judgement:  limited  Oriented to:  time, person, and place    ASSESSMENT/PLAN                                                      (F10.20) Alcohol use disorder, moderate, dependence (H)  (primary encounter diagnosis)  Plan: Continue naltrexone (DEPADE/REVIA) 50 MG tablet; take 2 tablet by mouth daily. #60     Recommended increased sober support.  Reviewed options for OP CD treatment or minimum of recovery meetings and patient will consider his options and goals around substance use.       (F32.1) Moderate major depression (H) versus substance-induced mood d/o   Plan: Patient to attend scheduled individual therapy session 2/3/2021 and consider complete alcohol abstinence as above.      Continue prozac 50 mg daily.  If not tolerated and not effective with sobriety will consider different antidepressant trial such as duloxetine.      (F41.9) Anxiety  Comment: R/O alcohol-induced anxiety disorder versus panic disorder versus LINNEA  Plan: Continue to evaluate anxiety symptoms  during alcohol reduction/cessation.  Continue Prozac as above.       Follow-up: patient to call and schedule f/u visit in the next 1-3 months     Patient counseling completed today:  Counseled the patient on barriers to sobriety, readiness to quit, and treatment options reviewed.          As the provider I attest to compliance with applicable laws and regulations related to telemedicine and that the above documentation accurately summarizes the assessments, treatment plan, and patient education completed during this visit.         Keila Day CNP  Psychiatric Mental Health Nurse Practitioner   [facility] - Addiction Medicine  [facility]   [phone]

## 2021-01-30 ENCOUNTER — MYC MEDICAL ADVICE (OUTPATIENT)
Dept: ADDICTION MEDICINE | Facility: CLINIC | Age: 41
End: 2021-01-30

## 2021-01-30 DIAGNOSIS — F10.20 ALCOHOL USE DISORDER, MODERATE, DEPENDENCE (H): Primary | ICD-10-CM

## 2021-02-03 ENCOUNTER — VIRTUAL VISIT (OUTPATIENT)
Dept: PSYCHOLOGY | Facility: CLINIC | Age: 41
End: 2021-02-03
Attending: NURSE PRACTITIONER
Payer: COMMERCIAL

## 2021-02-03 DIAGNOSIS — F10.20 ALCOHOL USE DISORDER, MODERATE, DEPENDENCE (H): ICD-10-CM

## 2021-02-03 DIAGNOSIS — F33.1 MAJOR DEPRESSIVE DISORDER, RECURRENT EPISODE, MODERATE (H): Primary | ICD-10-CM

## 2021-02-03 PROCEDURE — 90834 PSYTX W PT 45 MINUTES: CPT | Mod: 95 | Performed by: MARRIAGE & FAMILY THERAPIST

## 2021-02-03 NOTE — PROGRESS NOTES
Progress Note    Patient Name: James Blanco  Date: 2-3-21         Service Type: Individual      Session Start Time: 3pm  Session End Time: 350pm     Session Length: 50min    Session #: 6    Attendees: Client attended alone    Service Modality:  Video Visit:      Provider verified identity through the following two step process.  Patient provided:  Patient  and Patient address    Telemedicine Visit: The patient's condition can be safely assessed and treated via synchronous audio and visual telemedicine encounter.      Reason for Telemedicine Visit: Patient has requested telehealth visit    Originating Site (Patient Location): Patient's home    Distant Site (Provider Location): Provider Remote Setting    Consent:  The patient/guardian has verbally consented to: the potential risks and benefits of telemedicine (video visit) versus in person care; bill my insurance or make self-payment for services provided; and responsibility for payment of non-covered services.     Patient would like the video invitation sent by:  My Chart    Mode of Communication:  Video Conference via [employer]    As the provider I attest to compliance with applicable laws and regulations related to telemedicine.     Treatment Plan Last Reviewed: 2-3-21  PHQ-9 / LINNEA-7 :2-3-21  CGI- 2-3-21    DATA  Interactive Complexity: No  Crisis: No     Progress Since Last Session (Related to Symptoms / Goals / Homework):              Symptoms: Client is returning to therapy after a number of months and has been struggling with alcohol abuse.   He reports today he is 4 days sober and has been working with an addiction nurse.           Homework:  Completed in session                            Episode of Care Goals: Minimal progress - ACTION (Actively working towards change); Intervened by reinforcing change plan / affirming steps taken                 Current / Ongoing Stressors and Concerns:              -Client  reports he has been struggling with alcohol abuse.  He reports he has started to work with an addiction nurse and also is on a medication to help with cravings.  He is working on finding ways to distract and stay busy with other activities.  His wife has been a big support.                 Treatment Objective(s) Addressed in This Session:          Alcohol abuse                     Intervention:              -Discussed recent struggles with alcohol abuse.   -Added goal to treatment plan.   -Discussed distraction and staying busy with other activities at night.   -Client will talk with the addiction nurse about starting a medication that would make him sick if he drank.                                ASSESSMENT: Current Emotional / Mental Status (status of significant symptoms):              Risk status (Self / Other harm or suicidal ideation)              Patient denies current fears or concerns for personal safety.              Patient denies current or recent suicidal ideation or behaviors.              Patientdenies current or recent homicidal ideation or behaviors.              Patient denies current or recent self injurious behavior or ideation.              Patient denies other safety concerns.              Patient reports there has been no change in risk factors since their last session.                Patientreports there has been no change in protective factors since their last session.                Recommended that patient call 911 or go to the local ED should there be a change in any of these risk factors.                 Appearance:                            Appropriate               Eye Contact:                           Good               Psychomotor Behavior:          Normal               Attitude:                                   Cooperative               Orientation:                             All              Speech                          Rate / Production:       Normal                            Volume:                       Normal               Mood:                                      Depressed and Anxious              Affect:                                      Flat              Thought Content:                    Clear               Thought Form:                        Coherent  Logical               Insight:                                     Good                  Medication Review:              Changes to psychiatric medications, see updated Medication List in EPIC.                  Medication Compliance:              Yes                 Changes in Health Issues:              None reported                 Chemical Use Review:              Substance Use: Chemical use reviewed, Client is getting help from an addiction nurse and is ready to discuss struggles with drinking.                   Tobacco Use: No current tobacco use.       Diagnosis:  296.32 (F33.1) Major Depressive Disorder, Recurrent Episode, Moderate _ and With melancholic features    (F10.20) Alcohol use disorder, moderate, dependence      Collateral Reports Completed:              Not Applicable     PLAN: (Patient Tasks / Therapist Tasks / Other)  Client will return in one month and is on the waiting list.  He will work on distraction at night.  He will talk with the addiction nurse about starting a medication that would make him ill if he drank.  Client will use wife for support and let her know when he is struggling.             Shu Monte,                                                            ______________________________________________________________________     Treatment Plan     Patient's Name: James Blanco                      YOB: 1980     Date: 2-11-20     Diagnoses:      296.32 (F33.1) Major Depressive Disorder, Recurrent Episode, Moderate _ and With melancholic features  (F10.20) Alcohol use disorder, moderate, dependence   Psychosocial & Contextual Factors: Loss of pet   WHODAS:   WHODAS 2.0  Total Score 1/27/2020 2/5/2020   Total Score 14 12         Referral / Collaboration:  Referral to another professional/service is not indicated at this time..     Anticipated number of session or this episode of care: 3-5        MeasurableTreatment Goal(s) related to diagnosis / functional impairment(s)  Goal 1: Client will address grief and loss tied to loss of pet.                 I will know I've met my goal when I am feeling less reactive to the grief.       Objective #A (Client Action)                Client will increase understanding of steps in the grief process.  Status: Continued- 2-3-21     Intervention(s)  Therapist will use CBT and solution focused therapy .     Objective #B  Client will talk to at least two others about losses and coping.                        Status: Continued- 2-3-21     Intervention(s)  Therapist will use solution focused therapy .     Objective #C  Client will work on routine at night to help with sadness.    Status: Continued- 2-3-21     Intervention(s)  Therapist will use CBT therapy .    Goal 2: Client will address struggles with alcohol use disorder.       I will know I've met my goal when I am not having cravings.      Objective #A (Client Action)    Client will maintain sobriety 100% of the time.  Status: New - Date: 2-3-21     Intervention(s)  Therapist will use cognitive and behavioral therapy.    Objective #B  Client will gain supports around remaining sober.    Status: New - Date: 2-3-21     Intervention(s)  Therapist will use solution focused therapy.    Objective #C  Client will identify at least 5 example(s) of how drinking has resulted in an experience that interferes with person values or goals.  Status: New - Date: 2-3-21     Intervention(s)  Therapist will use CBT and solution focused therapy.              Patient has reviewed and agreed to the above plan.        Shu Monte, TH                February 11, 2020

## 2021-02-05 RX ORDER — DISULFIRAM 250 MG/1
250 TABLET ORAL DAILY
Qty: 30 TABLET | Refills: 1 | Status: SHIPPED | OUTPATIENT
Start: 2021-02-05 | End: 2021-05-11

## 2021-02-05 NOTE — TELEPHONE ENCOUNTER
Antabuse 250 mg rx sent for patient to start with [employer] message reminding him not to start medication until at least 24 hours after last drink and to avoid all alcohol-based products including hand  and OTC medications.

## 2021-03-04 ENCOUNTER — VIRTUAL VISIT (OUTPATIENT)
Dept: PSYCHOLOGY | Facility: CLINIC | Age: 41
End: 2021-03-04
Payer: COMMERCIAL

## 2021-03-04 DIAGNOSIS — F10.20 ACUTE ALCOHOLISM (H): ICD-10-CM

## 2021-03-04 DIAGNOSIS — F33.1 MAJOR DEPRESSIVE DISORDER, RECURRENT EPISODE, MODERATE (H): Primary | ICD-10-CM

## 2021-03-04 PROCEDURE — 90834 PSYTX W PT 45 MINUTES: CPT | Mod: 95 | Performed by: MARRIAGE & FAMILY THERAPIST

## 2021-03-04 NOTE — PROGRESS NOTES
Progress Note    Patient Name: James Blanco  Date: 3-4-21         Service Type: Individual      Session Start Time: 1pm  Session End Time: 150pm     Session Length: 50min    Session #: 7    Attendees: Client attended alone    Service Modality:  Video Visit:      Provider verified identity through the following two step process.  Patient provided:  Patient  and Patient address    Telemedicine Visit: The patient's condition can be safely assessed and treated via synchronous audio and visual telemedicine encounter.      Reason for Telemedicine Visit: Patient has requested telehealth visit    Originating Site (Patient Location): Patient's home    Distant Site (Provider Location): Provider Remote Setting    Consent:  The patient/guardian has verbally consented to: the potential risks and benefits of telemedicine (video visit) versus in person care; bill my insurance or make self-payment for services provided; and responsibility for payment of non-covered services.     Patient would like the video invitation sent by:  My Chart    Mode of Communication:  Video Conference via [employer]    As the provider I attest to compliance with applicable laws and regulations related to telemedicine.     Treatment Plan Last Reviewed: 2-3-21  PHQ-9 / LINNEA-7 :Did not complete today  CGI- 2-3-21    DATA  Interactive Complexity: No  Crisis: No     Progress Since Last Session (Related to Symptoms / Goals / Homework):              Symptoms: Client has been working on sobriety with success and has been finding ways to stay distracted with other activities.          Homework:  Completed in session, completed outside of session.                            Episode of Care Goals: Minimal progress - ACTION (Actively working towards change); Intervened by reinforcing change plan / affirming steps taken                 Current / Ongoing Stressors and Concerns:              -Client reports he has been  struggling with alcohol abuse.  He reports he has started to work with an addiction nurse and also is on a medication to help with cravings.  He is working on finding ways to distract and stay busy with other activities.  His also has some other changes taking place but feels they have kept his mind busy.  He has been developing a new routine at night, has made some financial changes and is working on talking openly with close family and friends about what he is working on.                   Treatment Objective(s) Addressed in This Session:          Alcohol abuse                     Intervention:              -Continue to engage in new activities in the evening.     -Implement new financial plan developed with wide.     -Stay on both medications prescribed by addiction nurse.                                    ASSESSMENT: Current Emotional / Mental Status (status of significant symptoms):              Risk status (Self / Other harm or suicidal ideation)              Patient denies current fears or concerns for personal safety.              Patient denies current or recent suicidal ideation or behaviors.              Patientdenies current or recent homicidal ideation or behaviors.              Patient denies current or recent self injurious behavior or ideation.              Patient denies other safety concerns.              Patient reports there has been no change in risk factors since their last session.                Patientreports there has been no change in protective factors since their last session.                Recommended that patient call 911 or go to the local ED should there be a change in any of these risk factors.                 Appearance:                            Appropriate               Eye Contact:                           Good               Psychomotor Behavior:          Normal               Attitude:                                   Cooperative               Orientation:                              All              Speech                          Rate / Production:       Normal                           Volume:                       Normal               Mood:                                      Depressed and Anxious              Affect:                                      Flat              Thought Content:                    Clear               Thought Form:                        Coherent  Logical               Insight:                                     Good                  Medication Review:              Changes to psychiatric medications, see updated Medication List in EPIC.                  Medication Compliance:              Yes                 Changes in Health Issues:              None reported                 Chemical Use Review:              Substance Use: Chemical use reviewed, Client is getting help from an addiction nurse and is ready to discuss struggles with drinking.                   Tobacco Use: No current tobacco use.       Diagnosis:  296.32 (F33.1) Major Depressive Disorder, Recurrent Episode, Moderate _ and With melancholic features    (F10.20) Alcohol use disorder, moderate, dependence      Collateral Reports Completed:              Not Applicable     PLAN: (Patient Tasks / Therapist Tasks / Other)  Client will return in two weeks and will work on the following goals.  He will work on distraction at night.  He will stay on both medications prescribed by the addiction nurse.  Client will use wife for support and let her know when he is struggling.  He will talk openly with family and friends he trusts.             Shu Monte,                                                            ______________________________________________________________________     Treatment Plan     Patient's Name: James Blanco                      YOB: 1980     Date: 2-11-20     Diagnoses:      296.32 (F33.1) Major Depressive Disorder, Recurrent Episode, Moderate _ and With  melancholic features  (F10.20) Alcohol use disorder, moderate, dependence   Psychosocial & Contextual Factors: Loss of pet   WHODAS:   WHODAS 2.0 Total Score 1/27/2020 2/5/2020   Total Score 14 12         Referral / Collaboration:  Referral to another professional/service is not indicated at this time..     Anticipated number of session or this episode of care: 3-5        MeasurableTreatment Goal(s) related to diagnosis / functional impairment(s)  Goal 1: Client will address grief and loss tied to loss of pet.                 I will know I've met my goal when I am feeling less reactive to the grief.       Objective #A (Client Action)                Client will increase understanding of steps in the grief process.  Status: Continued- 2-3-21     Intervention(s)  Therapist will use CBT and solution focused therapy .     Objective #B  Client will talk to at least two others about losses and coping.                        Status: Continued- 2-3-21     Intervention(s)  Therapist will use solution focused therapy .     Objective #C  Client will work on routine at night to help with sadness.    Status: Continued- 2-3-21     Intervention(s)  Therapist will use CBT therapy .    Goal 2: Client will address struggles with alcohol use disorder.       I will know I've met my goal when I am not having cravings.      Objective #A (Client Action)    Client will maintain sobriety 100% of the time.  Status: New - Date: 2-3-21     Intervention(s)  Therapist will use cognitive and behavioral therapy.    Objective #B  Client will gain supports around remaining sober.    Status: New - Date: 2-3-21     Intervention(s)  Therapist will use solution focused therapy.    Objective #C  Client will identify at least 5 example(s) of how drinking has resulted in an experience that interferes with person values or goals.  Status: New - Date: 2-3-21     Intervention(s)  Therapist will use CBT and solution focused therapy.              Patient has  reviewed and agreed to the above plan.        Shu Monte, TH February 11, 2020

## 2021-03-15 ENCOUNTER — MYC MEDICAL ADVICE (OUTPATIENT)
Dept: ADDICTION MEDICINE | Facility: CLINIC | Age: 41
End: 2021-03-15

## 2021-03-16 ENCOUNTER — MYC MEDICAL ADVICE (OUTPATIENT)
Dept: FAMILY MEDICINE | Facility: CLINIC | Age: 41
End: 2021-03-16

## 2021-03-16 NOTE — TELEPHONE ENCOUNTER
I have not personally ordered this test before.  He may need to have this done in conjunction or under the care of a psychiatrist.

## 2021-03-22 ENCOUNTER — VIRTUAL VISIT (OUTPATIENT)
Dept: PSYCHOLOGY | Facility: CLINIC | Age: 41
End: 2021-03-22
Payer: COMMERCIAL

## 2021-03-22 DIAGNOSIS — F32.1 MAJOR DEPRESSIVE DISORDER, SINGLE EPISODE, MODERATE (H): Primary | ICD-10-CM

## 2021-03-22 DIAGNOSIS — F10.20 ALCOHOL USE DISORDER, MODERATE, DEPENDENCE (H): ICD-10-CM

## 2021-03-22 PROCEDURE — 90834 PSYTX W PT 45 MINUTES: CPT | Mod: 95 | Performed by: MARRIAGE & FAMILY THERAPIST

## 2021-03-22 ASSESSMENT — ANXIETY QUESTIONNAIRES
GAD7 TOTAL SCORE: 7
6. BECOMING EASILY ANNOYED OR IRRITABLE: NOT AT ALL
5. BEING SO RESTLESS THAT IT IS HARD TO SIT STILL: MORE THAN HALF THE DAYS
3. WORRYING TOO MUCH ABOUT DIFFERENT THINGS: SEVERAL DAYS
7. FEELING AFRAID AS IF SOMETHING AWFUL MIGHT HAPPEN: NOT AT ALL
IF YOU CHECKED OFF ANY PROBLEMS ON THIS QUESTIONNAIRE, HOW DIFFICULT HAVE THESE PROBLEMS MADE IT FOR YOU TO DO YOUR WORK, TAKE CARE OF THINGS AT HOME, OR GET ALONG WITH OTHER PEOPLE: SOMEWHAT DIFFICULT
1. FEELING NERVOUS, ANXIOUS, OR ON EDGE: MORE THAN HALF THE DAYS
2. NOT BEING ABLE TO STOP OR CONTROL WORRYING: NOT AT ALL

## 2021-03-22 ASSESSMENT — PATIENT HEALTH QUESTIONNAIRE - PHQ9
SUM OF ALL RESPONSES TO PHQ QUESTIONS 1-9: 12
5. POOR APPETITE OR OVEREATING: MORE THAN HALF THE DAYS

## 2021-03-22 NOTE — PROGRESS NOTES
Progress Note    Patient Name: James Blanco  Date: 3-22-21         Service Type: Individual      Session Start Time: 3pm  Session End Time: 350pm     Session Length: 50min    Session #: 8    Attendees: Client attended alone    Service Modality:  Video Visit:      Provider verified identity through the following two step process.  Patient provided:  Patient  and Patient address    Telemedicine Visit: The patient's condition can be safely assessed and treated via synchronous audio and visual telemedicine encounter.      Reason for Telemedicine Visit: Patient has requested telehealth visit    Originating Site (Patient Location): Patient's home    Distant Site (Provider Location): Provider Remote Setting    Consent:  The patient/guardian has verbally consented to: the potential risks and benefits of telemedicine (video visit) versus in person care; bill my insurance or make self-payment for services provided; and responsibility for payment of non-covered services.     Patient would like the video invitation sent by:  My Chart    Mode of Communication:  Video Conference via [employer]    As the provider I attest to compliance with applicable laws and regulations related to telemedicine.     Treatment Plan Last Reviewed: 2-3-21  PHQ-9 / LINNEA-7 :3-22-21  CGI- 2-3-21    DATA  Interactive Complexity: No  Crisis: No     Progress Since Last Session (Related to Symptoms / Goals / Homework):              Symptoms: Client has been working on sobriety with success and has been finding ways to stay distracted with other activities.  He has been struggling with loosing his train of thought on a regular basis and feels it is happening in all settings.          Homework:  Completed in session, completed outside of session.                            Episode of Care Goals: Minimal progress - ACTION (Actively working towards change); Intervened by reinforcing change plan / affirming steps  taken                 Current / Ongoing Stressors and Concerns:              -Client reports he has been struggling with alcohol abuse.  He reports he has started to work with an addiction nurse and also is on a medication to help with cravings.  He is working on finding ways to distract and stay busy with other activities.     -Client reports they have implemented a new financial plan at home and this has been a challenge.  Bills are shared and then they have their separate accounts for left over hobbies and personal expenses.  Client reports he has been cutting it close some weeks.   -Client has been spending more time with family and friends and is feeling supported.   -Client has been smoking marijuana as a way to manage alcohol withdrawals.  This has caused some tension between he and his wife.                   Treatment Objective(s) Addressed in This Session:          Alcohol abuse                     Intervention:              -Continue to engage in new activities in the evening.  Walk the dog and explore new hobbies.     -Continue with financial plan but monitor personal expenses more closely.     -Stay on both medications prescribed by addiction nurse.     -Consider gene site testing and seeing psychiatry.                                  ASSESSMENT: Current Emotional / Mental Status (status of significant symptoms):              Risk status (Self / Other harm or suicidal ideation)              Patient denies current fears or concerns for personal safety.              Patient denies current or recent suicidal ideation or behaviors.              Patientdenies current or recent homicidal ideation or behaviors.              Patient denies current or recent self injurious behavior or ideation.              Patient denies other safety concerns.              Patient reports there has been no change in risk factors since their last session.                Patientreports there has been no change in protective  factors since their last session.                Recommended that patient call 911 or go to the local ED should there be a change in any of these risk factors.                 Appearance:                            Appropriate               Eye Contact:                           Good               Psychomotor Behavior:          Normal               Attitude:                                   Cooperative               Orientation:                             All              Speech                          Rate / Production:       Normal                           Volume:                       Normal               Mood:                                      Depressed and Anxious              Affect:                                      Flat              Thought Content:                    Clear               Thought Form:                        Coherent  Logical               Insight:                                     Good                  Medication Review:             No changes to psychiatric medications, see updated Medication List in EPIC.                  Medication Compliance:              Yes                 Changes in Health Issues:              None reported                 Chemical Use Review:              Substance Use: Chemical use reviewed, Client is getting help from an addiction nurse and is ready to discuss struggles with drinking.  Smoking marijuana.                   Tobacco Use: No current tobacco use.       Diagnosis:  296.32 (F33.1) Major Depressive Disorder, Recurrent Episode, Moderate _ and With melancholic features    (F10.20) Alcohol use disorder, moderate, dependence      Collateral Reports Completed:              Not Applicable     PLAN: (Patient Tasks / Therapist Tasks / Other)  Client will return in two weeks and will work on the following goals.  He will work on distraction at night.  He will stay on both medications prescribed by the addiction nurse.  Client will communicate openly  with wife about the struggles.  Client will monitor times he looses his train of thought.  Expresses interest in gene site testing.              Shu KIM Monte,                                                            ______________________________________________________________________     Treatment Plan     Patient's Name: James Blanco                      YOB: 1980     Date: 2-11-20     Diagnoses:      296.32 (F33.1) Major Depressive Disorder, Recurrent Episode, Moderate _ and With melancholic features  (F10.20) Alcohol use disorder, moderate, dependence   Psychosocial & Contextual Factors: Loss of pet   WHODAS:   WHODAS 2.0 Total Score 1/27/2020 2/5/2020   Total Score 14 12         Referral / Collaboration:  Referral to another professional/service is not indicated at this time..     Anticipated number of session or this episode of care: 3-5        MeasurableTreatment Goal(s) related to diagnosis / functional impairment(s)  Goal 1: Client will address grief and loss tied to loss of pet.                 I will know I've met my goal when I am feeling less reactive to the grief.       Objective #A (Client Action)                Client will increase understanding of steps in the grief process.  Status: Continued- 2-3-21     Intervention(s)  Therapist will use CBT and solution focused therapy .     Objective #B  Client will talk to at least two others about losses and coping.                        Status: Continued- 2-3-21     Intervention(s)  Therapist will use solution focused therapy .     Objective #C  Client will work on routine at night to help with sadness.    Status: Continued- 2-3-21     Intervention(s)  Therapist will use CBT therapy .    Goal 2: Client will address struggles with alcohol use disorder.       I will know I've met my goal when I am not having cravings.      Objective #A (Client Action)    Client will maintain sobriety 100% of the time.  Status: New - Date: 2-3-21      Intervention(s)  Therapist will use cognitive and behavioral therapy.    Objective #B  Client will gain supports around remaining sober.    Status: New - Date: 2-3-21     Intervention(s)  Therapist will use solution focused therapy.    Objective #C  Client will identify at least 5 example(s) of how drinking has resulted in an experience that interferes with person values or goals.  Status: New - Date: 2-3-21     Intervention(s)  Therapist will use CBT and solution focused therapy.              Patient has reviewed and agreed to the above plan.        Shu Monte, TH                February 11, 2020

## 2021-03-23 ASSESSMENT — ANXIETY QUESTIONNAIRES: GAD7 TOTAL SCORE: 7

## 2021-04-01 ENCOUNTER — VIRTUAL VISIT (OUTPATIENT)
Dept: PSYCHOLOGY | Facility: CLINIC | Age: 41
End: 2021-04-01
Payer: COMMERCIAL

## 2021-04-01 DIAGNOSIS — F33.1 MAJOR DEPRESSIVE DISORDER, RECURRENT EPISODE, MODERATE (H): Primary | ICD-10-CM

## 2021-04-01 DIAGNOSIS — F10.20 ALCOHOL USE DISORDER, MODERATE, DEPENDENCE (H): ICD-10-CM

## 2021-04-01 PROCEDURE — 90834 PSYTX W PT 45 MINUTES: CPT | Mod: 95 | Performed by: MARRIAGE & FAMILY THERAPIST

## 2021-04-01 NOTE — PROGRESS NOTES
"                                           Progress Note    Patient Name: James Blanco  Date: 21         Service Type: Individual      Session Start Time: 4pm  Session End Time: 450pm     Session Length: 50min    Session #: 9    Attendees: Client attended alone    Service Modality:  Video Visit:      Provider verified identity through the following two step process.  Patient provided:  Patient  and Patient address    Telemedicine Visit: The patient's condition can be safely assessed and treated via synchronous audio and visual telemedicine encounter.      Reason for Telemedicine Visit: Patient has requested telehealth visit    Originating Site (Patient Location): Patient's home    Distant Site (Provider Location): Provider Remote Setting    Consent:  The patient/guardian has verbally consented to: the potential risks and benefits of telemedicine (video visit) versus in person care; bill my insurance or make self-payment for services provided; and responsibility for payment of non-covered services.     Patient would like the video invitation sent by:  My Chart    Mode of Communication:  Video Conference via [employer]well    As the provider I attest to compliance with applicable laws and regulations related to telemedicine.     Treatment Plan Last Reviewed: 2-3-21  PHQ-9 / LINNEA-7 :3-22-21  CGI- 2-3-21    DATA  Interactive Complexity: No  Crisis: No     Progress Since Last Session (Related to Symptoms / Goals / Homework):              Symptoms: Client has been working on sobriety with success and has been finding ways to stay distracted with other activities.  He feels his \"brain fog\" has also improved over the last two weeks.           Homework:  Completed in session, completed outside of session.                            Episode of Care Goals: Minimal progress - ACTION (Actively working towards change); Intervened by reinforcing change plan / affirming steps taken                 Current / Ongoing Stressors and " Concerns:              -Client reports he has been struggling with alcohol abuse.  He reports he has started to work with an addiction nurse and also is on a medication to help with cravings.  He is working on finding ways to distract and stay busy with other activities.  He is having success with this and has started to develop some new routines.     -Client reports they have implemented a new financial plan at home and this has been a challenge.  Bills are shared and then they have their separate accounts for left over hobbies and personal expenses.  Client is working on finding more of a balance with this and finding her has a little more of a cushion each week.     -Client has been spending more time with family and friends and is feeling supported.   -Client has been smoking marijuana as a way to manage alcohol withdrawals.  He reports this has been less as his body heals.     -Working on weight management and being active.                   Treatment Objective(s) Addressed in This Session:          Alcohol abuse   Increasing activity   Managing finances                     Intervention:              -Continue to engage in new activities in the evening.  Walk the dog and explore new hobbies.     -Continue with financial plan but monitor personal expenses more closely.     -Consider a referral to the nutritionist at [facility].     -Get nordic track ready and start off slow.                                    ASSESSMENT: Current Emotional / Mental Status (status of significant symptoms):              Risk status (Self / Other harm or suicidal ideation)              Patient denies current fears or concerns for personal safety.              Patient denies current or recent suicidal ideation or behaviors.              Patientdenies current or recent homicidal ideation or behaviors.              Patient denies current or recent self injurious behavior or ideation.              Patient denies other safety  concerns.              Patient reports there has been no change in risk factors since their last session.                Patientreports there has been no change in protective factors since their last session.                Recommended that patient call 911 or go to the local ED should there be a change in any of these risk factors.                 Appearance:                            Appropriate               Eye Contact:                           Good               Psychomotor Behavior:          Normal               Attitude:                                   Cooperative               Orientation:                             All              Speech                          Rate / Production:       Normal                           Volume:                       Normal               Mood:                                      Depressed and Anxious              Affect:                                      Normal               Thought Content:                    Clear               Thought Form:                        Coherent  Logical               Insight:                                     Good                  Medication Review:             No changes to psychiatric medications, see updated Medication List in EPIC.                  Medication Compliance:              Yes                 Changes in Health Issues:              None reported                 Chemical Use Review:              Substance Use: Chemical use reviewed, Client is getting help from an addiction nurse and is ready to discuss struggles with drinking.  Smoking marijuana.                   Tobacco Use: No current tobacco use.       Diagnosis:  296.32 (F33.1) Major Depressive Disorder, Recurrent Episode, Moderate _ and With melancholic features    (F10.20) Alcohol use disorder, moderate, dependence      Collateral Reports Completed:              Not Applicable     PLAN: (Patient Tasks / Therapist Tasks / Other)  Client will return in two weeks and  will work on the following goals.  He will work on distraction at night. Consider referral for the nutritionist.  Client will communicate openly with wife about the struggles.  Get nordic track out but start off slowly.              Shu Monte,                                                            ______________________________________________________________________     Treatment Plan     Patient's Name: James Blanco                      YOB: 1980     Date: 2-11-20     Diagnoses:      296.32 (F33.1) Major Depressive Disorder, Recurrent Episode, Moderate _ and With melancholic features  (F10.20) Alcohol use disorder, moderate, dependence   Psychosocial & Contextual Factors: Loss of pet   WHODAS:   WHODAS 2.0 Total Score 1/27/2020 2/5/2020   Total Score 14 12         Referral / Collaboration:  Referral to another professional/service is not indicated at this time..     Anticipated number of session or this episode of care: 3-5        MeasurableTreatment Goal(s) related to diagnosis / functional impairment(s)  Goal 1: Client will address grief and loss tied to loss of pet.                 I will know I've met my goal when I am feeling less reactive to the grief.       Objective #A (Client Action)                Client will increase understanding of steps in the grief process.  Status: Continued- 2-3-21     Intervention(s)  Therapist will use CBT and solution focused therapy .     Objective #B  Client will talk to at least two others about losses and coping.                        Status: Continued- 2-3-21     Intervention(s)  Therapist will use solution focused therapy .     Objective #C  Client will work on routine at night to help with sadness.    Status: Continued- 2-3-21     Intervention(s)  Therapist will use CBT therapy .    Goal 2: Client will address struggles with alcohol use disorder.       I will know I've met my goal when I am not having cravings.      Objective #A (Client  Action)    Client will maintain sobriety 100% of the time.  Status: New - Date: 2-3-21     Intervention(s)  Therapist will use cognitive and behavioral therapy.    Objective #B  Client will gain supports around remaining sober.    Status: New - Date: 2-3-21     Intervention(s)  Therapist will use solution focused therapy.    Objective #C  Client will identify at least 5 example(s) of how drinking has resulted in an experience that interferes with person values or goals.  Status: New - Date: 2-3-21     Intervention(s)  Therapist will use CBT and solution focused therapy.              Patient has reviewed and agreed to the above plan.        Shu Monte, TH                February 11, 2020

## 2021-04-14 ENCOUNTER — VIRTUAL VISIT (OUTPATIENT)
Dept: PSYCHOLOGY | Facility: CLINIC | Age: 41
End: 2021-04-14
Payer: COMMERCIAL

## 2021-04-14 DIAGNOSIS — F33.1 MAJOR DEPRESSIVE DISORDER, RECURRENT EPISODE, MODERATE (H): Primary | ICD-10-CM

## 2021-04-14 DIAGNOSIS — F43.10 PTSD (POST-TRAUMATIC STRESS DISORDER): ICD-10-CM

## 2021-04-14 DIAGNOSIS — F10.20 ALCOHOL USE DISORDER, MODERATE, DEPENDENCE (H): ICD-10-CM

## 2021-04-14 PROCEDURE — 90834 PSYTX W PT 45 MINUTES: CPT | Mod: 95 | Performed by: MARRIAGE & FAMILY THERAPIST

## 2021-04-14 ASSESSMENT — ANXIETY QUESTIONNAIRES
6. BECOMING EASILY ANNOYED OR IRRITABLE: NOT AT ALL
5. BEING SO RESTLESS THAT IT IS HARD TO SIT STILL: SEVERAL DAYS
3. WORRYING TOO MUCH ABOUT DIFFERENT THINGS: SEVERAL DAYS
2. NOT BEING ABLE TO STOP OR CONTROL WORRYING: SEVERAL DAYS
7. FEELING AFRAID AS IF SOMETHING AWFUL MIGHT HAPPEN: NOT AT ALL
IF YOU CHECKED OFF ANY PROBLEMS ON THIS QUESTIONNAIRE, HOW DIFFICULT HAVE THESE PROBLEMS MADE IT FOR YOU TO DO YOUR WORK, TAKE CARE OF THINGS AT HOME, OR GET ALONG WITH OTHER PEOPLE: SOMEWHAT DIFFICULT
GAD7 TOTAL SCORE: 5
1. FEELING NERVOUS, ANXIOUS, OR ON EDGE: SEVERAL DAYS

## 2021-04-14 ASSESSMENT — PATIENT HEALTH QUESTIONNAIRE - PHQ9
SUM OF ALL RESPONSES TO PHQ QUESTIONS 1-9: 6
5. POOR APPETITE OR OVEREATING: SEVERAL DAYS

## 2021-04-15 ENCOUNTER — FCC EXTENDED DOCUMENTATION (OUTPATIENT)
Dept: PSYCHOLOGY | Facility: CLINIC | Age: 41
End: 2021-04-15

## 2021-04-15 ASSESSMENT — ANXIETY QUESTIONNAIRES: GAD7 TOTAL SCORE: 5

## 2021-04-24 ENCOUNTER — MYC MEDICAL ADVICE (OUTPATIENT)
Dept: FAMILY MEDICINE | Facility: CLINIC | Age: 41
End: 2021-04-24

## 2021-04-26 NOTE — TELEPHONE ENCOUNTER
Dr. Talbot,    Patient sends my chart message with struggling with smoking again now that he has quit drinking.  Asking for Rx for chantix..   Pended for your consideration. Holley MOSES RN

## 2021-05-07 ENCOUNTER — VIRTUAL VISIT (OUTPATIENT)
Dept: PSYCHOLOGY | Facility: CLINIC | Age: 41
End: 2021-05-07
Payer: COMMERCIAL

## 2021-05-07 DIAGNOSIS — F33.1 MAJOR DEPRESSIVE DISORDER, RECURRENT EPISODE, MODERATE (H): ICD-10-CM

## 2021-05-07 DIAGNOSIS — F10.20 ALCOHOL USE DISORDER, MODERATE, DEPENDENCE (H): ICD-10-CM

## 2021-05-07 DIAGNOSIS — F43.10 PTSD (POST-TRAUMATIC STRESS DISORDER): Primary | ICD-10-CM

## 2021-05-07 PROCEDURE — 90834 PSYTX W PT 45 MINUTES: CPT | Mod: 95 | Performed by: MARRIAGE & FAMILY THERAPIST

## 2021-05-07 ASSESSMENT — ANXIETY QUESTIONNAIRES
GAD7 TOTAL SCORE: 11
1. FEELING NERVOUS, ANXIOUS, OR ON EDGE: MORE THAN HALF THE DAYS
6. BECOMING EASILY ANNOYED OR IRRITABLE: SEVERAL DAYS
7. FEELING AFRAID AS IF SOMETHING AWFUL MIGHT HAPPEN: NOT AT ALL
3. WORRYING TOO MUCH ABOUT DIFFERENT THINGS: SEVERAL DAYS
5. BEING SO RESTLESS THAT IT IS HARD TO SIT STILL: NEARLY EVERY DAY
IF YOU CHECKED OFF ANY PROBLEMS ON THIS QUESTIONNAIRE, HOW DIFFICULT HAVE THESE PROBLEMS MADE IT FOR YOU TO DO YOUR WORK, TAKE CARE OF THINGS AT HOME, OR GET ALONG WITH OTHER PEOPLE: SOMEWHAT DIFFICULT
2. NOT BEING ABLE TO STOP OR CONTROL WORRYING: SEVERAL DAYS

## 2021-05-07 ASSESSMENT — PATIENT HEALTH QUESTIONNAIRE - PHQ9
5. POOR APPETITE OR OVEREATING: NEARLY EVERY DAY
SUM OF ALL RESPONSES TO PHQ QUESTIONS 1-9: 13

## 2021-05-07 NOTE — PROGRESS NOTES
Progress Note    Patient Name: James Blanco  Date: 21         Service Type: Individual      Session Start Time: 4pm  Session End Time: 450pm     Session Length: 50min    Session #: 11    Attendees: Client attended alone    Service Modality:  Video Visit:      Provider verified identity through the following two step process.  Patient provided:  Patient  and Patient address    Telemedicine Visit: The patient's condition can be safely assessed and treated via synchronous audio and visual telemedicine encounter.      Reason for Telemedicine Visit: Patient has requested telehealth visit    Originating Site (Patient Location): Patient's home    Distant Site (Provider Location): Provider Remote Setting    Consent:  The patient/guardian has verbally consented to: the potential risks and benefits of telemedicine (video visit) versus in person care; bill my insurance or make self-payment for services provided; and responsibility for payment of non-covered services.     Patient would like the video invitation sent by:  My Chart    Mode of Communication:  Video Conference via [employer]    As the provider I attest to compliance with applicable laws and regulations related to telemedicine.     Treatment Plan Last Reviewed: 21  PHQ-9 / LINNEA-7 :21  CGI- 21    DATA  Interactive Complexity: No  Crisis: No     Progress Since Last Session (Related to Symptoms / Goals / Homework):              Symptoms: Client has been working on sobriety with success and has been finding ways to stay distracted with other activities.  He reports today was a hard day and he had a break down at work.          Homework:  Completed in session, completed outside of session.                            Episode of Care Goals: Minimal progress - ACTION (Actively working towards change); Intervened by reinforcing change plan / affirming steps taken                 Current / Ongoing Stressors and  Concerns:              -Client reports he has been struggling with alcohol abuse.  He reports he has started to work with an addiction nurse and also is on a medication to help with cravings.  He is working on finding ways to distract and stay busy with other activities.  He is having success with this and has started to develop some new routines.  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     -Client reports they have implemented a new financial plan at home and this has been a challenge.  Bills are shared and then they have their separate accounts for left over hobbies and personal expenses.  Client is working on finding more of a balance with this and finding her has a little more of a cushion each week.  He and his wife are communicating about this and feel they will give it another couple of months to see if it is the right plan for them.     -Client has had significant issues with restless leg syndrome.     -Client had a break down at work today tied to loss of his beloved dog.  He believes the trigger was a song.  Was able to return to work after about 10 minutes.                   Treatment Objective(s) Addressed in This Session:          Alcohol abuse   Increasing activity   Managing finances  Trauma                      Intervention:              -Continue to engage in new activities in the evening.  Walk the dog and start to develop small business.  Finish up housing projects.     -Continue with financial plan but monitor personal expenses more closely.     -Consider EMDR therapy for trauma when we are back in person.     -Try magnesium lotion at night on legs.  Consider seeing sleep specialist to discuss restless leg.                                      ASSESSMENT: Current Emotional / Mental Status (status of significant symptoms):              Risk status (Self / Other harm or suicidal ideation)              Patient denies current fears or  concerns for personal safety.              Patient denies current or recent suicidal ideation or behaviors.              Patientdenies current or recent homicidal ideation or behaviors.              Patient denies current or recent self injurious behavior or ideation.              Patient denies other safety concerns.              Patient reports there has been no change in risk factors since their last session.                Patientreports there has been no change in protective factors since their last session.                Recommended that patient call 911 or go to the local ED should there be a change in any of these risk factors.                 Appearance:                            Appropriate               Eye Contact:                           Good               Psychomotor Behavior:          Normal               Attitude:                                   Cooperative               Orientation:                             All              Speech                          Rate / Production:       Normal                           Volume:                       Normal               Mood:                                      Depressed and Anxious              Affect:                                      Normal               Thought Content:                    Clear               Thought Form:                        Coherent  Logical               Insight:                                     Good                  Medication Review:             No changes to psychiatric medications, see updated Medication List in EPIC.                  Medication Compliance:              Yes                 Changes in Health Issues:              Severe restless leg                  Chemical Use Review:              Substance Use: Chemical use reviewed, Client is getting help from an addiction nurse and is ready to discuss struggles with drinking. Now has medical marijuana card.  Working with their pharmacist and the dispensary.                    Tobacco Use: No current tobacco use.       Diagnosis:  296.32 (F33.1) Major Depressive Disorder, Recurrent Episode, Moderate _ and With melancholic features  F43.10 PTSD    (F10.20) Alcohol use disorder, moderate, dependence      Collateral Reports Completed:              Not Applicable     PLAN: (Patient Tasks / Therapist Tasks / Other)  Client will return in two weeks and will work on the following goals.   -Continue to process through trauma.  -Remain sober 100% of the time.  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    -Consider meeting with sleep specialist.    -Try magnesium lotion on legs at night.               Shu Monte,                                                            ______________________________________________________________________     Treatment Plan     Patient's Name: James Blanco                      YOB: 1980     Date: 2-11-20     Diagnoses:      296.32 (F33.1) Major Depressive Disorder, Recurrent Episode, Moderate _ and With melancholic features  F43.10 PTSD  (F10.20) Alcohol use disorder, moderate, dependence   Psychosocial & Contextual Factors: Loss of pet   WHODAS:   WHODAS 2.0 Total Score 1/27/2020 2/5/2020   Total Score 14 12         Referral / Collaboration:  Referral to another professional/service is not indicated at this time..     Anticipated number of session or this episode of care: 3-5        MeasurableTreatment Goal(s) related to diagnosis / functional impairment(s)  Goal 1: Client will address grief and loss tied to loss of pet.                 I will know I've met my goal when I am feeling less reactive to the grief.       Objective #A (Client Action)                Client will increase understanding of steps in the grief process.  Status: Continued- 2-3-21, 5-7-21     Intervention(s)  Therapist will use CBT and solution focused therapy .     Objective #B  Client will talk to at least two others about losses and  coping.                        Status: Continued- 2-3-21, 5-7-21     Intervention(s)  Therapist will use solution focused therapy .     Objective #C  Client will work on routine at night to help with sadness.    Status: Continued- 2-3-21, 5-7-21     Intervention(s)  Therapist will use CBT therapy .    Goal 2: Client will address struggles with alcohol use disorder.       I will know I've met my goal when I am not having cravings.      Objective #A (Client Action)    Client will maintain sobriety 100% of the time.  Status: Continued - Date(s): 5-7-21    Intervention(s)  Therapist will use cognitive and behavioral therapy.    Objective #B  Client will gain supports around remaining sober.    Status: Continued - Date(s): 5-7-21    Intervention(s)  Therapist will use solution focused therapy.    Objective #C  Client will identify at least 5 example(s) of how drinking has resulted in an experience that interferes with person values or goals.  Status: Continued - Date(s): 5-21-21    Intervention(s)  Therapist will use CBT and solution focused therapy.              Patient has reviewed and agreed to the above plan.        Shu Monte, TH                February 11, 2020

## 2021-05-08 ASSESSMENT — ANXIETY QUESTIONNAIRES: GAD7 TOTAL SCORE: 11

## 2021-05-11 ENCOUNTER — VIRTUAL VISIT (OUTPATIENT)
Dept: FAMILY MEDICINE | Facility: CLINIC | Age: 41
End: 2021-05-11
Payer: COMMERCIAL

## 2021-05-11 DIAGNOSIS — G25.81 RESTLESS LEGS: Primary | ICD-10-CM

## 2021-05-11 DIAGNOSIS — F17.200 TOBACCO USE DISORDER: ICD-10-CM

## 2021-05-11 PROCEDURE — 99214 OFFICE O/P EST MOD 30 MIN: CPT | Mod: 95 | Performed by: FAMILY MEDICINE

## 2021-05-11 NOTE — PATIENT INSTRUCTIONS
Prescription for Chantix was sent to your pharmacy as you requested. Watch out for possible side effects: stomach upset, heartburn, vivid dreams or mood instability.  Call clinic if these are experienced.  Stop smoking completely within 2 weeks of starting the medication.      1 month virtual visit for a follow up on the medications.    Lab tests have been orderd to rule out underlying possible causes of the leg symptoms.  Will treat possible conditions if present.  Consider Ropirinole for restless legs if no concurrent other conditions to be treated.      Patient Education     Staying Smoke-Free  Quitting smoking is a big change. People will congratulate you. You have the right to be proud. But later at times you may miss smoking. Plan ahead to resist temptation.  Prepare to be tempted    If you feel the urge to smoke, distract yourself for about 5 to 15 minutes. Drink water. Call a friend, take a walk, or try deep breathing. Chew gum. Usually, the urge to smoke will pass.    Don t trust yourself to have  just one cigarette.  Many ex-smokers get hooked again that way.    Remind yourself why you quit. Tell yourself you can stay quit.    Stay away from people or places that can trigger you to smoke. Ask others not to smoke in your home or car.    Spend time in places where you can t smoke, such as a museum, a library, a store, or a gym.    Take your nonsmoking life one day at a time. [place] each day on your calendar.    HALT your desire. Keep yourself from feeling too Hungry, Angry, Lonely, or Tired. Deal with your real needs. Eat, talk, or sleep.    Reward yourself! Put aside cigarette money and buy yourself a treat.    Talk with your healthcare provider about using quit-smoking products. These include medicine or a nicotine patch, inhaler, nasal spray, gum, or lozenges. These can help increase your success by reducing urges.    If you slip  You may slip and smoke again. Many ex-smokers slip on the way to success. If  you do, it s not the end of your quit process. Think about what triggered you to smoke. Then think of ways to prevent future slips. Ask yourself what you can learn from the slip. Decide how you will handle this trigger better in the future. Then get back on track--right away!  Don t give up  Keep telling yourself you re no longer a smoker. Don t lose hope. Most people have tried to quit several times before being successful. Try to stay focused on your plan to be smoke-free. Keep in mind all the benefits of staying quit. Millions of people have given up smoking. You can too.  To learn more    www.cdc.gov/tobacco/quit_smoking/ 800-QUIT-NOW (470-967-5974)    www.smokefree.gov 877-44U-QUIT (026-233-4932)    www.lung.org/stop-smoking/ 800-LUNGUSA (390-120-9851)    [employer] last reviewed this educational content on 5/1/2019 2000-2021 [employer]. All rights reserved. This information is not intended as a substitute for professional medical care. Always follow your healthcare professional's instructions.           Patient Education     Tips for Quitting Smoking (Cardiovascular)  Quitting smoking is a gift to yourself. It's one of the best things you can do to keep your heart disease from getting worse. Smoking reduces oxygen flow to your heart. It does this in 2 ways. It speeds the buildup of plaque along the artery walls. And it changes the health of your blood vessels. This raises your risk for heart attack, also known as acute myocardial infarction (AMI). Quitting helps reduce smoking's harmful effects. You may have tried to quit before, but don t give up. Try again. Many smokers try a few times before they succeed. It's never too early to benefit from quitting smoking. This is especially true if you already have ongoing (chronic) conditions such as high blood pressure and high cholesterol. These put you at higher risk for cardiovascular disease. Quitting smoking is a powerful way to reduce your risk  for coronary disease.   Line up help     Ask for the support of your family and friends.    Join a smoking cessation class. Or ask your healthcare provider for a referral to a psychologist who specializes in helping people quit smoking.     Ask your healthcare provider about nicotine replacement products. Also ask about prescription medicines that can help you quit. It may take some time to find a product and schedule that works for you.    Set a quit date    Choose a date in the next 2 to 4 weeks.    After picking a day, idalmis it in bold letters on a calendar.    Your quit list  Ideas to stop smoking include:  1. Start by giving up cigarettes at the times you least need them.  2. Keep some fruit close by at the times you are most likely to reach for a cigarette. For many people, smoking has an oral fixation component. This must be recognized and replaced by a healthy habit.  3. Use a nicotine replacement product instead of a cigarette.  Write down a few more ideas:  ______________________________________________________________________________  ______________________________________________________________________________  ______________________________________________________________________________  Set limits    Limit where you can smoke. Pick one room or a porch. Smoke only in that place.    Make smoking outdoors a house rule. Other smokers won t tempt you as much.    Talk with smokers around you about your intent to stop smoking. Then they can show consideration for you and limit their smoking around you.    Hang a list of  quit benefits  in the spot where you smoke. Put one on the refrigerator and one on your car dashboard.    For more information    National [employer] [place] Smoking Quitlinesmokefree.gov/diut-fd-kj-jkgxbi216-55D-AHLL (330-097-6879)    [employer] last reviewed this educational content on 11/1/2019 2000-2021 [employer]. All rights reserved. This information is not intended as a  substitute for professional medical care. Always follow your healthcare professional's instructions.

## 2021-05-11 NOTE — PROGRESS NOTES
James is a 40 year old who is being evaluated via a billable video visit.      How would you like to obtain your AVS? MyChart  If the video visit is dropped, the invitation should be resent by: Text to cell phone: [phone]  Will anyone else be joining your video visit? No    Video Start Time: 2:44 pm    Assessment & Plan     Restless legs  Patient was advised of possible causes or conditions assocaited with restless legs and lower extremity paresthesias.  Initial work up rdered after discussion with patient.  Treat any underlying condition.  Consider Ropirinole if all tests are normal.  Consider EMG if with more pronounced paresthesias.  - CBC with platelets  - Ferritin  - Vitamin B12  - Erythrocyte sedimentation rate auto  - Folate    Tobacco use disorder  Patient had good result with varenicline in the past per his report. Advised possible worsening of mood disorder though.  Patient still prefers to pursue treatment.  Advised safe use and tips estelle increasing chances of success.  - varenicline (CHANTIX CHAIM) 0.5 MG X 11 & 1 MG X 42 tablet  Dispense: 53 tablet; Refill: 0       Tobacco Cessation:   reports that he has been smoking cigarettes. He has a 6.50 pack-year smoking history. He has quit using smokeless tobacco.  Tobacco Cessation Action Plan: Pharmacotherapies : Chantix  \  Patient Instructions   Prescription for Chantix was sent to your pharmacy as you requested. Watch out for possible side effects: stomach upset, heartburn, vivid dreams or mood instability.  Call clinic if these are experienced.  Stop smoking completely within 2 weeks of starting the medication.      1 month virtual visit for a follow up on the medications.    Lab tests have been orderd to rule out underlying possible causes of the leg symptoms.  Will treat possible conditions if present.  Consider Ropirinole for restless legs if no concurrent other conditions to be treated.      Patient Education     Staying Smoke-Free  Quitting  smoking is a big change. People will congratulate you. You have the right to be proud. But later at times you may miss smoking. Plan ahead to resist temptation.  Prepare to be tempted    If you feel the urge to smoke, distract yourself for about 5 to 15 minutes. Drink water. Call a friend, take a walk, or try deep breathing. Chew gum. Usually, the urge to smoke will pass.    Don t trust yourself to have  just one cigarette.  Many ex-smokers get hooked again that way.    Remind yourself why you quit. Tell yourself you can stay quit.    Stay away from people or places that can trigger you to smoke. Ask others not to smoke in your home or car.    Spend time in places where you can t smoke, such as a museum, a library, a store, or a gym.    Take your nonsmoking life one day at a time. Stoney each day on your calendar.    HALT your desire. Keep yourself from feeling too Hungry, Angry, Lonely, or Tired. Deal with your real needs. Eat, talk, or sleep.    Reward yourself! Put aside cigarette money and buy yourself a treat.    Talk with your healthcare provider about using quit-smoking products. These include medicine or a nicotine patch, inhaler, nasal spray, gum, or lozenges. These can help increase your success by reducing urges.    If you slip  You may slip and smoke again. Many ex-smokers slip on the way to success. If you do, it s not the end of your quit process. Think about what triggered you to smoke. Then think of ways to prevent future slips. Ask yourself what you can learn from the slip. Decide how you will handle this trigger better in the future. Then get back on track--right away!  Don t give up  Keep telling yourself you re no longer a smoker. Don t lose hope. Most people have tried to quit several times before being successful. Try to stay focused on your plan to be smoke-free. Keep in mind all the benefits of staying quit. Millions of people have given up smoking. You can too.  To learn  more    www.cdc.gov/tobacco/quit_smoking/ 800-QUIT-NOW (445-515-4310)    www.smokefree.gov 877-44U-QUIT (069-820-3651)    www.lung.org/stop-smoking/ 800-LUNGUSA (784-238-6631)    Laney last reviewed this educational content on 5/1/2019 2000-2021 [employer]. All rights reserved. This information is not intended as a substitute for professional medical care. Always follow your healthcare professional's instructions.           Patient Education     Tips for Quitting Smoking (Cardiovascular)  Quitting smoking is a gift to yourself. It's one of the best things you can do to keep your heart disease from getting worse. Smoking reduces oxygen flow to your heart. It does this in 2 ways. It speeds the buildup of plaque along the artery walls. And it changes the health of your blood vessels. This raises your risk for heart attack, also known as acute myocardial infarction (AMI). Quitting helps reduce smoking's harmful effects. You may have tried to quit before, but don t give up. Try again. Many smokers try a few times before they succeed. It's never too early to benefit from quitting smoking. This is especially true if you already have ongoing (chronic) conditions such as high blood pressure and high cholesterol. These put you at higher risk for cardiovascular disease. Quitting smoking is a powerful way to reduce your risk for coronary disease.   Line up help     Ask for the support of your family and friends.    Join a smoking cessation class. Or ask your healthcare provider for a referral to a psychologist who specializes in helping people quit smoking.     Ask your healthcare provider about nicotine replacement products. Also ask about prescription medicines that can help you quit. It may take some time to find a product and schedule that works for you.    Set a quit date    Choose a date in the next 2 to 4 weeks.    After picking a day, idalmis it in bold letters on a calendar.    Your quit list  Ideas  to stop smoking include:  1. Start by giving up cigarettes at the times you least need them.  2. Keep some fruit close by at the times you are most likely to reach for a cigarette. For many people, smoking has an oral fixation component. This must be recognized and replaced by a healthy habit.  3. Use a nicotine replacement product instead of a cigarette.  Write down a few more ideas:  ______________________________________________________________________________  ______________________________________________________________________________  ______________________________________________________________________________  Set limits    Limit where you can smoke. Pick one room or a porch. Smoke only in that place.    Make smoking outdoors a house rule. Other smokers won t tempt you as much.    Talk with smokers around you about your intent to stop smoking. Then they can show consideration for you and limit their smoking around you.    Hang a list of  quit benefits  in the spot where you smoke. Put one on the refrigerator and one on your car dashboard.    For more information    National [employer] [place] Smoking Quitlinesmokefree.gov/gfkr-nu-gh-grxwhx471-47I-VLEI (568-297-2239)    Laney last reviewed this educational content on 11/1/2019 2000-2021 [employer]. All rights reserved. This information is not intended as a substitute for professional medical care. Always follow your healthcare professional's instructions.               Return in about 1 month (around 6/11/2021) for Virtual visit for smoking cessation and restless legs follow up.    Hector Talbot MD  [facility] Ramirez is a 40 year old who presents for the following health issues:  Chief Complaint   Patient presents with     Smoking Cessation     Pt would like to quit smoking.     Musculoskeletal Problem     restless legs       HPI     Concern - restless legs  Onset: past 3 weeks  Description:  Pt is having restless legs in the evenings from about 5:00 until he goes to bed.  Once he falls asleep he is usually fine, does not wake him up.  Doesn't happen during the day while he is moving around.  Intensity: severe  Progression of Symptoms:  worsening and constant-daily for the past week  Accompanying Signs & Symptoms: feels like electricity going through his legs, very hard to sit down, legs cramp   Previous history of similar problem: has had mild symptoms but nothing this bad  Precipitating factors:        Worsened by: sitting down in the evenings  Alleviating factors:        Improved by: none  Therapies tried and outcome: stretching muscles helps but does not make symptoms go away      Pt would like to quit smoking.  Has used chantix in the past and quit for 7 years.  During quiting drinking this spring he started smoking again.    Review of Systems   Constitutional, HEENT, cardiovascular, pulmonary, GI, , musculoskeletal, neuro, skin, endocrine and psych systems are negative, except as otherwise noted.      Objective           Vitals:  No vitals were obtained today due to virtual visit.    Physical Exam   GENERAL: Healthy, alert and no distress  EYES: Eyes grossly normal to inspection.  No discharge or erythema, or obvious scleral/conjunctival abnormalities.  RESP: No audible wheeze, cough, or visible cyanosis.  No visible retractions or increased work of breathing.    SKIN: Visible skin clear. No significant rash, abnormal pigmentation or lesions.  NEURO: Cranial nerves grossly intact.  Mentation and speech appropriate for age.  PSYCH: Mentation appears normal, affect normal/bright, judgement and insight intact, normal speech and appearance well-groomed.    No results found for any visits on 05/11/21.        Video-Visit Details    Type of service:  Video Visit    Video End Time:2:56 PM    Originating Location (pt. Location): Home    Distant Location (provider location):  [facility]  [place]     Platform used for Video Visit: Arely

## 2021-05-17 DIAGNOSIS — G25.81 RESTLESS LEGS: ICD-10-CM

## 2021-05-17 DIAGNOSIS — E78.2 MIXED HYPERLIPIDEMIA: ICD-10-CM

## 2021-05-17 LAB
CHOLEST SERPL-MCNC: 156 MG/DL
ERYTHROCYTE [DISTWIDTH] IN BLOOD BY AUTOMATED COUNT: 12 % (ref 10–15)
ERYTHROCYTE [SEDIMENTATION RATE] IN BLOOD BY WESTERGREN METHOD: 7 MM/H (ref 0–15)
FERRITIN SERPL-MCNC: 149 NG/ML (ref 26–388)
FOLATE SERPL-MCNC: 25.8 NG/ML
HCT VFR BLD AUTO: 43 % (ref 40–53)
HDLC SERPL-MCNC: 41 MG/DL
HGB BLD-MCNC: 13.8 G/DL (ref 13.3–17.7)
LDLC SERPL CALC-MCNC: 95 MG/DL
MCH RBC QN AUTO: 31.4 PG (ref 26.5–33)
MCHC RBC AUTO-ENTMCNC: 32.1 G/DL (ref 31.5–36.5)
MCV RBC AUTO: 98 FL (ref 78–100)
NONHDLC SERPL-MCNC: 115 MG/DL
PLATELET # BLD AUTO: 259 10E9/L (ref 150–450)
RBC # BLD AUTO: 4.39 10E12/L (ref 4.4–5.9)
TRIGL SERPL-MCNC: 98 MG/DL
VIT B12 SERPL-MCNC: 321 PG/ML (ref 193–986)
WBC # BLD AUTO: 6.1 10E9/L (ref 4–11)

## 2021-05-17 PROCEDURE — 82728 ASSAY OF FERRITIN: CPT | Performed by: FAMILY MEDICINE

## 2021-05-17 PROCEDURE — 85652 RBC SED RATE AUTOMATED: CPT | Performed by: FAMILY MEDICINE

## 2021-05-17 PROCEDURE — 80061 LIPID PANEL: CPT | Performed by: FAMILY MEDICINE

## 2021-05-17 PROCEDURE — 82607 VITAMIN B-12: CPT | Performed by: FAMILY MEDICINE

## 2021-05-17 PROCEDURE — 82746 ASSAY OF FOLIC ACID SERUM: CPT | Performed by: FAMILY MEDICINE

## 2021-05-17 PROCEDURE — 85027 COMPLETE CBC AUTOMATED: CPT | Performed by: FAMILY MEDICINE

## 2021-05-17 PROCEDURE — 36415 COLL VENOUS BLD VENIPUNCTURE: CPT | Performed by: FAMILY MEDICINE

## 2021-05-18 ENCOUNTER — MYC MEDICAL ADVICE (OUTPATIENT)
Dept: FAMILY MEDICINE | Facility: CLINIC | Age: 41
End: 2021-05-18

## 2021-05-18 ENCOUNTER — VIRTUAL VISIT (OUTPATIENT)
Dept: PSYCHOLOGY | Facility: CLINIC | Age: 41
End: 2021-05-18
Payer: COMMERCIAL

## 2021-05-18 DIAGNOSIS — F33.1 MAJOR DEPRESSIVE DISORDER, RECURRENT EPISODE, MODERATE (H): ICD-10-CM

## 2021-05-18 DIAGNOSIS — G25.81 RESTLESS LEGS SYNDROME (RLS): Primary | ICD-10-CM

## 2021-05-18 DIAGNOSIS — F32.1 MAJOR DEPRESSIVE DISORDER, SINGLE EPISODE, MODERATE (H): ICD-10-CM

## 2021-05-18 DIAGNOSIS — F43.10 PTSD (POST-TRAUMATIC STRESS DISORDER): Primary | ICD-10-CM

## 2021-05-18 PROCEDURE — 90834 PSYTX W PT 45 MINUTES: CPT | Mod: 95 | Performed by: MARRIAGE & FAMILY THERAPIST

## 2021-05-18 RX ORDER — ROPINIROLE 0.5 MG/1
0.5 TABLET, FILM COATED ORAL AT BEDTIME
Qty: 30 TABLET | Refills: 0 | Status: SHIPPED | OUTPATIENT
Start: 2021-05-18 | End: 2021-06-15

## 2021-05-18 ASSESSMENT — ANXIETY QUESTIONNAIRES
2. NOT BEING ABLE TO STOP OR CONTROL WORRYING: SEVERAL DAYS
6. BECOMING EASILY ANNOYED OR IRRITABLE: NOT AT ALL
IF YOU CHECKED OFF ANY PROBLEMS ON THIS QUESTIONNAIRE, HOW DIFFICULT HAVE THESE PROBLEMS MADE IT FOR YOU TO DO YOUR WORK, TAKE CARE OF THINGS AT HOME, OR GET ALONG WITH OTHER PEOPLE: SOMEWHAT DIFFICULT
5. BEING SO RESTLESS THAT IT IS HARD TO SIT STILL: NEARLY EVERY DAY
1. FEELING NERVOUS, ANXIOUS, OR ON EDGE: NOT AT ALL
7. FEELING AFRAID AS IF SOMETHING AWFUL MIGHT HAPPEN: SEVERAL DAYS
3. WORRYING TOO MUCH ABOUT DIFFERENT THINGS: SEVERAL DAYS
GAD7 TOTAL SCORE: 8

## 2021-05-18 ASSESSMENT — PATIENT HEALTH QUESTIONNAIRE - PHQ9
5. POOR APPETITE OR OVEREATING: MORE THAN HALF THE DAYS
SUM OF ALL RESPONSES TO PHQ QUESTIONS 1-9: 11

## 2021-05-18 NOTE — PROGRESS NOTES
Progress Note    Patient Name: James Blanco  Date: 21         Service Type: Individual      Session Start Time: 4pm  Session End Time: 450pm     Session Length: 50min    Session #: 12    Attendees: Client attended alone    Service Modality:  Video Visit:      Provider verified identity through the following two step process.  Patient provided:  Patient  and Patient address    Telemedicine Visit: The patient's condition can be safely assessed and treated via synchronous audio and visual telemedicine encounter.      Reason for Telemedicine Visit: Patient has requested telehealth visit    Originating Site (Patient Location): Patient's home    Distant Site (Provider Location): Provider Remote Setting    Consent:  The patient/guardian has verbally consented to: the potential risks and benefits of telemedicine (video visit) versus in person care; bill my insurance or make self-payment for services provided; and responsibility for payment of non-covered services.     Patient would like the video invitation sent by:  My Chart    Mode of Communication:  Video Conference via Amwell    As the provider I attest to compliance with applicable laws and regulations related to telemedicine.     Treatment Plan Last Reviewed: 21  PHQ-9 / LINNEA-7 :21  CGI- 21    DATA  Interactive Complexity: No  Crisis: No     Progress Since Last Session (Related to Symptoms / Goals / Homework):              Symptoms: Client has been working on sobriety with success and has been finding ways to stay distracted with other activities.  He reports today he is noticing improvement both mentally and physically.          Homework:  Completed in session, completed outside of session.                            Episode of Care Goals: Satisfactory progress - ACTION (Actively working towards change); Intervened by reinforcing change plan / affirming steps taken                 Current / Ongoing  Stressors and Concerns:              -Client reports improvement in general with both mental health and physical health.  Feeling better on a lower dose of prozac and also wanting to taper off.  No struggles with drinking and remaining sober.     -Client reports they have implemented a new financial plan at home and this has been a challenge.  Bills are shared and then they have their separate accounts for left over hobbies and personal expenses.  Client is working on finding more of a balance with this and finding her has a little more of a cushion each week.  He and his wife are communicating about this and feel they will give it another couple of months to see if it is the right plan for them- continued.   -Client followed up on restless leg and is now starting a medication for this.     -Client has a fun weekend planned with his brother in laws that he is really looking forward to.                     Treatment Objective(s) Addressed in This Session:          Sobriety   Increasing activity   Managing finances                      Intervention:              -Continue to engage in new activities in the evening.  Walk the dog and start to develop small business.  Finish up housing projects.  Making good progress around the house.    -Continue with financial plan but monitor personal expenses more closely.     -Consider EMDR therapy for trauma when we are back in person.     -Start medication for restless leg.   -Engage in self-care this weekend and attend trip with brother in-laws.                                ASSESSMENT: Current Emotional / Mental Status (status of significant symptoms):              Risk status (Self / Other harm or suicidal ideation)              Patient denies current fears or concerns for personal safety.              Patient denies current or recent suicidal ideation or behaviors.              Patientdenies current or recent homicidal ideation or behaviors.              Patient denies  current or recent self injurious behavior or ideation.              Patient denies other safety concerns.              Patient reports there has been no change in risk factors since their last session.                Patientreports there has been no change in protective factors since their last session.                Recommended that patient call 911 or go to the local ED should there be a change in any of these risk factors.                 Appearance:                            Appropriate               Eye Contact:                           Good               Psychomotor Behavior:          Normal               Attitude:                                   Cooperative               Orientation:                             All              Speech                          Rate / Production:       Normal                           Volume:                       Normal               Mood:                                      Anxious               Affect:                                      Normal               Thought Content:                    Clear               Thought Form:                        Coherent  Logical               Insight:                                     Good                  Medication Review:             No changes to psychiatric medications, see updated Medication List in EPIC.                  Medication Compliance:              Yes                 Changes in Health Issues:              None                 Chemical Use Review:              Substance Use: Chemical use reviewed, Remaining sober from alcohol.  Now has medical marijuana card.  Working with their pharmacist and the dispensary.                   Tobacco Use: Started chantix for tobacco use        Diagnosis:  296.32 (F33.1) Major Depressive Disorder, Recurrent Episode, Moderate _ and With melancholic features  F43.10 PTSD    (F10.20) Alcohol use disorder, moderate, dependence , In early remission      Collateral Reports Completed:               Not Applicable     PLAN: (Patient Tasks / Therapist Tasks / Other)  Client will return in two weeks and will work on the following goals.   -Continue to process through trauma.  -Remain sober 100% of the time.  -Start medication for restless leg.   -Continue with chantix to stop smoking.  -Attend weekend with brother in-laws for self-care.               Shu Monte,                                                            ______________________________________________________________________     Treatment Plan     Patient's Name: James Blanco                      YOB: 1980     Date: 2-11-20     Diagnoses:      296.32 (F33.1) Major Depressive Disorder, Recurrent Episode, Moderate _ and With melancholic features  F43.10 PTSD  (F10.20) Alcohol use disorder, moderate, dependence   Psychosocial & Contextual Factors: Loss of pet   WHODAS:   WHODAS 2.0 Total Score 1/27/2020 2/5/2020   Total Score 14 12         Referral / Collaboration:  Referral to another professional/service is not indicated at this time..     Anticipated number of session or this episode of care: 3-5        MeasurableTreatment Goal(s) related to diagnosis / functional impairment(s)  Goal 1: Client will address grief and loss tied to loss of pet.                 I will know I've met my goal when I am feeling less reactive to the grief.       Objective #A (Client Action)                Client will increase understanding of steps in the grief process.  Status: Continued- 2-3-21, 5-7-21     Intervention(s)  Therapist will use CBT and solution focused therapy .     Objective #B  Client will talk to at least two others about losses and coping.                        Status: Continued- 2-3-21, 5-7-21     Intervention(s)  Therapist will use solution focused therapy .     Objective #C  Client will work on routine at night to help with sadness.    Status: Continued- 2-3-21, 5-7-21     Intervention(s)  Therapist will use CBT  therapy .    Goal 2: Client will address struggles with alcohol use disorder.       I will know I've met my goal when I am not having cravings.      Objective #A (Client Action)    Client will maintain sobriety 100% of the time.  Status: Continued - Date(s): 5-7-21    Intervention(s)  Therapist will use cognitive and behavioral therapy.    Objective #B  Client will gain supports around remaining sober.    Status: Continued - Date(s): 5-7-21    Intervention(s)  Therapist will use solution focused therapy.    Objective #C  Client will identify at least 5 example(s) of how drinking has resulted in an experience that interferes with person values or goals.  Status: Continued - Date(s): 5-21-21    Intervention(s)  Therapist will use CBT and solution focused therapy.              Patient has reviewed and agreed to the above plan.        Shu Monte, TH                February 11, 2020

## 2021-05-19 ASSESSMENT — ANXIETY QUESTIONNAIRES: GAD7 TOTAL SCORE: 8

## 2021-06-10 ENCOUNTER — MYC MEDICAL ADVICE (OUTPATIENT)
Dept: FAMILY MEDICINE | Facility: CLINIC | Age: 41
End: 2021-06-10

## 2021-06-10 ENCOUNTER — TELEPHONE (OUTPATIENT)
Dept: FAMILY MEDICINE | Facility: CLINIC | Age: 41
End: 2021-06-10

## 2021-06-10 NOTE — TELEPHONE ENCOUNTER
Reason for Call:  Other prescription    Detailed comments: Sandrine from [facility] the insurance pharmacy. Said that they are not going to be covering Chantix that the preferred is Bupropion.   [phone]    Phone Number Patient can be reached at: Home number on file [phone] (home)    Best Time: any    Can we leave a detailed message on this number? YES    Call taken on 6/10/2021 at 11:37 AM by Anna Ray

## 2021-06-15 ENCOUNTER — VIRTUAL VISIT (OUTPATIENT)
Dept: FAMILY MEDICINE | Facility: CLINIC | Age: 41
End: 2021-06-15
Payer: COMMERCIAL

## 2021-06-15 DIAGNOSIS — F43.10 PTSD (POST-TRAUMATIC STRESS DISORDER): ICD-10-CM

## 2021-06-15 DIAGNOSIS — F32.1 MODERATE MAJOR DEPRESSION (H): Primary | ICD-10-CM

## 2021-06-15 DIAGNOSIS — F41.9 ANXIETY: ICD-10-CM

## 2021-06-15 PROCEDURE — 99214 OFFICE O/P EST MOD 30 MIN: CPT | Mod: 95 | Performed by: FAMILY MEDICINE

## 2021-06-15 ASSESSMENT — ANXIETY QUESTIONNAIRES
3. WORRYING TOO MUCH ABOUT DIFFERENT THINGS: SEVERAL DAYS
2. NOT BEING ABLE TO STOP OR CONTROL WORRYING: SEVERAL DAYS
5. BEING SO RESTLESS THAT IT IS HARD TO SIT STILL: NEARLY EVERY DAY
6. BECOMING EASILY ANNOYED OR IRRITABLE: MORE THAN HALF THE DAYS
1. FEELING NERVOUS, ANXIOUS, OR ON EDGE: NOT AT ALL
GAD7 TOTAL SCORE: 10
IF YOU CHECKED OFF ANY PROBLEMS ON THIS QUESTIONNAIRE, HOW DIFFICULT HAVE THESE PROBLEMS MADE IT FOR YOU TO DO YOUR WORK, TAKE CARE OF THINGS AT HOME, OR GET ALONG WITH OTHER PEOPLE: SOMEWHAT DIFFICULT
7. FEELING AFRAID AS IF SOMETHING AWFUL MIGHT HAPPEN: NOT AT ALL

## 2021-06-15 ASSESSMENT — PATIENT HEALTH QUESTIONNAIRE - PHQ9
5. POOR APPETITE OR OVEREATING: NEARLY EVERY DAY
SUM OF ALL RESPONSES TO PHQ QUESTIONS 1-9: 9

## 2021-06-15 NOTE — PATIENT INSTRUCTIONS
You preferred to start referral to collaborative psychiatry to explore your other medical options to manage your anxiety, depression and history of PTSD.  Still can consider Bupropion as alternative to fluoxetine.  Continue behavior therapy.    Patient Education     Depression  Depression is one of the most common mental health problems today. It is not just a state of unhappiness or sadness. It is a true disease. The cause seems to be linked to a drop in chemicals that transmit signals in the brain. Having a family history of depression, alcoholism, or suicide increases the risk. Chronic illness, chronic pain, migraine headaches, and high emotional stress also increase the risk.   Depression is something we tend to recognize in others. But we may have a hard time seeing it in ourselves. It can show in many physical and emotional ways:     Loss of appetite    Overeating    Not being able to sleep    Sleeping too much    Excessive tiredness not linked to physical exertion    Restlessness or irritability    Slowness of movement or speech    Feeling depressed or withdrawn    Loss of interest in things you once enjoyed    Trouble concentrating, remembering, or making decisions    Thoughts of harming or killing oneself, or thoughts that life is not worth living    Low self-esteem  The treatment for depression may include both medicine and psychotherapy. Antidepressants can reduce suffering. They can also improve the ability to function during the depressed period. Therapy can offer emotional support. It can also help you understand emotional factors that may be causing the depression.   Home care    Ongoing care and support help people manage this disease. Find a healthcare provider and therapist who meet your needs. Seek help when you feel like you may be getting ill.    Be kind to yourself. Make it a point to do things that you enjoy (gardening, walking in nature, going to a movie). Reward yourself for small  successes.    Once you start your medicine, expect a slow decrease in your symptoms. Depression will lift gradually, not right away. Ask your healthcare provider how long it will take for the medicine to start working.    Take care of your physical body. Eat a balanced diet (low in saturated fat and high in fruits and vegetables). Exercise at least 3 times a week for 30 minutes. Even mild to moderate exercise (like brisk walking) can make you feel better.    Don't make major decisions, such as a job change, a divorce, or a marriage until you feel better.    Don't drink alcohol. It can make depression worse.    Take medicine as prescribed. Don't stop your medicine or adjust the dose unless you talk with your healthcare provider.    Don't share your medicine or use someone else's medicine.    Tell all your healthcare providers about all the prescription and over-the-counter medicines, vitamins, and supplements you take. Certain supplements interact with medicines. They can cause dangerous side effects. Ask your pharmacist about medicine interactions when you have questions.    Talk with your family and trusted friends about your feelings and thoughts. Ask them to help you notice behavior changes early. You can then get help and, if needed, your medicine can be adjusted.    Talk with your healthcare provider if you are not getting better. He or she may change your medicine or have you try another treatment.    Follow-up care  Follow up with your healthcare provider as advised.   Call 911  Call 911 if you:     Have suicidal thoughts, a suicide plan, and the means to carry out the plan    Have serious thoughts of hurting someone else    Have trouble breathing    Are very confused    Feel very drowsy or have trouble awakening    Faint or lose consciousness    Have new chest pain that becomes more severe, lasts longer, or spreads into your shoulder, arm, neck, jaw, or back  When to seek medical advice  Call your healthcare  provider right away if any of these happen:    Worsening of your symptoms    Feeling extreme depression, fear, anxiety, or anger toward yourself or others    Feeling out of control    Feeling that you may try to harm yourself or another    Hearing voices that others do not hear    Seeing things that others do not see    Not sleeping or eating for 3 days in a row    Having friends or family express concern over your behavior and ask you to seek help  [employer] last reviewed this educational content on 7/1/2020 2000-2021 [employer]. All rights reserved. This information is not intended as a substitute for professional medical care. Always follow your healthcare professional's instructions.           Patient Education     Depression: Tips to Help Yourself    As your healthcare providers help treat your depression, you can also help yourself. Keep in mind that your illness affects you emotionally, physically, mentally, and socially. So full recovery will take time. Take care of your body and your soul, and be patient with yourself as you get better.  Self-care    Educate yourself. Read about treatment and medicine options. If you have the energy, attend local conferences or support groups. Keep a list of useful websites and helpful books and use them as needed. This illness is not your fault. Don t blame yourself for your depression.    Manage early symptoms. If you notice symptoms returning, experience triggers, or identify other factors that may lead to a depressive episode, get help as soon as possible. Ask trusted friends and family to monitor your behavior and let you know if they see anything of concern.    Work with your provider. Find a provider you can trust. Communicate honestly with that person and share information on your treatment for depression and your reaction to medicines.    Be prepared for a crisis. Know what to do if you experience a crisis. Keep the phone number of a crisis hotline  and know the location of your community's urgent care centers and the closest emergency department.    Hold off on big decisions. Depression can cloud your judgment. So wait until you feel better before making major life decisions, such as changing jobs, moving, or getting  or .    Be patient. Recovering from depression is a process. Don t be discouraged if it takes some time to feel better.    Keep it simple. Depression saps your energy and concentration. So you won t be able to do all the things you used to do. Set small goals and do what you can.    Be with others. Don t isolate yourself--you ll only feel worse. Try to be with other people. And take part in fun activities when you can. Go to a movie, ballgame, S[employer] service, or social event. Talk openly with people you can trust. And accept help when it s offered.    Take care of your body  People with depression often lose the desire to take care of themselves. That only makes their problems worse. During treatment and afterward, make a point to:    Exercise. It s a great way to take care of your body. And studies have shown that exercise helps fight depression. Aim for 30 minutes of moderate activity a day. Walking in small blocks of time (5-10 minutes) is a good way to start, but anything that gets you moving (gardening, house cleaning) counts.    Don't use drugs and alcohol. These may ease the pain in the short term. But they ll only make your problems worse in the long run.    Get relief from stress. Ask your healthcare provider for relaxation exercises and techniques to help relieve stress. Consider activities like meditation, yoga, or [employer].    Eat right. A balanced and healthy diet helps keep your body healthy.    Get adequate sleep. Aim for 8 hours per night. Too much or too little sleep can cause other physical and emotional problems.  [employer] last reviewed this educational content on 12/1/2019 2000-2021 [employer]. All rights reserved. This information is not intended as a substitute for professional medical care. Always follow your healthcare professional's instructions.           Patient Education     Recognizing Suicide Warning Signs in Yourself     People who are thinking about suicide may not know they are depressed. Certain thoughts, feelings, and actions can be signals that let you know you may need help. The best thing you can do is watch for signs that you may be at risk. Then, ask for help. You can talk with your regular healthcare provider or get help from a mental health provider.   Depression  Depression is a treatable illness, just like diabetes or heart disease. And like those illnesses, depression is not something that you can just \"snap out of.\" To feel better, treatment is needed before depression gets to a point that it can endanger your life. To know if depression is causing you to feel like ending your life, ask yourself:     Do I feel worthless, guilty, helpless, or hopeless?    Have I been feeling sad, down, or blue on most days?    Have I lost interest in my work or people I used to enjoy?    Do I have trouble sleeping or do I sleep too much?    Do I eat more or less than normal?    Do I feel tired, weak, and low on energy?    Do I feel restless and unable to sit still?    Do I have trouble thinking or making choices?    Do I cry more than normal?    Do I feel life isn't worth living?  Warning signs for suicide  Call your healthcare provider or get help right away if you have any of the warning signs below. You can also call a mental health clinic or a 24-hour suicide crisis hotline for help and support. Warning signs for suicide include:     Thinking often about taking your life    Planning how you may attempt it    Talking or writing about committing suicide    Feeling that death is the only solution to your problems    Feeling a pressing need to make out your will or arrange your "     Giving away things you own    Taking part in risky behaviors, such as having sex with someone you don't know, or drinking and driving    Buying a lethal weapon, such as a gun, or hoarding medicines that could be used in an overdose  Call 911  If you are in immediate risk of harming yourself, call 911  To learn more  For more information about depression and suicide prevention:     National [place] of Mental Health 404-166-8798qtl.[facility].nih.gov    National Suicide Prevention Lifeline 975-595-4139 (715-164-UFSG) www.suicidepreventionlifeline.org    National [place] on Mental Illness 251-898-1077kcm.[place].org    Mental Health Elunrlj029-835-8532xjs.[facility].org    National Suicide Ekctmrj743-364-2351 (196-SUICIDE)  [employer] last reviewed this educational content on 2020-2021 [employer]. All rights reserved. This information is not intended as a substitute for professional medical care. Always follow your healthcare professional's instructions.

## 2021-06-15 NOTE — PROGRESS NOTES
James is a 40 year old who is being evaluated via a billable video visit.      How would you like to obtain your AVS? MyChart  If the video visit is dropped, the invitation should be resent by: Text to cell phone: [phone]  Will anyone else be joining your video visit? No    Video Start Time: 5:01 pm    Assessment & Plan     Moderate major depression (H)  - MENTAL HEALTH REFERRAL  - Adult; Psychiatry; Psychiatry; [facility]: Collaborative Care Psychiatry Service/Bridge to Long-Term Psychiatry as indicated ([phone]); Yes; Several Medications tried and failed; Yes; We will contact you to schedule the a...    PTSD (post-traumatic stress disorder)  - MENTAL HEALTH REFERRAL  - Adult; Psychiatry; Psychiatry; [facility]: [facility] Psych[facility]ry Service/Bridge to Long-Term Psychiatry as indicated ([phone]); Yes; Several Medications tried and failed; Yes; We will contact you to schedule the a...    Anxiety  - MENTAL HEALTH REFERRAL  - Adult; Psychiatry; Psychiatry; [facility]: [facility] Psychiatry Service/Bridge to Long-Term Psychiatry as indicated ([phone]); Yes; Several Medications tried and failed; Yes; We will contact you to schedule the a...    After thorough discussion with patient about his options, he preferred to defer restarting bupropion and wait for collab psych consult.  Continue CBT.    Patient Instructions   You preferred to start referral to collaborative psychiatry to explore your other medical options to manage your anxiety, depression and history of PTSD.  Still can consider Bupropion as alternative to fluoxetine.  Continue behavior therapy.    Patient Education     Depression  Depression is one of the most common mental health problems today. It is not just a state of unhappiness or sadness. It is a true disease. The cause seems to be linked to a drop in chemicals that transmit signals in the brain. Having a family history of depression, alcoholism, or suicide increases the risk.  Chronic illness, chronic pain, migraine headaches, and high emotional stress also increase the risk.   Depression is something we tend to recognize in others. But we may have a hard time seeing it in ourselves. It can show in many physical and emotional ways:     Loss of appetite    Overeating    Not being able to sleep    Sleeping too much    Excessive tiredness not linked to physical exertion    Restlessness or irritability    Slowness of movement or speech    Feeling depressed or withdrawn    Loss of interest in things you once enjoyed    Trouble concentrating, remembering, or making decisions    Thoughts of harming or killing oneself, or thoughts that life is not worth living    Low self-esteem  The treatment for depression may include both medicine and psychotherapy. Antidepressants can reduce suffering. They can also improve the ability to function during the depressed period. Therapy can offer emotional support. It can also help you understand emotional factors that may be causing the depression.   Home care    Ongoing care and support help people manage this disease. Find a healthcare provider and therapist who meet your needs. Seek help when you feel like you may be getting ill.    Be kind to yourself. Make it a point to do things that you enjoy (gardening, walking in nature, going to a movie). Reward yourself for small successes.    Once you start your medicine, expect a slow decrease in your symptoms. Depression will lift gradually, not right away. Ask your healthcare provider how long it will take for the medicine to start working.    Take care of your physical body. Eat a balanced diet (low in saturated fat and high in fruits and vegetables). Exercise at least 3 times a week for 30 minutes. Even mild to moderate exercise (like brisk walking) can make you feel better.    Don't make major decisions, such as a job change, a divorce, or a marriage until you feel better.    Don't drink alcohol. It can make  depression worse.    Take medicine as prescribed. Don't stop your medicine or adjust the dose unless you talk with your healthcare provider.    Don't share your medicine or use someone else's medicine.    Tell all your healthcare providers about all the prescription and over-the-counter medicines, vitamins, and supplements you take. Certain supplements interact with medicines. They can cause dangerous side effects. Ask your pharmacist about medicine interactions when you have questions.    Talk with your family and trusted friends about your feelings and thoughts. Ask them to help you notice behavior changes early. You can then get help and, if needed, your medicine can be adjusted.    Talk with your healthcare provider if you are not getting better. He or she may change your medicine or have you try another treatment.    Follow-up care  Follow up with your healthcare provider as advised.   Call 911  Call 911 if you:     Have suicidal thoughts, a suicide plan, and the means to carry out the plan    Have serious thoughts of hurting someone else    Have trouble breathing    Are very confused    Feel very drowsy or have trouble awakening    Faint or lose consciousness    Have new chest pain that becomes more severe, lasts longer, or spreads into your shoulder, arm, neck, jaw, or back  When to seek medical advice  Call your healthcare provider right away if any of these happen:    Worsening of your symptoms    Feeling extreme depression, fear, anxiety, or anger toward yourself or others    Feeling out of control    Feeling that you may try to harm yourself or another    Hearing voices that others do not hear    Seeing things that others do not see    Not sleeping or eating for 3 days in a row    Having friends or family express concern over your behavior and ask you to seek help  [employer] last reviewed this educational content on 7/1/2020 2000-2021 [employer]. All rights reserved. This information is  not intended as a substitute for professional medical care. Always follow your healthcare professional's instructions.           Patient Education     Depression: Tips to Help Yourself    As your healthcare providers help treat your depression, you can also help yourself. Keep in mind that your illness affects you emotionally, physically, mentally, and socially. So full recovery will take time. Take care of your body and your soul, and be patient with yourself as you get better.  Self-care    Educate yourself. Read about treatment and medicine options. If you have the energy, attend local conferences or support groups. Keep a list of useful websites and helpful books and use them as needed. This illness is not your fault. Don t blame yourself for your depression.    Manage early symptoms. If you notice symptoms returning, experience triggers, or identify other factors that may lead to a depressive episode, get help as soon as possible. Ask trusted friends and family to monitor your behavior and let you know if they see anything of concern.    Work with your provider. Find a provider you can trust. Communicate honestly with that person and share information on your treatment for depression and your reaction to medicines.    Be prepared for a crisis. Know what to do if you experience a crisis. Keep the phone number of a crisis hotline and know the location of your community's urgent care centers and the closest emergency department.    Hold off on big decisions. Depression can cloud your judgment. So wait until you feel better before making major life decisions, such as changing jobs, moving, or getting  or .    Be patient. Recovering from depression is a process. Don t be discouraged if it takes some time to feel better.    Keep it simple. Depression saps your energy and concentration. So you won t be able to do all the things you used to do. Set small goals and do what you can.    Be with others.  Don t isolate yourself--you ll only feel worse. Try to be with other people. And take part in fun activities when you can. Go to a movie, ballgame, Sabianism service, or social event. Talk openly with people you can trust. And accept help when it s offered.    Take care of your body  People with depression often lose the desire to take care of themselves. That only makes their problems worse. During treatment and afterward, make a point to:    Exercise. It s a great way to take care of your body. And studies have shown that exercise helps fight depression. Aim for 30 minutes of moderate activity a day. Walking in small blocks of time (5-10 minutes) is a good way to start, but anything that gets you moving (gardening, house cleaning) counts.    Don't use drugs and alcohol. These may ease the pain in the short term. But they ll only make your problems worse in the long run.    Get relief from stress. Ask your healthcare provider for relaxation exercises and techniques to help relieve stress. Consider activities like meditation, yoga, or Tate [employer].    Eat right. A balanced and healthy diet helps keep your body healthy.    Get adequate sleep. Aim for 8 hours per night. Too much or too little sleep can cause other physical and emotional problems.  [employer] last reviewed this educational content on 12/1/2019 2000-2021 [employer]. All rights reserved. This information is not intended as a substitute for professional medical care. Always follow your healthcare professional's instructions.           Patient Education     Recognizing Suicide Warning Signs in Yourself     People who are thinking about suicide may not know they are depressed. Certain thoughts, feelings, and actions can be signals that let you know you may need help. The best thing you can do is watch for signs that you may be at risk. Then, ask for help. You can talk with your regular healthcare provider or get help from a mental health  "provider.   Depression  Depression is a treatable illness, just like diabetes or heart disease. And like those illnesses, depression is not something that you can just \"snap out of.\" To feel better, treatment is needed before depression gets to a point that it can endanger your life. To know if depression is causing you to feel like ending your life, ask yourself:     Do I feel worthless, guilty, helpless, or hopeless?    Have I been feeling sad, down, or blue on most days?    Have I lost interest in my work or people I used to enjoy?    Do I have trouble sleeping or do I sleep too much?    Do I eat more or less than normal?    Do I feel tired, weak, and low on energy?    Do I feel restless and unable to sit still?    Do I have trouble thinking or making choices?    Do I cry more than normal?    Do I feel life isn't worth living?  Warning signs for suicide  Call your healthcare provider or get help right away if you have any of the warning signs below. You can also call a mental health clinic or a 24-hour suicide crisis hotline for help and support. Warning signs for suicide include:     Thinking often about taking your life    Planning how you may attempt it    Talking or writing about committing suicide    Feeling that death is the only solution to your problems    Feeling a pressing need to make out your will or arrange your     Giving away things you own    Taking part in risky behaviors, such as having sex with someone you don't know, or drinking and driving    Buying a lethal weapon, such as a gun, or hoarding medicines that could be used in an overdose  Call 911  If you are in immediate risk of harming yourself, call 911  To learn more  For more information about depression and suicide prevention:     National [place] of Mental Health 024-950-7195dlf.[facility].nih.gov    National Suicide Prevention Lifeline 932-348-0274 (648-095-XHEK) www.suicidepreventionlifeline.org    National [place] on Mental " Illness 740-374-4557pdx.[facility].org    Mental Health Inxgcly059-262-0674yit.[facility].org    National Suicide Qkrsunk706-568-0481 (800-SUICIDE)  [employer] last reviewed this educational content on 2020-2021 [employer]. All rights reserved. This information is not intended as a substitute for professional medical care. Always follow your healthcare professional's instructions.               No follow-ups on file.    Hector Talbot MD  [facility]    Anibal Ramirez is a 40 year old who presents for the following health issues:  Chief Complaint   Patient presents with     Depression     Pt being seen for a f/u on depression.       HPI     Depression Followup    How are you doing with your depression since your last visit? Pt has been having good and bad days.  He took his last dose of fluoxetine the middle of May.  He is having some withdrawal symptoms.  (confusion, memory loss worse than before)    Are you having other symptoms that might be associated with depression? Yes:  short tempered, more irritable,     Have you had a significant life event?  Grief or Loss, grandma passed away, stopping alcohol in January, medical cannibas started in middle of April     Are you feeling anxious or having panic attacks?   No    Do you have any concerns with your use of alcohol or other drugs? No    Social History     Tobacco Use     Smoking status: Former Smoker     Packs/day: 0.50     Years: 13.00     Pack years: 6.50     Types: Cigarettes     Quit date: 2021     Years since quittin.0     Smokeless tobacco: Former User   Substance Use Topics     Alcohol use: Not Currently     Comment: sober     Drug use: Yes     Types: Marijuana     Comment: h/o street drug use 15 yrs ago     PHQ 2021 2021 6/15/2021   PHQ-9 Total Score 13 11 9   Q9: Thoughts of better off dead/self-harm past 2 weeks Not at all Not at all Not at all     LINNEA-7 SCORE 2021 2021 6/15/2021    Total Score - - -   Total Score 11 8 10     Last PHQ-9 6/15/2021   1.  Little interest or pleasure in doing things 1   2.  Feeling down, depressed, or hopeless 0   3.  Trouble falling or staying asleep, or sleeping too much 1   4.  Feeling tired or having little energy 1   5.  Poor appetite or overeating 1   6.  Feeling bad about yourself 0   7.  Trouble concentrating 3   8.  Moving slowly or restless 2   Q9: Thoughts of better off dead/self-harm past 2 weeks 0   PHQ-9 Total Score 9   Difficulty at work, home, or with people Somewhat difficult     LINNEA-7  6/15/2021   1. Feeling nervous, anxious, or on edge 0   2. Not being able to stop or control worrying 1   3. Worrying too much about different things 1   4. Trouble relaxing 3   5. Being so restless that it is hard to sit still 3   6. Becoming easily annoyed or irritable 2   7. Feeling afraid, as if something awful might happen 0   LINNEA-7 Total Score 10   If you checked any problems, how difficult have they made it for you to do your work, take care of things at home, or get along with other people? Somewhat difficult       Suicide Assessment Five-step Evaluation and Treatment (SAFE-T)      How many servings of fruits and vegetables do you eat daily?  0-1    On average, how many sweetened beverages do you drink each day (Examples: soda, juice, sweet tea, etc.  Do NOT count diet or artificially sweetened beverages)?   2    How many days per week do you exercise enough to make your heart beat faster? none    How many minutes a day do you exercise enough to make your heart beat faster? none    How many days per week do you miss taking your medication? Not taking    Review of Systems   C: NEGATIVE for fever, chills, change in weight  I: NEGATIVE for worrisome rashes, moles or lesions  E: NEGATIVE for vision changes or irritation  E/M: NEGATIVE for ear, mouth and throat problems  R: NEGATIVE for significant cough or SOB  CV: NEGATIVE for chest pain, palpitations or  peripheral edema  GI: NEGATIVE for nausea, abdominal pain, heartburn, or change in bowel habits  : NEGATIVE for frequency, dysuria, or hematuria  N: NEGATIVE for weakness, dizziness or paresthesias  E: NEGATIVE for temperature intolerance, skin/hair changes  PSYCHIATRIC: see above      Objective           Vitals:  No vitals were obtained today due to virtual visit.    Physical Exam   GENERAL: Healthy, alert and no distress  EYES: Eyes grossly normal to inspection.  No discharge or erythema, or obvious scleral/conjunctival abnormalities.  RESP: No audible wheeze, cough, or visible cyanosis.  No visible retractions or increased work of breathing.    SKIN: Visible skin clear. No significant rash, abnormal pigmentation or lesions.  NEURO: Cranial nerves grossly intact.  Mentation and speech appropriate for age.  PSYCH: Mentation appears normal, affect normal/bright, judgement and insight intact, normal speech and appearance well-groomed.    No results found for any visits on 06/15/21.        Video-Visit Details    Type of service:  Video Visit    Video End Time:5:14 PM    Originating Location (pt. Location): Home    Distant Location (provider location):  [facility]     Platform used for Video Visit: POTATOSOFT

## 2021-06-16 ENCOUNTER — VIRTUAL VISIT (OUTPATIENT)
Dept: PSYCHOLOGY | Facility: CLINIC | Age: 41
End: 2021-06-16
Payer: COMMERCIAL

## 2021-06-16 DIAGNOSIS — F10.20 ALCOHOL USE DISORDER, MODERATE, DEPENDENCE (H): ICD-10-CM

## 2021-06-16 DIAGNOSIS — F43.10 PTSD (POST-TRAUMATIC STRESS DISORDER): Primary | ICD-10-CM

## 2021-06-16 DIAGNOSIS — F33.1 MAJOR DEPRESSIVE DISORDER, RECURRENT EPISODE, MODERATE (H): ICD-10-CM

## 2021-06-16 PROCEDURE — 90834 PSYTX W PT 45 MINUTES: CPT | Mod: 95 | Performed by: MARRIAGE & FAMILY THERAPIST

## 2021-06-16 ASSESSMENT — ANXIETY QUESTIONNAIRES: GAD7 TOTAL SCORE: 10

## 2021-06-17 NOTE — PROGRESS NOTES
Progress Note    Patient Name: James Blanco  Date: 21         Service Type: Individual      Session Start Time: 4pm  Session End Time: 450pm     Session Length: 50min    Session #: 13    Attendees: Client attended alone    Service Modality:  Video Visit:      Provider verified identity through the following two step process.  Patient provided:  Patient  and Patient address    Telemedicine Visit: The patient's condition can be safely assessed and treated via synchronous audio and visual telemedicine encounter.      Reason for Telemedicine Visit: Patient has requested telehealth visit    Originating Site (Patient Location): Patient's home    Distant Site (Provider Location): Provider Remote Setting    Consent:  The patient/guardian has verbally consented to: the potential risks and benefits of telemedicine (video visit) versus in person care; bill my insurance or make self-payment for services provided; and responsibility for payment of non-covered services.     Patient would like the video invitation sent by:  My Chart    Mode of Communication:  Video Conference via "[employer]    As the provider I attest to compliance with applicable laws and regulations related to telemedicine.     Treatment Plan Last Reviewed: 21  PHQ-9 / LNINEA-7 :Completed at an appointment the day before   CGI- 21    DATA  Interactive Complexity: No  Crisis: No     Progress Since Last Session (Related to Symptoms / Goals / Homework):              Symptoms: Client has been working on sobriety with success and has been finding ways to stay distracted with other activities.  He was having a difficult time withdrawing from this medication as it was making him feel worse.  He will be seeing psychiatry in the near future.          Homework:  Completed in session, completed outside of session.                            Episode of Care Goals: Satisfactory progress - ACTION (Actively working towards  change); Intervened by reinforcing change plan / affirming steps taken                 Current / Ongoing Stressors and Concerns:              -Client reports improvement in general with both mental health and physical health.   Did have a rough time tapering prozac and now is off.      -Client reports they have implemented a new financial plan at home and this has been a challenge.  Bills are shared and then they have their separate accounts for left over hobbies and personal expenses.  Client is working on finding more of a balance with this and finding her has a little more of a cushion each week.  He and his wife are communicating about this and feel they will give it another couple of months to see if it is the right plan for them- continued.   -Client will be seeing psychiatry in the near future.     -Starting to enjoy activities with others without using alcohol.     -Working on starting up a small business.                    Treatment Objective(s) Addressed in This Session:          Sobriety   Increasing activity/ motivation   Managing finances                      Intervention:              -Continue to engage in new activities in the evening.  Walk the dog and start to develop small business.  Feeling more motivated to work on small business.     -Continue with financial plan but monitor personal expenses more closely.     -Consider EMDR therapy for trauma when we are back in person.     -Participate in activities without using alcohol.     -See psychiatry in the near future.                                ASSESSMENT: Current Emotional / Mental Status (status of significant symptoms):              Risk status (Self / Other harm or suicidal ideation)              Patient denies current fears or concerns for personal safety.              Patient denies current or recent suicidal ideation or behaviors.              Patientdenies current or recent homicidal ideation or behaviors.              Patient denies  current or recent self injurious behavior or ideation.              Patient denies other safety concerns.              Patient reports there has been no change in risk factors since their last session.                Patientreports there has been no change in protective factors since their last session.                Recommended that patient call 911 or go to the local ED should there be a change in any of these risk factors.                 Appearance:                            Appropriate               Eye Contact:                           Good               Psychomotor Behavior:          Normal               Attitude:                                   Cooperative               Orientation:                             All              Speech                          Rate / Production:       Normal                           Volume:                       Normal               Mood:                                      Anxious               Affect:                                      Normal               Thought Content:                    Clear               Thought Form:                        Coherent  Logical               Insight:                                     Good                  Medication Review:            changes to psychiatric medications, see updated Medication List in EPIC.                  Medication Compliance:              Yes                 Changes in Health Issues:              None                 Chemical Use Review:              Substance Use: Chemical use reviewed, Remaining sober from alcohol.  Now has medical marijuana card.  Working with their pharmacist and the dispensary.                   Tobacco Use: Started chantix for tobacco use        Diagnosis:  296.32 (F33.1) Major Depressive Disorder, Recurrent Episode, Moderate _ and With melancholic features  F43.10 PTSD    (F10.20) Alcohol use disorder, moderate, dependence , In early remission      Collateral Reports Completed:               Not Applicable     PLAN: (Patient Tasks / Therapist Tasks / Other)  Client will return in two weeks and will work on the following goals.   -Continue to process through trauma.  -Remain sober 100% of the time.  -Schedule with psychiatry.  -Work on getting small business running.               Shu Monte,                                                            ______________________________________________________________________     Treatment Plan     Patient's Name: James Blanco                      YOB: 1980     Date: 2-11-20     Diagnoses:      296.32 (F33.1) Major Depressive Disorder, Recurrent Episode, Moderate _ and With melancholic features  F43.10 PTSD  (F10.20) Alcohol use disorder, moderate, dependence   Psychosocial & Contextual Factors: Loss of pet   WHODAS:   WHODAS 2.0 Total Score 1/27/2020 2/5/2020   Total Score 14 12         Referral / Collaboration:  Referral to another professional/service is not indicated at this time..     Anticipated number of session or this episode of care: 3-5        MeasurableTreatment Goal(s) related to diagnosis / functional impairment(s)  Goal 1: Client will address grief and loss tied to loss of pet.                 I will know I've met my goal when I am feeling less reactive to the grief.       Objective #A (Client Action)                Client will increase understanding of steps in the grief process.  Status: Continued- 2-3-21, 5-7-21     Intervention(s)  Therapist will use CBT and solution focused therapy .     Objective #B  Client will talk to at least two others about losses and coping.                        Status: Continued- 2-3-21, 5-7-21     Intervention(s)  Therapist will use solution focused therapy .     Objective #C  Client will work on routine at night to help with sadness.    Status: Continued- 2-3-21, 5-7-21     Intervention(s)  Therapist will use CBT therapy .    Goal 2: Client will address struggles with alcohol use  disorder.       I will know I've met my goal when I am not having cravings.      Objective #A (Client Action)    Client will maintain sobriety 100% of the time.  Status: Continued - Date(s): 5-7-21    Intervention(s)  Therapist will use cognitive and behavioral therapy.    Objective #B  Client will gain supports around remaining sober.    Status: Continued - Date(s): 5-7-21    Intervention(s)  Therapist will use solution focused therapy.    Objective #C  Client will identify at least 5 example(s) of how drinking has resulted in an experience that interferes with person values or goals.  Status: Continued - Date(s): 5-21-21    Intervention(s)  Therapist will use CBT and solution focused therapy.              Patient has reviewed and agreed to the above plan.        Shu Monte, TH                February 11, 2020

## 2021-06-21 ENCOUNTER — MYC MEDICAL ADVICE (OUTPATIENT)
Dept: FAMILY MEDICINE | Facility: CLINIC | Age: 41
End: 2021-06-21

## 2021-06-22 ENCOUNTER — TELEPHONE (OUTPATIENT)
Dept: PSYCHOLOGY | Facility: CLINIC | Age: 41
End: 2021-06-22

## 2021-06-22 DIAGNOSIS — F32.1 MODERATE MAJOR DEPRESSION (H): ICD-10-CM

## 2021-06-22 PROCEDURE — 999N001204 HC STATISTIC GENESIGHT PSYCHOTROPIC: Performed by: NURSE PRACTITIONER

## 2021-06-22 PROCEDURE — 999N001211 HC STATISTIC GENESIGHT MTHFR: Performed by: NURSE PRACTITIONER

## 2021-06-22 NOTE — TELEPHONE ENCOUNTER
Called and talked with client who had messaged through my chart stating he was having some safety concern type thoughts.  He describes these as passive and states he was disturbed that he was having them.  He reports the trigger was having a couple of bad days with random weird things happening like forgetting his wallet and his mirror popping off his car.  Able to contract for safety and did have a conversation with his wife about feeling off.  Has gene site testing on the way and also has an appointment next week.  Sending some safety resources through the mail and will complete detailed safety plan with client.

## 2021-06-22 NOTE — TELEPHONE ENCOUNTER
Noted the update from patient.  If his symptoms worsen, he develops hallucinations or erratic behavior, he should be brought to the ER for a more acute evaluation.

## 2021-06-23 ENCOUNTER — MYC MEDICAL ADVICE (OUTPATIENT)
Dept: FAMILY MEDICINE | Facility: CLINIC | Age: 41
End: 2021-06-23

## 2021-06-28 ENCOUNTER — TELEPHONE (OUTPATIENT)
Dept: ADDICTION MEDICINE | Facility: CLINIC | Age: 41
End: 2021-06-28

## 2021-06-28 ENCOUNTER — MYC MEDICAL ADVICE (OUTPATIENT)
Dept: FAMILY MEDICINE | Facility: CLINIC | Age: 41
End: 2021-06-28

## 2021-06-28 LAB
LAB SCANNED RESULT: ABNORMAL
LAB SCANNED RESULT: NORMAL

## 2021-06-28 NOTE — TELEPHONE ENCOUNTER
Reviewed [employer] test results with patient via telephone.  Questions answered to the best of my ability.  References sent to patient in [employer] to assist him in researching medication options and learning about homozygous MTHFR gene mutation.      No other action needed at this time.  Patient to call [facility] to schedule initial psychiatry appointment for medication management.

## 2021-06-28 NOTE — TELEPHONE ENCOUNTER
Provider sent [employer] message to patient to let him know that his Genes[employer] test results have come back.

## 2021-06-29 ENCOUNTER — MYC MEDICAL ADVICE (OUTPATIENT)
Dept: FAMILY MEDICINE | Facility: CLINIC | Age: 41
End: 2021-06-29

## 2021-06-29 DIAGNOSIS — F32.1 MODERATE MAJOR DEPRESSION (H): Primary | ICD-10-CM

## 2021-06-30 ENCOUNTER — VIRTUAL VISIT (OUTPATIENT)
Dept: PSYCHOLOGY | Facility: CLINIC | Age: 41
End: 2021-06-30
Payer: COMMERCIAL

## 2021-06-30 DIAGNOSIS — F10.20 ALCOHOL USE DISORDER, MODERATE, DEPENDENCE (H): ICD-10-CM

## 2021-06-30 DIAGNOSIS — F43.10 PTSD (POST-TRAUMATIC STRESS DISORDER): Primary | ICD-10-CM

## 2021-06-30 DIAGNOSIS — F33.1 MAJOR DEPRESSIVE DISORDER, RECURRENT EPISODE, MODERATE (H): ICD-10-CM

## 2021-06-30 PROCEDURE — 90834 PSYTX W PT 45 MINUTES: CPT | Mod: 95 | Performed by: MARRIAGE & FAMILY THERAPIST

## 2021-06-30 ASSESSMENT — ANXIETY QUESTIONNAIRES
7. FEELING AFRAID AS IF SOMETHING AWFUL MIGHT HAPPEN: NOT AT ALL
3. WORRYING TOO MUCH ABOUT DIFFERENT THINGS: SEVERAL DAYS
GAD7 TOTAL SCORE: 7
2. NOT BEING ABLE TO STOP OR CONTROL WORRYING: SEVERAL DAYS
1. FEELING NERVOUS, ANXIOUS, OR ON EDGE: NOT AT ALL
5. BEING SO RESTLESS THAT IT IS HARD TO SIT STILL: MORE THAN HALF THE DAYS
IF YOU CHECKED OFF ANY PROBLEMS ON THIS QUESTIONNAIRE, HOW DIFFICULT HAVE THESE PROBLEMS MADE IT FOR YOU TO DO YOUR WORK, TAKE CARE OF THINGS AT HOME, OR GET ALONG WITH OTHER PEOPLE: SOMEWHAT DIFFICULT
6. BECOMING EASILY ANNOYED OR IRRITABLE: MORE THAN HALF THE DAYS

## 2021-06-30 ASSESSMENT — PATIENT HEALTH QUESTIONNAIRE - PHQ9
SUM OF ALL RESPONSES TO PHQ QUESTIONS 1-9: 11
5. POOR APPETITE OR OVEREATING: SEVERAL DAYS

## 2021-06-30 NOTE — PROGRESS NOTES
Progress Note    Patient Name: James Blanco  Date: 21         Service Type: Individual      Session Start Time: 4pm  Session End Time: 450pm     Session Length: 50min    Session #: 14    Attendees: Client attended alone    Service Modality:  Video Visit:      Provider verified identity through the following two step process.  Patient provided:  Patient  and Patient address    Telemedicine Visit: The patient's condition can be safely assessed and treated via synchronous audio and visual telemedicine encounter.      Reason for Telemedicine Visit: Patient has requested telehealth visit    Originating Site (Patient Location): Patient's home    Distant Site (Provider Location): Provider Remote Setting    Consent:  The patient/guardian has verbally consented to: the potential risks and benefits of telemedicine (video visit) versus in person care; bill my insurance or make self-payment for services provided; and responsibility for payment of non-covered services.     Patient would like the video invitation sent by:  My Chart    Mode of Communication:  Video Conference via [employer]    As the provider I attest to compliance with applicable laws and regulations related to telemedicine.     Treatment Plan Last Reviewed: 21  PHQ-9 / LINNEA-7 :21  CGI- 21    DATA  Interactive Complexity: No  Crisis: No     Progress Since Last Session (Related to Symptoms / Goals / Homework):              Symptoms: Client has been working on sobriety with success and has been finding ways to stay distracted with other activities.  He was having some passive suicidal thoughts last week and contacted me and did a safety plan.  This week he is not having any suicidal thoughts.          Homework:  Completed in session, completed outside of session.                            Episode of Care Goals: Satisfactory progress - ACTION (Actively working towards change); Intervened by reinforcing  change plan / affirming steps taken                 Current / Ongoing Stressors and Concerns:              -Client was having some passive suicidal thoughts last week and contacted me.  Adding the safety plan to progress note today and client has not had any recurring suicidal thoughts.     -Client reports they have implemented a new financial plan at home and this has been a challenge.  Bills are shared and then they have their separate accounts for left over hobbies and personal expenses.  Client is working on finding more of a balance with this and finding her has a little more of a cushion each week.  He and his wife are communicating about this and feel they will give it another couple of months to see if it is the right plan for them- continued.   -Client will be seeing psychiatry in the near future.  Did get gene site testing back and will be starting effexor.     -Starting to enjoy activities with others without using alcohol.     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                   Treatment Objective(s) Addressed in This Session:          Sobriety   Increasing activity/ motivation   Managing finances                      Intervention:              -Continue to engage in new activities in the evening.  Walk the dog and work on small business.     -Continue with financial plan but monitor personal expenses more closely.     -Consider EMDR therapy for trauma when we are back in person.     -Participate in activities without using alcohol.     -See psychiatry in the near future and start effexor in the meantime.     -Follow safety plan and use crisis resources.                                  ASSESSMENT: Current Emotional / Mental Status (status of significant symptoms):              Risk status (Self / Other harm or suicidal ideation)              Patient denies current fears or concerns for personal safety.              Patient reports current or  "recent suicidal ideation or behaviors.  Had passive thoughts one week ago.  Intensity a 2 out of 10 and lasted a couples of seconds.                Patientdenies current or recent homicidal ideation or behaviors.              Patient denies current or recent self injurious behavior or ideation.              Patient denies other safety concerns.              Patient reports there has been no change in risk factors since their last session.                Patientreports there has been no change in protective factors since their last session.                Completed safety plan and reviewed 0n 6-30-21    James Blanco                SAFETY PLAN:  Step 1: Warning signs / cues (Thoughts, images, mood, situation, behavior) that a crisis may be developing:  ? Thoughts: \"People would be better off without me\", \"I'm a burden\", \"I can't do this anymore\", \"I just want this to end\" and \"Nothing makes it better\"  ? Images: visions of harm: Driving into an oncoming nikhil of traffic  ? Thinking Processes: ruminations (can't stop thinking about my problems): Having more than one bad day in a row, racing thoughts and intrusive thoughts (bothersome, unwanted thoughts that come out of nowhere): Thoughts in general about self harm  ? Mood: worsening depression, hopelessness, intense worry and mood swings  ? Behaviors: isolating/withdrawing , not taking care of myself, not taking care of my responsibilities and not sleeping enough  ? Situations: Feeling overwhelmed with too much going on at once   Step 2: Coping strategies - Things I can do to take my mind off of my problems without contacting another person (relaxation technique, physical activity):  ? Distress Tolerance Strategies:  play with my pet , change body temperature (ice pack/cold water) , paced breathing/progressive muscle relaxation and working on business plan for distraction  ? Physical Activities: go for a walk, deep breathing, stretching  and working outside on " "things/ in the garage  ? Focus on helpful thoughts:  \"This is temporary\", \"I will get through this\" and \"It always passes\"  Step 3: People and social settings that provide distraction:              Name: Antonette (wife)         Phone: Lives with              Name:  Extended family both bio family and in-laws     ? Outside working on things, going to the cabin or a trip with family or friends       Step 4: Remind myself of people and things that are important to me and worth living for:    Family  Friends  Dog  Future plans   Step 5: When I am in crisis, I can ask these people to help me use my safety plan:              Name: Antonette (wife)         Phone: [phone]              Name: Rayray Blanco   Phone: [phone]                Step 6: Making the environment safe:   ? remove alcohol, secure medications, dispose of old medications , remove access to firearms, remove things I could use to hurt myself and alternate routes.  Step 7: Professionals or agencies I can contact during a crisis:  ? [facility] Daytime Number: [phone]  ? Suicide Prevention Lifeline: 2-808-942-NAID (7075)  ? Crisis Text Line Service (available 24 hours a day, 7 days a week): Text [place] to 834188    Local Crisis Services: EyeQuantmvnuvb-6425-437-8255     Call 911 or go to my nearest emergency department.       I helped develop this safety plan and agree to use it when needed.  I have been given a copy of this plan.       Client signature _________________________________________________________________  Today s date:  6/23/2021  Adapted from Safety Plan Template 2008 Nisha Sparks and Chalo Wills is reprinted with the express permission of the authors.  No portion of the Safety Plan Template may be reproduced without the express, written permission.  You can contact the authors at [email or web address] or [email or web address].                         Appearance:                            Appropriate               Eye " Contact:                           Good               Psychomotor Behavior:          Normal               Attitude:                                   Cooperative               Orientation:                             All              Speech                          Rate / Production:       Normal                           Volume:                       Normal               Mood:                                      Anxious               Affect:                                      Normal               Thought Content:                    Clear               Thought Form:                        Coherent  Logical               Insight:                                     Good                  Medication Review:            changes to psychiatric medications, see updated Medication List in EPIC.                  Medication Compliance:              Yes                 Changes in Health Issues:              None                 Chemical Use Review:              Substance Use: Chemical use reviewed, Remaining sober from alcohol.  Now has medical marijuana card.  Working with their pharmacist and the dispensary.                   Tobacco Use: Started chantix for tobacco use        Diagnosis:  296.32 (F33.1) Major Depressive Disorder, Recurrent Episode, Moderate _ and With melancholic features  F43.10 PTSD    (F10.20) Alcohol use disorder, moderate, dependence , In early remission      Collateral Reports Completed:              Not Applicable     PLAN: (Patient Tasks / Therapist Tasks / Other)  Client will return in two weeks and will work on the following goals.   -Continue to process through trauma.  -Remain sober 100% of the time.  -Start effexor  -Work on getting small business running.  -Follow safety plan and use crisis resources.                 Shu Monte,                                                            ______________________________________________________________________     Treatment  Plan     Patient's Name: James Blanco                      YOB: 1980     Date: 2-11-20     Diagnoses:      296.32 (F33.1) Major Depressive Disorder, Recurrent Episode, Moderate _ and With melancholic features  F43.10 PTSD  (F10.20) Alcohol use disorder, moderate, dependence   Psychosocial & Contextual Factors: Loss of pet   WHODAS:   WHODAS 2.0 Total Score 1/27/2020 2/5/2020   Total Score 14 12         Referral / Collaboration:  Referral to another professional/service is not indicated at this time..     Anticipated number of session or this episode of care: 3-5        MeasurableTreatment Goal(s) related to diagnosis / functional impairment(s)  Goal 1: Client will address grief and loss tied to loss of pet.                 I will know I've met my goal when I am feeling less reactive to the grief.       Objective #A (Client Action)                Client will increase understanding of steps in the grief process.  Status: Continued- 2-3-21, 5-7-21     Intervention(s)  Therapist will use CBT and solution focused therapy .     Objective #B  Client will talk to at least two others about losses and coping.                        Status: Continued- 2-3-21, 5-7-21     Intervention(s)  Therapist will use solution focused therapy .     Objective #C  Client will work on routine at night to help with sadness.    Status: Continued- 2-3-21, 5-7-21     Intervention(s)  Therapist will use CBT therapy .    Goal 2: Client will address struggles with alcohol use disorder.       I will know I've met my goal when I am not having cravings.      Objective #A (Client Action)    Client will maintain sobriety 100% of the time.  Status: Continued - Date(s): 5-7-21    Intervention(s)  Therapist will use cognitive and behavioral therapy.    Objective #B  Client will gain supports around remaining sober.    Status: Continued - Date(s): 5-7-21    Intervention(s)  Therapist will use solution focused therapy.    Objective #C  Client  will identify at least 5 example(s) of how drinking has resulted in an experience that interferes with person values or goals.  Status: Continued - Date(s): 5-21-21    Intervention(s)  Therapist will use CBT and solution focused therapy.              Patient has reviewed and agreed to the above plan.        Shu Monte, TH February 11, 2020

## 2021-07-01 ENCOUNTER — TELEPHONE (OUTPATIENT)
Dept: FAMILY MEDICINE | Facility: CLINIC | Age: 41
End: 2021-07-01

## 2021-07-01 RX ORDER — VENLAFAXINE HYDROCHLORIDE 37.5 MG/1
CAPSULE, EXTENDED RELEASE ORAL
Qty: 60 CAPSULE | Refills: 1 | Status: SHIPPED | OUTPATIENT
Start: 2021-07-01 | End: 2021-08-23 | Stop reason: DRUGHIGH

## 2021-07-01 ASSESSMENT — ANXIETY QUESTIONNAIRES: GAD7 TOTAL SCORE: 7

## 2021-07-01 NOTE — TELEPHONE ENCOUNTER
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 I sent prescription for this to [facility]. He should start one capsule daily for 3 days, then increase to 2 capsules daily. He should still follow up with psychiatry.    Possible side effects of antidepressants/anxiety meds, including but not limited to GI upset, disrupted sleep, loss of libido, worsening of mood or even possible risk of increased suicidal thoughts.   Often some of these things if not severe will improve after 1-2 weeks on medications but some may not see effects for 3-4 weeks,  if tolerable patients should continue meds and see if there is improvement.  If symptoms are intolerable or for any suicidal thoughts the medication should be stopped immediately and contact the clinic.       These medications should be used for 6-9 months before stopping, to avoid rebound symptoms.   Contact the clinic if having any problems tolerating these medications.  Take the medication daily and do not stop the medication abruptly.

## 2021-07-02 NOTE — TELEPHONE ENCOUNTER
Central Prior Authorization Team   Phone: [phone]    PA Initiation    Medication: venlafaxine  Insurance Company:    Pharmacy Filling the Rx: [facility] [address] [place] [address] - [address]  Filling Pharmacy Phone: [phone]  Filling Pharmacy Fax: [phone]  Start Date: 7/2/2021

## 2021-07-02 NOTE — TELEPHONE ENCOUNTER
Prior Authorization Approval    Authorization Effective Date: 7/2/2021  Authorization Expiration Date: 7/2/2022  Medication: venlafaxine-APPROVED  Approved Dose/Quantity:    Reference #:     Insurance Company:    Expected CoPay:       CoPay Card Available:      Foundation Assistance Needed:    Which Pharmacy is filling the prescription (Not needed for infusion/clinic administered): [facility] [address]  Pharmacy Notified: Yes  Patient Notified: Yes  **Instructed pharmacy to notify patient when script is ready to /ship.**

## 2021-07-02 NOTE — TELEPHONE ENCOUNTER
Prior Authorization Retail Medication Request    Medication/Dose: venlafaxine  ICD code (if different than what is on RX):    Previously Tried and Failed:  wellbutrin sr and xl100 and 150 mg; prozac 10 and 40 mg;   Rationale:      Insurance Name:  Not provided  Insurance ID:  Not provided  CMM Key: [id number]      Pharmacy Information (if different than what is on RX)  Name:    Phone:

## 2021-07-06 ENCOUNTER — VIRTUAL VISIT (OUTPATIENT)
Dept: FAMILY MEDICINE | Facility: CLINIC | Age: 41
End: 2021-07-06
Payer: COMMERCIAL

## 2021-07-06 DIAGNOSIS — Z53.9 ERRONEOUS ENCOUNTER--DISREGARD: Primary | ICD-10-CM

## 2021-07-21 ENCOUNTER — VIRTUAL VISIT (OUTPATIENT)
Dept: PSYCHOLOGY | Facility: CLINIC | Age: 41
End: 2021-07-21
Payer: COMMERCIAL

## 2021-07-21 DIAGNOSIS — F33.1 MAJOR DEPRESSIVE DISORDER, RECURRENT EPISODE, MODERATE (H): ICD-10-CM

## 2021-07-21 DIAGNOSIS — F43.10 PTSD (POST-TRAUMATIC STRESS DISORDER): Primary | ICD-10-CM

## 2021-07-21 DIAGNOSIS — F10.20 ALCOHOL USE DISORDER, MODERATE, DEPENDENCE (H): ICD-10-CM

## 2021-07-21 PROCEDURE — 90834 PSYTX W PT 45 MINUTES: CPT | Mod: 95 | Performed by: MARRIAGE & FAMILY THERAPIST

## 2021-07-21 ASSESSMENT — PATIENT HEALTH QUESTIONNAIRE - PHQ9
SUM OF ALL RESPONSES TO PHQ QUESTIONS 1-9: 14
5. POOR APPETITE OR OVEREATING: MORE THAN HALF THE DAYS

## 2021-07-21 ASSESSMENT — ANXIETY QUESTIONNAIRES
IF YOU CHECKED OFF ANY PROBLEMS ON THIS QUESTIONNAIRE, HOW DIFFICULT HAVE THESE PROBLEMS MADE IT FOR YOU TO DO YOUR WORK, TAKE CARE OF THINGS AT HOME, OR GET ALONG WITH OTHER PEOPLE: SOMEWHAT DIFFICULT
3. WORRYING TOO MUCH ABOUT DIFFERENT THINGS: MORE THAN HALF THE DAYS
7. FEELING AFRAID AS IF SOMETHING AWFUL MIGHT HAPPEN: SEVERAL DAYS
GAD7 TOTAL SCORE: 12
5. BEING SO RESTLESS THAT IT IS HARD TO SIT STILL: MORE THAN HALF THE DAYS
6. BECOMING EASILY ANNOYED OR IRRITABLE: MORE THAN HALF THE DAYS
1. FEELING NERVOUS, ANXIOUS, OR ON EDGE: SEVERAL DAYS
2. NOT BEING ABLE TO STOP OR CONTROL WORRYING: MORE THAN HALF THE DAYS

## 2021-07-21 NOTE — PROGRESS NOTES
Progress Note    Patient Name: James Blanco  Date: 21         Service Type: Individual      Session Start Time: 4pm  Session End Time: 450pm     Session Length: 50min    Session #: 15    Attendees: Client attended alone    Service Modality:  Video Visit:      Provider verified identity through the following two step process.  Patient provided:  Patient  and Patient address    Telemedicine Visit: The patient's condition can be safely assessed and treated via synchronous audio and visual telemedicine encounter.      Reason for Telemedicine Visit: Patient has requested telehealth visit    Originating Site (Patient Location): Patient's home    Distant Site (Provider Location): Provider Remote Setting    Consent:  The patient/guardian has verbally consented to: the potential risks and benefits of telemedicine (video visit) versus in person care; bill my insurance or make self-payment for services provided; and responsibility for payment of non-covered services.     Patient would like the video invitation sent by:  My Chart    Mode of Communication:  Video Conference via [facility]well    As the provider I attest to compliance with applicable laws and regulations related to telemedicine.     Treatment Plan Last Reviewed: 21  PHQ-9 / LINNEA-7 :21  CGI- 21    DATA  Interactive Complexity: No  Crisis: No     Progress Since Last Session (Related to Symptoms / Goals / Homework):              Symptoms: Client has been working on sobriety with success and has been finding ways to stay distracted with other activities.  He has not had any suicidal thoughts.  Although his scores are a little higher than his previous appointment on the anxiety and depression questionnaire, he states he has noticed some positive improvement on the new medication.          Homework:  Completed in session, completed outside of session.                            Episode of Care Goals:  Satisfactory progress - ACTION (Actively working towards change); Intervened by reinforcing change plan / affirming steps taken                 Current / Ongoing Stressors and Concerns:              -Client reports no suicidal thoughts since we spoke last session.     -Client reports they have implemented a new financial plan at home and this has been a challenge.  Bills are shared and then they have their separate accounts for left over hobbies and personal expenses.  Client is working on finding more of a balance with this and finding her has a little more of a cushion each week.  He and his wife are communicating about this and feel they will give it another couple of months to see if it is the right plan for them- continued.   -Client will be seeing psychiatry in the near future.  Has started effexor and has noticed some positive improvement.     -Making more social connections.  Feels he has some good friends at work now.     -Working on starting up a small business.   Making progress while also recognizing that he does not want to take too much on his plate at one time.                     Treatment Objective(s) Addressed in This Session:          Sobriety   Increasing activity/ motivation   Managing finances  Check in on safety                       Intervention:              -Continue to engage in new activities in the evening.  Walk the dog and connect with others from work.     -Continue with financial plan but monitor personal expenses more closely.     -Consider EMDR therapy for trauma when we are back in person.     -Finding ways to manage sobriety and has been able to find enjoyment in other social activities without alcohol.     -See psychiatry in the near future.   -Follow safety plan and use crisis resources.                                  ASSESSMENT: Current Emotional / Mental Status (status of significant symptoms):              Risk status (Self / Other harm or suicidal ideation)               "Patient denies current fears or concerns for personal safety.              Patient denies current or recent suicidal ideation or behaviors.                Patientdenies current or recent homicidal ideation or behaviors.              Patient denies current or recent self injurious behavior or ideation.              Patient denies other safety concerns.              Patient reports there has been no change in risk factors since their last session.                Patientreports there has been no change in protective factors since their last session.                Completed safety plan and reviewed 0n 7-21-21    James Blanco                SAFETY PLAN:  Step 1: Warning signs / cues (Thoughts, images, mood, situation, behavior) that a crisis may be developing:  ? Thoughts: \"People would be better off without me\", \"I'm a burden\", \"I can't do this anymore\", \"I just want this to end\" and \"Nothing makes it better\"  ? Images: visions of harm: Driving into an oncoming nikhil of traffic  ? Thinking Processes: ruminations (can't stop thinking about my problems): Having more than one bad day in a row, racing thoughts and intrusive thoughts (bothersome, unwanted thoughts that come out of nowhere): Thoughts in general about self harm  ? Mood: worsening depression, hopelessness, intense worry and mood swings  ? Behaviors: isolating/withdrawing , not taking care of myself, not taking care of my responsibilities and not sleeping enough  ? Situations: Feeling overwhelmed with too much going on at once   Step 2: Coping strategies - Things I can do to take my mind off of my problems without contacting another person (relaxation technique, physical activity):  ? Distress Tolerance Strategies:  play with my pet , change body temperature (ice pack/cold water) , paced breathing/progressive muscle relaxation and working on business plan for distraction  ? Physical Activities: go for a walk, deep breathing, stretching  and working outside on " "things/ in the garage  ? Focus on helpful thoughts:  \"This is temporary\", \"I will get through this\" and \"It always passes\"  Step 3: People and social settings that provide distraction:              Name: Antonette (wife)         Phone: Lives with              Name:  Extended family both bio family and in-laws     ? Outside working on things, going to the cabin or a trip with family or friends       Step 4: Remind myself of people and things that are important to me and worth living for:    Family  Friends  Dog  Future plans   Step 5: When I am in crisis, I can ask these people to help me use my safety plan:              Name: Antonette (wife)         Phone: [phone]              Name: Rayray Blanco   Phone: [phone]                Step 6: Making the environment safe:   ? remove alcohol, secure medications, dispose of old medications , remove access to firearms, remove things I could use to hurt myself and alternate routes.  Step 7: Professionals or agencies I can contact during a crisis:  ? [facility] Daytime Number: [phone]  ? Suicide Prevention Lifeline: 7-413-765-GALW (9431)  ? Crisis Text Line Service (available 24 hours a day, 7 days a week): Text [place] to 466948    Local Crisis Services: [facility]-7201-261-8255     Call 911 or go to my nearest emergency department.       I helped develop this safety plan and agree to use it when needed.  I have been given a copy of this plan.       Client signature _________________________________________________________________  Today s date:  6/23/2021  Adapted from Safety Plan Template 2008 Nisha Sparks and Chalo Wills is reprinted with the express permission of the authors.  No portion of the Safety Plan Template may be reproduced without the express, written permission.  You can contact the authors at [email or web address] or [email or web address].                         Appearance:                            Appropriate               Eye " Contact:                           Good               Psychomotor Behavior:          Normal               Attitude:                                   Cooperative               Orientation:                             All              Speech                          Rate / Production:       Normal                           Volume:                       Normal               Mood:                                      Anxious, depressed about some topics.                 Affect:                                      Normal               Thought Content:                    Clear               Thought Form:                        Coherent  Logical               Insight:                                     Good                  Medication Review:            changes to psychiatric medications, see updated Medication List in EPIC.                  Medication Compliance:              Yes                 Changes in Health Issues:              None                 Chemical Use Review:              Substance Use: Chemical use reviewed, Remaining sober from alcohol.  Now has medical marijuana card.  Working with their pharmacist and the dispensary.                   Tobacco Use: Started chantix for tobacco use        Diagnosis:  296.32 (F33.1) Major Depressive Disorder, Recurrent Episode, Moderate _ and With melancholic features  F43.10 PTSD    (F10.20) Alcohol use disorder, moderate, dependence , In early remission      Collateral Reports Completed:              Not Applicable     PLAN: (Patient Tasks / Therapist Tasks / Other)  Client will return in two weeks and will work on the following goals.   -Continue to process through trauma.  -Remain sober 100% of the time.  -Make plans with others socially.    -Work on getting small business running while also recognizing limits and other areas that need attention.    -Follow safety plan and use crisis resources.                 Shu Monte, TH                                                            ______________________________________________________________________     Treatment Plan     Patient's Name: James Blanco                      YOB: 1980     Date: 2-11-20     Diagnoses:      296.32 (F33.1) Major Depressive Disorder, Recurrent Episode, Moderate _ and With melancholic features  F43.10 PTSD  (F10.20) Alcohol use disorder, moderate, dependence   Psychosocial & Contextual Factors: Loss of pet   WHODAS:   WHODAS 2.0 Total Score 1/27/2020 2/5/2020   Total Score 14 12         Referral / Collaboration:  Referral to another professional/service is not indicated at this time..     Anticipated number of session or this episode of care: 3-5        MeasurableTreatment Goal(s) related to diagnosis / functional impairment(s)  Goal 1: Client will address grief and loss tied to loss of pet.                 I will know I've met my goal when I am feeling less reactive to the grief.       Objective #A (Client Action)                Client will increase understanding of steps in the grief process.  Status: Continued- 2-3-21, 5-7-21     Intervention(s)  Therapist will use CBT and solution focused therapy .     Objective #B  Client will talk to at least two others about losses and coping.                        Status: Continued- 2-3-21, 5-7-21     Intervention(s)  Therapist will use solution focused therapy .     Objective #C  Client will work on routine at night to help with sadness.    Status: Continued- 2-3-21, 5-7-21     Intervention(s)  Therapist will use CBT therapy .    Goal 2: Client will address struggles with alcohol use disorder.       I will know I've met my goal when I am not having cravings.      Objective #A (Client Action)    Client will maintain sobriety 100% of the time.  Status: Continued - Date(s): 5-7-21    Intervention(s)  Therapist will use cognitive and behavioral therapy.    Objective #B  Client will gain supports around remaining sober.    Status: Continued -  Date(s): 5-7-21    Intervention(s)  Therapist will use solution focused therapy.    Objective #C  Client will identify at least 5 example(s) of how drinking has resulted in an experience that interferes with person values or goals.  Status: Continued - Date(s): 5-21-21    Intervention(s)  Therapist will use CBT and solution focused therapy.              Patient has reviewed and agreed to the above plan.        Shu Monte, TH February 11, 2020

## 2021-07-22 ASSESSMENT — ANXIETY QUESTIONNAIRES: GAD7 TOTAL SCORE: 12

## 2021-07-25 DIAGNOSIS — E78.1 HYPERTRIGLYCERIDEMIA: ICD-10-CM

## 2021-07-25 DIAGNOSIS — E78.5 HYPERLIPIDEMIA LDL GOAL <130: ICD-10-CM

## 2021-07-27 ENCOUNTER — TELEPHONE (OUTPATIENT)
Dept: FAMILY MEDICINE | Facility: CLINIC | Age: 41
End: 2021-07-27

## 2021-07-27 RX ORDER — SIMVASTATIN 40 MG
TABLET ORAL
Qty: 90 TABLET | Refills: 2 | Status: SHIPPED | OUTPATIENT
Start: 2021-07-27 | End: 2022-05-04

## 2021-07-27 RX ORDER — FENOFIBRATE 145 MG/1
TABLET, COATED ORAL
Qty: 90 TABLET | Refills: 2 | Status: SHIPPED | OUTPATIENT
Start: 2021-07-27 | End: 2022-05-04

## 2021-07-27 NOTE — TELEPHONE ENCOUNTER
"Prescriptions approved per [facility] Refill Protocol.    Requested Prescriptions   Pending Prescriptions Disp Refills     simvastatin (ZOCOR) 40 MG tablet [Pharmacy Med Name: Simvastatin 40 MG Oral Tablet] 90 tablet 0     Sig: TAKE 1 TABLET BY MOUTH AT BEDTIME       [facility] Protocol Passed - 7/25/2021  7:34 PM        Passed - LDL on file in past 12 months     Recent Labs   Lab Test 05/17/21  0707   LDL 95             Passed - No abnormal creatine kinase in past 12 months     No lab results found.             Passed - Recent (12 mo) or future (30 days) visit within the authorizing provider's specialty     Patient has had an office visit with the authorizing provider or a provider within the authorizing providers department within the previous 12 mos or has a future within next 30 days. See \"Patient Info\" tab in [employer] [place], or \"Choose Columns\" in Meds & Orders section of the refill encounter.              Passed - Medication is active on med list        Passed - Patient is age 18 or older           fenofibrate (TRICOR) 145 MG tablet [Pharmacy Med Name: Fenofibrate 145 MG Oral Tablet] 90 tablet 0     Sig: Take 1 tablet by mouth once daily       Fibrates Passed - 7/25/2021  7:34 PM        Passed - Lipid panel on file in past 12 months     Recent Labs   Lab Test 05/17/21  0707 09/20/18  0608 09/04/15  0818   CHOL 156  --  209*   TRIG 98  --  228*   HDL 41  --  46   LDL 95   < > 117   NHDL 115  --   --    VLDL  --   --  46*   CHOLHDLRATIO  --   --  4.5    < > = values in this interval not displayed.               Passed - No abnormal creatine kinase in past 12 months     No lab results found.             Passed - Recent (12 mo) or future (30 days) visit within the authorizing provider's specialty     Patient has had an office visit with the authorizing provider or a provider within the authorizing providers department within the previous 12 mos or has a future within next 30 days. See \"Patient Info\" tab in [employer] [place], or \"Choose " "Columns\" in Meds & Orders section of the refill encounter.              Passed - Medication is active on med list        Passed - Patient is age 18 or older             "

## 2021-07-27 NOTE — TELEPHONE ENCOUNTER
Central Prior Authorization Team   Phone: [phone]      PA Initiation    Medication: fenofibrate (TRICOR) 145 MG tablet -PA NOT NEEDED  Insurance Company:    Pharmacy Filling the Rx: [facility] [place] [facility], [place] - [address]  Filling Pharmacy Phone: [phone]  Filling Pharmacy Fax:    Start Date: 7/27/2021    Called pharmacy to verify if PA is needed, per pharmacy, PA is not needed, they are working on the prescription.  Pharmacy will notify patient when medication is ready.

## 2021-08-01 ENCOUNTER — MYC MEDICAL ADVICE (OUTPATIENT)
Dept: FAMILY MEDICINE | Facility: CLINIC | Age: 41
End: 2021-08-01

## 2021-08-05 ENCOUNTER — VIRTUAL VISIT (OUTPATIENT)
Dept: PSYCHOLOGY | Facility: CLINIC | Age: 41
End: 2021-08-05
Payer: COMMERCIAL

## 2021-08-05 DIAGNOSIS — F10.20 ALCOHOL USE DISORDER, MODERATE, DEPENDENCE (H): ICD-10-CM

## 2021-08-05 DIAGNOSIS — F33.1 MAJOR DEPRESSIVE DISORDER, RECURRENT EPISODE, MODERATE (H): ICD-10-CM

## 2021-08-05 DIAGNOSIS — F43.10 PTSD (POST-TRAUMATIC STRESS DISORDER): Primary | ICD-10-CM

## 2021-08-05 PROCEDURE — 90834 PSYTX W PT 45 MINUTES: CPT | Mod: 95 | Performed by: MARRIAGE & FAMILY THERAPIST

## 2021-08-05 ASSESSMENT — ANXIETY QUESTIONNAIRES
1. FEELING NERVOUS, ANXIOUS, OR ON EDGE: MORE THAN HALF THE DAYS
3. WORRYING TOO MUCH ABOUT DIFFERENT THINGS: MORE THAN HALF THE DAYS
GAD7 TOTAL SCORE: 10
5. BEING SO RESTLESS THAT IT IS HARD TO SIT STILL: SEVERAL DAYS
2. NOT BEING ABLE TO STOP OR CONTROL WORRYING: MORE THAN HALF THE DAYS
6. BECOMING EASILY ANNOYED OR IRRITABLE: SEVERAL DAYS
7. FEELING AFRAID AS IF SOMETHING AWFUL MIGHT HAPPEN: SEVERAL DAYS
IF YOU CHECKED OFF ANY PROBLEMS ON THIS QUESTIONNAIRE, HOW DIFFICULT HAVE THESE PROBLEMS MADE IT FOR YOU TO DO YOUR WORK, TAKE CARE OF THINGS AT HOME, OR GET ALONG WITH OTHER PEOPLE: SOMEWHAT DIFFICULT

## 2021-08-05 ASSESSMENT — PATIENT HEALTH QUESTIONNAIRE - PHQ9
5. POOR APPETITE OR OVEREATING: SEVERAL DAYS
SUM OF ALL RESPONSES TO PHQ QUESTIONS 1-9: 7

## 2021-08-06 ASSESSMENT — ANXIETY QUESTIONNAIRES: GAD7 TOTAL SCORE: 10

## 2021-08-06 NOTE — PROGRESS NOTES
Progress Note    Patient Name: James Blanco  Date: 21         Service Type: Individual      Session Start Time: 4pm  Session End Time: 450pm     Session Length: 50min    Session #: 16    Attendees: Client attended alone    Service Modality:  Video Visit:      Provider verified identity through the following two step process.  Patient provided:  Patient  and Patient address    Telemedicine Visit: The patient's condition can be safely assessed and treated via synchronous audio and visual telemedicine encounter.      Reason for Telemedicine Visit: Patient has requested telehealth visit    Originating Site (Patient Location): Patient's home    Distant Site (Provider Location): Provider Remote Setting    Consent:  The patient/guardian has verbally consented to: the potential risks and benefits of telemedicine (video visit) versus in person care; bill my insurance or make self-payment for services provided; and responsibility for payment of non-covered services.     Patient would like the video invitation sent by:  My Chart    Mode of Communication:  Video Conference via Amwell    As the provider I attest to compliance with applicable laws and regulations related to telemedicine.     Treatment Plan Last Reviewed: 21  PHQ-9 / LINNEA-7 :21  CGI- 21    DATA  Interactive Complexity: No  Crisis: No     Progress Since Last Session (Related to Symptoms / Goals / Homework):              Symptoms: Client has been working on sobriety with success and has been finding ways to stay distracted with other activities.  He has not had any suicidal thoughts.  He has moderate scores on the LINNEA-7 and PHQ-9.           Homework:  Completed in session, completed outside of session.                            Episode of Care Goals: Satisfactory progress - ACTION (Actively working towards change); Intervened by reinforcing change plan / affirming steps taken                 Current /  Ongoing Stressors and Concerns:              -Client reports no suicidal thoughts since we spoke last session.     -Client reports he decided to put in notice at his job so he can go back out on his own.  Has had success with this in the past and was feeling more secure financially.     -Client will be seeing psychiatry in the near future.  Has started effexor but has had some side effects.     -Making more social connections.    -Working on starting up a small business.   Making progress while also recognizing that he does not want to take too much on his plate at one time.                     Treatment Objective(s) Addressed in This Session:          Sobriety   Increasing activity/ motivation   Managing finances  Check in on safety                       Intervention:              -Continue to engage in new activities in the evening.  Walk the dog and connect with others from work.     -Continue with financial plan and start advertising for own business.     -Consider EMDR therapy for trauma when we are back in person.     -Finding ways to manage sobriety and has been able to find enjoyment in other social activities without alcohol.     -See psychiatry in the near future.   -Follow safety plan and use crisis resources.                                  ASSESSMENT: Current Emotional / Mental Status (status of significant symptoms):              Risk status (Self / Other harm or suicidal ideation)              Patient denies current fears or concerns for personal safety.              Patient denies current or recent suicidal ideation or behaviors.                Patientdenies current or recent homicidal ideation or behaviors.              Patient denies current or recent self injurious behavior or ideation.              Patient denies other safety concerns.              Patient reports there has been no change in risk factors since their last session.                Patientreports there has been no change in  "protective factors since their last session.                Completed safety plan and reviewed 0n 8-5-21    James Blanco                SAFETY PLAN:  Step 1: Warning signs / cues (Thoughts, images, mood, situation, behavior) that a crisis may be developing:  ? Thoughts: \"People would be better off without me\", \"I'm a burden\", \"I can't do this anymore\", \"I just want this to end\" and \"Nothing makes it better\"  ? Images: visions of harm: Driving into an oncoming nikhil of traffic  ? Thinking Processes: ruminations (can't stop thinking about my problems): Having more than one bad day in a row, racing thoughts and intrusive thoughts (bothersome, unwanted thoughts that come out of nowhere): Thoughts in general about self harm  ? Mood: worsening depression, hopelessness, intense worry and mood swings  ? Behaviors: isolating/withdrawing , not taking care of myself, not taking care of my responsibilities and not sleeping enough  ? Situations: Feeling overwhelmed with too much going on at once   Step 2: Coping strategies - Things I can do to take my mind off of my problems without contacting another person (relaxation technique, physical activity):  ? Distress Tolerance Strategies:  play with my pet , change body temperature (ice pack/cold water) , paced breathing/progressive muscle relaxation and working on business plan for distraction  ? Physical Activities: go for a walk, deep breathing, stretching  and working outside on things/ in the garage  ? Focus on helpful thoughts:  \"This is temporary\", \"I will get through this\" and \"It always passes\"  Step 3: People and social settings that provide distraction:              Name: Antonette (wife)         Phone: Lives with              Name:  Extended family both bio family and in-laws     ? Outside working on things, going to the cabin or a trip with family or friends       Step 4: Remind myself of people and things that are important to me and worth living for:  "   Family  Friends  Dog  Future plans   Step 5: When I am in crisis, I can ask these people to help me use my safety plan:              Name: Antonette (wife)         Phone: [phone]              Name: Rayray Blanco   Phone: [phone]                Step 6: Making the environment safe:   ? remove alcohol, secure medications, dispose of old medications , remove access to firearms, remove things I could use to hurt myself and alternate routes.  Step 7: Professionals or agencies I can contact during a crisis:  ? [facility] Daytime Number: 200-934-4207  ? Suicide Prevention Lifeline: 3-716-717-OAGA (1895)  ? Crisis Text Line Service (available 24 hours a day, 7 days a week): Text [place] to 923273    [facility] Crisis Services: Munetrixmqufim-7438-111-8255     Call 911 or go to my nearest emergency department.       I helped develop this safety plan and agree to use it when needed.  I have been given a copy of this plan.       Client signature _________________________________________________________________  Today s date:  6/23/2021  Adapted from Safety Plan Template 2008 Nisha Sparks and Chalo Wills is reprinted with the express permission of the authors.  No portion of the Safety Plan Template may be reproduced without the express, written permission.  You can contact the authors at [email or web address] or [email or web address].                         Appearance:                            Appropriate               Eye Contact:                           Good               Psychomotor Behavior:          Normal               Attitude:                                   Cooperative               Orientation:                             All              Speech                          Rate / Production:       Normal                           Volume:                       Normal               Mood:                                      Anxious, depressed about some topics.                 Affect:                                       Normal               Thought Content:                    Clear               Thought Form:                        Coherent  Logical               Insight:                                     Good                  Medication Review:            No changes to psychiatric medications, see updated Medication List in EPIC.                  Medication Compliance:              Yes                 Changes in Health Issues:              None                 Chemical Use Review:              Substance Use: Chemical use reviewed, Remaining sober from alcohol.  Now has medical marijuana card.  Working with their pharmacist and the dispensary.                   Tobacco Use: Started chantix for tobacco use        Diagnosis:  296.32 (F33.1) Major Depressive Disorder, Recurrent Episode, Moderate _ and With melancholic features  F43.10 PTSD    (F10.20) Alcohol use disorder, moderate, dependence , In early remission      Collateral Reports Completed:              Not Applicable     PLAN: (Patient Tasks / Therapist Tasks / Other)  Client will return in two weeks and will work on the following goals.   -Continue to process through trauma.  -Remain sober 100% of the time.  -Make plans with others socially.    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    -Follow safety plan and use crisis resources.                 Shu Monte,                                                            ______________________________________________________________________     Treatment Plan     Patient's Name: James Blanco                      YOB: 1980     Date: 2-11-20     Diagnoses:      296.32 (F33.1) Major Depressive Disorder, Recurrent Episode, Moderate _ and With melancholic features  F43.10 PTSD  (F10.20) Alcohol use disorder, moderate, dependence   Psychosocial & Contextual Factors: Loss of pet   WHODAS:   WHODAS 2.0 Total Score 1/27/2020 2/5/2020   Total Score 14 12         Referral /  Collaboration:  Referral to another professional/service is not indicated at this time..     Anticipated number of session or this episode of care: 3-5        MeasurableTreatment Goal(s) related to diagnosis / functional impairment(s)  Goal 1: Client will address grief and loss tied to loss of pet.                 I will know I've met my goal when I am feeling less reactive to the grief.       Objective #A (Client Action)                Client will increase understanding of steps in the grief process.  Status: Continued- 2-3-21, 5-7-21     Intervention(s)  Therapist will use CBT and solution focused therapy .     Objective #B  Client will talk to at least two others about losses and coping.                        Status: Continued- 2-3-21, 5-7-21     Intervention(s)  Therapist will use solution focused therapy .     Objective #C  Client will work on routine at night to help with sadness.    Status: Continued- 2-3-21, 5-7-21     Intervention(s)  Therapist will use CBT therapy .    Goal 2: Client will address struggles with alcohol use disorder.       I will know I've met my goal when I am not having cravings.      Objective #A (Client Action)    Client will maintain sobriety 100% of the time.  Status: Continued - Date(s): 5-7-21    Intervention(s)  Therapist will use cognitive and behavioral therapy.    Objective #B  Client will gain supports around remaining sober.    Status: Continued - Date(s): 5-7-21    Intervention(s)  Therapist will use solution focused therapy.    Objective #C  Client will identify at least 5 example(s) of how drinking has resulted in an experience that interferes with person values or goals.  Status: Continued - Date(s): 5-21-21    Intervention(s)  Therapist will use CBT and solution focused therapy.              Patient has reviewed and agreed to the above plan.        Shu Monte, TH                February 11, 2020

## 2021-08-23 ENCOUNTER — VIRTUAL VISIT (OUTPATIENT)
Dept: PSYCHIATRY | Facility: CLINIC | Age: 41
End: 2021-08-23
Attending: FAMILY MEDICINE
Payer: COMMERCIAL

## 2021-08-23 DIAGNOSIS — F43.10 PTSD (POST-TRAUMATIC STRESS DISORDER): Primary | ICD-10-CM

## 2021-08-23 DIAGNOSIS — F41.1 GAD (GENERALIZED ANXIETY DISORDER): ICD-10-CM

## 2021-08-23 DIAGNOSIS — F32.1 MODERATE MAJOR DEPRESSION (H): ICD-10-CM

## 2021-08-23 PROCEDURE — 99204 OFFICE O/P NEW MOD 45 MIN: CPT | Mod: 95 | Performed by: NURSE PRACTITIONER

## 2021-08-23 RX ORDER — VENLAFAXINE HYDROCHLORIDE 75 MG/1
75 CAPSULE, EXTENDED RELEASE ORAL DAILY
Qty: 30 CAPSULE | Refills: 3 | Status: SHIPPED | OUTPATIENT
Start: 2021-08-23 | End: 2021-10-26 | Stop reason: DRUGHIGH

## 2021-08-23 RX ORDER — ARIPIPRAZOLE 5 MG/1
5 TABLET ORAL DAILY
Qty: 30 TABLET | Refills: 1 | Status: SHIPPED | OUTPATIENT
Start: 2021-08-23 | End: 2021-09-20

## 2021-08-23 ASSESSMENT — ANXIETY QUESTIONNAIRES
2. NOT BEING ABLE TO STOP OR CONTROL WORRYING: SEVERAL DAYS
6. BECOMING EASILY ANNOYED OR IRRITABLE: NOT AT ALL
5. BEING SO RESTLESS THAT IT IS HARD TO SIT STILL: SEVERAL DAYS
GAD7 TOTAL SCORE: 7
IF YOU CHECKED OFF ANY PROBLEMS ON THIS QUESTIONNAIRE, HOW DIFFICULT HAVE THESE PROBLEMS MADE IT FOR YOU TO DO YOUR WORK, TAKE CARE OF THINGS AT HOME, OR GET ALONG WITH OTHER PEOPLE: SOMEWHAT DIFFICULT
7. FEELING AFRAID AS IF SOMETHING AWFUL MIGHT HAPPEN: NOT AT ALL
3. WORRYING TOO MUCH ABOUT DIFFERENT THINGS: SEVERAL DAYS
1. FEELING NERVOUS, ANXIOUS, OR ON EDGE: SEVERAL DAYS

## 2021-08-23 ASSESSMENT — PATIENT HEALTH QUESTIONNAIRE - PHQ9
5. POOR APPETITE OR OVEREATING: NEARLY EVERY DAY
SUM OF ALL RESPONSES TO PHQ QUESTIONS 1-9: 8

## 2021-08-23 NOTE — PATIENT INSTRUCTIONS
1.  Add Abilify 1/2 tablet daily for 4 days then 1 tablet daily for total of 5 mg daily  2.  Continue venlafaxine 75 mg daily  3.  Continue talk therapy with Jena        Continue all other medical directions per primary care provider.     Continue all other medications as reviewed per electronic medical record today.     Safety plan reviewed. To the Emergency Department as needed or call after hours crisis line at [phone] or [phone]. [place] Crisis Text Line: Text [place] to 842826  or  Suicide LifeLine Chat: suicideDTI - [employer].org/chat/    To schedule individual or family therapy, call [facility] at [phone].     Schedule an appointment with me in 2 months or sooner as needed.  Call [facility] at [phone] to schedule.    Follow up with primary care provider as planned or for acute medical concerns.    Call the psychiatric nurse line with medication questions or concerns at [phone].    Oris4hart may be used to communicate with your provider, but this is not intended to be used for emergencies.    Crisis Resources:    National Suicide Prevention Lifeline: 196.607.5437 (TTY: 858.464.7926). Call anytime for help.  (www.suicidepreventionlifeline.org)  [employer] [place] [employer] [place] (www.[place].org): 651.758.4443 or 626-588-6582.   Mental Health Association (www.mentalhealth.org): 946.233.6772 or 486-610-2640.  [place] Crisis Text Line: Text [place] to 240417  Suicide LifeLine Chat: suicideDTI - [employer].org/chat

## 2021-08-23 NOTE — PROGRESS NOTES
"Location of patient:  truck   Any new OTC medications: No  Recent height or weight: No  Recent BP/HR: No  Alcohol use:  No If yes, how many drinks per week:    Current other substance use (including Vaping):  Denies and Cannabis   Any updates with mental health (hospital stay, ER, etc) that Rasheeda should know about: No  Taking medications every day: Yes  If female: Birth control:   What is the most important thing you would like addressed today: mecication                                                             Outpatient Psychiatric Evaluation - Standard Adult    Name:  James Blanco  : 1980    Source of Referral:  Primary Care Provider: Hector Talbot MD   Last visit: Isabella 15, 2021  Current Psychotherapist: Shu Monte   Last visit: every two weeks    Identifying Data:  Patient is a 40 year old,   White Not  or  male  who presents for initial visit with me.  Patient is currently employed part time. Patient attended the session alone. Consent to communicate signed for no one. Consent for treatment signed and included in electronic medical record. Discussed limits of confidentiality today. My Practice Policy was reviewed and signed.     Patient prefers to be called: James     Chief Complaint:    Chief Complaint   Patient presents with     Consult     New pt, review meds.          HPI:      James is a 40-year-old male coming into visit with me to talk about medication options to manage his symptoms of depression and anxiety.  He tells me life was normal for a long time.  He had been on Prozac for 12 to 13 years.  James became tearful in talking about having to euthanize his dog 2 years ago.  He then began talk therapy after that and tried Wellbutrin but did not feel good on it.  When he tried to wean off of Prozac he experienced high anxiety with surges of energy.  James tells me he does not get angry.  Sometimes he feels like he is \"crawling\" out of his skin.  Since " "being on Effexor he tells me things are going \"okay\".  His appetite is decreased and he has had difficulties getting to sleep.  Weight loss has occurred and he is unsure why.  He recently had a job change and he feels happier.  James tells me that he suffers from general depression.  Him and his wife left [place] after a year in [facility].  When he was there he felt lonely and did not know anyone.  He has been back in [place] for 7 years.  James feels that cannabis helps calm him down.  When he does not use this he tells me he has an excess of energy and feels like there is \"too much information \"on his mind.  Taking antidepressant medication makes him feel that he has too much energy.  He is mostly concerned about taking medications to stabilize his mood that might make him \"dumb down\".    James has been sober from alcohol since January 2021.  He had received a DUI in [place] at age 20 years old.  He has used Antabuse in the past.  He felt pressure to become sober from Dr. Talbot and his wife.  He is enrolled in the medical cannabis program for PTSD from his dog.    Psychiatric Review of Symptoms:  Depression: Interest: Decrease  Depressed Mood  Sleep: Decrease   Energy: Decrease  Appetite: Decrease   Guilt: Increase  Psychomotor slowing   Suicide: No  Hopeless: Increase   Ruminations: Increase    PHQ-9 scores:   PHQ-9 SCORE 7/21/2021 8/5/2021 8/23/2021   PHQ-9 Total Score MyChart - - -   PHQ-9 Total Score 14 7 8   Some encounter information is confidential and restricted. Go to Review Zentric activity to see all data.     Bernie:  No symptoms   MDQ Score: Negative Screen  Anxiety: Feeling nervous, anxious, or on edge  Uncontrolled worrying  Worrying too much about different things  Trouble relaxing    LINNEA-7 scores:    LINNEA-7 SCORE 7/21/2021 8/5/2021 8/23/2021   Total Score - - -   Total Score 12 10 7   Some encounter information is confidential and restricted. Go to Review Zentric activity " to see all data.     Panic:  Palpitations  Tremors  Shortness of Breath   Agoraphobia:  No   PTSD:  History of Trauma   OCD:  No symptoms   Psychosis: No symptoms   ADD / ADHD: No symptoms  Gambling or shoplifting: No   Eating Disorder:  Restriction  Sleep:   Trouble staying asleep     Psychiatric History:   Hospitalizations:none  History of Commitment? No   Past Treatment: counseling and medication(s) from physician / PCP  Suicide Attempts:  none  Self-injurious Behavior: Denies  Electroconvulsive Therapy (ECT) or Transcranial Magnetic Stimulation (TMS): No   Genetic Testing: Yes     Substance Use History:  Current use of drugs or alcohol: Cannabis    Patient reported the following problems as a result of drinking: DUI, legal issues and relationship problems.   Based on the clinical interview, there  are indications of drug or alcohol abuse. Sober since January 2021.  Tobacco use: Yes Cigarettes and E-cigarettes  Ready to quit?  No  Nicotine Replacement Therapy tried: None  Caffeine:  Yes  3 sodas/day  Patient has received chemical dependency treatment in the past at [facility] Programming Involvement: None    Past Medical History:  Past Medical History:   Diagnosis Date     Anxiety      Depressive disorder      Hyperlipidaemia LDL goal <130       Surgery:   Past Surgical History:   Procedure Laterality Date     COLONOSCOPY N/A 3/21/2016    Procedure: COLONOSCOPY;  Surgeon: Tavo Wilcox MD;  Location: [facility]     OPEN REDUCTION INTERNAL FIXATION FOOT Right 4/17/2020    Procedure: Percutaneous intramedullary fixation, right fifth metatarsal fracture;  Surgeon: Aamdo Sanchez DPM;  Location:  OR     SURGICAL HISTORY OF -       plasty surgery on face, due to injury     SURGICAL HISTORY OF -       left elbow nerve decompression, sound like ulnar     SURGICAL HISTORY OF -       left knee surgery, scope and allograph for cartilege     SURGICAL HISTORY OF -       lasix surgery     Allergies:      Allergies   Allergen Reactions     Bees Swelling     Has epi-pen     Ceclor [Cefaclor]      Was an infant     Penicillins      Was an infant     Primary Care Provider: Hector Talbot MD  Seizures or Head Injury: Yes Occurred: When younger  Diet: No Restrictions  Food Allergies: No   Exercise: No regular exercise program  Supplements: Reviewed per Electronic Medical Record Today    fenofibrate (TRICOR) 145 MG tablet, Take 1 tablet by mouth once daily  multivitamin w/minerals (MULTI-VITAMIN) tablet, Take 1 tablet by mouth daily  omeprazole 20 MG tablet, Take 20 mg by mouth daily  simvastatin (ZOCOR) 40 MG tablet, TAKE 1 TABLET BY MOUTH AT BEDTIME  venlafaxine (EFFEXOR-XR) 37.5 MG 24 hr capsule, Take one capsule by mouth daily for 3 days, then increase to 2 capsules by mouth daily.  EPINEPHrine (EPIPEN 2-CHAIM) 0.3 MG/0.3ML injection 2-pack, Inject 0.3 mLs (0.3 mg) into the muscle as needed for anaphylaxis    No current facility-administered medications on file prior to visit.       The [place] Prescription Monitoring Program has been reviewed and there are no concerns about diversionary activity for controlled substances at this time.     Vital Signs:  Vitals: There were no vitals taken for this visit.    Labs:    Most recent labs reviewed and no new labs.   No EKG on file.    Review of Systems:  10 systems (general, cardiovascular, respiratory, eyes, ENT, endocrine, GI, , M/S, neurological) were reviewed. Most pertinent finding(s) is/are: some muscles aches, no chest pain, no headaches, no skin rashes . The remaining systems are all unremarkable.  Family History:   Patient reported family history includes:   Family History   Problem Relation Age of Onset     Hyperlipidemia Mother      Coronary Artery Disease Father      Hyperlipidemia Father      Diabetes Father      Other Cancer Sister         lymphoma-non hodgkins     Depression Sister      Anxiety Disorder Sister      Substance Abuse Sister      Substance  Abuse Brother      Bipolar Disorder Brother      Depression Brother      Mental Illness History: Yes: depression, anxiety, bipolar  Substance Abuse History: Yes: alcoholism, opioids  Suicide History: Yes: paternal uncle committed suicide - self inflicted gunshot  Medications: Unknown     Social History:   Born: [place]  Raised: [place]  Childhood: easy, brother 6 years older, sister 9 years older: grew up like an only child.  Struggled in school with academics.  He did well in art and industrial arts.  After high school he moved in with friends.  After a while  Moved in with parents in [place].    Siblings:  1 brother and 1 sister  Highest education level was high school graduate and associate degree / vocational certificate.   Employment History:  [employer]  Childhood illnesses: Denies  Current Living situation:  [place]  with Wife and 2 dogs . Feels safe at home.  Children: none : tried for 6 years  Firearms/Weapons Access: No: Patient denies   Service: Family member(s) served: Father in [employer] in [place]    Mental Status Examination:     Appearance:  awake, alert and casually dressed  Attitude:  cooperative   Eye Contact:  adequate  Gait and Station: No assistive Devices used and No dizziness or falls  Psychomotor Behavior:  intact station, gait and muscle tone  Oriented to:  time, person, and place  Attention Span and Concentration:  Normal  Speech:  clear, coherent and Speaks: English  Mood:  anxious and depressed  Affect:  mood congruent  Associations:  no loose associations  Thought Process:  goal oriented  Thought Content:  no evidence of suicidal ideation or homicidal ideation, no auditory hallucinations present and no visual hallucinations present  Recent and Remote Memory:  intact Not formally assessed. No amnesia.  Fund of Knowledge: appropriate  Insight:  good  Judgment:  intact  Impulse Control:  intact    Suicide Risk Assessment:  Today James Blanco reports that he is  having no thoughts to want to end his life or to harm other people. In addition, there are notable risk factors for self-harm, including anxiety and mood change. However, risk is mitigated by history of seeking help when needed, future oriented, denies suicidal intent or plan and denies homicidal ideation, intent, or plan. Therefore, based on all available evidence including the factors cited above, James Blanco does not appear to be at imminent risk for self-harm, does not meet criteria for a 72-hr hold, and therefore remains appropriate for ongoing outpatient level of care.  A thorough assessment of risk factors related to suicide and self-harm have been reviewed and are noted above. The patient convincingly denies acute suicidality on several occasions. Local community safety resources reviewed and printed for patient to use if needed. There was no deceit detected, and the patient presented in a manner that was believable.     DSM5  Diagnosis:  296.32 (F33.1) Major Depressive Disorder, Recurrent Episode, Moderate _ and With melancholic features  300.02 (F41.1) Generalized Anxiety Disorder  309.81 (F43.10) Posttraumatic Stress Disorder (includes Posttraumatic Stress Disorder for Children 6 Years and Younger)  Without dissociative symptoms    Medical Comorbidities Include:   Patient Active Problem List    Diagnosis Date Noted     Tobacco use disorder 05/11/2021     Priority: Medium     PTSD (post-traumatic stress disorder) 04/14/2021     Priority: Medium     Alcohol use disorder, moderate, dependence (H) 11/21/2020     Priority: Medium     Closed nondisplaced fracture of fifth metatarsal bone of right foot, initial encounter 04/15/2020     Priority: Medium     Added automatically from request for surgery [id number]       Moderate major depression (H) 04/07/2020     Priority: Medium     Grief reaction 01/09/2020     Priority: Medium     Fatty liver, alcoholic 09/26/2018     Priority: Medium     Anxiety 09/01/2015      Priority: Medium     Mixed hyperlipidemia 09/01/2015     Priority: Medium       A 12-item WHODAS 2.0 assessment was completed by the patient today and recorded in EPIC.    WHODAS 2.0 Total Score 2/5/2020 2/3/2021   Total Score 12 -   Total Score MyChart - 19   Some encounter information is confidential and restricted. Go to Review Flowsheets activity to see all data.       The Patient Activation Measure (SHUBHAM) score was completed and recorded in EPIC. This assesses patient knowledge, skill, and confidence for self-management. No flowsheet data found.             Impression:  James Blanco met with me to talk about medication options to better manage his symptoms of depression and anxiety.  2 years ago he had to euthanize his dog which has exacerbated his mood symptoms.  He has been sober from alcohol since January 2021.  James also admits to using cannabis on a regular basis to help him relax.  I added Abilify 1/2 tablet daily for 4 days then 1 tablet daily for a total of 5 mg daily for mood stabilization and as an adjunct to his venlafaxine at 75 mg daily.  He is urged to continue talking with Jena to process life stressors and manage his grief over the loss of his dog.    Medication side effects and alternatives reviewed. Health promotion activities recommended and reviewed today. All questions addressed. Education and counseling completed regarding risks and benefits of medications and psychotherapy options. Collaborative Care Psychiatry Service model reviewed today. Recommend therapy for additional support.     Treatment Plan:     1.  Add Abilify 1/2 tablet daily for 4 days then 1 tablet daily for total of 5 mg daily  2.  Continue venlafaxine 75 mg daily  3.  Continue talk therapy with Jena        Continue all other medical directions per primary care provider.     Continue all other medications as reviewed per electronic medical record today.     Safety plan reviewed. To the Emergency Department as needed  or call after hours crisis line at [phone] or [phone]. [place] Crisis Text Line: Text [place] to 345864  or  Suicide LifeLine Chat: suicidezuuka!.org/chat/    To schedule individual or family therapy, call [facility] at [phone].     Schedule an appointment with me in 2 months or sooner as needed.  Call [facility] at [phone] to schedule.    Follow up with primary care provider as planned or for acute medical concerns.    Call the psychiatric nurse line with medication questions or concerns at [phone].    [employer] may be used to communicate with your provider, but this is not intended to be used for emergencies.    Crisis Resources:    National Suicide Prevention Lifeline: 851.230.5372 (TTY: 189.595.3230). Call anytime for help.  (www.suicidepreventionlifeline.org)  National [employer] on Mental Illness (www.chloe.org): 769.852.1572 or 248-059-3593.   Mental Health Association (www.mentalhealth.org): 973.416.3589 or 908-514-6857.  [place] Crisis Text Line: Text [place] to 957726  Suicide LifeLine Chat: suicidezuuka!.org/chat    Administrative Billing:   Time spent with patient was 60 minutes and greater than 50% of time or 40 minutes was spent in counseling and coordination of care regarding above diagnoses and treatment plan.    Patient Status:  Patient will continue to be seen for ongoing consultation and stabilization.    Signed:   ISABELA Lackey-BC   Psychiatry

## 2021-08-23 NOTE — PROGRESS NOTES
"Location of patient:  ***   Any new OTC medications: {YES / NO:719915::\"No\"}  Recent height or weight: {YES / NO:004639::\"No\"}  Recent BP/HR: {YES / NO:097697::\"No\"}  Alcohol use:  {YES / NO:954406::\"No\"} If yes, how many drinks per week: {NUMBERS 1-6:627546}   Current other substance use (including Vaping):  {[facility] SUBSTANCES:500724::\"Denies\"}  Any updates with mental health (hospital stay, ER, etc) that Rasheeda should know about: {YES / NO:625723::\"No\"}  Taking medications every day: {YES / NO:388665::\"No\"}  If female: Birth control: {YES / NO:498466::\"No\"}  What is the most important thing you would like addressed today: ***  "

## 2021-08-23 NOTE — Clinical Note
Hi, in visiting with James today, he agreed to try a low-dose of Abilify to take with his venlafaxine.  He described having continued grief over the loss of his pet as well as energy racing thoughts.  Therapy is very valuable to him.  Thank you for the referral.  Rasheeda

## 2021-08-24 ASSESSMENT — ANXIETY QUESTIONNAIRES: GAD7 TOTAL SCORE: 7

## 2021-08-26 ENCOUNTER — VIRTUAL VISIT (OUTPATIENT)
Dept: PSYCHOLOGY | Facility: CLINIC | Age: 41
End: 2021-08-26
Payer: COMMERCIAL

## 2021-08-26 DIAGNOSIS — F43.10 PTSD (POST-TRAUMATIC STRESS DISORDER): Primary | ICD-10-CM

## 2021-08-26 DIAGNOSIS — F33.1 MAJOR DEPRESSIVE DISORDER, RECURRENT EPISODE, MODERATE (H): ICD-10-CM

## 2021-08-26 DIAGNOSIS — F32.1 MAJOR DEPRESSIVE DISORDER, SINGLE EPISODE, MODERATE (H): ICD-10-CM

## 2021-08-26 PROCEDURE — 90834 PSYTX W PT 45 MINUTES: CPT | Mod: 95 | Performed by: MARRIAGE & FAMILY THERAPIST

## 2021-08-27 NOTE — PROGRESS NOTES
Progress Note    Patient Name: James Blanco  Date: 21         Service Type: Individual      Session Start Time: 4pm  Session End Time: 450pm     Session Length: 50min    Session #: 17    Attendees: Client attended alone    Service Modality:  Video Visit:      Provider verified identity through the following two step process.  Patient provided:  Patient  and Patient address    Telemedicine Visit: The patient's condition can be safely assessed and treated via synchronous audio and visual telemedicine encounter.      Reason for Telemedicine Visit: Patient has requested telehealth visit    Originating Site (Patient Location): Patient's home    Distant Site (Provider Location): Provider Remote Setting    Consent:  The patient/guardian has verbally consented to: the potential risks and benefits of telemedicine (video visit) versus in person care; bill my insurance or make self-payment for services provided; and responsibility for payment of non-covered services.     Patient would like the video invitation sent by:  My Chart    Mode of Communication:  Video Conference via Amwell    As the provider I attest to compliance with applicable laws and regulations related to telemedicine.     Treatment Plan Last Reviewed: 21  PHQ-9 / LINNEA-7 :Completed two days ago  CGI- 21    DATA  Interactive Complexity: No  Crisis: No     Progress Since Last Session (Related to Symptoms / Goals / Homework):              Symptoms: Client has been working on sobriety with success and has been finding ways to stay distracted with other activities.  He has not had any suicidal thoughts.  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          Homework:  Completed in session, completed outside of session.                            Episode of Care Goals: Satisfactory progress - ACTION (Actively working towards change);  Intervened by reinforcing change plan / affirming steps taken                 Current / Ongoing Stressors and Concerns:              -Client reports no suicidal thoughts since we spoke last session.     -Client reports he did wrap up his job and now is working on his own again.  He has been busy and gaining work.     -Client did see psychiatry and is now also on abilify.     -Making more social connections.    -Fostering a new dog which is both positive and can bring up some memories of his beloved dog who passed away.                    Treatment Objective(s) Addressed in This Session:          Sobriety   Increasing activity/ motivation   Managing finances  Check in on safety                       Intervention:              -Continue to engage in new activities in the evening.  Walk the dog and spend time with spouse.     -Continue with financial plan and start gaining more business..     -Consider EMDR therapy for trauma when we are back in person.     -Finding ways to manage sobriety and has been able to find enjoyment in other social activities without alcohol.     -Continue with abilify.     -Follow safety plan and use crisis resources.                                  ASSESSMENT: Current Emotional / Mental Status (status of significant symptoms):              Risk status (Self / Other harm or suicidal ideation)              Patient denies current fears or concerns for personal safety.              Patient denies current or recent suicidal ideation or behaviors.                Patientdenies current or recent homicidal ideation or behaviors.              Patient denies current or recent self injurious behavior or ideation.              Patient denies other safety concerns.              Patient reports there has been no change in risk factors since their last session.                Patientreports there has been no change in protective factors since their last session.                Completed safety plan and  "reviewed 0n 8-26-21    James Blanco                SAFETY PLAN:  Step 1: Warning signs / cues (Thoughts, images, mood, situation, behavior) that a crisis may be developing:  ? Thoughts: \"People would be better off without me\", \"I'm a burden\", \"I can't do this anymore\", \"I just want this to end\" and \"Nothing makes it better\"  ? Images: visions of harm: Driving into an oncoming nikhil of traffic  ? Thinking Processes: ruminations (can't stop thinking about my problems): Having more than one bad day in a row, racing thoughts and intrusive thoughts (bothersome, unwanted thoughts that come out of nowhere): Thoughts in general about self harm  ? Mood: worsening depression, hopelessness, intense worry and mood swings  ? Behaviors: isolating/withdrawing , not taking care of myself, not taking care of my responsibilities and not sleeping enough  ? Situations: Feeling overwhelmed with too much going on at once   Step 2: Coping strategies - Things I can do to take my mind off of my problems without contacting another person (relaxation technique, physical activity):  ? Distress Tolerance Strategies:  play with my pet , change body temperature (ice pack/cold water) , paced breathing/progressive muscle relaxation and working on business plan for distraction  ? Physical Activities: go for a walk, deep breathing, stretching  and working outside on things/ in the garage  ? Focus on helpful thoughts:  \"This is temporary\", \"I will get through this\" and \"It always passes\"  Step 3: People and social settings that provide distraction:              Name: Antonette (wife)         Phone: Lives with              Name:  Extended family both bio family and in-laws     ? Outside working on things, going to the cabin or a trip with family or friends       Step 4: Remind myself of people and things that are important to me and worth living for:    Family  Friends  Dog  Future plans   Step 5: When I am in crisis, I can ask these people to help me use " my safety plan:              Name: Antonette (wife)         Phone: [phone]              Name: Rayray Blanco   Phone: [phone]                Step 6: Making the environment safe:   ? remove alcohol, secure medications, dispose of old medications , remove access to firearms, remove things I could use to hurt myself and alternate routes.  Step 7: Professionals or agencies I can contact during a crisis:  ? [facility] Number: [phone]  ? Suicide Prevention Lifeline: 0-971-592-DICQ (4944)  ? Crisis Text Line Service (available 24 hours a day, 7 days a week): Text [place] to 634966    Local Crisis Services: [employer]-[phone]     Call 911 or go to my nearest emergency department.       I helped develop this safety plan and agree to use it when needed.  I have been given a copy of this plan.       Client signature _________________________________________________________________  Today s date:  6/23/2021  Adapted from Safety Plan Template 2008 Nisha Sparks and Chalo Wills is reprinted with the express permission of the authors.  No portion of the Safety Plan Template may be reproduced without the express, written permission.  You can contact the authors at [email or web address] or [email or web address].                         Appearance:                            Appropriate               Eye Contact:                           Good               Psychomotor Behavior:          Normal               Attitude:                                   Cooperative               Orientation:                             All              Speech                          Rate / Production:       Normal                           Volume:                       Normal               Mood:                                      Anxious, depressed                 Affect:                                      Normal               Thought Content:                    Clear               Thought Form:                         Coherent  Logical               Insight:                                     Good                  Medication Review:            changes to psychiatric medications, see updated Medication List in EPIC.                  Medication Compliance:              Yes                 Changes in Health Issues:              None                 Chemical Use Review:              Substance Use: Chemical use reviewed, Remaining sober from alcohol.  Now has medical marijuana card.  Working with their pharmacist and the dispensary.                   Tobacco Use: Started chantix for tobacco use        Diagnosis:  296.32 (F33.1) Major Depressive Disorder, Recurrent Episode, Moderate _ and With melancholic features  F43.10 PTSD    (F10.20) Alcohol use disorder, moderate, dependence , In early remission      Collateral Reports Completed:              Not Applicable     PLAN: (Patient Tasks / Therapist Tasks / Other)  Client will return in two weeks and will work on the following goals.   -Continue to process through trauma.  -Remain sober 100% of the time.  -Have limits on how much time he will put into weekends and holidays for work.    -Follow safety plan and use crisis resources.    -Continue with indira Monte,                                                            ______________________________________________________________________     Treatment Plan     Patient's Name: James Blanco                      YOB: 1980     Date: 2-11-20     Diagnoses:      296.32 (F33.1) Major Depressive Disorder, Recurrent Episode, Moderate _ and With melancholic features  F43.10 PTSD  (F10.20) Alcohol use disorder, moderate, dependence   Psychosocial & Contextual Factors: Loss of pet   WHODAS:   WHODAS 2.0 Total Score 1/27/2020 2/5/2020   Total Score 14 12         Referral / Collaboration:  Referral to another professional/service is not indicated at this time..     Anticipated number of  session or this episode of care: 3-5        MeasurableTreatment Goal(s) related to diagnosis / functional impairment(s)  Goal 1: Client will address grief and loss tied to loss of pet.                 I will know I've met my goal when I am feeling less reactive to the grief.       Objective #A (Client Action)                Client will increase understanding of steps in the grief process.  Status: Continued- 2-3-21, 5-7-21, 8-26-21     Intervention(s)  Therapist will use CBT and solution focused therapy .     Objective #B  Client will talk to at least two others about losses and coping.                        Status: Continued- 2-3-21, 5-7-21, 8-26-21     Intervention(s)  Therapist will use solution focused therapy .     Objective #C  Client will work on routine at night to help with sadness.    Status: Continued- 2-3-21, 5-7-21, 8-26-21     Intervention(s)  Therapist will use CBT therapy .    Goal 2: Client will address struggles with alcohol use disorder.       I will know I've met my goal when I am not having cravings.      Objective #A (Client Action)    Client will maintain sobriety 100% of the time.  Status: Continued - Date(s): 5-7-21, 8-26-21    Intervention(s)  Therapist will use cognitive and behavioral therapy.    Objective #B  Client will gain supports around remaining sober.    Status: Continued - Date(s): 5-7-21, 8-26-21    Intervention(s)  Therapist will use solution focused therapy.    Objective #C  Client will identify at least 5 example(s) of how drinking has resulted in an experience that interferes with person values or goals.  Status: Continued - Date(s): 5-21-21, 8-26-21    Intervention(s)  Therapist will use CBT and solution focused therapy.              Patient has reviewed and agreed to the above plan.        Shu Monte, TH                February 11, 2020

## 2021-09-07 ENCOUNTER — VIRTUAL VISIT (OUTPATIENT)
Dept: PSYCHOLOGY | Facility: CLINIC | Age: 41
End: 2021-09-07
Payer: COMMERCIAL

## 2021-09-07 DIAGNOSIS — F10.20 ALCOHOL USE DISORDER, MODERATE, DEPENDENCE (H): ICD-10-CM

## 2021-09-07 DIAGNOSIS — F33.1 MAJOR DEPRESSIVE DISORDER, RECURRENT EPISODE, MODERATE (H): ICD-10-CM

## 2021-09-07 DIAGNOSIS — F43.10 PTSD (POST-TRAUMATIC STRESS DISORDER): Primary | ICD-10-CM

## 2021-09-07 PROCEDURE — 90834 PSYTX W PT 45 MINUTES: CPT | Mod: 95 | Performed by: MARRIAGE & FAMILY THERAPIST

## 2021-09-08 NOTE — PROGRESS NOTES
Progress Note    Patient Name: James Blanco  Date: 21         Service Type: Individual      Session Start Time: 4pm  Session End Time: 450pm     Session Length: 50min    Session #: 18    Attendees: Client attended alone    Service Modality:  Video Visit:      Provider verified identity through the following two step process.  Patient provided:  Patient  and Patient address    Telemedicine Visit: The patient's condition can be safely assessed and treated via synchronous audio and visual telemedicine encounter.      Reason for Telemedicine Visit: Patient has requested telehealth visit    Originating Site (Patient Location): Patient's home    Distant Site (Provider Location): Provider Remote Setting    Consent:  The patient/guardian has verbally consented to: the potential risks and benefits of telemedicine (video visit) versus in person care; bill my insurance or make self-payment for services provided; and responsibility for payment of non-covered services.     Patient would like the video invitation sent by:  My Chart    Mode of Communication:  Video Conference via Amwell    As the provider I attest to compliance with applicable laws and regulations related to telemedicine.     Treatment Plan Last Reviewed: 21  PHQ-9 / LINNEA-7 :21  CGI- 21    DATA  Interactive Complexity: No  Crisis: No     Progress Since Last Session (Related to Symptoms / Goals / Homework):              Symptoms: Client has been working on sobriety with success and has been finding ways to stay distracted with other activities.  He has not had any suicidal thoughts.  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          Homework:  Completed in session, completed outside of session.                            Episode of Care Goals: Satisfactory progress - ACTION (Actively working towards change); Intervened by reinforcing change plan / affirming steps  taken                 Current / Ongoing Stressors and Concerns:              -Client reports no suicidal thoughts since we spoke last session.     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  He is putting in very long hours as one job is taking much longer then expected.      -Client reports his wife is upset with him today for not being home as often and needing to spend more time on the job.  They have a new foster dog that has been a lot of work.     -Making more social connections.                      Treatment Objective(s) Addressed in This Session:          Sobriety   Positive time with spouse  Personal limits and expectations   Check in on safety                       Intervention:              -Continue to engage in new activities in the evening.  Make quality time with spouse a priority.     -Continue with financial plan and start gaining more business..     -Consider EMDR therapy for trauma when we are back in person.     -Finding ways to manage sobriety and has been able to find enjoyment in other social activities without alcohol.     -Continue with abilify.     -Follow safety plan and use crisis resources.   -Re-home dog that client and spouse are fostering due to high energy levels.                                    ASSESSMENT: Current Emotional / Mental Status (status of significant symptoms):              Risk status (Self / Other harm or suicidal ideation)              Patient denies current fears or concerns for personal safety.              Patient denies current or recent suicidal ideation or behaviors.                Patientdenies current or recent homicidal ideation or behaviors.              Patient denies current or recent self injurious behavior or ideation.              Patient denies other safety concerns.              Patient reports there has been no change in risk factors since their last session.                Patientreports there has been no  "change in protective factors since their last session.                Completed safety plan and reviewed 0n 9-7-21    James Blanco                SAFETY PLAN:  Step 1: Warning signs / cues (Thoughts, images, mood, situation, behavior) that a crisis may be developing:  ? Thoughts: \"People would be better off without me\", \"I'm a burden\", \"I can't do this anymore\", \"I just want this to end\" and \"Nothing makes it better\"  ? Images: visions of harm: Driving into an oncoming nikhil of traffic  ? Thinking Processes: ruminations (can't stop thinking about my problems): Having more than one bad day in a row, racing thoughts and intrusive thoughts (bothersome, unwanted thoughts that come out of nowhere): Thoughts in general about self harm  ? Mood: worsening depression, hopelessness, intense worry and mood swings  ? Behaviors: isolating/withdrawing , not taking care of myself, not taking care of my responsibilities and not sleeping enough  ? Situations: Feeling overwhelmed with too much going on at once   Step 2: Coping strategies - Things I can do to take my mind off of my problems without contacting another person (relaxation technique, physical activity):  ? Distress Tolerance Strategies:  play with my pet , change body temperature (ice pack/cold water) , paced breathing/progressive muscle relaxation and working on business plan for distraction  ? Physical Activities: go for a walk, deep breathing, stretching  and working outside on things/ in the garage  ? Focus on helpful thoughts:  \"This is temporary\", \"I will get through this\" and \"It always passes\"  Step 3: People and social settings that provide distraction:              Name: Antonette (wife)         Phone: Lives with              Name:  Extended family both bio family and in-laws     ? Outside working on things, going to the cabin or a trip with family or friends       Step 4: Remind myself of people and things that are important to me and worth living for:  "   Family  Friends  Dog  Future plans   Step 5: When I am in crisis, I can ask these people to help me use my safety plan:              Name: Antonette (wife)         Phone: [phone]              Name: Rayray Blanco   Phone: [phone]                Step 6: Making the environment safe:   ? remove alcohol, secure medications, dispose of old medications , remove access to firearms, remove things I could use to hurt myself and alternate routes.  Step 7: Professionals or agencies I can contact during a crisis:  ? [facility] Daytime Number: [phone]  ? Suicide Prevention Lifeline: 4-990-324-BVNE (6033)  ? Crisis Text Line Service (available 24 hours a day, 7 days a week): Text [place] to 687268    [facility] Crisis Services: [employer]-[phone]     Call 911 or go to my nearest emergency department.       I helped develop this safety plan and agree to use it when needed.  I have been given a copy of this plan.       Client signature _________________________________________________________________  Today s date:  6/23/2021  Adapted from Safety Plan Template 2008 Nisha Sparks and Chalo Wills is reprinted with the express permission of the authors.  No portion of the Safety Plan Template may be reproduced without the express, written permission.  You can contact the authors at [email or web address] or [email or web address].                         Appearance:                            Appropriate               Eye Contact:                           Good               Psychomotor Behavior:          Normal               Attitude:                                   Cooperative               Orientation:                             All              Speech                          Rate / Production:       Normal                           Volume:                       Normal               Mood:                                      Anxious, depressed                 Affect:                                       Normal               Thought Content:                    Clear               Thought Form:                        Coherent  Logical               Insight:                                     Good                  Medication Review:            No changes to psychiatric medications, see updated Medication List in EPIC.                  Medication Compliance:              Yes                 Changes in Health Issues:              None                 Chemical Use Review:              Substance Use: Chemical use reviewed, Remaining sober from alcohol.  Now has medical marijuana card.  Working with their pharmacist and the dispensary.                   Tobacco Use: Started chantix for tobacco use        Diagnosis:  296.32 (F33.1) Major Depressive Disorder, Recurrent Episode, Moderate _ and With melancholic features  F43.10 PTSD    (F10.20) Alcohol use disorder, moderate, dependence , In early remission      Collateral Reports Completed:              Not Applicable     PLAN: (Patient Tasks / Therapist Tasks / Other)  Client will return in two weeks and will work on the following goals.   -Continue to process through trauma.  -Remain sober 100% of the time.  -Attempt to stay within a 40 hour work week once larger job is complete.    -Follow safety plan and use crisis resources.    -Continue with abilify.    -Make quality time with spouse a priority.               Shu Monte,                                                            ______________________________________________________________________     Treatment Plan     Patient's Name: James Blanco                      YOB: 1980     Date: 2-11-20     Diagnoses:      296.32 (F33.1) Major Depressive Disorder, Recurrent Episode, Moderate _ and With melancholic features  F43.10 PTSD  (F10.20) Alcohol use disorder, moderate, dependence   Psychosocial & Contextual Factors: Loss of pet   WHODAS:   WHODAS 2.0 Total Score 1/27/2020 2/5/2020    Total Score 14 12         Referral / Collaboration:  Referral to another professional/service is not indicated at this time..     Anticipated number of session or this episode of care: 3-5        MeasurableTreatment Goal(s) related to diagnosis / functional impairment(s)  Goal 1: Client will address grief and loss tied to loss of pet.                 I will know I've met my goal when I am feeling less reactive to the grief.       Objective #A (Client Action)                Client will increase understanding of steps in the grief process.  Status: Continued- 2-3-21, 5-7-21, 8-26-21     Intervention(s)  Therapist will use CBT and solution focused therapy .     Objective #B  Client will talk to at least two others about losses and coping.                        Status: Continued- 2-3-21, 5-7-21, 8-26-21     Intervention(s)  Therapist will use solution focused therapy .     Objective #C  Client will work on routine at night to help with sadness.    Status: Continued- 2-3-21, 5-7-21, 8-26-21     Intervention(s)  Therapist will use CBT therapy .    Goal 2: Client will address struggles with alcohol use disorder.       I will know I've met my goal when I am not having cravings.      Objective #A (Client Action)    Client will maintain sobriety 100% of the time.  Status: Continued - Date(s): 5-7-21, 8-26-21    Intervention(s)  Therapist will use cognitive and behavioral therapy.    Objective #B  Client will gain supports around remaining sober.    Status: Continued - Date(s): 5-7-21, 8-26-21    Intervention(s)  Therapist will use solution focused therapy.    Objective #C  Client will identify at least 5 example(s) of how drinking has resulted in an experience that interferes with person values or goals.  Status: Continued - Date(s): 5-21-21, 8-26-21    Intervention(s)  Therapist will use CBT and solution focused therapy.              Patient has reviewed and agreed to the above plan.        Shu Monte, TH                 February 11, 2020

## 2021-09-21 ENCOUNTER — TELEPHONE (OUTPATIENT)
Dept: PSYCHIATRY | Facility: CLINIC | Age: 41
End: 2021-09-21

## 2021-09-21 NOTE — TELEPHONE ENCOUNTER
PA Initiation    Medication: ARIPiprazole (ABILIFY) 5 MG tablet- INITIATED  Insurance Company: Other (see comments)  Pharmacy Filling the Rx: [facility] [address] [place] [address] - [address]  Filling Pharmacy Phone: [phone]  Filling Pharmacy Fax: [phone]  Start Date: 9/21/2021

## 2021-09-21 NOTE — TELEPHONE ENCOUNTER
Prior Authorization Retail Medication Request    Medication/Dose: ARIPiprazole (ABILIFY) 5 MG tablet  ICD code (if different than what is on RX):  Moderate major depression (H) [F32.1]  Previously Tried and Failed:    Rationale:      Insurance Name:  [employer]  Insurance ID:  [id number]    Pharmacy Information (if different than what is on RX)  Name:  [facility] [address] [place] [address] - [address]  Phone:  [phone]

## 2021-09-22 ENCOUNTER — VIRTUAL VISIT (OUTPATIENT)
Dept: PSYCHOLOGY | Facility: CLINIC | Age: 41
End: 2021-09-22
Payer: COMMERCIAL

## 2021-09-22 DIAGNOSIS — F10.20 ALCOHOL USE DISORDER, MODERATE, DEPENDENCE (H): ICD-10-CM

## 2021-09-22 DIAGNOSIS — F43.10 PTSD (POST-TRAUMATIC STRESS DISORDER): Primary | ICD-10-CM

## 2021-09-22 DIAGNOSIS — F33.1 MAJOR DEPRESSIVE DISORDER, RECURRENT EPISODE, MODERATE (H): ICD-10-CM

## 2021-09-22 PROCEDURE — 90834 PSYTX W PT 45 MINUTES: CPT | Mod: 95 | Performed by: MARRIAGE & FAMILY THERAPIST

## 2021-09-22 ASSESSMENT — ANXIETY QUESTIONNAIRES
5. BEING SO RESTLESS THAT IT IS HARD TO SIT STILL: MORE THAN HALF THE DAYS
6. BECOMING EASILY ANNOYED OR IRRITABLE: SEVERAL DAYS
GAD7 TOTAL SCORE: 11
1. FEELING NERVOUS, ANXIOUS, OR ON EDGE: NEARLY EVERY DAY
7. FEELING AFRAID AS IF SOMETHING AWFUL MIGHT HAPPEN: SEVERAL DAYS
2. NOT BEING ABLE TO STOP OR CONTROL WORRYING: SEVERAL DAYS
3. WORRYING TOO MUCH ABOUT DIFFERENT THINGS: SEVERAL DAYS
IF YOU CHECKED OFF ANY PROBLEMS ON THIS QUESTIONNAIRE, HOW DIFFICULT HAVE THESE PROBLEMS MADE IT FOR YOU TO DO YOUR WORK, TAKE CARE OF THINGS AT HOME, OR GET ALONG WITH OTHER PEOPLE: SOMEWHAT DIFFICULT

## 2021-09-22 NOTE — TELEPHONE ENCOUNTER
Prior Authorization Approval    Authorization Effective Date: 9/22/2021  Authorization Expiration Date: 9/22/2022  Medication: ARIPiprazole (ABILIFY) 5 MG tablet- APPROVED  Approved Dose/Quantity: 45 TABS  Reference #: [id number]   Insurance Company: Other (see comments)  Expected CoPay:       CoPay Card Available:      Foundation Assistance Needed:    Which Pharmacy is filling the prescription (Not needed for infusion/clinic administered): [facility] [address] [place] [address] [address]  Pharmacy Notified: Yes  Patient Notified: Yes        Central Prior Authorization Team  Phone: [phone]

## 2021-09-23 ASSESSMENT — ANXIETY QUESTIONNAIRES: GAD7 TOTAL SCORE: 11

## 2021-09-23 NOTE — PROGRESS NOTES
Progress Note    Patient Name: James Blanco  Date: 21         Service Type: Individual      Session Start Time: 4pm  Session End Time: 450pm     Session Length: 50min    Session #: 19    Attendees: Client attended alone    Service Modality:  Video Visit:      Provider verified identity through the following two step process.  Patient provided:  Patient  and Patient address    Telemedicine Visit: The patient's condition can be safely assessed and treated via synchronous audio and visual telemedicine encounter.      Reason for Telemedicine Visit: Patient has requested telehealth visit    Originating Site (Patient Location): Patient's home    Distant Site (Provider Location): Provider Remote Setting    Consent:  The patient/guardian has verbally consented to: the potential risks and benefits of telemedicine (video visit) versus in person care; bill my insurance or make self-payment for services provided; and responsibility for payment of non-covered services.     Patient would like the video invitation sent by:  My Chart    Mode of Communication:  Video Conference via Amwell    As the provider I attest to compliance with applicable laws and regulations related to telemedicine.     Treatment Plan Last Reviewed: 21  PHQ-9 / LINNEA-7 :21  CGI- 21    DATA  Interactive Complexity: No  Crisis: No     Progress Since Last Session (Related to Symptoms / Goals / Homework):              Symptoms: Client has been working on sobriety with success and has been finding ways to stay distracted with other activities.  He has not had any suicidal thoughts.  He reports some improvement with his work obligations now that he completed a large project.          Homework:  Completed in session, completed outside of session.                            Episode of Care Goals: Satisfactory progress - ACTION (Actively working towards change); Intervened by reinforcing change plan /  affirming steps taken                 Current / Ongoing Stressors and Concerns:              -Client reports no suicidal thoughts since we spoke last session.     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  He did finish a large project and is looking forward to having a more consistent work schedule again.       -Client was able to re-home their temporary foster dog after deciding she was not a good fit for their lifestyle.     -Client feels he is struggling with losing thoughts and will feel at times like he is in a fog.  He states he will lose track of his words mid sentence.                       Treatment Objective(s) Addressed in This Session:          Sobriety   Positive time with spouse  Personal limits and expectations   Check in on safety                       Intervention:              -Continue to engage in new activities in the evening.  Make quality time with spouse a priority.     -Continue with financial plan and start to set up shop at home.     -Consider EMDR therapy for trauma when we are back in person.     -Finding ways to manage sobriety and has been able to find enjoyment in other social activities without alcohol.     -Increase abilify.     -Follow safety plan and use crisis resources.   -Aim to do more of a 40 hour work week.                                     ASSESSMENT: Current Emotional / Mental Status (status of significant symptoms):              Risk status (Self / Other harm or suicidal ideation)              Patient denies current fears or concerns for personal safety.              Patient denies current or recent suicidal ideation or behaviors.                Patientdenies current or recent homicidal ideation or behaviors.              Patient denies current or recent self injurious behavior or ideation.              Patient denies other safety concerns.              Patient reports there has been no change in risk factors since their last  "session.                Patientreports there has been no change in protective factors since their last session.                Completed safety plan and reviewed 0n 9-23-21    James Blanco                SAFETY PLAN:  Step 1: Warning signs / cues (Thoughts, images, mood, situation, behavior) that a crisis may be developing:  ? Thoughts: \"People would be better off without me\", \"I'm a burden\", \"I can't do this anymore\", \"I just want this to end\" and \"Nothing makes it better\"  ? Images: visions of harm: Driving into an oncoming nikhil of traffic  ? Thinking Processes: ruminations (can't stop thinking about my problems): Having more than one bad day in a row, racing thoughts and intrusive thoughts (bothersome, unwanted thoughts that come out of nowhere): Thoughts in general about self harm  ? Mood: worsening depression, hopelessness, intense worry and mood swings  ? Behaviors: isolating/withdrawing , not taking care of myself, not taking care of my responsibilities and not sleeping enough  ? Situations: Feeling overwhelmed with too much going on at once   Step 2: Coping strategies - Things I can do to take my mind off of my problems without contacting another person (relaxation technique, physical activity):  ? Distress Tolerance Strategies:  play with my pet , change body temperature (ice pack/cold water) , paced breathing/progressive muscle relaxation and working on business plan for distraction  ? Physical Activities: go for a walk, deep breathing, stretching  and working outside on things/ in the garage  ? Focus on helpful thoughts:  \"This is temporary\", \"I will get through this\" and \"It always passes\"  Step 3: People and social settings that provide distraction:              Name: Antonette (wife)         Phone: Lives with              Name:  Extended family both bio family and in-laws     ? Outside working on things, going to the cabin or a trip with family or friends       Step 4: Remind myself of people and things " that are important to me and worth living for:    Family  Friends  Dog  Future plans   Step 5: When I am in crisis, I can ask these people to help me use my safety plan:              Name: Antonette (wife)         Phone: [phone]              Name: Rayray lBanco   Phone: [phone]                Step 6: Making the environment safe:   ? remove alcohol, secure medications, dispose of old medications , remove access to firearms, remove things I could use to hurt myself and alternate routes.  Step 7: Professionals or agencies I can contact during a crisis:  ? State mental health facility Daytime Number: [phone]  ? Suicide Prevention Lifeline: 0-955-683-TVRM (9780)  ? Crisis Text Line Service (available 24 hours a day, 7 days a week): Text [place] to 110284    [facility] Crisis Services: Yaoota.comeoacmi-9004-583-8255     Call 911 or go to my nearest emergency department.       I helped develop this safety plan and agree to use it when needed.  I have been given a copy of this plan.       Client signature _________________________________________________________________  Today s date:  6/23/2021  Adapted from Safety Plan Template 2008 Nisha Sparks and Chalo Wills is reprinted with the express permission of the authors.  No portion of the Safety Plan Template may be reproduced without the express, written permission.  You can contact the authors at bhs@[email or web address] or [email or web address].                         Appearance:                            Appropriate               Eye Contact:                           Good               Psychomotor Behavior:          Normal               Attitude:                                   Cooperative               Orientation:                             All              Speech                          Rate / Production:       Normal                           Volume:                       Normal               Mood:                                      Anxious, depressed                  Affect:                                      Normal               Thought Content:                    Clear               Thought Form:                        Coherent  Logical               Insight:                                     Good                  Medication Review:            changes to psychiatric medications, see updated Medication List in EPIC.                  Medication Compliance:              Yes                 Changes in Health Issues:              None                 Chemical Use Review:              Substance Use: Chemical use reviewed, Remaining sober from alcohol.  Now has medical marijuana card.  Working with their pharmacist and the dispensary.                   Tobacco Use: Started chantix for tobacco use        Diagnosis:  296.32 (F33.1) Major Depressive Disorder, Recurrent Episode, Moderate _ and With melancholic features  F43.10 PTSD    (F10.20) Alcohol use disorder, moderate, dependence , In early remission      Collateral Reports Completed:              Not Applicable     PLAN: (Patient Tasks / Therapist Tasks / Other)  Client will return in two weeks and will work on the following goals.   -Continue to process through trauma.  -Remain sober 100% of the time.  -Attempt to stay within a 40 hour work week once larger job is complete.    -Follow safety plan and use crisis resources.    -Take higher dose of abilify.    -Have more of a scheduled self-care routine.                 Shu Monte,                                                            ______________________________________________________________________     Treatment Plan     Patient's Name: James Blanco                      YOB: 1980     Date: 2-11-20     Diagnoses:      296.32 (F33.1) Major Depressive Disorder, Recurrent Episode, Moderate _ and With melancholic features  F43.10 PTSD  (F10.20) Alcohol use disorder, moderate, dependence   Psychosocial & Contextual Factors: Loss of  pet   WHODAS:   WHODAS 2.0 Total Score 1/27/2020 2/5/2020   Total Score 14 12         Referral / Collaboration:  Referral to another professional/service is not indicated at this time..     Anticipated number of session or this episode of care: 3-5        MeasurableTreatment Goal(s) related to diagnosis / functional impairment(s)  Goal 1: Client will address grief and loss tied to loss of pet.                 I will know I've met my goal when I am feeling less reactive to the grief.       Objective #A (Client Action)                Client will increase understanding of steps in the grief process.  Status: Continued- 2-3-21, 5-7-21, 8-26-21     Intervention(s)  Therapist will use CBT and solution focused therapy .     Objective #B  Client will talk to at least two others about losses and coping.                        Status: Continued- 2-3-21, 5-7-21, 8-26-21     Intervention(s)  Therapist will use solution focused therapy .     Objective #C  Client will work on routine at night to help with sadness.    Status: Continued- 2-3-21, 5-7-21, 8-26-21     Intervention(s)  Therapist will use CBT therapy .    Goal 2: Client will address struggles with alcohol use disorder.       I will know I've met my goal when I am not having cravings.      Objective #A (Client Action)    Client will maintain sobriety 100% of the time.  Status: Continued - Date(s): 5-7-21, 8-26-21    Intervention(s)  Therapist will use cognitive and behavioral therapy.    Objective #B  Client will gain supports around remaining sober.    Status: Continued - Date(s): 5-7-21, 8-26-21    Intervention(s)  Therapist will use solution focused therapy.    Objective #C  Client will identify at least 5 example(s) of how drinking has resulted in an experience that interferes with person values or goals.  Status: Continued - Date(s): 5-21-21, 8-26-21    Intervention(s)  Therapist will use CBT and solution focused therapy.              Patient has reviewed and agreed  to the above plan.        Shu Monte, TH February 11, 2020

## 2021-10-03 ENCOUNTER — HEALTH MAINTENANCE LETTER (OUTPATIENT)
Age: 41
End: 2021-10-03

## 2021-10-05 ASSESSMENT — ENCOUNTER SYMPTOMS
COUGH: 1
ARTHRALGIAS: 1
MYALGIAS: 1

## 2021-10-11 ENCOUNTER — OFFICE VISIT (OUTPATIENT)
Dept: FAMILY MEDICINE | Facility: CLINIC | Age: 41
End: 2021-10-11
Payer: COMMERCIAL

## 2021-10-11 VITALS
SYSTOLIC BLOOD PRESSURE: 106 MMHG | TEMPERATURE: 97.4 F | RESPIRATION RATE: 14 BRPM | HEART RATE: 96 BPM | WEIGHT: 215.4 LBS | DIASTOLIC BLOOD PRESSURE: 80 MMHG | HEIGHT: 76 IN | BODY MASS INDEX: 26.23 KG/M2 | OXYGEN SATURATION: 98 %

## 2021-10-11 DIAGNOSIS — Z23 NEED FOR VACCINATION: ICD-10-CM

## 2021-10-11 DIAGNOSIS — Z51.81 ENCOUNTER FOR THERAPEUTIC DRUG MONITORING: ICD-10-CM

## 2021-10-11 DIAGNOSIS — R09.82 POSTNASAL DRIP: ICD-10-CM

## 2021-10-11 DIAGNOSIS — Z11.59 NEED FOR HEPATITIS C SCREENING TEST: ICD-10-CM

## 2021-10-11 DIAGNOSIS — Z23 NEED FOR PROPHYLACTIC VACCINATION AND INOCULATION AGAINST INFLUENZA: ICD-10-CM

## 2021-10-11 DIAGNOSIS — Z00.00 ROUTINE GENERAL MEDICAL EXAMINATION AT A HEALTH CARE FACILITY: Primary | ICD-10-CM

## 2021-10-11 DIAGNOSIS — F17.200 TOBACCO USE DISORDER: ICD-10-CM

## 2021-10-11 PROCEDURE — 99213 OFFICE O/P EST LOW 20 MIN: CPT | Mod: 25 | Performed by: FAMILY MEDICINE

## 2021-10-11 PROCEDURE — 90471 IMMUNIZATION ADMIN: CPT | Performed by: FAMILY MEDICINE

## 2021-10-11 PROCEDURE — 90732 PPSV23 VACC 2 YRS+ SUBQ/IM: CPT | Performed by: FAMILY MEDICINE

## 2021-10-11 PROCEDURE — 99396 PREV VISIT EST AGE 40-64: CPT | Mod: 25 | Performed by: FAMILY MEDICINE

## 2021-10-11 PROCEDURE — 90686 IIV4 VACC NO PRSV 0.5 ML IM: CPT | Performed by: FAMILY MEDICINE

## 2021-10-11 PROCEDURE — 90472 IMMUNIZATION ADMIN EACH ADD: CPT | Performed by: FAMILY MEDICINE

## 2021-10-11 RX ORDER — FLUTICASONE PROPIONATE 50 MCG
1 SPRAY, SUSPENSION (ML) NASAL DAILY
Start: 2021-10-11 | End: 2022-05-04

## 2021-10-11 ASSESSMENT — MIFFLIN-ST. JEOR: SCORE: 1970.61

## 2021-10-11 ASSESSMENT — ENCOUNTER SYMPTOMS
MYALGIAS: 1
ARTHRALGIAS: 1
COUGH: 1

## 2021-10-11 NOTE — NURSING NOTE
Prior to immunization administration, verified patients identity using patient s name and date of birth. Please see Immunization Activity for additional information.     Screening Questionnaire for Adult Immunization    Are you sick today?   No   Do you have allergies to medications, food, a vaccine component or latex?   No   Have you ever had a serious reaction after receiving a vaccination?   No   Do you have a long-term health problem with heart, lung, kidney, or metabolic disease (e.g., diabetes), asthma, a blood disorder, no spleen, complement component deficiency, a cochlear implant, or a spinal fluid leak?  Are you on long-term aspirin therapy?   No   Do you have cancer, leukemia, HIV/AIDS, or any other immune system problem?   No   Do you have a parent, brother, or sister with an immune system problem?   No   In the past 3 months, have you taken medications that affect  your immune system, such as prednisone, other steroids, or anticancer drugs; drugs for the treatment of rheumatoid arthritis, Crohn s disease, or psoriasis; or have you had radiation treatments?   No   Have you had a seizure, or a brain or other nervous system problem?   No   During the past year, have you received a transfusion of blood or blood    products, or been given immune (gamma) globulin or antiviral drug?   No   For women: Are you pregnant or is there a chance you could become       pregnant during the next month?   No   Have you received any vaccinations in the past 4 weeks?   No     Immunization questionnaire answers were all negative.        Per orders of Dr. Talbot, injection of pneumovax 23 given by Mary Limon CMA. Patient instructed to remain in clinic for 15 minutes afterwards, and to report any adverse reaction to me immediately.       Screening performed by Mary Limon CMA on 10/11/2021 at 4:45 PM.
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
## 2021-10-11 NOTE — PATIENT INSTRUCTIONS
Schedule lab appointment in spring 2022.    Be consistent with low trans fat and saturated fat diet.  Eat food rich in omega-3-fatty acids as you tolerate. (salmon, olive oil)  Eat 5 cups of vegetables, fruits and whole grains per day.  Limit starchy food (white rice, white bread, white pasta, white potatoes) to less than a cup per meal.  Minimize sweets, junk food and fastfood. Limit soda beverages to one serving per day; best to avoid it altogether though.  Exercise: moderate intensity sustained for at least 30 mins per episode, goal of 150 mins per week at least  Combine cardiovascular and resistance exercises.  These exercise recommendations are in addition to your daily activity at work or home.  Work on losing weight if you are above your goal body mass index.    Work on completely stoping all nicotine products.    Start Flonase nasal spray 1 spray to each nostril daily for 2-4 weeks if the cough recurs.    Preventative Care Visits include: Yearly physicals, Well-child visits, Welcome to Medicare visits, & Medicare yearly wellness exams.    The purpose of these visits is to discuss your medical history and prevent health problems before you are sick.  You may need to pay a copay, coinsurance or deductible if your visit today includes testing or treating for a new or existing condition.    Additional charges may be incurred for today's visit. If you have questions about what your insurance plan covers, please contact your Insurance Company's member service department.  If you have questions specific to a bill you have already received from [employer], please contact the [employer] Billing Office at [phone].     Preventive Health Recommendations  Male Ages 40 to 49    Yearly exam:             See your health care provider every year in order to  o   Review health changes.   o   Discuss preventive care.    o   Review your medicines if your doctor has prescribed any.    You should be tested each year for STDs  (sexually transmitted diseases) if you re at risk.     Have a cholesterol test every 5 years.     Have a colonoscopy (test for colon cancer) if someone in your family has had colon cancer or polyps before age 50.     After age 45, have a diabetes test (fasting glucose). If you are at risk for diabetes, you should have this test every 3 years.      Talk with your health care provider about whether or not a prostate cancer screening test (PSA) is right for you.    Shots: Get a flu shot each year. Get a tetanus shot every 10 years.     Nutrition:    Eat at least 5 servings of fruits and vegetables daily.     Eat whole-grain bread, whole-wheat pasta and brown rice instead of white grains and rice.     Get adequate Calcium and Vitamin D.     Lifestyle    Exercise for at least 150 minutes a week (30 minutes a day, 5 days a week). This will help you control your weight and prevent disease.     Limit alcohol to one drink per day.     No smoking.     Wear sunscreen to prevent skin cancer.     See your dentist every six months for an exam and cleaning.

## 2021-10-11 NOTE — LETTER
My Depression Action Plan  Name: James Blanco   Date of Birth 1980  Date: 10/11/2021    My doctor: Hector Talbot   My clinic: [facility]  [address] [facility] [address]  [phone]          GREEN    ZONE   Good Control    What it looks like:     Things are going generally well. You have normal ups and downs. You may even feel depressed from time to time, but bad moods usually last less than a day.   What you need to do:  1. Continue to care for yourself (see self care plan)  2. Check your depression survival kit and update it as needed  3. Follow your physician s recommendations including any medication.  4. Do not stop taking medication unless you consult with your physician first.           YELLOW         ZONE Getting Worse    What it looks like:     Depression is starting to interfere with your life.     It may be hard to get out of bed; you may be starting to isolate yourself from others.    Symptoms of depression are starting to last most all day and this has happened for several days.     You may have suicidal thoughts but they are not constant.   What you need to do:     1. Call your care team. Your response to treatment will improve if you keep your care team informed of your progress. Yellow periods are signs an adjustment may need to be made.     2. Continue your self-care.  Just get dressed and ready for the day.  Don't give yourself time to talk yourself out of it.    3. Talk to someone in your support network.    4. Open up your Depression Self-Care Plan/Wellness Kit.           RED    ZONE Medical Alert - Get Help    What it looks like:     Depression is seriously interfering with your life.     You may experience these or other symptoms: You can t get out of bed most days, can t work or engage in other necessary activities, you have trouble taking care of basic hygiene, or basic responsibilities, thoughts of suicide or death that  will not go away, self-injurious behavior.     What you need to do:  1. Call your care team and request a same-day appointment. If they are not available (weekends or after hours) call your local crisis line, emergency room or 911.          Depression Self-Care Plan / Wellness Kit    Many people find that medication and therapy are helpful treatments for managing depression. In addition, making small changes to your everyday life can help to boost your mood and improve your wellbeing. Below are some tips for you to consider. Be sure to talk with your medical provider and/or behavioral health consultant if your symptoms are worsening or not improving.     Sleep   Sleep hygiene  means all of the habits that support good, restful sleep. It includes maintaining a consistent bedtime and wake time, using your bedroom only for sleeping or sex, and keeping the bedroom dark and free of distractions like a computer, smartphone, or television.     Develop a Healthy Routine  Maintain good hygiene. Get out of bed in the morning, make your bed, brush your teeth, take a shower, and get dressed. Don t spend too much time viewing media that makes you feel stressed. Find time to relax each day.    Exercise  Get some form of exercise every day. This will help reduce pain and release endorphins, the  feel good  chemicals in your brain. It can be as simple as just going for a walk or doing some gardening, anything that will get you moving.      Diet  Strive to eat healthy foods, including fruits and vegetables. Drink plenty of water. Avoid excessive sugar, caffeine, alcohol, and other mood-altering substances.     Stay Connected with Others  Stay in touch with friends and family members.    Manage Your Mood  Try deep breathing, massage therapy, biofeedback, or meditation. Take part in fun activities when you can. Try to find something to smile about each day.     Psychotherapy  Be open to working with a therapist if your provider  recommends it.     Medication  Be sure to take your medication as prescribed. Most anti-depressants need to be taken every day. It usually takes several weeks for medications to work. Not all medicines work for all people. It is important to follow-up with your provider to make sure you have a treatment plan that is working for you. Do not stop your medication abruptly without first discussing it with your provider.    Crisis Resources   These hotlines are for both adults and children. They and are open 24 hours a day, 7 days a week unless noted otherwise.      National Suicide Prevention Lifeline   9-938-305-ARJK (0804)      Crisis Text Line    www.crisistextline.org  Text HOME to 669309 from anywhere in the [place], anytime, about any type of crisis. A live, trained crisis counselor will receive the text and respond quickly.      [employer] Lifeline for LGBTQ Youth  A national crisis intervention and suicide lifeline for LGBTQ youth under 25. Provides a safe place to talk without judgement. Call 1-508.666.9836; text START to 827531 or visit www.thetrevorproject.org to talk to a trained counselor.      For [facility] crisis numbers, visit the [facility] website at:  https://[place].gov/dhs/people-we-serve/adults/health-care/mental-health/resources/crisis-contacts.jsp

## 2021-10-13 ENCOUNTER — VIRTUAL VISIT (OUTPATIENT)
Dept: PSYCHOLOGY | Facility: CLINIC | Age: 41
End: 2021-10-13
Payer: COMMERCIAL

## 2021-10-13 DIAGNOSIS — F33.1 MAJOR DEPRESSIVE DISORDER, RECURRENT EPISODE, MODERATE (H): ICD-10-CM

## 2021-10-13 DIAGNOSIS — F32.1 MAJOR DEPRESSIVE DISORDER, SINGLE EPISODE, MODERATE (H): ICD-10-CM

## 2021-10-13 DIAGNOSIS — F43.10 PTSD (POST-TRAUMATIC STRESS DISORDER): Primary | ICD-10-CM

## 2021-10-13 PROCEDURE — 90834 PSYTX W PT 45 MINUTES: CPT | Mod: 95 | Performed by: MARRIAGE & FAMILY THERAPIST

## 2021-10-13 NOTE — PROGRESS NOTES
Progress Note    Patient Name: James Blanco  Date: 10-13-21         Service Type: Individual      Session Start Time: 3pm  Session End Time: 350pm     Session Length: 50min    Session #: 20    Attendees: Client attended alone    Service Modality:  Video Visit:      Provider verified identity through the following two step process.  Patient provided:  Patient  and Patient address    Telemedicine Visit: The patient's condition can be safely assessed and treated via synchronous audio and visual telemedicine encounter.      Reason for Telemedicine Visit: Patient has requested telehealth visit    Originating Site (Patient Location): Patient's home    Distant Site (Provider Location): Provider Remote Setting    Consent:  The patient/guardian has verbally consented to: the potential risks and benefits of telemedicine (video visit) versus in person care; bill my insurance or make self-payment for services provided; and responsibility for payment of non-covered services.     Patient would like the video invitation sent by:  My Chart    Mode of Communication:  Video Conference via Amwell    As the provider I attest to compliance with applicable laws and regulations related to telemedicine.     Treatment Plan Last Reviewed: 21  PHQ-9 / LINNEA-7 :10-13-21  CGI- 21    DATA  Interactive Complexity: No  Crisis: No     Progress Since Last Session (Related to Symptoms / Goals / Homework):              Symptoms: Client has been working on sobriety with success but has been struggling with motivation, staying organized and brain fog.          Homework:  Completed in session, completed outside of session.                            Episode of Care Goals: Satisfactory progress - ACTION (Actively working towards change); Intervened by reinforcing change plan / affirming steps taken                 Current / Ongoing Stressors and Concerns:              -Client reports no suicidal  "thoughts since we spoke last session.     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  He reports difficulty with organization and motivation.  Feeling run down and tired most afternoons.  Has been struggling with focus and concentration.  Also looses a lot tools, wallet and cell phone.       -Getting back of track with nightly schedule and spending more quality time with wife.     -Continuing to struggle with brain fog and describes this as \"Scrambled egg brain.\"                         Treatment Objective(s) Addressed in This Session:          Sobriety   Positive time with spouse  Personal limits and expectations   Check in on safety   Reviewed criteria for ADHD                      Intervention:              -Continue to engage in new activities in the evening.  Make quality time with spouse a priority.     -Continue with financial plan and start to set up shop at home.     -Consider EMDR therapy for trauma when we are back in person.     -Finding ways to manage sobriety and has been able to find enjoyment in other social activities without alcohol.     -Reviewed criteria for ADHD and made referral for testing.     -Follow safety plan and use crisis resources.                                        ASSESSMENT: Current Emotional / Mental Status (status of significant symptoms):              Risk status (Self / Other harm or suicidal ideation)              Patient denies current fears or concerns for personal safety.              Patient denies current or recent suicidal ideation or behaviors.                Patientdenies current or recent homicidal ideation or behaviors.              Patient denies current or recent self injurious behavior or ideation.              Patient denies other safety concerns.              Patient reports there has been no change in risk factors since their last session.                Patientreports there has been no change in protective factors " "since their last session.                Completed safety plan and reviewed 0n 10-13-21    James Blanco                SAFETY PLAN:  Step 1: Warning signs / cues (Thoughts, images, mood, situation, behavior) that a crisis may be developing:  ? Thoughts: \"People would be better off without me\", \"I'm a burden\", \"I can't do this anymore\", \"I just want this to end\" and \"Nothing makes it better\"  ? Images: visions of harm: Driving into an oncoming nikhil of traffic  ? Thinking Processes: ruminations (can't stop thinking about my problems): Having more than one bad day in a row, racing thoughts and intrusive thoughts (bothersome, unwanted thoughts that come out of nowhere): Thoughts in general about self harm  ? Mood: worsening depression, hopelessness, intense worry and mood swings  ? Behaviors: isolating/withdrawing , not taking care of myself, not taking care of my responsibilities and not sleeping enough  ? Situations: Feeling overwhelmed with too much going on at once   Step 2: Coping strategies - Things I can do to take my mind off of my problems without contacting another person (relaxation technique, physical activity):  ? Distress Tolerance Strategies:  play with my pet , change body temperature (ice pack/cold water) , paced breathing/progressive muscle relaxation and working on business plan for distraction  ? Physical Activities: go for a walk, deep breathing, stretching  and working outside on things/ in the garage  ? Focus on helpful thoughts:  \"This is temporary\", \"I will get through this\" and \"It always passes\"  Step 3: People and social settings that provide distraction:              Name: Antonette (wife)         Phone: Lives with              Name:  Extended family both bio family and in-laws     ? Outside working on things, going to the cabin or a trip with family or friends       Step 4: Remind myself of people and things that are important to me and worth living for:    Family  Friends  Dog  Future plans "   Step 5: When I am in crisis, I can ask these people to help me use my safety plan:              Name: Antonette (wife)         Phone: [phone]              Name: Rayray Blanco   Phone: [phone]                Step 6: Making the environment safe:   ? remove alcohol, secure medications, dispose of old medications , remove access to firearms, remove things I could use to hurt myself and alternate routes.  Step 7: Professionals or agencies I can contact during a crisis:  ? [facility] Number: [phone]  ? Suicide Prevention Lifeline: 4-709-594-LYVC (3898)  ? Crisis Text Line Service (available 24 hours a day, 7 days a week): Text [place] to 284744    Local Crisis Services: TextureMediawenkxi-3932-452-8255     Call 911 or go to my nearest emergency department.       I helped develop this safety plan and agree to use it when needed.  I have been given a copy of this plan.       Client signature _________________________________________________________________  Today s date:  6/23/2021  Adapted from Safety Plan Template 2008 Nisha Sparks and Chalo Wills is reprinted with the express permission of the authors.  No portion of the Safety Plan Template may be reproduced without the express, written permission.  You can contact the authors at [email or web address] or [email or web address].                         Appearance:                            Appropriate               Eye Contact:                           Good               Psychomotor Behavior:          Normal               Attitude:                                   Cooperative               Orientation:                             All              Speech                          Rate / Production:       Normal                           Volume:                       Normal               Mood:                                      Anxious, depressed                 Affect:                                      Normal               Thought  Content:                    Clear               Thought Form:                        Coherent  Logical               Insight:                                     Good                  Medication Review:            No changes to psychiatric medications, see updated Medication List in EPIC.                  Medication Compliance:              Yes                 Changes in Health Issues:              None                 Chemical Use Review:              Substance Use: Chemical use reviewed, Remaining sober from alcohol.  Now has medical marijuana card.  Working with their pharmacist and the dispensary.                   Tobacco Use: Started chantix for tobacco use        Diagnosis:  296.32 (F33.1) Major Depressive Disorder, Recurrent Episode, Moderate _ and With melancholic features  F43.10 PTSD  (F10.20) Alcohol use disorder, moderate, dependence , In early remission          Collateral Reports Completed:              Routed to psychiatry      PLAN: (Patient Tasks / Therapist Tasks / Other)  Client will return in two weeks and will work on the following goals.   -Continue to process through trauma.  -Remain sober 100% of the time.  -Referral put in for ADHD testing.     -Follow safety plan and use crisis resources.    -Continue to work on getting business up and running.                   Shu Monte,                                                            ______________________________________________________________________     Treatment Plan     Patient's Name: James Blanco                      YOB: 1980     Date: 2-11-20     Diagnoses:      296.32 (F33.1) Major Depressive Disorder, Recurrent Episode, Moderate _ and With melancholic features  F43.10 PTSD  (F10.20) Alcohol use disorder, moderate, dependence   Psychosocial & Contextual Factors: Loss of pet   WHODAS:   WHODAS 2.0 Total Score 1/27/2020 2/5/2020   Total Score 14 12         Referral / Collaboration:  Referral to another  professional/service is not indicated at this time..     Anticipated number of session or this episode of care: 3-5        MeasurableTreatment Goal(s) related to diagnosis / functional impairment(s)  Goal 1: Client will address grief and loss tied to loss of pet.                 I will know I've met my goal when I am feeling less reactive to the grief.       Objective #A (Client Action)                Client will increase understanding of steps in the grief process.  Status: Continued- 2-3-21, 5-7-21, 8-26-21     Intervention(s)  Therapist will use CBT and solution focused therapy .     Objective #B  Client will talk to at least two others about losses and coping.                        Status: Continued- 2-3-21, 5-7-21, 8-26-21     Intervention(s)  Therapist will use solution focused therapy .     Objective #C  Client will work on routine at night to help with sadness.    Status: Continued- 2-3-21, 5-7-21, 8-26-21     Intervention(s)  Therapist will use CBT therapy .    Goal 2: Client will address struggles with alcohol use disorder.       I will know I've met my goal when I am not having cravings.      Objective #A (Client Action)    Client will maintain sobriety 100% of the time.  Status: Continued - Date(s): 5-7-21, 8-26-21    Intervention(s)  Therapist will use cognitive and behavioral therapy.    Objective #B  Client will gain supports around remaining sober.    Status: Continued - Date(s): 5-7-21, 8-26-21    Intervention(s)  Therapist will use solution focused therapy.    Objective #C  Client will identify at least 5 example(s) of how drinking has resulted in an experience that interferes with person values or goals.  Status: Continued - Date(s): 5-21-21, 8-26-21    Intervention(s)  Therapist will use CBT and solution focused therapy.              Patient has reviewed and agreed to the above plan.        Shu Monte, TH                February 11, 2020

## 2021-10-13 NOTE — Clinical Note
Edward Vanessa, Was wondering if you have ever got the impression that James may have ADHD?  We went through the criteria today and he answered yes to every inattentive symptoms and yes to about half of the hyperactivity criteria.  I put in a referral for testing with Mar[facility] Roberts but thought I would pass this along before your next appointment with him.  Thank you, Shu Monte

## 2021-10-19 PROBLEM — F32.9 MAJOR DEPRESSION: Status: ACTIVE | Noted: 2020-04-07

## 2021-10-26 ENCOUNTER — VIRTUAL VISIT (OUTPATIENT)
Dept: PSYCHIATRY | Facility: CLINIC | Age: 41
End: 2021-10-26
Payer: COMMERCIAL

## 2021-10-26 DIAGNOSIS — E55.9 VITAMIN D DEFICIENCY: ICD-10-CM

## 2021-10-26 DIAGNOSIS — F41.1 GAD (GENERALIZED ANXIETY DISORDER): ICD-10-CM

## 2021-10-26 DIAGNOSIS — F33.1 MAJOR DEPRESSIVE DISORDER, RECURRENT EPISODE, MODERATE WITH MELANCHOLIC FEATURES (H): Primary | ICD-10-CM

## 2021-10-26 DIAGNOSIS — Z15.89 HETEROZYGOUS FOR MTHFR GENE MUTATION: ICD-10-CM

## 2021-10-26 PROCEDURE — 99214 OFFICE O/P EST MOD 30 MIN: CPT | Mod: 95 | Performed by: NURSE PRACTITIONER

## 2021-10-26 RX ORDER — VENLAFAXINE HYDROCHLORIDE 150 MG/1
150 CAPSULE, EXTENDED RELEASE ORAL DAILY
Qty: 30 CAPSULE | Refills: 3 | Status: SHIPPED | OUTPATIENT
Start: 2021-10-26 | End: 2022-03-07

## 2021-10-26 RX ORDER — FOLIC ACID 1 MG/1
1 TABLET ORAL DAILY
Qty: 30 TABLET | Refills: 3 | Status: SHIPPED | OUTPATIENT
Start: 2021-10-26 | End: 2022-03-07

## 2021-10-26 NOTE — PATIENT INSTRUCTIONS
1. Increase venlafaxine ER to 150 mg daily    2. Discontinue Abilify    3. Continue talk therapy with Jena    4. Add Folic Acid 1 mg daily    5. Vitamin D level        Continue all other medications as reviewed per electronic medical record today.     Safety plan reviewed. To the Emergency Department as needed or call after hours crisis line at [phone] or [phone]. [place] Crisis Text Line. Text [place] to 104005 or Suicide LifeLine Chat: suicideEcoSense Lighting.org/chat/    To schedule individual or family therapy, call [facility] at [phone].    Schedule an appointment with me in December or sooner as needed. Call [facility] at [phone] to schedule.    Follow up with primary care provider as planned or for acute medical concerns.    Call the psychiatric nurse line with medication questions or concerns at [phone].    Geomagichart may be used to communicate with your provider, but this is not intended to be used for emergencies.    Crisis Resources:    National Suicide Prevention Lifeline: 348.240.5061 (TTY: 157.703.7373). Call anytime for help.  (www.suicidepreventionlifeline.org)  National [place] on Mental Illness (www.chloe.org): 244.344.9067 or 765-304-7597.   Mental Health Association (www.mentalhealth.org): 363.749.2612 or 324-530-8409.  [place] Crisis Text Line: Text [place] to 060499  Suicide LifeLine Chat: suicideSxbbmline.org/chat

## 2021-10-26 NOTE — PROGRESS NOTES
"James is a 41 year old who is being evaluated via a billable video visit.      How would you like to obtain your AVS? MyChart  If the video visit is dropped, the invitation should be resent by: Text to cell phone: [phone]  Will anyone else be joining your video visit? No      Video Start Time: 3:03 PM  Video-Visit Details    Type of service:  Video Visit    Video End Time:3:29 PM    Originating Location (pt. Location): Home    Distant Location (provider location):  [facility] [place] [facility]     Platform used for Video Visit: [facility]         Outpatient Psychiatric Progress Note    Name: James Blanco   : 1980                    Primary Care Provider: Hector Talbot MD   Therapist: Jena     PHQ-9 scores:  PHQ-9 SCORE 2021   PHQ-9 Total Score MyChart - - -   PHQ-9 Total Score 7 8 12   Some encounter information is confidential and restricted. Go to Review Flowsheets activity to see all data.       LINNEA-7 scores:  LINNEA-7 SCORE 2021   Total Score - - -   Total Score 10 7 11   Some encounter information is confidential and restricted. Go to Review Flowsheets activity to see all data.       Patient Identification:    Patient is a 41 year old year old,   White Not  or  male  who presents for return visit with me.  Patient is currently employed part time. Patient attended the session alone. Patient prefers to be called: \"James\".    Interim History:    I last saw James Blanco for outpatient psychiatry Return Visit on 2021.     During that appointment, we met for the first time to talk about medication options to better manage his symptoms of depression and anxiety.  2 years ago he had to euthanize his dog which has exacerbated his mood symptoms.  He has been sober from alcohol since 2021.  James also admits to using cannabis on a regular basis to help him relax.  I added " Abilify 1/2 tablet daily for 4 days then 1 tablet daily for a total of 5 mg daily for mood stabilization and as an adjunct to his venlafaxine at 75 mg daily.  He is urged to continue talking with Jena to process life stressors and manage his grief over the loss of his dog.    .     Current medications include: ARIPiprazole (ABILIFY) 5 MG tablet, Take 1.5 tablets (7.5 mg) by mouth daily  EPINEPHrine (EPIPEN 2-CHAIM) 0.3 MG/0.3ML injection 2-pack, Inject 0.3 mLs (0.3 mg) into the muscle as needed for anaphylaxis  fenofibrate (TRICOR) 145 MG tablet, Take 1 tablet by mouth once daily  fluticasone (FLONASE) 50 MCG/ACT nasal spray, Spray 1 spray into both nostrils daily  multivitamin w/minerals (MULTI-VITAMIN) tablet, Take 1 tablet by mouth daily  omeprazole 20 MG tablet, Take 20 mg by mouth daily  simvastatin (ZOCOR) 40 MG tablet, TAKE 1 TABLET BY MOUTH AT BEDTIME  venlafaxine (EFFEXOR-XR) 75 MG 24 hr capsule, Take 1 capsule (75 mg) by mouth daily    No current facility-administered medications on file prior to visit.       The [place] Prescription Monitoring Program has been reviewed and there are no concerns about diversionary activity for controlled substances at this time.      I was able to review most recent Primary Care Provider, specialty provider, and therapy visit notes that I have access to.     Today, patient reports that by the time the afternoon comes he gets tired with no motivation.  He feels more depressed about being on his own during his job.    It is hard to find people to talk to.  He finds his wife is supportive.  James reports some mild sleep disruption and feels tired during the day.  He denies any suicide thinking at this time.  Generalized worry can create racing thoughts for him.     has a past medical history of Anxiety, Depressive disorder, and Hyperlipidaemia LDL goal <130. He also has no past medical history of Arthritis, Cancer (H), Cerebral infarction (H), Chronic infection,  Complication of anesthesia, Congestive heart failure (H), COPD (chronic obstructive pulmonary disease) (H), Diabetes (H), Heart disease, History of blood transfusion, Hypertension, Malignant hyperthermia, PONV (postoperative nausea and vomiting), Seizures (H), Sleep apnea, Thyroid disease, or Uncomplicated asthma.    Social history updates:    James is  to a supportive wife.    Substance use updates:    No alcohol use reported  Tobacco use: Yes Cigarettes  Ready to quit?  No  Nicotine Replacement Therapy tried: none     Vital Signs:   There were no vitals taken for this visit.    Labs:    Most recent laboratory results reviewed and no new labs.       Mental Status Examination:  Appearance:  awake, alert and casually dressed  Attitude:  cooperative   Eye Contact:  adequate  Gait and Station: No assistive Devices used and No dizziness or falls  Psychomotor Behavior:  intact station, gait and muscle tone  Oriented to:  time, person, and place  Attention Span and Concentration:  Normal  Speech:   clear, coherent and Speaks: English  Mood:  anxious and depressed  Affect:  mood congruent  Associations:  no loose associations  Thought Process:  goal oriented  Thought Content:  no evidence of suicidal ideation or homicidal ideation, no auditory hallucinations present and no visual hallucinations present  Recent and Remote Memory:  intact Not formally assessed. No amnesia.  Fund of Knowledge: appropriate  Insight:  good  Judgment:  intact  Impulse Control:  intact    Suicide Risk Assessment:  Today James Blanco reports that he is having no thoughts to harm himself or others. In addition, there are notable risk factors for self-harm, including anxiety and mood change. However, risk is mitigated by commitment to family, history of seeking help when needed, future oriented, denies suicidal intent or plan and denies homicidal ideation, intent, or plan. Therefore, based on all available evidence including the factors  cited above, James Blanco does not appear to be at imminent risk for self-harm, does not meet criteria for a 72-hr hold, and therefore remains appropriate for ongoing outpatient level of care.  A thorough assessment of risk factors related to suicide and self-harm have been reviewed and are noted above. The patient convincingly denies suicidality on several occasions. Local community safety resources printed and reviewed for patient to use if needed. There was no deceit detected, and the patient presented in a manner that was believable.     DSM5 Diagnosis:  296.32 (F33.1) Major Depressive Disorder, Recurrent Episode, Moderate _ and With melancholic features  300.02 (F41.1) Generalized Anxiety Disorder    Medical comorbidities include:   Patient Active Problem List    Diagnosis Date Noted     Tobacco use disorder 05/11/2021     Priority: Medium     PTSD (post-traumatic stress disorder) 04/14/2021     Priority: Medium     Alcohol use disorder, moderate, dependence (H) 11/21/2020     Priority: Medium     Closed nondisplaced fracture of fifth metatarsal bone of right foot, initial encounter 04/15/2020     Priority: Medium     Added automatically from request for surgery [id number]       Moderate major depression (H) 04/07/2020     Priority: Medium     Grief reaction 01/09/2020     Priority: Medium     Fatty liver, alcoholic 09/26/2018     Priority: Medium     Anxiety 09/01/2015     Priority: Medium     Mixed hyperlipidemia 09/01/2015     Priority: Medium       Assessment:    James Blanco reports ongoing depression and anxiety.  His job duties have changed leaving him last time to interact with peers which increases his loneliness, depression, and anxiety.  I added folic acid 1 mg daily to his schedule.  Abilify was discontinued and I increased his venlafaxine ER to 150 mg daily to help with depression and anger dieting.  While his wife is supportive I asked him to continue talk therapy with Jena to learn ways to  regulate life so that he gets more enjoyment out of bed.  Considering his low energy and lack of motivation I ordered a vitamin D level..    Medication side effects and alternatives were reviewed. Health promotion activities recommended and reviewed today. All questions addressed. Education and counseling completed regarding risks and benefits of medications and psychotherapy options.    Treatment Plan:        1. Increase venlafaxine ER to 150 mg daily    2. Discontinue Abilify    3. Continue talk therapy with Jena    4. Add Folic Acid 1 mg daily    5. Vitamin D level        Continue all other medications as reviewed per electronic medical record today.     Safety plan reviewed. To the Emergency Department as needed or call after hours crisis line at [phone] or [phone]. [place] Crisis Text Line. Text [place] to 245494 or Suicide LifeLine Chat: [employer].org/chat/    To schedule individual or family therapy, call [facility] at [phone].    Schedule an appointment with me in December or sooner as needed. Call [facility] at [phone] to schedule.    Follow up with primary care provider as planned or for acute medical concerns.    Call the psychiatric nurse line with medication questions or concerns at [phone].    MySocialNightlifehart may be used to communicate with your provider, but this is not intended to be used for emergencies.    Crisis Resources:    National Suicide Prevention Lifeline: 104.182.6617 (TTY: 949.364.9885). Call anytime for help.  (www.suicidepreventionlifeline.org)  National [place] on Mental Illness (www.[place].org): 112.666.6979 or 309-739-7485.   Mental Health Association (www.mentalhealth.org): 952.589.9518 or 375-079-7442.  [place] Crisis Text Line: Text [place] to 304142  Suicide LifeLine Chat: [employer].org/chat    Administrative Billing:   Time spent with patient was 30 minutes and greater than 50% of time or 20 minutes was  spent in counseling and coordination of care regarding above diagnoses and treatment plan.    Patient Status:  Patient will continue to be seen for ongoing consultation and stabilization.    Signed:   ISABELA Lackey-BC   Psychiatry

## 2021-11-03 ENCOUNTER — VIRTUAL VISIT (OUTPATIENT)
Dept: PSYCHOLOGY | Facility: CLINIC | Age: 41
End: 2021-11-03
Payer: COMMERCIAL

## 2021-11-03 DIAGNOSIS — F43.10 PTSD (POST-TRAUMATIC STRESS DISORDER): Primary | ICD-10-CM

## 2021-11-03 DIAGNOSIS — F33.1 MAJOR DEPRESSIVE DISORDER, RECURRENT EPISODE, MODERATE (H): ICD-10-CM

## 2021-11-03 DIAGNOSIS — F10.20 ALCOHOL USE DISORDER, MODERATE, DEPENDENCE (H): ICD-10-CM

## 2021-11-03 PROCEDURE — 90834 PSYTX W PT 45 MINUTES: CPT | Mod: 95 | Performed by: MARRIAGE & FAMILY THERAPIST

## 2021-11-03 ASSESSMENT — ANXIETY QUESTIONNAIRES
1. FEELING NERVOUS, ANXIOUS, OR ON EDGE: SEVERAL DAYS
5. BEING SO RESTLESS THAT IT IS HARD TO SIT STILL: MORE THAN HALF THE DAYS
6. BECOMING EASILY ANNOYED OR IRRITABLE: MORE THAN HALF THE DAYS
2. NOT BEING ABLE TO STOP OR CONTROL WORRYING: SEVERAL DAYS
3. WORRYING TOO MUCH ABOUT DIFFERENT THINGS: SEVERAL DAYS
7. FEELING AFRAID AS IF SOMETHING AWFUL MIGHT HAPPEN: SEVERAL DAYS
GAD7 TOTAL SCORE: 10
IF YOU CHECKED OFF ANY PROBLEMS ON THIS QUESTIONNAIRE, HOW DIFFICULT HAVE THESE PROBLEMS MADE IT FOR YOU TO DO YOUR WORK, TAKE CARE OF THINGS AT HOME, OR GET ALONG WITH OTHER PEOPLE: SOMEWHAT DIFFICULT

## 2021-11-03 ASSESSMENT — PATIENT HEALTH QUESTIONNAIRE - PHQ9
5. POOR APPETITE OR OVEREATING: MORE THAN HALF THE DAYS
SUM OF ALL RESPONSES TO PHQ QUESTIONS 1-9: 13

## 2021-11-03 NOTE — PROGRESS NOTES
Progress Note    Patient Name: James Blanco  Date: 11-3-21         Service Type: Individual      Session Start Time: 4pm  Session End Time: 450pm     Session Length: 50min    Session #: 21    Attendees: Client attended alone    Service Modality:  Video Visit:      Provider verified identity through the following two step process.  Patient provided:  Patient  and Patient address    Telemedicine Visit: The patient's condition can be safely assessed and treated via synchronous audio and visual telemedicine encounter.      Reason for Telemedicine Visit: Patient has requested telehealth visit    Originating Site (Patient Location): Patient's home    Distant Site (Provider Location): Provider Remote Setting    Consent:  The patient/guardian has verbally consented to: the potential risks and benefits of telemedicine (video visit) versus in person care; bill my insurance or make self-payment for services provided; and responsibility for payment of non-covered services.     Patient would like the video invitation sent by:  My Chart    Mode of Communication:  Video Conference via Amwell    As the provider I attest to compliance with applicable laws and regulations related to telemedicine.     Treatment Plan Last Reviewed: 21  PHQ-9 / LINNEA-7 :21  CGI- 21    DATA  Interactive Complexity: No  Crisis: No     Progress Since Last Session (Related to Symptoms / Goals / Homework):              Symptoms: Client has been working on sobriety with success but has been struggling with motivation, staying organized and brain fog.  He reports he also has been struggling with restless leg syndrome not only at night but also during the day.          Homework:  Completed in session, completed outside of session.                            Episode of Care Goals: Satisfactory progress - ACTION (Actively working towards change); Intervened by reinforcing change plan / affirming steps  taken                 Current / Ongoing Stressors and Concerns:              -Client reports no suicidal thoughts since we spoke last session.     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  He reports difficulty with organization and motivation.  Feeling run down and tired most afternoons.  Has been struggling with focus and concentration.  Reports his wife feels he is not present or engaged in the evening.     -Some struggles losing track of thoughts and losing words.  Does happen in session when talking.                           Treatment Objective(s) Addressed in This Session:          Sobriety   Positive time with spouse  Personal limits and expectations   Check in on safety                       Intervention:              -Continue to engage in new activities in the evening.  Make quality time with spouse a priority.  Make eye contact during conversation and acknowledge what wife is saying.     -Continue with financial plan and start to set up shop at home.     -Consider EMDR therapy for loss of beloved pet.       -Finding ways to manage sobriety and has been able to find enjoyment in other social activities without alcohol.     -Keep track of brain fog moments and monitor if it is better with folic acid and vitamin B.   -Follow safety plan and use crisis resources.                                        ASSESSMENT: Current Emotional / Mental Status (status of significant symptoms):              Risk status (Self / Other harm or suicidal ideation)              Patient denies current fears or concerns for personal safety.              Patient denies current or recent suicidal ideation or behaviors.                Patientdenies current or recent homicidal ideation or behaviors.              Patient denies current or recent self injurious behavior or ideation.              Patient denies other safety concerns.              Patient reports there has been no change in risk  "factors since their last session.                Patientreports there has been no change in protective factors since their last session.                Completed safety plan and reviewed 0n 11-3-21    James Blanco                SAFETY PLAN:  Step 1: Warning signs / cues (Thoughts, images, mood, situation, behavior) that a crisis may be developing:  ? Thoughts: \"People would be better off without me\", \"I'm a burden\", \"I can't do this anymore\", \"I just want this to end\" and \"Nothing makes it better\"  ? Images: visions of harm: Driving into an oncoming nikhil of traffic  ? Thinking Processes: ruminations (can't stop thinking about my problems): Having more than one bad day in a row, racing thoughts and intrusive thoughts (bothersome, unwanted thoughts that come out of nowhere): Thoughts in general about self harm  ? Mood: worsening depression, hopelessness, intense worry and mood swings  ? Behaviors: isolating/withdrawing , not taking care of myself, not taking care of my responsibilities and not sleeping enough  ? Situations: Feeling overwhelmed with too much going on at once   Step 2: Coping strategies - Things I can do to take my mind off of my problems without contacting another person (relaxation technique, physical activity):  ? Distress Tolerance Strategies:  play with my pet , change body temperature (ice pack/cold water) , paced breathing/progressive muscle relaxation and working on business plan for distraction  ? Physical Activities: go for a walk, deep breathing, stretching  and working outside on things/ in the garage  ? Focus on helpful thoughts:  \"This is temporary\", \"I will get through this\" and \"It always passes\"  Step 3: People and social settings that provide distraction:              Name: Antonette (wife)         Phone: Lives with              Name:  Extended family both bio family and in-laws     ? Outside working on things, going to the cabin or a trip with family or friends       Step 4: Remind " myself of people and things that are important to me and worth living for:    Family  Friends  Dog  Future plans   Step 5: When I am in crisis, I can ask these people to help me use my safety plan:              Name: Antonette (wife)         Phone: [phone]              Name: Rayray Blanco   Phone: [phone]                Step 6: Making the environment safe:   ? remove alcohol, secure medications, dispose of old medications , remove access to firearms, remove things I could use to hurt myself and alternate routes.  Step 7: Professionals or agencies I can contact during a crisis:  ? [facility] Number: [phone]  ? Suicide Prevention Lifeline: 3-686-319-CLYY (2020)  ? Crisis Text Line Service (available 24 hours a day, 7 days a week): Text [place] to 368440    [facility] Crisis Services: Exploretripumnjzs-[phone]     Call 911 or go to my nearest emergency department.       I helped develop this safety plan and agree to use it when needed.  I have been given a copy of this plan.       Client signature _________________________________________________________________  Today s date:  6/23/2021  Adapted from Safety Plan Template 2008 Nisha Sparks and Chalo Wills is reprinted with the express permission of the authors.  No portion of the Safety Plan Template may be reproduced without the express, written permission.  You can contact the authors at [email or web address] or [email or web address].                         Appearance:                            Appropriate               Eye Contact:                           Good               Psychomotor Behavior:          Normal               Attitude:                                   Cooperative               Orientation:                             All              Speech                          Rate / Production:       Normal                           Volume:                       Normal               Mood:                                       Anxious, depressed                 Affect:                                      Normal               Thought Content:                    Clear               Thought Form:                        Coherent  Logical               Insight:                                     Good                  Medication Review:            changes to psychiatric medications, see updated Medication List in EPIC.                  Medication Compliance:              Yes                 Changes in Health Issues:              None                 Chemical Use Review:              Substance Use: Chemical use reviewed, Remaining sober from alcohol.  Now has medical marijuana card.  Working with their pharmacist and the dispensary.                   Tobacco Use: Started chantix for tobacco use        Diagnosis:  296.32 (F33.1) Major Depressive Disorder, Recurrent Episode, Moderate _ and With melancholic features  F43.10 PTSD  (F10.20) Alcohol use disorder, moderate, dependence , In early remission          Collateral Reports Completed:              Routed to psychiatry      PLAN: (Patient Tasks / Therapist Tasks / Other)  Client will return in two weeks and will work on the following goals.   -Continue to process through trauma.  -Remain sober 100% of the time.  -Track brain fog on vitamin B and folic acid.    -Follow safety plan and use crisis resources.    -reciprocate eye contact and conversation with wife.                 Shu Monte,                                                            ______________________________________________________________________     Treatment Plan     Patient's Name: James Blanco                      YOB: 1980     Date: 2-11-20     Diagnoses:      296.32 (F33.1) Major Depressive Disorder, Recurrent Episode, Moderate _ and With melancholic features  F43.10 PTSD  (F10.20) Alcohol use disorder, moderate, dependence   Psychosocial & Contextual Factors: Loss of pet   WHODAS:   WHODAS  2.0 Total Score 1/27/2020 2/5/2020   Total Score 14 12         Referral / Collaboration:  Referral to another professional/service is not indicated at this time..     Anticipated number of session or this episode of care: 3-5        MeasurableTreatment Goal(s) related to diagnosis / functional impairment(s)  Goal 1: Client will address grief and loss tied to loss of pet.                 I will know I've met my goal when I am feeling less reactive to the grief.       Objective #A (Client Action)                Client will increase understanding of steps in the grief process.  Status: Continued- 2-3-21, 5-7-21, 8-26-21     Intervention(s)  Therapist will use CBT and solution focused therapy .     Objective #B  Client will talk to at least two others about losses and coping.                        Status: Continued- 2-3-21, 5-7-21, 8-26-21     Intervention(s)  Therapist will use solution focused therapy .     Objective #C  Client will work on routine at night to help with sadness.    Status: Continued- 2-3-21, 5-7-21, 8-26-21     Intervention(s)  Therapist will use CBT therapy .    Goal 2: Client will address struggles with alcohol use disorder.       I will know I've met my goal when I am not having cravings.      Objective #A (Client Action)    Client will maintain sobriety 100% of the time.  Status: Continued - Date(s): 5-7-21, 8-26-21    Intervention(s)  Therapist will use cognitive and behavioral therapy.    Objective #B  Client will gain supports around remaining sober.    Status: Continued - Date(s): 5-7-21, 8-26-21    Intervention(s)  Therapist will use solution focused therapy.    Objective #C  Client will identify at least 5 example(s) of how drinking has resulted in an experience that interferes with person values or goals.  Status: Continued - Date(s): 5-21-21, 8-26-21    Intervention(s)  Therapist will use CBT and solution focused therapy.              Patient has reviewed and agreed to the above  plan.        Shu Monte, TH February 11, 2020

## 2021-11-04 ASSESSMENT — ANXIETY QUESTIONNAIRES: GAD7 TOTAL SCORE: 10

## 2021-11-29 ENCOUNTER — VIRTUAL VISIT (OUTPATIENT)
Dept: PSYCHOLOGY | Facility: CLINIC | Age: 41
End: 2021-11-29
Payer: COMMERCIAL

## 2021-11-29 DIAGNOSIS — F33.1 MAJOR DEPRESSIVE DISORDER, RECURRENT EPISODE, MODERATE (H): ICD-10-CM

## 2021-11-29 DIAGNOSIS — F10.20 ALCOHOL USE DISORDER, MODERATE, DEPENDENCE (H): ICD-10-CM

## 2021-11-29 DIAGNOSIS — F43.10 PTSD (POST-TRAUMATIC STRESS DISORDER): Primary | ICD-10-CM

## 2021-11-29 PROCEDURE — 90834 PSYTX W PT 45 MINUTES: CPT | Mod: 95 | Performed by: MARRIAGE & FAMILY THERAPIST

## 2021-11-29 ASSESSMENT — PATIENT HEALTH QUESTIONNAIRE - PHQ9: 5. POOR APPETITE OR OVEREATING: MORE THAN HALF THE DAYS

## 2021-11-29 ASSESSMENT — ANXIETY QUESTIONNAIRES
GAD7 TOTAL SCORE: 14
6. BECOMING EASILY ANNOYED OR IRRITABLE: SEVERAL DAYS
3. WORRYING TOO MUCH ABOUT DIFFERENT THINGS: NEARLY EVERY DAY
5. BEING SO RESTLESS THAT IT IS HARD TO SIT STILL: MORE THAN HALF THE DAYS
2. NOT BEING ABLE TO STOP OR CONTROL WORRYING: NEARLY EVERY DAY
1. FEELING NERVOUS, ANXIOUS, OR ON EDGE: SEVERAL DAYS
7. FEELING AFRAID AS IF SOMETHING AWFUL MIGHT HAPPEN: MORE THAN HALF THE DAYS
IF YOU CHECKED OFF ANY PROBLEMS ON THIS QUESTIONNAIRE, HOW DIFFICULT HAVE THESE PROBLEMS MADE IT FOR YOU TO DO YOUR WORK, TAKE CARE OF THINGS AT HOME, OR GET ALONG WITH OTHER PEOPLE: VERY DIFFICULT

## 2021-11-29 NOTE — PROGRESS NOTES
Progress Note    Patient Name: James Blanco  Date: 21         Service Type: Individual      Session Start Time: 4pm  Session End Time: 450pm     Session Length: 50min    Session #: 22    Attendees: Client attended alone    Service Modality:  Video Visit:      Provider verified identity through the following two step process.  Patient provided:  Patient  and Patient address    Telemedicine Visit: The patient's condition can be safely assessed and treated via synchronous audio and visual telemedicine encounter.      Reason for Telemedicine Visit: Patient has requested telehealth visit    Originating Site (Patient Location): Patient's home    Distant Site (Provider Location): Provider Remote Setting    Consent:  The patient/guardian has verbally consented to: the potential risks and benefits of telemedicine (video visit) versus in person care; bill my insurance or make self-payment for services provided; and responsibility for payment of non-covered services.     Patient would like the video invitation sent by:  My Chart    Mode of Communication:  Video Conference via Amwell    As the provider I attest to compliance with applicable laws and regulations related to telemedicine.     Treatment Plan Last Reviewed: 21  PHQ-9 / LINNEA-7 :21  CGI- 21    DATA  Interactive Complexity: No  Crisis: No     Progress Since Last Session (Related to Symptoms / Goals / Homework):              Symptoms: Client has been working on sobriety with success but has been struggling with higher anxiety concerning finances.  Reports some suicidal thoughts today but is able to contract for safety.       Homework:  Completed in session, completed outside of session.                            Episode of Care Goals: Satisfactory progress - ACTION (Actively working towards change); Intervened by reinforcing change plan / affirming steps taken                 Current / Ongoing  "Stressors and Concerns:              -Client reports some minor suicidal thoughts with no plan or intent tied to finances.     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  He reports difficulty with organization and motivation.  Feeling run down and tired most afternoons.  Has been struggling with focus and concentration.    He reports he was disengaged during the holidays but did recognize it and was able to let some worries go.     -Feeling some improvement with brain fog and losing track of words.                           Treatment Objective(s) Addressed in This Session:          Sobriety   Positive time with spouse  Personal limits and expectations   Check in on safety                       Intervention:              -Continue to engage in new activities in the evening.  Make quality time with spouse a priority.  \"Close the extra open tabs.\"  Focus on one thing at a time.     -Work on recognizing when having too much on the mind and \"close out some of the tabs.\"   -Consider EMDR therapy for loss of beloved pet.       -Finding ways to manage sobriety and has been able to find enjoyment in other social activities without alcohol.     -Follow safety plan and use crisis resources.   -Has a concrete plan in place for finishing the job he is working on.                            ASSESSMENT: Current Emotional / Mental Status (status of significant symptoms):              Risk status (Self / Other harm or suicidal ideation)              Patient denies current fears or concerns for personal safety.              Patient reports current or recent suicidal ideation or behaviors.  Intensity at a 3 out of 10 with no intent or plan.                Patientdenies current or recent homicidal ideation or behaviors.              Patient denies current or recent self injurious behavior or ideation.              Patient denies other safety concerns.              Patient reports there has been " "no change in risk factors since their last session.                Patientreports there has been no change in protective factors since their last session.                Completed safety plan and reviewed 0n 11-29-21    James Blanco                SAFETY PLAN:  Step 1: Warning signs / cues (Thoughts, images, mood, situation, behavior) that a crisis may be developing:  ? Thoughts: \"People would be better off without me\", \"I'm a burden\", \"I can't do this anymore\", \"I just want this to end\" and \"Nothing makes it better\"  ? Images: visions of harm: Driving into an oncoming nikhil of traffic  ? Thinking Processes: ruminations (can't stop thinking about my problems): Having more than one bad day in a row, racing thoughts and intrusive thoughts (bothersome, unwanted thoughts that come out of nowhere): Thoughts in general about self harm  ? Mood: worsening depression, hopelessness, intense worry and mood swings  ? Behaviors: isolating/withdrawing , not taking care of myself, not taking care of my responsibilities and not sleeping enough  ? Situations: Feeling overwhelmed with too much going on at once   Step 2: Coping strategies - Things I can do to take my mind off of my problems without contacting another person (relaxation technique, physical activity):  ? Distress Tolerance Strategies:  play with my pet , change body temperature (ice pack/cold water) , paced breathing/progressive muscle relaxation and working on business plan for distraction  ? Physical Activities: go for a walk, deep breathing, stretching  and working outside on things/ in the garage  ? Focus on helpful thoughts:  \"This is temporary\", \"I will get through this\" and \"It always passes\"  Step 3: People and social settings that provide distraction:              Name: Antonette (wife)         Phone: Lives with              Name:  Extended family both bio family and in-laws     ? Outside working on things, going to the cabin or a trip with family or friends     "   Step 4: Remind myself of people and things that are important to me and worth living for:    Family  Friends  Dog  Future plans   Step 5: When I am in crisis, I can ask these people to help me use my safety plan:              Name: Antonette (wife)         Phone: [phone]              Name: Rayray Blanco   Phone: [phone]                Step 6: Making the environment safe:   ? remove alcohol, secure medications, dispose of old medications , remove access to firearms, remove things I could use to hurt myself and alternate routes.  Step 7: Professionals or agencies I can contact during a crisis:  ? [facility] Daytime Number: 685-327-3099  ? Suicide Prevention Lifeline: 3-870-569-VTLQ (2166)  ? Crisis Text Line Service (available 24 hours a day, 7 days a week): Text [place] to 579720    [facility] Crisis Services: Horranceolwezp-[phone]     Call 911 or go to my nearest emergency department.       I helped develop this safety plan and agree to use it when needed.  I have been given a copy of this plan.       Client signature _________________________________________________________________  Today s date:  6/23/2021  Adapted from Safety Plan Template 2008 Nisha Sparks and Chalo Wills is reprinted with the express permission of the authors.  No portion of the Safety Plan Template may be reproduced without the express, written permission.  You can contact the authors at [email or web address] or [email or web address].                         Appearance:                            Appropriate               Eye Contact:                           Good               Psychomotor Behavior:          Normal               Attitude:                                   Cooperative               Orientation:                             All              Speech                          Rate / Production:       Normal                           Volume:                       Normal               Mood:                   "                    Anxious, depressed                 Affect:                                      Normal               Thought Content:                    Clear               Thought Form:                        Coherent  Logical               Insight:                                     Good                  Medication Review:           No changes to psychiatric medications, see updated Medication List in EPIC.                  Medication Compliance:              Yes                 Changes in Health Issues:              None                 Chemical Use Review:              Substance Use: Chemical use reviewed, Remaining sober from alcohol.  Now has medical marijuana card.  Working with their pharmacist and the dispensary.                   Tobacco Use: Started chantix for tobacco use        Diagnosis:  296.32 (F33.1) Major Depressive Disorder, Recurrent Episode, Moderate _ and With melancholic features  F43.10 PTSD  (F10.20) Alcohol use disorder, moderate, dependence , In early remission          Collateral Reports Completed:              Routed to psychiatry      PLAN: (Patient Tasks / Therapist Tasks / Other)  Client will return in two weeks and will work on the following goals.   -Continue to process through trauma.  -Remain sober 100% of the time.  -Track brain fog on vitamin B and folic acid.    -Follow safety plan and use crisis resources.    -Recognize when disengaging and \"close out some tabs.\"             Shu Monte,                                                            ______________________________________________________________________     Treatment Plan     Patient's Name: James Blanco                      YOB: 1980     Date: 2-11-20     Diagnoses:      296.32 (F33.1) Major Depressive Disorder, Recurrent Episode, Moderate _ and With melancholic features  F43.10 PTSD  (F10.20) Alcohol use disorder, moderate, dependence   Psychosocial & Contextual Factors: Loss of " pet   WHODAS:   WHODAS 2.0 Total Score 1/27/2020 2/5/2020   Total Score 14 12         Referral / Collaboration:  Referral to another professional/service is not indicated at this time..     Anticipated number of session or this episode of care: 3-5        MeasurableTreatment Goal(s) related to diagnosis / functional impairment(s)  Goal 1: Client will address grief and loss tied to loss of pet.                 I will know I've met my goal when I am feeling less reactive to the grief.       Objective #A (Client Action)                Client will increase understanding of steps in the grief process.  Status: Continued- 2-3-21, 5-7-21, 8-26-21, 11-29-21     Intervention(s)  Therapist will use CBT and solution focused therapy .     Objective #B  Client will talk to at least two others about losses and coping.                        Status: Continued- 2-3-21, 5-7-21, 8-26-21, 11-29-21     Intervention(s)  Therapist will use solution focused therapy .     Objective #C  Client will work on routine at night to help with sadness.    Status: Continued- 2-3-21, 5-7-21, 8-26-21, 11-29-21     Intervention(s)  Therapist will use CBT therapy .    Goal 2: Client will address struggles with alcohol use disorder.       I will know I've met my goal when I am not having cravings.      Objective #A (Client Action)    Client will maintain sobriety 100% of the time.  Status: Continued - Date(s): 5-7-21, 8-26-21, 11-29-21    Intervention(s)  Therapist will use cognitive and behavioral therapy.    Objective #B  Client will gain supports around remaining sober.    Status: Continued - Date(s): 5-7-21, 8-26-21, 11-29-21    Intervention(s)  Therapist will use solution focused therapy.    Objective #C  Client will identify at least 5 example(s) of how drinking has resulted in an experience that interferes with person values or goals.  Status: Continued - Date(s): 5-21-21, 8-26-21, 11-29-21    Intervention(s)  Therapist will use CBT and solution  focused therapy.              Patient has reviewed and agreed to the above plan.        Shu Monte, TH February 11, 2020

## 2021-11-30 ASSESSMENT — PATIENT HEALTH QUESTIONNAIRE - PHQ9: SUM OF ALL RESPONSES TO PHQ QUESTIONS 1-9: 15

## 2021-11-30 ASSESSMENT — ANXIETY QUESTIONNAIRES: GAD7 TOTAL SCORE: 14

## 2021-12-08 ENCOUNTER — VIRTUAL VISIT (OUTPATIENT)
Dept: PSYCHOLOGY | Facility: CLINIC | Age: 41
End: 2021-12-08
Payer: COMMERCIAL

## 2021-12-08 DIAGNOSIS — F43.10 PTSD (POST-TRAUMATIC STRESS DISORDER): Primary | ICD-10-CM

## 2021-12-08 DIAGNOSIS — F10.20 ALCOHOL USE DISORDER, MODERATE, DEPENDENCE (H): ICD-10-CM

## 2021-12-08 DIAGNOSIS — F33.1 MAJOR DEPRESSIVE DISORDER, RECURRENT EPISODE, MODERATE (H): ICD-10-CM

## 2021-12-08 PROCEDURE — 90834 PSYTX W PT 45 MINUTES: CPT | Mod: 95 | Performed by: MARRIAGE & FAMILY THERAPIST

## 2021-12-09 NOTE — PROGRESS NOTES
Progress Note    Patient Name: James Blanco  Date: 21         Service Type: Individual      Session Start Time: 4pm  Session End Time: 450pm     Session Length: 50min    Session #: 23    Attendees: Client attended alone    Service Modality:  Video Visit:      Provider verified identity through the following two step process.  Patient provided:  Patient  and Patient address    Telemedicine Visit: The patient's condition can be safely assessed and treated via synchronous audio and visual telemedicine encounter.      Reason for Telemedicine Visit: Patient has requested telehealth visit    Originating Site (Patient Location): Patient's home    Distant Site (Provider Location): Provider Remote Setting    Consent:  The patient/guardian has verbally consented to: the potential risks and benefits of telemedicine (video visit) versus in person care; bill my insurance or make self-payment for services provided; and responsibility for payment of non-covered services.     Patient would like the video invitation sent by:  My Chart    Mode of Communication:  Video Conference via [facility]    As the provider I attest to compliance with applicable laws and regulations related to telemedicine.     Treatment Plan Last Reviewed: 21  PHQ-9 / LINNEA-7 :21  CGI- 21    DATA  Interactive Complexity: No  Crisis: No     Progress Since Last Session (Related to Symptoms / Goals / Homework):              Symptoms: Client has been working on sobriety with success but has been struggling with higher anxiety concerning finances.  Reports no suicidal thoughts today.       Homework:  Completed in session, completed outside of session.                            Episode of Care Goals: Satisfactory progress - ACTION (Actively working towards change); Intervened by reinforcing change plan / affirming steps taken                 Current / Ongoing Stressors and Concerns:               "-Client reports no suicidal thoughts in today's session.      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  He reports difficulty with organization and motivation.  Feeling run down and tired most afternoons.  Has been struggling with focus and concentration.    He reports he has been getting more on a daily schedule but feels he will continue to have to put in some extra time  .     -Feeling some improvement with brain fog and losing track of words.       -Reports making an effort to have more quality time with wife.   -Sister is having a surgery in which one of her legs will be removed up to the hip.  Client and sister are not close due to her history of poor choices.  He is a little worried she will overstay her welcome with his parents as she is recovering.                         Treatment Objective(s) Addressed in This Session:          Sobriety   Positive time with spouse  Personal limits and expectations   Check in on safety                       Intervention:              -Continue to engage in new activities in the evening.  Make quality time with spouse a priority.  \"Close the extra open tabs.\"  Focus on one thing at a time.     -Consider EMDR therapy for loss of beloved pet.       -Finding ways to manage sobriety and has been able to find enjoyment in other social activities without alcohol.  Feels he has found a good alcohol free option for holidays and other events.     -Follow safety plan and use crisis resources.   -Continue to follow plan to finish current job.                             ASSESSMENT: Current Emotional / Mental Status (status of significant symptoms):              Risk status (Self / Other harm or suicidal ideation)              Patient denies current fears or concerns for personal safety.              Patient denies current or recent suicidal ideation or behaviors.                Patientdenies current or recent homicidal ideation or behaviors.    " "          Patient denies current or recent self injurious behavior or ideation.              Patient denies other safety concerns.              Patient reports there has been no change in risk factors since their last session.                Patientreports there has been no change in protective factors since their last session.                Completed safety plan and reviewed 0n 12-8-21    James Blanco                SAFETY PLAN:  Step 1: Warning signs / cues (Thoughts, images, mood, situation, behavior) that a crisis may be developing:  ? Thoughts: \"People would be better off without me\", \"I'm a burden\", \"I can't do this anymore\", \"I just want this to end\" and \"Nothing makes it better\"  ? Images: visions of harm: Driving into an oncoming nikhil of traffic  ? Thinking Processes: ruminations (can't stop thinking about my problems): Having more than one bad day in a row, racing thoughts and intrusive thoughts (bothersome, unwanted thoughts that come out of nowhere): Thoughts in general about self harm  ? Mood: worsening depression, hopelessness, intense worry and mood swings  ? Behaviors: isolating/withdrawing , not taking care of myself, not taking care of my responsibilities and not sleeping enough  ? Situations: Feeling overwhelmed with too much going on at once   Step 2: Coping strategies - Things I can do to take my mind off of my problems without contacting another person (relaxation technique, physical activity):  ? Distress Tolerance Strategies:  play with my pet , change body temperature (ice pack/cold water) , paced breathing/progressive muscle relaxation and working on business plan for distraction  ? Physical Activities: go for a walk, deep breathing, stretching  and working outside on things/ in the garage  ? Focus on helpful thoughts:  \"This is temporary\", \"I will get through this\" and \"It always passes\"  Step 3: People and social settings that provide distraction:              Name: Antonette (wife)         " Phone: Lives with              Name:  Extended family both bio family and in-laws     ? Outside working on things, going to the cabin or a trip with family or friends       Step 4: Remind myself of people and things that are important to me and worth living for:    Family  Friends  Dog  Future plans   Step 5: When I am in crisis, I can ask these people to help me use my safety plan:              Name: Antonette (wife)         Phone: [phone]              Name: Rayray Blanco   Phone: [phone]                Step 6: Making the environment safe:   ? remove alcohol, secure medications, dispose of old medications , remove access to firearms, remove things I could use to hurt myself and alternate routes.  Step 7: Professionals or agencies I can contact during a crisis:  ? [facility] Daytime Number: [phone]  ? Suicide Prevention Lifeline: 8-671-322-BKFD (7710)  ? Crisis Text Line Service (available 24 hours a day, 7 days a week): Text [place] to 386062    Local Crisis Services: [employer] Systemswicflt-9981-541-8255     Call 911 or go to my nearest emergency department.       I helped develop this safety plan and agree to use it when needed.  I have been given a copy of this plan.       Client signature _________________________________________________________________  Today s date:  6/23/2021  Adapted from Safety Plan Template 2008 Nisha Sparks and Chalo Wills is reprinted with the express permission of the authors.  No portion of the Safety Plan Template may be reproduced without the express, written permission.  You can contact the authors at [email or web address] or [email or web address].                         Appearance:                            Appropriate               Eye Contact:                           Good               Psychomotor Behavior:          Normal               Attitude:                                   Cooperative               Orientation:                             All        "       Speech                          Rate / Production:       Normal                           Volume:                       Normal               Mood:                                      Anxious, depressed                 Affect:                                      Normal               Thought Content:                    Clear               Thought Form:                        Coherent  Logical               Insight:                                     Good                  Medication Review:           No changes to psychiatric medications, see updated Medication List in EPIC.                  Medication Compliance:              Yes                 Changes in Health Issues:              None                 Chemical Use Review:              Substance Use: Chemical use reviewed, Remaining sober from alcohol.  Now has medical marijuana card.  Working with their pharmacist and the dispensary.                   Tobacco Use: Started chantix for tobacco use        Diagnosis:  296.32 (F33.1) Major Depressive Disorder, Recurrent Episode, Moderate _ and With melancholic features  F43.10 PTSD  (F10.20) Alcohol use disorder, moderate, dependence , In early remission          Collateral Reports Completed:              Routed to psychiatry      PLAN: (Patient Tasks / Therapist Tasks / Other)  Client will return in two weeks and will work on the following goals.   -Continue to process through trauma.  -Remain sober 100% of the time.  -Track brain fog on vitamin B and folic acid.    -Follow safety plan and use crisis resources.    -Recognize when disengaging and \"close out some tabs.\"  -Continue to wrap up work project.    -Check in on parents and let them know the concerns.               Shu Monte,                                                            ______________________________________________________________________     Treatment Plan     Patient's Name: James Blanco                      Date Of Birth: " 1980     Date: 2-11-20     Diagnoses:      296.32 (F33.1) Major Depressive Disorder, Recurrent Episode, Moderate _ and With melancholic features  F43.10 PTSD  (F10.20) Alcohol use disorder, moderate, dependence   Psychosocial & Contextual Factors: Loss of pet   WHODAS:   WHODAS 2.0 Total Score 1/27/2020 2/5/2020   Total Score 14 12         Referral / Collaboration:  Referral to another professional/service is not indicated at this time..     Anticipated number of session or this episode of care: 3-5        MeasurableTreatment Goal(s) related to diagnosis / functional impairment(s)  Goal 1: Client will address grief and loss tied to loss of pet.                 I will know I've met my goal when I am feeling less reactive to the grief.       Objective #A (Client Action)                Client will increase understanding of steps in the grief process.  Status: Continued- 2-3-21, 5-7-21, 8-26-21, 11-29-21     Intervention(s)  Therapist will use CBT and solution focused therapy .     Objective #B  Client will talk to at least two others about losses and coping.                        Status: Continued- 2-3-21, 5-7-21, 8-26-21, 11-29-21     Intervention(s)  Therapist will use solution focused therapy .     Objective #C  Client will work on routine at night to help with sadness.    Status: Continued- 2-3-21, 5-7-21, 8-26-21, 11-29-21     Intervention(s)  Therapist will use CBT therapy .    Goal 2: Client will address struggles with alcohol use disorder.       I will know I've met my goal when I am not having cravings.      Objective #A (Client Action)    Client will maintain sobriety 100% of the time.  Status: Continued - Date(s): 5-7-21, 8-26-21, 11-29-21    Intervention(s)  Therapist will use cognitive and behavioral therapy.    Objective #B  Client will gain supports around remaining sober.    Status: Continued - Date(s): 5-7-21, 8-26-21, 11-29-21    Intervention(s)  Therapist will use solution focused  therapy.    Objective #C  Client will identify at least 5 example(s) of how drinking has resulted in an experience that interferes with person values or goals.  Status: Continued - Date(s): 5-21-21, 8-26-21, 11-29-21    Intervention(s)  Therapist will use CBT and solution focused therapy.              Patient has reviewed and agreed to the above plan.        Shu Monte, TH                February 11, 2020

## 2021-12-15 ENCOUNTER — LAB (OUTPATIENT)
Dept: LAB | Facility: CLINIC | Age: 41
End: 2021-12-15
Payer: COMMERCIAL

## 2021-12-15 DIAGNOSIS — E55.9 VITAMIN D DEFICIENCY: ICD-10-CM

## 2021-12-15 PROCEDURE — 36415 COLL VENOUS BLD VENIPUNCTURE: CPT

## 2021-12-15 PROCEDURE — 82306 VITAMIN D 25 HYDROXY: CPT

## 2021-12-16 LAB — DEPRECATED CALCIDIOL+CALCIFEROL SERPL-MC: 22 UG/L (ref 20–75)

## 2021-12-20 ENCOUNTER — TELEPHONE (OUTPATIENT)
Dept: PSYCHIATRY | Facility: CLINIC | Age: 41
End: 2021-12-20
Payer: COMMERCIAL

## 2021-12-20 NOTE — TELEPHONE ENCOUNTER
----- Message from Rasheeda Brantley NP sent at 12/17/2021  5:50 PM CST -----  Please have rogerio take OTC supplement of vitamin D 2000 units daily

## 2021-12-21 ENCOUNTER — VIRTUAL VISIT (OUTPATIENT)
Dept: PSYCHOLOGY | Facility: CLINIC | Age: 41
End: 2021-12-21
Payer: COMMERCIAL

## 2021-12-21 DIAGNOSIS — F10.20 ALCOHOL USE DISORDER, MODERATE, DEPENDENCE (H): ICD-10-CM

## 2021-12-21 DIAGNOSIS — F43.10 PTSD (POST-TRAUMATIC STRESS DISORDER): Primary | ICD-10-CM

## 2021-12-21 DIAGNOSIS — F33.1 MAJOR DEPRESSIVE DISORDER, RECURRENT EPISODE, MODERATE (H): ICD-10-CM

## 2021-12-21 PROCEDURE — 90834 PSYTX W PT 45 MINUTES: CPT | Mod: 95 | Performed by: MARRIAGE & FAMILY THERAPIST

## 2021-12-22 NOTE — PROGRESS NOTES
Progress Note    Patient Name: James Blanco  Date: 21         Service Type: Individual      Session Start Time: 4pm  Session End Time: 450pm     Session Length: 50min    Session #: 24    Attendees: Client attended alone    Service Modality:  Video Visit:      Provider verified identity through the following two step process.  Patient provided:  Patient  and Patient address    Telemedicine Visit: The patient's condition can be safely assessed and treated via synchronous audio and visual telemedicine encounter.      Reason for Telemedicine Visit: Patient has requested telehealth visit    Originating Site (Patient Location): Patient's home    Distant Site (Provider Location): Provider Remote Setting    Consent:  The patient/guardian has verbally consented to: the potential risks and benefits of telemedicine (video visit) versus in person care; bill my insurance or make self-payment for services provided; and responsibility for payment of non-covered services.     Patient would like the video invitation sent by:  My Chart    Mode of Communication:  Video Conference via [facility]    As the provider I attest to compliance with applicable laws and regulations related to telemedicine.     Treatment Plan Last Reviewed: 21  PHQ-9 / LINNEA-7 :21  CGI- 21    DATA  Interactive Complexity: No  Crisis: No     Progress Since Last Session (Related to Symptoms / Goals / Homework):              Symptoms: Client has been working on sobriety with success.  He is working on his business and making progress.  Good communication with his wife.  Continues to grieve the loss of a beloved pet Niya.       Homework:  Completed in session, completed outside of session.                            Episode of Care Goals: Satisfactory progress - ACTION (Actively working towards change); Intervened by reinforcing change plan / affirming steps taken                 Current /  "Ongoing Stressors and Concerns:              -Client reports no suicidal thoughts in today's session.      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  He has been making good progress on a job that has taken longer than expected.        -Feeling some improvement with brain fog and losing track of words.       -Reports making an effort to have more quality time with wife.  Open communication about finances.     -Sister did complete extensive surgery.     -Grieving loss of his beloved dog Niya.  Had to be the person to make the decision when it was time to put her down.  At times questions if it was too early but also did not want her to get really bad.                          Treatment Objective(s) Addressed in This Session:          Sobriety   Positive time with spouse  Personal limits and expectations   Check in on safety                       Intervention:              -Continue to engage in new activities in the evening.  Make quality time with spouse a priority.  \"Close the extra open tabs.\"  Focus on one thing at a time.  Communicating openly.     -Consider EMDR therapy for loss of beloved pet.  Processed through the feelings of second guessing himself.  Some struggles with having to be the one to make the decision about when it was time.     -Finding ways to manage sobriety and has been able to find enjoyment in other social activities without alcohol.  Feels he has found a good alcohol free option for holidays and other events.     -Follow safety plan and use crisis resources.   -Take a couple of extra days off for the holidays.                              ASSESSMENT: Current Emotional / Mental Status (status of significant symptoms):              Risk status (Self / Other harm or suicidal ideation)              Patient denies current fears or concerns for personal safety.              Patient denies current or recent suicidal ideation or behaviors.                " "Patientdenies current or recent homicidal ideation or behaviors.              Patient denies current or recent self injurious behavior or ideation.              Patient denies other safety concerns.              Patient reports there has been no change in risk factors since their last session.                Patientreports there has been no change in protective factors since their last session.                Completed safety plan and reviewed 0n 12-21-21    James Blanco                SAFETY PLAN:  Step 1: Warning signs / cues (Thoughts, images, mood, situation, behavior) that a crisis may be developing:  ? Thoughts: \"People would be better off without me\", \"I'm a burden\", \"I can't do this anymore\", \"I just want this to end\" and \"Nothing makes it better\"  ? Images: visions of harm: Driving into an oncoming nikhil of traffic  ? Thinking Processes: ruminations (can't stop thinking about my problems): Having more than one bad day in a row, racing thoughts and intrusive thoughts (bothersome, unwanted thoughts that come out of nowhere): Thoughts in general about self harm  ? Mood: worsening depression, hopelessness, intense worry and mood swings  ? Behaviors: isolating/withdrawing , not taking care of myself, not taking care of my responsibilities and not sleeping enough  ? Situations: Feeling overwhelmed with too much going on at once   Step 2: Coping strategies - Things I can do to take my mind off of my problems without contacting another person (relaxation technique, physical activity):  ? Distress Tolerance Strategies:  play with my pet , change body temperature (ice pack/cold water) , paced breathing/progressive muscle relaxation and working on business plan for distraction  ? Physical Activities: go for a walk, deep breathing, stretching  and working outside on things/ in the garage  ? Focus on helpful thoughts:  \"This is temporary\", \"I will get through this\" and \"It always passes\"  Step 3: People and social " settings that provide distraction:              Name: Antonette (wife)         Phone: Lives with              Name:  Extended family both bio family and in-laws     ? Outside working on things, going to the cabin or a trip with family or friends       Step 4: Remind myself of people and things that are important to me and worth living for:    Family  Friends  Dog  Future plans   Step 5: When I am in crisis, I can ask these people to help me use my safety plan:              Name: Antonette (wife)         Phone: [phone]              Name: Rayray Blanco   Phone: [phone]                Step 6: Making the environment safe:   ? remove alcohol, secure medications, dispose of old medications , remove access to firearms, remove things I could use to hurt myself and alternate routes.  Step 7: Professionals or agencies I can contact during a crisis:  ? [facility] Daytime Number: 740-774-9026  ? Suicide Prevention Lifeline: 1-128-645-CMIW (3475)  ? Crisis Text Line Service (available 24 hours a day, 7 days a week): Text [place] to 226800    Local Crisis Services: [employer].jucffy-5614-731-8255     Call 911 or go to my nearest emergency department.       I helped develop this safety plan and agree to use it when needed.  I have been given a copy of this plan.       Client signature _________________________________________________________________  Today s date:  6/23/2021  Adapted from Safety Plan Template 2008 Nisha Sparks and Chalo Wills is reprinted with the express permission of the authors.  No portion of the Safety Plan Template may be reproduced without the express, written permission.  You can contact the authors at [email or web address] or [email or web address].                         Appearance:                            Appropriate               Eye Contact:                           Good               Psychomotor Behavior:          Normal               Attitude:                                    Cooperative               Orientation:                             All              Speech                          Rate / Production:       Normal                           Volume:                       Normal               Mood:                                      Anxious, depressed                 Affect:                                      Normal               Thought Content:                    Clear               Thought Form:                        Coherent  Logical               Insight:                                     Good                  Medication Review:           No changes to psychiatric medications, see updated Medication List in EPIC.                  Medication Compliance:              Yes                 Changes in Health Issues:              None                 Chemical Use Review:              Substance Use: Chemical use reviewed, Remaining sober from alcohol.  Now has medical marijuana card.  Working with their pharmacist and the dispensary.                   Tobacco Use: Started chantix for tobacco use        Diagnosis:  296.32 (F33.1) Major Depressive Disorder, Recurrent Episode, Moderate _ and With melancholic features  F43.10 PTSD  (F10.20) Alcohol use disorder, moderate, dependence , In early remission          Collateral Reports Completed:              Routed to psychiatry      PLAN: (Patient Tasks / Therapist Tasks / Other)  Client will return in two weeks and will work on the following goals.   -Continue to process through trauma.  -Remain sober 100% of the time.  -Track brain fog on vitamin B and folic acid.    -Follow safety plan and use crisis resources.    -Open communication with spouse.    -Continue to wrap up work project.    -Take a couple of extra days off for the holidays.               Shu Monte,                                                            ______________________________________________________________________     Treatment Plan     Patient's  Name: James Blanco                      YOB: 1980     Date: 2-11-20     Diagnoses:      296.32 (F33.1) Major Depressive Disorder, Recurrent Episode, Moderate _ and With melancholic features  F43.10 PTSD  (F10.20) Alcohol use disorder, moderate, dependence   Psychosocial & Contextual Factors: Loss of pet   WHODAS:   WHODAS 2.0 Total Score 1/27/2020 2/5/2020   Total Score 14 12         Referral / Collaboration:  Referral to another professional/service is not indicated at this time..     Anticipated number of session or this episode of care: 3-5        MeasurableTreatment Goal(s) related to diagnosis / functional impairment(s)  Goal 1: Client will address grief and loss tied to loss of pet.                 I will know I've met my goal when I am feeling less reactive to the grief.       Objective #A (Client Action)                Client will increase understanding of steps in the grief process.  Status: Continued- 2-3-21, 5-7-21, 8-26-21, 11-29-21     Intervention(s)  Therapist will use CBT and solution focused therapy .     Objective #B  Client will talk to at least two others about losses and coping.                        Status: Continued- 2-3-21, 5-7-21, 8-26-21, 11-29-21     Intervention(s)  Therapist will use solution focused therapy .     Objective #C  Client will work on routine at night to help with sadness.    Status: Continued- 2-3-21, 5-7-21, 8-26-21, 11-29-21     Intervention(s)  Therapist will use CBT therapy .    Goal 2: Client will address struggles with alcohol use disorder.       I will know I've met my goal when I am not having cravings.      Objective #A (Client Action)    Client will maintain sobriety 100% of the time.  Status: Continued - Date(s): 5-7-21, 8-26-21, 11-29-21    Intervention(s)  Therapist will use cognitive and behavioral therapy.    Objective #B  Client will gain supports around remaining sober.    Status: Continued - Date(s): 5-7-21, 8-26-21,  11-29-21    Intervention(s)  Therapist will use solution focused therapy.    Objective #C  Client will identify at least 5 example(s) of how drinking has resulted in an experience that interferes with person values or goals.  Status: Continued - Date(s): 5-21-21, 8-26-21, 11-29-21    Intervention(s)  Therapist will use CBT and solution focused therapy.              Patient has reviewed and agreed to the above plan.        Shu Monte, TH                February 11, 2020

## 2022-01-06 ENCOUNTER — VIRTUAL VISIT (OUTPATIENT)
Dept: PSYCHOLOGY | Facility: CLINIC | Age: 42
End: 2022-01-06
Payer: COMMERCIAL

## 2022-01-06 DIAGNOSIS — F33.1 MAJOR DEPRESSIVE DISORDER, RECURRENT EPISODE, MODERATE (H): ICD-10-CM

## 2022-01-06 DIAGNOSIS — F43.10 PTSD (POST-TRAUMATIC STRESS DISORDER): Primary | ICD-10-CM

## 2022-01-06 DIAGNOSIS — F10.20 ALCOHOL USE DISORDER, MODERATE, DEPENDENCE (H): ICD-10-CM

## 2022-01-06 PROCEDURE — 90834 PSYTX W PT 45 MINUTES: CPT | Mod: 95 | Performed by: MARRIAGE & FAMILY THERAPIST

## 2022-01-06 NOTE — PROGRESS NOTES
Progress Note    Patient Name: James Blanco  Date: 22         Service Type: Individual      Session Start Time: 4pm  Session End Time: 450pm     Session Length: 50min    Session #: 25    Attendees: Client attended alone    Service Modality:  Video Visit:      Provider verified identity through the following two step process.  Patient provided:  Patient  and Patient address    Telemedicine Visit: The patient's condition can be safely assessed and treated via synchronous audio and visual telemedicine encounter.      Reason for Telemedicine Visit: Patient has requested telehealth visit    Originating Site (Patient Location): Patient's home    Distant Site (Provider Location): Provider Remote Setting    Consent:  The patient/guardian has verbally consented to: the potential risks and benefits of telemedicine (video visit) versus in person care; bill my insurance or make self-payment for services provided; and responsibility for payment of non-covered services.     Patient would like the video invitation sent by:  My Chart    Mode of Communication:  Video Conference via Amwell    As the provider I attest to compliance with applicable laws and regulations related to telemedicine.     Treatment Plan Last Reviewed: 21  PHQ-9 / LINNEA-7 :Did not complete today   CGI- 21    DATA  Interactive Complexity: No  Crisis: No     Progress Since Last Session (Related to Symptoms / Goals / Homework):              Symptoms: Client has been working on sobriety with success.  He is working on his business and making progress.  Good communication with his wife.  Not feeling as stressed out and stretched thin.       Homework:  Completed in session, completed outside of session.                            Episode of Care Goals: Satisfactory progress - ACTION (Actively working towards change); Intervened by reinforcing change plan / affirming steps taken                 Current /  "Ongoing Stressors and Concerns:              -Client reports no suicidal thoughts in today's session.      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  He has been making good progress on a job that has taken longer than expected.  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     -Brain fog has improved dramatically.        -Reports making an effort to have more quality time with wife.  Open communication about finances.  Planning more future vacations.      -Positive interaction with extended family over the holidays.                           Treatment Objective(s) Addressed in This Session:          Sobriety   Positive time with spouse  Personal limits and expectations   Check in on safety                       Intervention:              -Continue to engage in new activities in the evening.  Make quality time with spouse a priority.  \"Close the extra open tabs.\"  Focus on one thing at a time.  Communicating openly.     -Consider EMDR therapy for loss of beloved pet.    -Finding ways to manage sobriety and has been able to find enjoyment in other social activities without alcohol.  Feels he has found a good alcohol free option for holidays and other events.     -Follow safety plan and use crisis resources.   -Continue to plan for days off to do some self-care.                            ASSESSMENT: Current Emotional / Mental Status (status of significant symptoms):              Risk status (Self / Other harm or suicidal ideation)              Patient denies current fears or concerns for personal safety.              Patient denies current or recent suicidal ideation or behaviors.                Patientdenies current or recent homicidal ideation or behaviors.              Patient denies current or recent self injurious behavior or ideation.              Patient denies other safety concerns.              Patient reports there has been no " "change in risk factors since their last session.                Patientreports there has been no change in protective factors since their last session.                Completed safety plan and reviewed 0n 1-6-22    James Blanco                SAFETY PLAN:  Step 1: Warning signs / cues (Thoughts, images, mood, situation, behavior) that a crisis may be developing:  ? Thoughts: \"People would be better off without me\", \"I'm a burden\", \"I can't do this anymore\", \"I just want this to end\" and \"Nothing makes it better\"  ? Images: visions of harm: Driving into an oncoming nikhil of traffic  ? Thinking Processes: ruminations (can't stop thinking about my problems): Having more than one bad day in a row, racing thoughts and intrusive thoughts (bothersome, unwanted thoughts that come out of nowhere): Thoughts in general about self harm  ? Mood: worsening depression, hopelessness, intense worry and mood swings  ? Behaviors: isolating/withdrawing , not taking care of myself, not taking care of my responsibilities and not sleeping enough  ? Situations: Feeling overwhelmed with too much going on at once   Step 2: Coping strategies - Things I can do to take my mind off of my problems without contacting another person (relaxation technique, physical activity):  ? Distress Tolerance Strategies:  play with my pet , change body temperature (ice pack/cold water) , paced breathing/progressive muscle relaxation and working on business plan for distraction  ? Physical Activities: go for a walk, deep breathing, stretching  and working outside on things/ in the garage  ? Focus on helpful thoughts:  \"This is temporary\", \"I will get through this\" and \"It always passes\"  Step 3: People and social settings that provide distraction:              Name: Antonette (wife)         Phone: Lives with              Name:  Extended family both bio family and in-laws     ? Outside working on things, going to the cabin or a trip with family or friends       Step " 4: Remind myself of people and things that are important to me and worth living for:    Family  Friends  Dog  Future plans   Step 5: When I am in crisis, I can ask these people to help me use my safety plan:              Name: Antonette (wife)         Phone: [phone]              Name: Rayray Blanco   Phone: [phone]                Step 6: Making the environment safe:   ? remove alcohol, secure medications, dispose of old medications , remove access to firearms, remove things I could use to hurt myself and alternate routes.  Step 7: Professionals or agencies I can contact during a crisis:  ? [facility] Daytime Number: [phone]  ? Suicide Prevention Lifeline: 3-123-834-NNXB (3574)  ? Crisis Text Line Service (available 24 hours a day, 7 days a week): Text [place] to 493962    [facility] Crisis Services: NoteSickzsonyb-[phone]     Call 911 or go to my nearest emergency department.       I helped develop this safety plan and agree to use it when needed.  I have been given a copy of this plan.       Client signature _________________________________________________________________  Today s date:  6/23/2021  Adapted from Safety Plan Template 2008 Nisha Sparks and Chalo Wills is reprinted with the express permission of the authors.  No portion of the Safety Plan Template may be reproduced without the express, written permission.  You can contact the authors at [email or web address] or [email or web address].                         Appearance:                            Appropriate               Eye Contact:                           Good               Psychomotor Behavior:          Normal               Attitude:                                   Cooperative               Orientation:                             All              Speech                          Rate / Production:       Normal                           Volume:                       Normal               Mood:                                       Normal               Affect:                                      Normal               Thought Content:                    Clear               Thought Form:                        Coherent  Logical               Insight:                                     Good                  Medication Review:           No changes to psychiatric medications, see updated Medication List in EPIC.                  Medication Compliance:              Yes                 Changes in Health Issues:              None                 Chemical Use Review:              Substance Use: Chemical use reviewed, Remaining sober from alcohol.  Now has medical marijuana card.  Working with their pharmacist and the dispensary.                   Tobacco Use: Started chantix for tobacco use        Diagnosis:  296.32 (F33.1) Major Depressive Disorder, Recurrent Episode, Moderate _ and With melancholic features  F43.10 PTSD  (F10.20) Alcohol use disorder, moderate, dependence , In early remission          Collateral Reports Completed:              Routed to psychiatry      PLAN: (Patient Tasks / Therapist Tasks / Other)  Client will return in two weeks and will work on the following goals.   -Continue to process through trauma.  -Remain sober 100% of the time.  -Study for contractor test.    -Follow safety plan and use crisis resources.    -Open communication with spouse.    -Take some time off each month for self-care               Shu Monte,                                                            ______________________________________________________________________     Treatment Plan     Patient's Name: James Blanco                      YOB: 1980     Date: 2-11-20     Diagnoses:      296.32 (F33.1) Major Depressive Disorder, Recurrent Episode, Moderate _ and With melancholic features  F43.10 PTSD  (F10.20) Alcohol use disorder, moderate, dependence   Psychosocial & Contextual Factors: Loss of  pet   WHODAS:   WHODAS 2.0 Total Score 1/27/2020 2/5/2020   Total Score 14 12         Referral / Collaboration:  Referral to another professional/service is not indicated at this time..     Anticipated number of session or this episode of care: 3-5        MeasurableTreatment Goal(s) related to diagnosis / functional impairment(s)  Goal 1: Client will address grief and loss tied to loss of pet.                 I will know I've met my goal when I am feeling less reactive to the grief.       Objective #A (Client Action)                Client will increase understanding of steps in the grief process.  Status: Continued- 2-3-21, 5-7-21, 8-26-21, 11-29-21     Intervention(s)  Therapist will use CBT and solution focused therapy .     Objective #B  Client will talk to at least two others about losses and coping.                        Status: Continued- 2-3-21, 5-7-21, 8-26-21, 11-29-21     Intervention(s)  Therapist will use solution focused therapy .     Objective #C  Client will work on routine at night to help with sadness.    Status: Continued- 2-3-21, 5-7-21, 8-26-21, 11-29-21     Intervention(s)  Therapist will use CBT therapy .    Goal 2: Client will address struggles with alcohol use disorder.       I will know I've met my goal when I am not having cravings.      Objective #A (Client Action)    Client will maintain sobriety 100% of the time.  Status: Continued - Date(s): 5-7-21, 8-26-21, 11-29-21    Intervention(s)  Therapist will use cognitive and behavioral therapy.    Objective #B  Client will gain supports around remaining sober.    Status: Continued - Date(s): 5-7-21, 8-26-21, 11-29-21    Intervention(s)  Therapist will use solution focused therapy.    Objective #C  Client will identify at least 5 example(s) of how drinking has resulted in an experience that interferes with person values or goals.  Status: Continued - Date(s): 5-21-21, 8-26-21, 11-29-21    Intervention(s)  Therapist will use CBT and solution  focused therapy.              Patient has reviewed and agreed to the above plan.        Shu Monte, TH February 11, 2020

## 2022-01-25 ENCOUNTER — VIRTUAL VISIT (OUTPATIENT)
Dept: PSYCHOLOGY | Facility: CLINIC | Age: 42
End: 2022-01-25
Payer: COMMERCIAL

## 2022-01-25 DIAGNOSIS — F43.10 PTSD (POST-TRAUMATIC STRESS DISORDER): Primary | ICD-10-CM

## 2022-01-25 DIAGNOSIS — F33.1 MAJOR DEPRESSIVE DISORDER, RECURRENT EPISODE, MODERATE (H): ICD-10-CM

## 2022-01-25 DIAGNOSIS — F10.21 ALCOHOL USE DISORDER, MODERATE, IN SUSTAINED REMISSION (H): ICD-10-CM

## 2022-01-25 PROCEDURE — 90834 PSYTX W PT 45 MINUTES: CPT | Mod: 95 | Performed by: MARRIAGE & FAMILY THERAPIST

## 2022-01-26 NOTE — PROGRESS NOTES
Progress Note    Patient Name: James Blanco  Date: 22         Service Type: Individual      Session Start Time: 3pm  Session End Time: 350pm     Session Length: 50min    Session #: 26    Attendees: Client attended alone    Service Modality:  Video Visit:      Provider verified identity through the following two step process.  Patient provided:  Patient  and Patient address    Telemedicine Visit: The patient's condition can be safely assessed and treated via synchronous audio and visual telemedicine encounter.      Reason for Telemedicine Visit: Patient has requested telehealth visit    Originating Site (Patient Location): Patient's home    Distant Site (Provider Location): Provider Remote Setting    Consent:  The patient/guardian has verbally consented to: the potential risks and benefits of telemedicine (video visit) versus in person care; bill my insurance or make self-payment for services provided; and responsibility for payment of non-covered services.     Patient would like the video invitation sent by:  My Chart    Mode of Communication:  Video Conference via Amwell    As the provider I attest to compliance with applicable laws and regulations related to telemedicine.     Treatment Plan Last Reviewed: 21  PHQ-9 / LINNEA-7 :Did not complete today   CGI- 21    DATA  Interactive Complexity: No  Crisis: No     Progress Since Last Session (Related to Symptoms / Goals / Homework):              Symptoms: Client has been working on sobriety with success.  He is working on his business and making progress.  Good communication with his wife. Feeling there has been general improvement the last three weeks.         Homework:  Completed in session, completed outside of session.                            Episode of Care Goals: Satisfactory progress - ACTION (Actively working towards change); Intervened by reinforcing change plan / affirming steps taken                  Current / Ongoing Stressors and Concerns:              -Client reports no suicidal thoughts in today's session.      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  He has been making good progress on a job that has taken longer than expected.  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  Feeling good about what he has been working on and also getting good feedback and referrals     -Brain fog has improved dramatically.        -Reports making an effort to have more quality time with wife.  Open communication about finances.  Enjoying time with one another.                         Treatment Objective(s) Addressed in This Session:          Sobriety   Positive time with spouse  Personal limits and expectations   Check in on safety                       Intervention:              -Continue to engage in new activities in the evening.  Make quality time with spouse a priority.  Helping out a good friend with his personal housing project.     -Consider EMDR therapy for loss of beloved pet.    -Finding ways to manage sobriety and has been able to find enjoyment in other social activities without alcohol.  Feeling good about being sober and is able to enjoy time with others.     -Follow safety plan and use crisis resources.   -Continue to plan for days off to do some self-care.                            ASSESSMENT: Current Emotional / Mental Status (status of significant symptoms):              Risk status (Self / Other harm or suicidal ideation)              Patient denies current fears or concerns for personal safety.              Patient denies current or recent suicidal ideation or behaviors.                Patientdenies current or recent homicidal ideation or behaviors.              Patient denies current or recent self injurious behavior or ideation.              Patient denies other safety concerns.              Patient reports there has  "been no change in risk factors since their last session.                Patientreports there has been no change in protective factors since their last session.                Completed safety plan and reviewed 0n 1-25-22    James Blanco                SAFETY PLAN:  Step 1: Warning signs / cues (Thoughts, images, mood, situation, behavior) that a crisis may be developing:  ? Thoughts: \"People would be better off without me\", \"I'm a burden\", \"I can't do this anymore\", \"I just want this to end\" and \"Nothing makes it better\"  ? Images: visions of harm: Driving into an oncoming nikhil of traffic  ? Thinking Processes: ruminations (can't stop thinking about my problems): Having more than one bad day in a row, racing thoughts and intrusive thoughts (bothersome, unwanted thoughts that come out of nowhere): Thoughts in general about self harm  ? Mood: worsening depression, hopelessness, intense worry and mood swings  ? Behaviors: isolating/withdrawing , not taking care of myself, not taking care of my responsibilities and not sleeping enough  ? Situations: Feeling overwhelmed with too much going on at once   Step 2: Coping strategies - Things I can do to take my mind off of my problems without contacting another person (relaxation technique, physical activity):  ? Distress Tolerance Strategies:  play with my pet , change body temperature (ice pack/cold water) , paced breathing/progressive muscle relaxation and working on business plan for distraction  ? Physical Activities: go for a walk, deep breathing, stretching  and working outside on things/ in the garage  ? Focus on helpful thoughts:  \"This is temporary\", \"I will get through this\" and \"It always passes\"  Step 3: People and social settings that provide distraction:              Name: Antonette (wife)         Phone: Lives with              Name:  Extended family both bio family and in-laws     ? Outside working on things, going to the cabin or a trip with family or friends    "    Step 4: Remind myself of people and things that are important to me and worth living for:    Family  Friends  Dog  Future plans   Step 5: When I am in crisis, I can ask these people to help me use my safety plan:              Name: Antonette (wife)         Phone: [phone]              Name: Rayray Blanco   Phone: [phone]                Step 6: Making the environment safe:   ? remove alcohol, secure medications, dispose of old medications , remove access to firearms, remove things I could use to hurt myself and alternate routes.  Step 7: Professionals or agencies I can contact during a crisis:  ? [facility] Daytime Number: [phone]  ? Suicide Prevention Lifeline: 8-962-634-AUNI (3015)  ? Crisis Text Line Service (available 24 hours a day, 7 days a week): Text [place] to 514968    [facility] Crisis Services: Kinnekrkbfqh-[phone]     Call 911 or go to my nearest emergency department.       I helped develop this safety plan and agree to use it when needed.  I have been given a copy of this plan.       Client signature _________________________________________________________________  Today s date:  6/23/2021  Adapted from Safety Plan Template 2008 Nisha Sparks and Chalo Wills is reprinted with the express permission of the authors.  No portion of the Safety Plan Template may be reproduced without the express, written permission.  You can contact the authors at [email or web address] or [email or web address].                         Appearance:                            Appropriate               Eye Contact:                           Good               Psychomotor Behavior:          Normal               Attitude:                                   Cooperative               Orientation:                             All              Speech                          Rate / Production:       Normal                           Volume:                       Normal               Mood:                                       Normal               Affect:                                      Normal               Thought Content:                    Clear               Thought Form:                        Coherent  Logical               Insight:                                     Good                  Medication Review:           No changes to psychiatric medications, see updated Medication List in EPIC.                  Medication Compliance:              Yes                 Changes in Health Issues:              None                 Chemical Use Review:              Substance Use: Chemical use reviewed, Remaining sober from alcohol.  Now has medical marijuana card.  Working with their pharmacist and the dispensary.                   Tobacco Use: Started chantix for tobacco use        Diagnosis:  296.32 (F33.1) Major Depressive Disorder, Recurrent Episode, Moderate _ and With melancholic features  F43.10 PTSD  (F10.21) Alcohol use disorder, moderate, In sustained remission.            Collateral Reports Completed:              Routed to psychiatry      PLAN: (Patient Tasks / Therapist Tasks / Other)  Client will return in one month and will work on the following goals.   -Continue to process through trauma.  -Remain sober 100% of the time.  -Study for contractor test.    -Follow safety plan and use crisis resources.    -Open communication with spouse.    -Help out friend with his housing project.               Shu Monte,                                                            ______________________________________________________________________     Treatment Plan     Patient's Name: James Blanco                      YOB: 1980     Date: 2-11-20     Diagnoses:      296.32 (F33.1) Major Depressive Disorder, Recurrent Episode, Moderate _ and With melancholic features  F43.10 PTSD  (F10.21) Alcohol use disorder, moderate, In sustained remission.    Psychosocial & Contextual Factors: Loss of  pet   WHODAS:   WHODAS 2.0 Total Score 1/27/2020 2/5/2020   Total Score 14 12         Referral / Collaboration:  Referral to another professional/service is not indicated at this time..     Anticipated number of session or this episode of care: 3-5        MeasurableTreatment Goal(s) related to diagnosis / functional impairment(s)  Goal 1: Client will address grief and loss tied to loss of pet.                 I will know I've met my goal when I am feeling less reactive to the grief.       Objective #A (Client Action)                Client will increase understanding of steps in the grief process.  Status: Continued- 2-3-21, 5-7-21, 8-26-21, 11-29-21     Intervention(s)  Therapist will use CBT and solution focused therapy .     Objective #B  Client will talk to at least two others about losses and coping.                        Status: Continued- 2-3-21, 5-7-21, 8-26-21, 11-29-21     Intervention(s)  Therapist will use solution focused therapy .     Objective #C  Client will work on routine at night to help with sadness.    Status: Continued- 2-3-21, 5-7-21, 8-26-21, 11-29-21     Intervention(s)  Therapist will use CBT therapy .    Goal 2: Client will address struggles with alcohol use disorder.       I will know I've met my goal when I am not having cravings.      Objective #A (Client Action)    Client will maintain sobriety 100% of the time.  Status: Continued - Date(s): 5-7-21, 8-26-21, 11-29-21    Intervention(s)  Therapist will use cognitive and behavioral therapy.    Objective #B  Client will gain supports around remaining sober.    Status: Continued - Date(s): 5-7-21, 8-26-21, 11-29-21    Intervention(s)  Therapist will use solution focused therapy.    Objective #C  Client will identify at least 5 example(s) of how drinking has resulted in an experience that interferes with person values or goals.  Status: Continued - Date(s): 5-21-21, 8-26-21, 11-29-21    Intervention(s)  Therapist will use CBT and solution  focused therapy.              Patient has reviewed and agreed to the above plan.        Shu Monte, TH February 11, 2020

## 2022-02-22 ENCOUNTER — VIRTUAL VISIT (OUTPATIENT)
Dept: PSYCHOLOGY | Facility: CLINIC | Age: 42
End: 2022-02-22
Payer: COMMERCIAL

## 2022-02-22 DIAGNOSIS — F43.10 PTSD (POST-TRAUMATIC STRESS DISORDER): Primary | ICD-10-CM

## 2022-02-22 DIAGNOSIS — F33.1 MAJOR DEPRESSIVE DISORDER, RECURRENT EPISODE, MODERATE (H): ICD-10-CM

## 2022-02-22 DIAGNOSIS — F10.21 ALCOHOL USE DISORDER, MODERATE, IN SUSTAINED REMISSION (H): ICD-10-CM

## 2022-02-22 PROCEDURE — 90834 PSYTX W PT 45 MINUTES: CPT | Mod: 95 | Performed by: MARRIAGE & FAMILY THERAPIST

## 2022-02-22 NOTE — PROGRESS NOTES
Progress Note    Patient Name: James Blanco  Date: 22         Service Type: Individual      Session Start Time: 3pm  Session End Time: 350pm     Session Length: 50min    Session #: 27    Attendees: Client attended alone    Service Modality:  Video Visit:      Provider verified identity through the following two step process.  Patient provided:  Patient  and Patient address    Telemedicine Visit: The patient's condition can be safely assessed and treated via synchronous audio and visual telemedicine encounter.      Reason for Telemedicine Visit: Patient has requested telehealth visit    Originating Site (Patient Location): Patient's home    Distant Site (Provider Location): Provider Remote Setting    Consent:  The patient/guardian has verbally consented to: the potential risks and benefits of telemedicine (video visit) versus in person care; bill my insurance or make self-payment for services provided; and responsibility for payment of non-covered services.     Patient would like the video invitation sent by:  My Chart    Mode of Communication:  Video Conference via [employer]    As the provider I attest to compliance with applicable laws and regulations related to telemedicine.     Treatment Plan Last Reviewed: 21  PHQ-9 / LINNEA-7 :Did not complete today   CGI- 21    DATA  Interactive Complexity: No  Crisis: No     Progress Since Last Session (Related to Symptoms / Goals / Homework):              Symptoms: Client has been working on sobriety with success.  He is working on his business and making progress. In general feels mood is in a better spot.       Homework:  Completed in session, completed outside of session.                            Episode of Care Goals: Satisfactory progress - ACTION (Actively working towards change); Intervened by reinforcing change plan / affirming steps taken                 Current / Ongoing Stressors and Concerns:               -Client reports no suicidal thoughts in today's session.      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  He has been making good progress on a job that has taken longer than expected.  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  Feeling good about the schedule he has created and the potential to keep growing the business.     -Brain fog has improved dramatically.        -Reports making an effort to have more quality time with wife. Getting out of the house and having their dog go to  so they can spend more time together.                           Treatment Objective(s) Addressed in This Session:          Sobriety   Positive time with spouse  Personal limits and expectations   Check in on safety                       Intervention:              -Continue to engage in new activities in the evening.  Make quality time with spouse a priority.  Getting out of the house to do more activities on the weekend.       -Consider EMDR therapy for loss of beloved pet.    -Finding ways to manage sobriety and has been able to find enjoyment in other social activities without alcohol.  Feeling good about being sober and is able to enjoy time with others.     -Follow safety plan and use crisis resources.  No safety issues in today's session.     -Continue to build business and stick to a 40 hour work week.                              ASSESSMENT: Current Emotional / Mental Status (status of significant symptoms):              Risk status (Self / Other harm or suicidal ideation)              Patient denies current fears or concerns for personal safety.              Patient denies current or recent suicidal ideation or behaviors.                Patientdenies current or recent homicidal ideation or behaviors.              Patient denies current or recent self injurious behavior or ideation.              Patient denies other safety concerns.            "   Patient reports there has been no change in risk factors since their last session.                Patientreports there has been no change in protective factors since their last session.                Completed safety plan and reviewed 0n 2-22-22    James Blanco                SAFETY PLAN:  Step 1: Warning signs / cues (Thoughts, images, mood, situation, behavior) that a crisis may be developing:  ? Thoughts: \"People would be better off without me\", \"I'm a burden\", \"I can't do this anymore\", \"I just want this to end\" and \"Nothing makes it better\"  ? Images: visions of harm: Driving into an oncoming nikhil of traffic  ? Thinking Processes: ruminations (can't stop thinking about my problems): Having more than one bad day in a row, racing thoughts and intrusive thoughts (bothersome, unwanted thoughts that come out of nowhere): Thoughts in general about self harm  ? Mood: worsening depression, hopelessness, intense worry and mood swings  ? Behaviors: isolating/withdrawing , not taking care of myself, not taking care of my responsibilities and not sleeping enough  ? Situations: Feeling overwhelmed with too much going on at once   Step 2: Coping strategies - Things I can do to take my mind off of my problems without contacting another person (relaxation technique, physical activity):  ? Distress Tolerance Strategies:  play with my pet , change body temperature (ice pack/cold water) , paced breathing/progressive muscle relaxation and working on business plan for distraction  ? Physical Activities: go for a walk, deep breathing, stretching  and working outside on things/ in the garage  ? Focus on helpful thoughts:  \"This is temporary\", \"I will get through this\" and \"It always passes\"  Step 3: People and social settings that provide distraction:              Name: Antonette (wife)         Phone: Lives with              Name:  Extended family both bio family and in-laws     ? Outside working on things, going to the cabin or a " trip with family or friends       Step 4: Remind myself of people and things that are important to me and worth living for:    Family  Friends  Dog  Future plans   Step 5: When I am in crisis, I can ask these people to help me use my safety plan:              Name: Antonette (wife)         Phone: [phone]              Name: Rayray Blanco   Phone: [phone]                Step 6: Making the environment safe:   ? remove alcohol, secure medications, dispose of old medications , remove access to firearms, remove things I could use to hurt myself and alternate routes.  Step 7: Professionals or agencies I can contact during a crisis:  ? [facility] Daytime Number: [phone]  ? Suicide Prevention Lifeline: 7-646-577-HMEX (9739)  ? Crisis Text Line Service (available 24 hours a day, 7 days a week): Text [place] to 140680    [facility] Crisis Services: MessageGatemqceah-[phone]     Call 911 or go to my nearest emergency department.       I helped develop this safety plan and agree to use it when needed.  I have been given a copy of this plan.       Client signature _________________________________________________________________  Today s date:  6/23/2021  Adapted from Safety Plan Template 2008 Nisha Sparks and Chalo Wills is reprinted with the express permission of the authors.  No portion of the Safety Plan Template may be reproduced without the express, written permission.  You can contact the authors at [email or web address] or [email or web address].                         Appearance:                            Appropriate               Eye Contact:                           Good               Psychomotor Behavior:          Normal               Attitude:                                   Cooperative               Orientation:                             All              Speech                          Rate / Production:       Normal                           Volume:                       Normal                Mood:                                      Normal               Affect:                                      Normal               Thought Content:                    Clear               Thought Form:                        Coherent  Logical               Insight:                                     Good                  Medication Review:           No changes to psychiatric medications, see updated Medication List in EPIC.                  Medication Compliance:              Yes                 Changes in Health Issues:              None                 Chemical Use Review:              Substance Use: Chemical use reviewed, Remaining sober from alcohol.  Now has medical marijuana card.  Working with their pharmacist and the dispensary.                   Tobacco Use: None       Diagnosis:  296.32 (F33.1) Major Depressive Disorder, Recurrent Episode, Moderate _ and With melancholic features  F43.10 PTSD  (F10.21) Alcohol use disorder, moderate, In sustained remission.            Collateral Reports Completed:              Routed to psychiatry      PLAN: (Patient Tasks / Therapist Tasks / Other)  Client will return in one month and will work on the following goals.   -Continue to process through trauma.  -Remain sober 100% of the time.  -Study for contractor test.    -Follow safety plan and use crisis resources.    -Open communication with spouse.  Engage in activities out of the home.    -Attempt to stick to a 40 hour work week.               Shu Monte,                                                            ______________________________________________________________________     Treatment Plan     Patient's Name: Jamse Blanco                      YOB: 1980     Date: 2-11-20     Diagnoses:      296.32 (F33.1) Major Depressive Disorder, Recurrent Episode, Moderate _ and With melancholic features  F43.10 PTSD  (F10.21) Alcohol use disorder, moderate, In sustained remission.     Psychosocial & Contextual Factors: Loss of pet   WHODAS:   WHODAS 2.0 Total Score 1/27/2020 2/5/2020   Total Score 14 12         Referral / Collaboration:  Referral to another professional/service is not indicated at this time..     Anticipated number of session or this episode of care: 3-5        MeasurableTreatment Goal(s) related to diagnosis / functional impairment(s)  Goal 1: Client will address grief and loss tied to loss of pet.                 I will know I've met my goal when I am feeling less reactive to the grief.       Objective #A (Client Action)                Client will increase understanding of steps in the grief process.  Status: Continued- 2-3-21, 5-7-21, 8-26-21, 11-29-21     Intervention(s)  Therapist will use CBT and solution focused therapy .     Objective #B  Client will talk to at least two others about losses and coping.                        Status: Continued- 2-3-21, 5-7-21, 8-26-21, 11-29-21     Intervention(s)  Therapist will use solution focused therapy .     Objective #C  Client will work on routine at night to help with sadness.    Status: Continued- 2-3-21, 5-7-21, 8-26-21, 11-29-21     Intervention(s)  Therapist will use CBT therapy .    Goal 2: Client will address struggles with alcohol use disorder.       I will know I've met my goal when I am not having cravings.      Objective #A (Client Action)    Client will maintain sobriety 100% of the time.  Status: Continued - Date(s): 5-7-21, 8-26-21, 11-29-21    Intervention(s)  Therapist will use cognitive and behavioral therapy.    Objective #B  Client will gain supports around remaining sober.    Status: Continued - Date(s): 5-7-21, 8-26-21, 11-29-21    Intervention(s)  Therapist will use solution focused therapy.    Objective #C  Client will identify at least 5 example(s) of how drinking has resulted in an experience that interferes with person values or goals.  Status: Continued - Date(s): 5-21-21, 8-26-21,  11-29-21    Intervention(s)  Therapist will use CBT and solution focused therapy.              Patient has reviewed and agreed to the above plan.        Shu Monte, TH February 11, 2020

## 2022-03-07 ENCOUNTER — VIRTUAL VISIT (OUTPATIENT)
Dept: PSYCHIATRY | Facility: CLINIC | Age: 42
End: 2022-03-07
Payer: COMMERCIAL

## 2022-03-07 ENCOUNTER — VIRTUAL VISIT (OUTPATIENT)
Dept: BEHAVIORAL HEALTH | Facility: CLINIC | Age: 42
End: 2022-03-07
Payer: COMMERCIAL

## 2022-03-07 DIAGNOSIS — F41.1 GAD (GENERALIZED ANXIETY DISORDER): ICD-10-CM

## 2022-03-07 DIAGNOSIS — Z15.89 HETEROZYGOUS FOR MTHFR GENE MUTATION: ICD-10-CM

## 2022-03-07 DIAGNOSIS — F33.1 MAJOR DEPRESSIVE DISORDER, RECURRENT EPISODE, MODERATE WITH MELANCHOLIC FEATURES (H): Primary | ICD-10-CM

## 2022-03-07 DIAGNOSIS — F17.200 TOBACCO USE DISORDER: ICD-10-CM

## 2022-03-07 DIAGNOSIS — F41.9 ANXIETY: ICD-10-CM

## 2022-03-07 PROCEDURE — 99214 OFFICE O/P EST MOD 30 MIN: CPT | Mod: 95 | Performed by: NURSE PRACTITIONER

## 2022-03-07 PROCEDURE — 90833 PSYTX W PT W E/M 30 MIN: CPT | Mod: 95 | Performed by: COUNSELOR

## 2022-03-07 RX ORDER — FOLIC ACID 1 MG/1
1 TABLET ORAL DAILY
Qty: 90 TABLET | Refills: 1 | Status: SHIPPED | OUTPATIENT
Start: 2022-03-07 | End: 2023-01-24

## 2022-03-07 RX ORDER — VENLAFAXINE HYDROCHLORIDE 150 MG/1
150 CAPSULE, EXTENDED RELEASE ORAL DAILY
Qty: 90 CAPSULE | Refills: 1 | Status: SHIPPED | OUTPATIENT
Start: 2022-03-07 | End: 2022-05-04

## 2022-03-07 NOTE — Clinical Note
Stephan James is doing very well.  He has been sober for over a year from alcohol.  His venlafaxine ER and folic acid supplement have  been refilled and he is ready to be returned to your  care.  James told me that he is planning to set up a visit  with you for a wellness check.  Thanks foro the referral.  Rasheeda

## 2022-03-07 NOTE — PATIENT INSTRUCTIONS
1.  Continue Venlafaxine  mg daily    2.  Continue Folic Acid 1 mg daily        Continue all other medications as reviewed per electronic medical record today.     Safety plan reviewed. To the Emergency Department as needed or call after hours crisis line at [phone] or [phone]. [place] Crisis Text Line. Text [place] to 788348 or Suicide LifeLine Chat: SocialTagg.org/chat/    To schedule individual or family therapy, call [facility] at [phone].    Schedule an appointment with me as needed. Call [facility] at [phone] to schedule.    Follow up with primary care provider as planned or for acute medical concerns.    Call the psychiatric nurse line with medication questions or concerns at [phone].    [employer] may be used to communicate with your provider, but this is not intended to be used for emergencies.    Crisis Resources:    National Suicide Prevention Lifeline: 642.129.8549 (TTY: 162.226.9398). Call anytime for help.  (www.suicidepreventionlifeline.org)  National [place] on Mental Illness (www.chloe.org): 170-639-0268 or 473-975-1911.   Mental Health Association (www.mentalhealth.org): 647.587.8972 or 957-628-6959.  [place] Crisis Text Line: Text [place] to 270306  Suicide LifeLine Chat: suicideSundrop Mobile.org/chat

## 2022-03-07 NOTE — PROGRESS NOTES
"James is a 41 year old who is being evaluated via a billable video visit.      How would you like to obtain your AVS? MyChart  If the video visit is dropped, the invitation should be resent by: Text to cell phone: [phone]  Will anyone else be joining your video visit? No      Video Start Time: 4:21 PM  Video-Visit Details    Type of service:  Video Visit    Video End Time:4:43 PM    Originating Location (pt. Location): Home    Distant Location (provider location):  [facility]     Platform used for Video Visit: [facility]             Outpatient Psychiatric Progress Note    Name: James Blanco   : 1980                    Primary Care Provider: Hector Talbot MD   Therapist: None    PHQ-9 scores:  PHQ-9 SCORE 2021 11/3/2021 2021   PHQ-9 Total Score MyChart - - -   PHQ-9 Total Score 12 13 15   Some encounter information is confidential and restricted. Go to Review Flowsheets activity to see all data.       LINNEA-7 scores:  LINNEA-7 SCORE 2021 11/3/2021 2021   Total Score - - -   Total Score 11 10 14   Some encounter information is confidential and restricted. Go to Review Flowsheets activity to see all data.       Patient Identification:    Patient is a 41 year old year old,   White Not  or  male  who presents for return visit with me.  Patient is currently employed full time. Patient attended the session alone. Patient prefers to be called: \" James\".    Current medications include: EPINEPHrine (EPIPEN 2-CHAIM) 0.3 MG/0.3ML injection 2-pack, Inject 0.3 mLs (0.3 mg) into the muscle as needed for anaphylaxis  fenofibrate (TRICOR) 145 MG tablet, Take 1 tablet by mouth once daily  fluticasone (FLONASE) 50 MCG/ACT nasal spray, Spray 1 spray into both nostrils daily  folic acid (FOLVITE) 1 MG tablet, Take 1 tablet (1 mg) by mouth daily  multivitamin w/minerals (MULTI-VITAMIN) tablet, Take 1 tablet by mouth " daily  omeprazole 20 MG tablet, Take 20 mg by mouth daily  simvastatin (ZOCOR) 40 MG tablet, TAKE 1 TABLET BY MOUTH AT BEDTIME  venlafaxine (EFFEXOR-XR) 150 MG 24 hr capsule, Take 1 capsule (150 mg) by mouth daily    No current facility-administered medications on file prior to visit.       The [place] Prescription Monitoring Program has been reviewed and there are no concerns about diversionary activity for controlled substances at this time.      I was able to review most recent Primary Care Provider, specialty provider, and therapy visit notes that I have access to.     Today, patient reports that he has been sober for over a year from alcohol.  Lab work is pending.  For 1 week he had felt lightheaded and dizzy no appetite changes reported.  He has experienced some weight loss and now weighs 206 pounds.  At one point he weighed 245 pounds.       has a past medical history of Anxiety, Depressive disorder, and Hyperlipidaemia LDL goal <130.    He has no past medical history of Arthritis, Cancer (H), Cerebral infarction (H), Chronic infection, Complication of anesthesia, Congestive heart failure (H), COPD (chronic obstructive pulmonary disease) (H), Diabetes (H), Heart disease, History of blood transfusion, Hypertension, Malignant hyperthermia, PONV (postoperative nausea and vomiting), Seizures (H), Sleep apnea, Thyroid disease, or Uncomplicated asthma.    Social history updates:    James is  and employed.    Substance use updates:    Sober from alcohol for almost 1 year  Tobacco use: Yes Cigarettes  Ready to quit?  No  Nicotine Replacement Therapy tried: None    Vital Signs:   There were no vitals taken for this visit.    Labs:    Most recent laboratory results reviewed and no new labs.     Mental Status Examination:  Appearance:  awake, alert and casually dressed  Attitude:  cooperative   Eye Contact:  adequate  Gait and Station: No assistive Devices used and No dizziness or falls  Psychomotor Behavior:   intact station, gait and muscle tone  Oriented to:  time, person, and place  Attention Span and Concentration:  Normal  Speech:   clear, coherent and Speaks: English  Mood:  anxious and depressed  Affect:  mood congruent  Associations:  no loose associations  Thought Process:  goal oriented  Thought Content:  no evidence of suicidal ideation or homicidal ideation, no auditory hallucinations present and no visual hallucinations present  Recent and Remote Memory:  intact Not formally assessed. No amnesia.  Fund of Knowledge: appropriate  Insight:  good  Judgment:  intact  Impulse Control:  intact    Suicide Risk Assessment:  Today James Blanco reports no thoughts to want to end his life or to harm other people. In addition, there are notable risk factors for self-harm, including anxiety, substance abuse and mood change. However, risk is mitigated by commitment to family, history of seeking help when needed, future oriented, denies suicidal intent or plan and denies homicidal ideation, intent, or plan. Therefore, based on all available evidence including the factors cited above, James Blanco does not appear to be at imminent risk for self-harm, does not meet criteria for a 72-hr hold, and therefore remains appropriate for ongoing outpatient level of care.  A thorough assessment of risk factors related to suicide and self-harm have been reviewed and are noted above. The patient convincingly denies suicidality on several occasions. Local community safety resources printed and reviewed for patient to use if needed. There was no deceit detected, and the patient presented in a manner that was believable.     DSM5 Diagnosis:  296.32 (F33.1) Major Depressive Disorder, Recurrent Episode, Moderate _ and With mixed features  300.02 (F41.1) Generalized Anxiety Disorder    Medical comorbidities include:   Patient Active Problem List    Diagnosis Date Noted     Tobacco use disorder 05/11/2021     Priority: Medium     PTSD  (post-traumatic stress disorder) 04/14/2021     Priority: Medium     Alcohol use disorder, moderate, dependence (H) 11/21/2020     Priority: Medium     Closed nondisplaced fracture of fifth metatarsal bone of right foot, initial encounter 04/15/2020     Priority: Medium     Added automatically from request for surgery [id number]       Moderate major depression (H) 04/07/2020     Priority: Medium     Grief reaction 01/09/2020     Priority: Medium     Fatty liver, alcoholic 09/26/2018     Priority: Medium     Anxiety 09/01/2015     Priority: Medium     Mixed hyperlipidemia 09/01/2015     Priority: Medium       Assessment:    James Blanco met with me today to report his mood symptoms are starting to stabilize out since being on the venlafaxine.  He had a week where he felt lightheaded and dizzy but that has subsided.  He will continue with venlafaxine  mg daily and folic acid 1 mg daily.  He has been sober from alcohol for almost 1 year..    Medication side effects and alternatives were reviewed. Health promotion activities recommended and reviewed today. All questions addressed. Education and counseling completed regarding risks and benefits of medications and psychotherapy options.    Treatment Plan:        1.  Continue Venlafaxine  mg daily    2.  Continue Folic Acid 1 mg daily        Continue all other medications as reviewed per electronic medical record today.     Safety plan reviewed. To the Emergency Department as needed or call after hours crisis line at [phone] or [phone]. [place] Crisis Text Line. Text [place] to 550418 or Suicide LifeLine Chat: suicidepreventionlifeline.org/chat/    To schedule individual or family therapy, call [facility] at [phone].    Schedule an appointment with me as needed. Call [facility] at [phone] to schedule.    Follow up with primary care provider as planned or for acute medical concerns.    Call the psychiatric nurse  line with medication questions or concerns at [phone].    MyChart may be used to communicate with your provider, but this is not intended to be used for emergencies.    Crisis Resources:    National Suicide Prevention Lifeline: 386.732.9435 (TTY: 701.521.6814). Call anytime for help.  (www.suicidepreventionlifeline.org)  National [place] on Mental Illness (www.chloe.org): 403.122.6204 or 948-402-8412.   Mental Health Association (www.mentalhealth.org): 173.870.7056 or 108-334-6907.  [place] Crisis Text Line: Text [place] to 819119  Suicide LifeLine Chat: suicidepreSafeBootline.org/chat    Administrative Billing:   Time spent with patient was 30 minutes and greater than 50% of time or 20 minutes was spent in counseling and coordination of care regarding above diagnoses and treatment plan.    Patient Status:  The patient is being returned to the referring provider for ongoing care and medication prescribing.  The patient can be referred back to this service for further consultation as needed.    Signed:   ISABELA Lackey-BC   Psychiatry

## 2022-03-07 NOTE — PROGRESS NOTES
[facility] Psychiatry Service  March 7, 2022      Behavioral Health Clinician Progress Note    Patient Name: James Blanco      Telemedicine Visit: The patient's condition can be safely assessed and treated via synchronous audio and visual telemedicine encounter.      Reason for Telemedicine Visit: Services only offered telehealth    Originating Site (Patient Location): Patient's home    Distant Site (Provider Location): Provider Remote Setting- Home Office    Consent:  The patient/guardian has verbally consented to: the potential risks and benefits of telemedicine (video visit) versus in person care; bill my insurance or make self-payment for services provided; and responsibility for payment of non-covered services.     Mode of Communication:  Video Conference via [employer]    As the provider I attest to compliance with applicable laws and regulations related to telemedicine.         Service Type:  Individual      Service Location:   Face to Face in Home / Community     Session Start Time: 353pm  Session End Time: 410pm      Session Length: 16 - 37      Attendees: Patient    Visit Activities (Refresh list every visit): [facility] Only    Diagnostic Assessment Date: 08/23/2021 Rasheeda Brantley  Treatment Plan Review Date: 06/07/2022  See Flowsheets for today's PHQ-9 and LINNEA-7 results  Previous PHQ-9:   PHQ-9 SCORE 9/22/2021 11/3/2021 11/29/2021   PHQ-9 Total Score MyChart - - -   PHQ-9 Total Score 12 13 15   Some encounter information is confidential and restricted. Go to Review Flowsheets activity to see all data.     Previous LINNEA-7:   LINNEA-7 SCORE 9/22/2021 11/3/2021 11/29/2021   Total Score - - -   Total Score 11 10 14   Some encounter information is confidential and restricted. Go to Review Flowsheets activity to see all data.       DATA  Extended Session (60+ minutes): No  Interactive Complexity: No  Crisis: No  [facility] Patient: No    Treatment Objective(s) Addressed in This Session:  Target  "Behavior(s): manage depression, feeling down, sleep troubles    Depressed Mood: Increase interest, engagement, and pleasure in doing things  Decrease frequency and intensity of feeling down, depressed, hopeless  Improve quantity and quality of night time sleep / decrease daytime naps  Feel less tired and more energy during the day     Current Stressors / Issues:  Worse/Better/Same: Pt states that he is doing very broderick. Pt says that they were feeling light headed and were quite dehydrated. Pt hasn't experienced this in the last few days. Pt says that they are more active than they were 6 months ago and lost a couple pounds.   Side Effects: none, has cold sweats at night- few months   Mood: \"good\"  Appetite: unremarkable  Sleep: unremarkable, using ZzzQuil doesn't always work, once asleep can stay asleep   Pregnancy:N/A  Suicidality: Pt denies   Self-harm: Pt denies  Substance Use: [place] medical marijuana- regular use   Caffeine: 2 Mt. Dews a day, before 3pm  Therapist: Shu once per month- helpful   Interventions: encourage to keep using coping skills  Medication Questions/Requests: doing pretty good        Progress on Treatment Objective(s) / Homework:  Satisfactory progress - ACTION (Actively working towards change); Intervened by reinforcing change plan / affirming steps taken    Motivational Interviewing    MI Intervention: Co-Developed Goal: attend appointments and take medication as prescribed, Expressed Empathy/Understanding, Supported Autonomy, Collaboration, Evocation, Permission to raise concern or advise, Open-ended questions, Reflections: simple and complex, Change talk (evoked) and Reframe     Change Talk Expressed by the Patient: Need to change Committment to change Activation Taking steps    Provider Response to Change Talk: E - Evoked more info from patient about behavior change, A - Affirmed patient's thoughts, decisions, or attempts at behavior change, R - Reflected patient's change talk and S - " Summarized patient's change talk statements    Also provided psychoeducation about behavioral health condition, symptoms, and treatment options    Care Plan review completed: Yes    Medication Review:  No changes to current psychiatric medication(s)    Medication Compliance:  Yes    Changes in Health Issues:   None reported    Chemical Use Review:   Substance Use: Chemical use reviewed, no active concerns identified      Tobacco Use: No change in amount of tobacco use since last session.  Provided encouragement to quit     Assessment: Current Emotional / Mental Status (status of significant symptoms):  Risk status (Self / Other harm or suicidal ideation)  Patient has had a history of suicidal ideation: SI in the past, Pt denies recent SI  Patient denies current fears or concerns for personal safety.  Patient denies current or recent suicidal ideation or behaviors.  Patient denies current or recent homicidal ideation or behaviors.  Patient denies current or recent self injurious behavior or ideation.  Patient denies other safety concerns.  A safety and risk management plan has not been developed at this time, however patient was encouraged to call [place] [phone] should there be a change in any of these risk factors.    Appearance:   Appropriate   Eye Contact:   Good   Psychomotor Behavior: Normal   Attitude:   Cooperative   Orientation:   All  Speech   Rate / Production: Normal    Volume:  Normal   Mood:    Normal  Affect:    Appropriate   Thought Content:  Clear   Thought Form:  Coherent  Logical   Insight:    Good     Diagnoses:  1. Major depressive disorder, recurrent episode, moderate with melancholic features (H)    2. Anxiety    3. Tobacco use disorder        Collateral Reports Completed:  Communicated with:   [facility]   [facility]    Plan: (Homework, other):  Patient was given information about behavioral services and instructed to schedule a follow up appointment  [facility]  in 1 month.  He was also given information about mental health symptoms and treatment options .  CD Recommendations: No indications of CD issues. NEO Haywood, [facility]      ______________________________________________________________________    [facility]    Patient's Name: James Blanco  YOB: 1980    Date of Creation: 03/07/2022  Date Treatment Plan Last Reviewed/Revised: 03/07/2022    DSM5 Diagnoses: 296.32 (F33.1) Major Depressive Disorder, Recurrent Episode, Moderate _ and With melancholic features or 300.02 (F41.1) Generalized Anxiety Disorder  Psychosocial / Contextual Factors: trouble with sleep, improved mood  PROMIS (reviewed every 90 days): 12    Referral / Collaboration:  Referral to another professional/service is not indicated at this time..    Anticipated number of session for this episode of care: 4-6  Anticipation frequency of session: Monthly  Anticipated Duration of each session: 16-37 minutes  Treatment plan will be reviewed in 90 days or when goals have been changed.       MeasurableTreatment Goal(s) related to diagnosis / functional impairment(s)  Goal 1: Patient will continue to use skills to manage symptoms.     I will know I've met my goal when I feel better than I do now.      Objective #A (Patient Action)    Patient will Pt will continue to use coping skills to reduce symptoms and will report 1-2 skills used each session.   Status: New - Date: 03/07/2022     Intervention(s)  Therapist will inquire about skills pt is using and use CBT and MI to encourage them to use skills.       Patient has reviewed and agreed to the above plan.      ELLIOTT Haywood  March 7, 2022

## 2022-04-04 ENCOUNTER — VIRTUAL VISIT (OUTPATIENT)
Dept: PSYCHOLOGY | Facility: CLINIC | Age: 42
End: 2022-04-04
Payer: COMMERCIAL

## 2022-04-04 DIAGNOSIS — F33.1 MAJOR DEPRESSIVE DISORDER, RECURRENT EPISODE, MODERATE (H): ICD-10-CM

## 2022-04-04 DIAGNOSIS — F43.10 PTSD (POST-TRAUMATIC STRESS DISORDER): Primary | ICD-10-CM

## 2022-04-04 DIAGNOSIS — F10.21 ALCOHOL USE DISORDER, MODERATE, IN SUSTAINED REMISSION (H): ICD-10-CM

## 2022-04-04 PROCEDURE — 90834 PSYTX W PT 45 MINUTES: CPT | Mod: 95 | Performed by: MARRIAGE & FAMILY THERAPIST

## 2022-04-04 NOTE — PROGRESS NOTES
Discharge Summary  Multiple Sessions    Client Name: James Blanco MRN#: [id number] YOB: 1980    Discharge Date:   2022    Service Modality: Video Visit:      Provider verified identity through the following two step process.  Patient provided:  Patient  and Patient address    Telemedicine Visit: The patient's condition can be safely assessed and treated via synchronous audio and visual telemedicine encounter.      Reason for Telemedicine Visit: Patient has requested telehealth visit    Originating Site (Patient Location): Patient's home    Distant Site (Provider Location): Provider Remote Setting- Home Office    Consent:  The patient/guardian has verbally consented to: the potential risks and benefits of telemedicine (video visit) versus in person care; bill my insurance or make self-payment for services provided; and responsibility for payment of non-covered services.     Patient would like the video invitation sent by:  My Chart    Mode of Communication:  Video Conference via [employer]    As the provider I attest to compliance with applicable laws and regulations related to telemedicine.    Service Type: Individual      Session Start Time: 4pm  Session End Time: 450pm      Session Length: 45 - 50     Session #: 28     Attendees: Client attended alone      Focus of Treatment Objective(s):  Client's presenting concerns included: Depression, anxiety and sobriety   Stage of Change at time of Discharge: MAINTENANCE (Working to maintain change, with risk of relapse)    Medication Adherence:  Yes    Chemical Use:  Sober    Assessment: Current Emotional / Mental Status (status of significant symptoms):    Risk status (Self / Other harm or suicidal ideation)  Client denies current fears or concerns for personal safety.  Client denies current or recent suicidal ideation or behaviors.  Client denies current or recent homicidal ideation or behaviors.  Client denies current or  recent self injurious behavior or ideation.  Client denies other safety concerns.  A safety and risk management plan has been developed including Client has safety plan on file .    Appearance:   Appropriate   Eye Contact:   Good   Psychomotor Behavior: Normal   Attitude:   Cooperative   Orientation:   All  Speech   Rate / Production: Normal    Volume:  Normal   Mood:    Normal  Affect:    Appropriate   Thought Content:  Clear   Thought Form:  Coherent  Logical   Insight:   Good     DSM5 Diagnoses:   Diagnosis:  296.32 (F33.1) Major Depressive Disorder, Recurrent Episode, Moderate _ and With melancholic features  F43.10 PTSD  (F10.21) Alcohol use disorder, moderate, In sustained remission.     Reason for Discharge:  Client is satisfied with progress      Aftercare Plan:  Client agreed to follow safety contract after discharge  Client may resume counseling services at any time in the future by calling the [facility] Intake Office, [phone].      Shu Monte, TH April 4, 2022

## 2022-04-27 ASSESSMENT — ENCOUNTER SYMPTOMS
DIZZINESS: 0
HEARTBURN: 0
FEVER: 0
MYALGIAS: 0
SORE THROAT: 0
HEADACHES: 0
JOINT SWELLING: 0
DIARRHEA: 0
PALPITATIONS: 0
COUGH: 0
DYSURIA: 0
HEMATURIA: 0
HEMATOCHEZIA: 0
ABDOMINAL PAIN: 0
PARESTHESIAS: 0
WEAKNESS: 0
CONSTIPATION: 0
CHILLS: 0
ARTHRALGIAS: 0
SHORTNESS OF BREATH: 0
EYE PAIN: 0
NAUSEA: 0
FREQUENCY: 0
NERVOUS/ANXIOUS: 0

## 2022-04-29 ENCOUNTER — LAB (OUTPATIENT)
Dept: LAB | Facility: CLINIC | Age: 42
End: 2022-04-29
Payer: COMMERCIAL

## 2022-04-29 DIAGNOSIS — Z00.00 ROUTINE GENERAL MEDICAL EXAMINATION AT A HEALTH CARE FACILITY: ICD-10-CM

## 2022-04-29 DIAGNOSIS — Z51.81 ENCOUNTER FOR THERAPEUTIC DRUG MONITORING: ICD-10-CM

## 2022-04-29 DIAGNOSIS — Z11.59 NEED FOR HEPATITIS C SCREENING TEST: ICD-10-CM

## 2022-04-29 LAB
ANION GAP SERPL CALCULATED.3IONS-SCNC: 5 MMOL/L (ref 3–14)
BUN SERPL-MCNC: 18 MG/DL (ref 7–30)
CALCIUM SERPL-MCNC: 9.3 MG/DL (ref 8.5–10.1)
CHLORIDE BLD-SCNC: 108 MMOL/L (ref 94–109)
CHOLEST SERPL-MCNC: 144 MG/DL
CO2 SERPL-SCNC: 28 MMOL/L (ref 20–32)
CREAT SERPL-MCNC: 1.18 MG/DL (ref 0.66–1.25)
FASTING STATUS PATIENT QL REPORTED: YES
GFR SERPL CREATININE-BSD FRML MDRD: 79 ML/MIN/1.73M2
GLUCOSE BLD-MCNC: 98 MG/DL (ref 70–99)
HCV AB SERPL QL IA: NONREACTIVE
HDLC SERPL-MCNC: 44 MG/DL
LDLC SERPL CALC-MCNC: 83 MG/DL
NONHDLC SERPL-MCNC: 100 MG/DL
POTASSIUM BLD-SCNC: 4 MMOL/L (ref 3.4–5.3)
SODIUM SERPL-SCNC: 141 MMOL/L (ref 133–144)
TRIGL SERPL-MCNC: 87 MG/DL

## 2022-04-29 PROCEDURE — 80061 LIPID PANEL: CPT

## 2022-04-29 PROCEDURE — 36415 COLL VENOUS BLD VENIPUNCTURE: CPT

## 2022-04-29 PROCEDURE — 80048 BASIC METABOLIC PNL TOTAL CA: CPT

## 2022-04-29 PROCEDURE — 86803 HEPATITIS C AB TEST: CPT

## 2022-05-03 ASSESSMENT — ENCOUNTER SYMPTOMS
SHORTNESS OF BREATH: 0
DYSURIA: 0
HEADACHES: 0
ABDOMINAL PAIN: 0
PARESTHESIAS: 0
CHILLS: 0
NERVOUS/ANXIOUS: 0
MYALGIAS: 0
HEMATOCHEZIA: 0
FEVER: 0
JOINT SWELLING: 0
DIARRHEA: 0
ARTHRALGIAS: 0
PALPITATIONS: 0
NAUSEA: 0
HEMATURIA: 0
SORE THROAT: 0
WEAKNESS: 0
DIZZINESS: 0
EYE PAIN: 0
HEARTBURN: 0
COUGH: 0
FREQUENCY: 0
CONSTIPATION: 0

## 2022-05-03 NOTE — PATIENT INSTRUCTIONS
At this point we can stop the medications simvastatin and fenofibrate and I'll have lab orders in for 3 months to recheck.    Goal: start healthy eating again  Cooking at home.    Preventive Health Recommendations  Male Ages 40 to 49    Yearly exam:             See your health care provider every year in order to  o   Review health changes.   o   Discuss preventive care.    o   Review your medicines if your doctor has prescribed any.    You should be tested each year for STDs (sexually transmitted diseases) if you re at risk.     Have a cholesterol test every 5 years.     Have a colonoscopy (test for colon cancer) if someone in your family has had colon cancer or polyps before age 50.     After age 45, have a diabetes test (fasting glucose). If you are at risk for diabetes, you should have this test every 3 years.      Talk with your health care provider about whether or not a prostate cancer screening test (PSA) is right for you.    Shots: Get a flu shot each year. Get a tetanus shot every 10 years.     Nutrition:    Eat at least 5 servings of fruits and vegetables daily.     Eat whole-grain bread, whole-wheat pasta and brown rice instead of white grains and rice.     Get adequate Calcium and Vitamin D.     Lifestyle    Exercise for at least 150 minutes a week (30 minutes a day, 5 days a week). This will help you control your weight and prevent disease.     Limit alcohol to one drink per day.     No smoking.     Wear sunscreen to prevent skin cancer.     See your dentist every six months for an exam and cleaning.      Preventive Health Recommendations  Male Ages 40 to 49    Yearly exam:             See your health care provider every year in order to  o   Review health changes.   o   Discuss preventive care.    o   Review your medicines if your doctor has prescribed any.    You should be tested each year for STDs (sexually transmitted diseases) if you re at risk.     Have a cholesterol test every 5 years.     Have  a colonoscopy (test for colon cancer) if someone in your family has had colon cancer or polyps before age 50.     After age 45, have a diabetes test (fasting glucose). If you are at risk for diabetes, you should have this test every 3 years.      Talk with your health care provider about whether or not a prostate cancer screening test (PSA) is right for you.    Shots: Get a flu shot each year. Get a tetanus shot every 10 years.     Nutrition:    Eat at least 5 servings of fruits and vegetables daily.     Eat whole-grain bread, whole-wheat pasta and brown rice instead of white grains and rice.     Get adequate Calcium and Vitamin D.     Lifestyle    Exercise for at least 150 minutes a week (30 minutes a day, 5 days a week). This will help you control your weight and prevent disease.     Limit alcohol to one drink per day.     No smoking.     Wear sunscreen to prevent skin cancer.     See your dentist every six months for an exam and cleaning.        The Benefits of Living Tobacco-Free  What do you want to improve in your life by quitting tobacco? Whether you smoke cigarettes, e-cigarettes, cigars, or a pipe, or use smokeless tobacco, there are many reasons to quit. Check off some reasons you want to be tobacco-free.  Health benefits  ___  I want to breathe without coughing or feeling short of breath.  ___  I want to reduce my risk for lung cancer, oral cancer, heart disease, bone problems such as osteoporosis and fractures, and chronic lung disease. Or reduce my risk for a serious lung injury called EVALI that may happen from vaping or using e-cigarettes.  ___  I want to have fewer wrinkles and softer skin.  ___  I want to improve my sense of taste and smell.  ___  For pregnant women: I want to reduce my risk for a miscarriage, stillbirth,  birth, or a low-birth-weight baby.  Personal benefits  ___  I want to be able to walk, go up stairs, and exercise without becoming out of breath.  ___  I want to feel more  in control of my life.  ___  I want to have better-smelling hair, clothes, home, and car.  ___  I want to save time by not having to take smoke breaks, buy tobacco products, or hunt for a light.  ___  I want to have whiter teeth and fresher breath.  Family benefits  ___  I want to reduce my child's respiratory tract infections.  ___  I want to set a healthy example for my child.  ___  I want to have the energy needed to play with my children and not become out-of-breath  ___  I want to reduce my family s cancer risk.  Financial benefits  ___  I want to save hundreds of dollars each year that would be spent on tobacco products.  ___  I want to save money on medical bills.  ___  I want to save money on life, health, and car insurance premiums.  How much do you spend?  A tobacco habit is expensive. Do you know how much you spend on tobacco each year? Fill in the blanks below to find out:  A. How much do you pay for 1 pack of cigarettes, 1 cigar, 1 pouch of pipe tobacco, or 1 can of smokeless tobacco? $________  B. How many packs, cigars, pouches, or cans do you use each day? _______________  C. Multiply answer A with answer B:___________________  D. Multiply answer C with 365: $___________________. This is your yearly cost of using tobacco.  Besides the cost of buying tobacco, there are other costs. These include:    Costs of cleaning clothing, furniture, and car    Replacement costs for clothing and furniture    Medical costs for tobacco-related illnesses    Missed days of work because of illness    Higher health, life, and car insurance premiums  Get help    QuitNow, www.cdc.gov/tobacco/quit_smoking/, 800-QUIT-NOW (646-957-2392).    QuitLine, www.smokefree.gov, 877-44U-QUIT (775-720-9669).    Laney last reviewed this educational content on 4/1/2020 2000-2021 [employer]. All rights reserved. This information is not intended as a substitute for professional medical care. Always follow your  healthcare professional's instructions.        HOW TO QUIT SMOKING  Smoking is one of the hardest habits to break. About half of all those who have ever smoked have been able to quit, and most of those (about 70%) who still smoke want to quit. Here are some of the best ways to stop smoking.     KEEP TRYING:  It takes most smokers about 8 tries before they are finally able to fully quit. So, the more often you try and fail, the better your chance of quitting the next time! So, don't give up!    GO COLD TURKEY:  Most ex-smokers quit cold turkey. Trying to cut back gradually doesn't seem to work as well, perhaps because it continues the smoking habit. Also, it is possible to fool yourself by inhaling more while smoking fewer cigarettes. This results in the same amount of nicotine in your body!    GET SUPPORT:  Support programs can make an important difference, especially for the heavy smoker. These groups offer lectures, methods to change your behavior and peer support. Call the free national Quitline for more information. 800-QUIT-NOW (845-415-5791). Low-cost or free programs are offered by many hospitals, local chapters of the American Lung Association (160-184-0703) and the American Cancer Society (325-506-1013). Support at home is important too. Non-smokers can help by offering praise and encouragement. If the smoker fails to quit, encourage them to try again!    OVER-THE-COUNTER MEDICINES:  For those who can't quit on their own, Nicotine Replacement Therapy (NRT) may make quitting much easier. Certain aids such as the nicotine patch, gum and lozenge are available without a prescription. However, it is best to use these under the guidance of your doctor. The skin patch provides a steady supply of nicotine to the body. Nicotine gum and lozenge gives temporary bursts of low levels of nicotine. Both methods take the edge off the craving for cigarettes. WARNING: If you feel symptoms of nicotine overdose, such as  nausea, vomiting, dizziness, weakness, or fast heartbeat, stop using these and see your doctor.    PRESCRIPTION MEDICINES:  After evaluating your smoking patterns and prior attempts at quitting, your doctor may offer a prescription medicine such as bupropion (Zyban, Wellbutrin), varenicline (Chantix, Champix), a niocotine inhaler or nasal spray. Each has its unique advantage and side effects which your doctor can review with you.    HEALTH BENEFITS OF QUITTING:  The benefits of quitting start right away and keep improving the longer you go without smokin minutes: blood pressure and pulse return to normal  8 hours: oxygen levels return to normal  2 days: ability to smell and taste begins to improve as damaged nerves start to regrow  2-3 weeks: circulation and lung function improves  1-9 months: decreased cough, congestion and shortness of breath; less tired  1 year: risk of heart attack decreases by half  5 years: risk of lung cancer decreases by half; risk of stroke becomes the same as a non-smoker  For information about how to quit smoking, visit the following links:  National Cancer [place] ,   Clearing the Air, Quit Smoking Today   - an online booklet. http://www.smokefree.gov/pubs/clearing_the_air.pdf  Smokefree.gov http://smokefree.gov/  QuitNet http://www.quitnet.com/    0887-4881 Gilma Davison, [address] [place] [address], [place] [address]. All rights reserved. This information is not intended as a substitute for professional medical care. Always follow your healthcare professional's instructions.

## 2022-05-03 NOTE — PROGRESS NOTES
SUBJECTIVE:   CC: James Blanco is an 41 year old male who presents for preventative health visit.     Patient has been advised of split billing requirements and indicates understanding: Yes  Healthy Habits:     Getting at least 3 servings of Calcium per day:  NO    Bi-annual eye exam:  Yes    Dental care twice a year:  Yes    Sleep apnea or symptoms of sleep apnea:  None    Diet:  Regular (no restrictions)    Frequency of exercise:  None    Taking medications regularly:  Yes    Barriers to taking medications:  None    Medication side effects:  None    PHQ-2 Total Score: 0    Additional concerns today:  No    Hyperlipidemia Follow-Up      Are you regularly taking any medication or supplement to lower your cholesterol?   Yes- simvastatin and fenofibrate.    Triglycerides were 2454 in 2019, had been drinking a lot of alcohol at that point.  Has stopped alcohol as of January 2021, switched to medical marijuana program for anxiety.    Are you having muscle aches or other side effects that you think could be caused by your cholesterol lowering medication?  No    Depression and Anxiety Follow-Up    How are you doing with your depression since your last visit? Stable    How are you doing with your anxiety since your last visit?  Stable    Are you having other symptoms that might be associated with depression or anxiety? No    Have you had a significant life event? No     Do you have any concerns with your use of alcohol or other drugs? No  Stable on the effexor 150mg for 6-12 months now.  Had tried prozac in the past: with feeling emptiness in gut.    Social History     Tobacco Use     Smoking status: Current Every Day Smoker     Packs/day: 0.50     Years: 13.00     Pack years: 6.50     Types: Other, Vaping Device     Smokeless tobacco: Current User   Vaping Use     Vaping Use: Every day     Substances: Nicotine     Devices: Disposable   Substance Use Topics     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      Drug use: Yes     Types: Marijuana     Comment: h/o street drug use 15 yrs ago     PHQ 9/22/2021 11/3/2021 11/29/2021   PHQ-9 Total Score 12 13 15   Q9: Thoughts of better off dead/self-harm past 2 weeks Not at all Not at all Several days   Some encounter information is confidential and restricted. Go to Review Flowsheets activity to see all data.                       Some encounter information is confidential and restricted. Go to Review Flowsheets activity to see all data.       Suicide Assessment Five-step Evaluation and Treatment (SAFE-T)    Today's PHQ-2 Score:   PHQ-2 ( 1999 Pfizer) 4/27/2022   Q1: Little interest or pleasure in doing things 0   Q2: Feeling down, depressed or hopeless 0   PHQ-2 Score 0   PHQ-2 Total Score (12-17 Years)- Positive if 3 or more points; Administer PHQ-A if positive -   Q1: Little interest or pleasure in doing things Not at all   Q2: Feeling down, depressed or hopeless Not at all   PHQ-2 Score 0       Abuse: Current or Past(Physical, Sexual or Emotional)- No  Do you feel safe in your environment? Yes      Social History     Tobacco Use     Smoking status: Current Every Day Smoker     Packs/day: 0.50     Years: 13.00     Pack years: 6.50     Types: Other, Vaping Device     Smokeless tobacco: Current User   Substance Use Topics     Alcohol use: Not Currently     Comment: sober     If you drink alcohol do you typically have >3 drinks per day or >7 drinks per week? Not applicable    Alcohol Use 4/27/2022   Prescreen: >3 drinks/day or >7 drinks/week? Not Applicable   Prescreen: >3 drinks/day or >7 drinks/week? -   AUDIT SCORE  -     AUDIT - Alcohol Use Disorders Identification Test - Reproduced from the World Health Organization Audit 2001 (Second Edition) 10/21/2020   1.  How often do you have a drink containing alcohol? 4 or more times a week   2.  How many drinks containing alcohol do you have on a typical day when you are drinking? 5 or 6   3.  How often do you have five or  more drinks on one occasion? Weekly   4.  How often during the last year have you found that you were not able to stop drinking once you had started? Monthly   5.  How often during the last year have you failed to do what was normally expected of you because of drinking? Never   6.  How often during the last year have you needed a first drink in the morning to get yourself going after a heavy drinking session? Never   7.  How often during the last year have you had a feeling of guilt or remorse after drinking? Less than monthly   8.  How often during the last year have you been unable to remember what happened the night before because of your drinking? Monthly   9.  Have you or someone else been injured because of your drinking? No   10. Has a relative, friend, doctor or other health care worker been concerned about your drinking or suggested you cut down? Yes, during the last year   TOTAL SCORE 18       Last PSA: No results found for: PSA    Reviewed orders with patient. Reviewed health maintenance and updated orders accordingly - Yes  BP Readings from Last 3 Encounters:   05/04/22 122/80   10/11/21 106/80   11/20/20 110/84    Wt Readings from Last 3 Encounters:   05/04/22 97.1 kg (214 lb)   10/11/21 97.7 kg (215 lb 6.4 oz)   11/20/20 105.2 kg (232 lb)                    Reviewed and updated as needed this visit by clinical staff   Tobacco  Allergies  Meds   Med Hx  Surg Hx  Fam Hx  Soc Hx          Reviewed and updated as needed this visit by Provider                       Review of Systems   Constitutional: Negative for chills and fever.   HENT: Positive for ear pain and hearing loss. Negative for congestion and sore throat.    Eyes: Negative for pain and visual disturbance.   Respiratory: Negative for cough and shortness of breath.    Cardiovascular: Negative for chest pain, palpitations and peripheral edema.   Gastrointestinal: Negative for abdominal pain, constipation, diarrhea, heartburn, hematochezia  "and nausea.   Genitourinary: Positive for urgency. Negative for dysuria, frequency, genital sores, hematuria, impotence and penile discharge.   Musculoskeletal: Negative for arthralgias, joint swelling and myalgias.   Skin: Negative for rash.   Neurological: Negative for dizziness, weakness, headaches and paresthesias.   Psychiatric/Behavioral: Negative for mood changes. The patient is not nervous/anxious.          OBJECTIVE:   /80   Pulse 103   Temp 98.3  F (36.8  C) (Tympanic)   Resp 16   Ht 1.928 m (6' 3.91\")   Wt 97.1 kg (214 lb)   SpO2 97%   BMI 26.11 kg/m      Physical Exam  GENERAL: healthy, alert and no distress  EYES: Eyes grossly normal to inspection, PERRL and conjunctivae and sclerae normal  HENT: ear canals and TM's normal, nose and mouth without ulcers or lesions  NECK: no adenopathy, no asymmetry, masses, or scars and thyroid normal to palpation  RESP: lungs clear to auscultation - no rales, rhonchi or wheezes  CV: regular rate and rhythm, normal S1 S2, no S3 or S4, no murmur, click or rub, no peripheral edema and peripheral pulses strong  ABDOMEN: soft, nontender, no hepatosplenomegaly, no masses and bowel sounds normal  MS: no gross musculoskeletal defects noted, no edema  SKIN: no suspicious lesions or rashes  NEURO: Normal strength and tone, mentation intact and speech normal  PSYCH: mentation appears normal, affect normal/bright    Diagnostic Test Results:  Labs reviewed in Epic    ASSESSMENT/PLAN:   James was seen today for physical.    Diagnoses and all orders for this visit:    Routine general medical examination at a health care facility    Hyperlipidemia LDL goal <130: now that no longer drinking alcohol, plan to stop the simvastatin and fenofibrate and recheck cholesterol in 3 months.  -     Lipid panel reflex to direct LDL Fasting; Future    Major depressive disorder, recurrent episode, moderate with melancholic features (H): stable, refill  Therapy, graduated  -     " "venlafaxine (EFFEXOR XR) 150 MG 24 hr capsule; Take 1 capsule (150 mg) by mouth daily    LINNEA (generalized anxiety disorder): stable, refill  Therapy, graduated.  -     venlafaxine (EFFEXOR XR) 150 MG 24 hr capsule; Take 1 capsule (150 mg) by mouth daily    Hypertriglyceridemia:  now that no longer drinking alcohol, plan to stop the simvastatin and fenofibrate and recheck cholesterol in 3 months.  -     Lipid panel reflex to direct LDL Fasting; Future    Nicotine dependence, uncomplicated, unspecified nicotine product type  -     SMOKING CESSATION COUNSELING 3-10 MIN      Alcohol use disorder, moderate, dependence (H): in remission          Patient has been advised of split billing requirements and indicates understanding: Yes    COUNSELING:   Reviewed preventive health counseling, as reflected in patient instructions       Regular exercise       Healthy diet/nutrition       Vision screening    Estimated body mass index is 26.11 kg/m  as calculated from the following:    Height as of this encounter: 1.928 m (6' 3.91\").    Weight as of this encounter: 97.1 kg (214 lb).     Weight management plan: Discussed healthy diet and exercise guidelines    He reports that he has been smoking other and vaping device. He has a 6.50 pack-year smoking history. He uses smokeless tobacco.  Tobacco Cessation Action Plan:   Information offered: Patient not interested at this time      Counseling Resources:  ATP IV Guidelines  Pooled Cohorts Equation Calculator  FRAX Risk Assessment  ICSI Preventive Guidelines  Dietary Guidelines for Americans, 2010  USDA's MyPlate  ASA Prophylaxis  Lung CA Screening    Vance Broderick MD  [facility]  "

## 2022-05-04 ENCOUNTER — OFFICE VISIT (OUTPATIENT)
Dept: FAMILY MEDICINE | Facility: CLINIC | Age: 42
End: 2022-05-04
Payer: COMMERCIAL

## 2022-05-04 VITALS
SYSTOLIC BLOOD PRESSURE: 122 MMHG | BODY MASS INDEX: 26.06 KG/M2 | DIASTOLIC BLOOD PRESSURE: 80 MMHG | HEART RATE: 103 BPM | WEIGHT: 214 LBS | HEIGHT: 76 IN | TEMPERATURE: 98.3 F | OXYGEN SATURATION: 97 % | RESPIRATION RATE: 16 BRPM

## 2022-05-04 DIAGNOSIS — F10.20 ALCOHOL USE DISORDER, MODERATE, DEPENDENCE (H): ICD-10-CM

## 2022-05-04 DIAGNOSIS — F33.1 MAJOR DEPRESSIVE DISORDER, RECURRENT EPISODE, MODERATE WITH MELANCHOLIC FEATURES (H): ICD-10-CM

## 2022-05-04 DIAGNOSIS — Z00.00 ROUTINE GENERAL MEDICAL EXAMINATION AT A HEALTH CARE FACILITY: Primary | ICD-10-CM

## 2022-05-04 DIAGNOSIS — F41.1 GAD (GENERALIZED ANXIETY DISORDER): ICD-10-CM

## 2022-05-04 DIAGNOSIS — E78.5 HYPERLIPIDEMIA LDL GOAL <130: ICD-10-CM

## 2022-05-04 DIAGNOSIS — F17.200 NICOTINE DEPENDENCE, UNCOMPLICATED, UNSPECIFIED NICOTINE PRODUCT TYPE: ICD-10-CM

## 2022-05-04 DIAGNOSIS — E78.1 HYPERTRIGLYCERIDEMIA: ICD-10-CM

## 2022-05-04 PROCEDURE — 99396 PREV VISIT EST AGE 40-64: CPT | Performed by: FAMILY MEDICINE

## 2022-05-04 PROCEDURE — 99214 OFFICE O/P EST MOD 30 MIN: CPT | Mod: 25 | Performed by: FAMILY MEDICINE

## 2022-05-04 RX ORDER — VITAMIN B COMPLEX
TABLET ORAL DAILY
COMMUNITY

## 2022-05-04 RX ORDER — VENLAFAXINE HYDROCHLORIDE 150 MG/1
150 CAPSULE, EXTENDED RELEASE ORAL DAILY
Qty: 90 CAPSULE | Refills: 3 | Status: SHIPPED | OUTPATIENT
Start: 2022-05-04 | End: 2023-08-30

## 2022-05-04 RX ORDER — SIMVASTATIN 40 MG
40 TABLET ORAL AT BEDTIME
Qty: 90 TABLET | Refills: 3 | Status: CANCELLED | OUTPATIENT
Start: 2022-05-04

## 2022-05-04 RX ORDER — FENOFIBRATE 145 MG/1
145 TABLET, COATED ORAL DAILY
Qty: 90 TABLET | Refills: 3 | Status: CANCELLED | OUTPATIENT
Start: 2022-05-04

## 2022-05-04 ASSESSMENT — PAIN SCALES - GENERAL: PAINLEVEL: MODERATE PAIN (4)

## 2022-05-06 ASSESSMENT — ANXIETY QUESTIONNAIRES
7. FEELING AFRAID AS IF SOMETHING AWFUL MIGHT HAPPEN: NOT AT ALL
5. BEING SO RESTLESS THAT IT IS HARD TO SIT STILL: SEVERAL DAYS
6. BECOMING EASILY ANNOYED OR IRRITABLE: NOT AT ALL
1. FEELING NERVOUS, ANXIOUS, OR ON EDGE: SEVERAL DAYS
GAD7 TOTAL SCORE: 3
IF YOU CHECKED OFF ANY PROBLEMS ON THIS QUESTIONNAIRE, HOW DIFFICULT HAVE THESE PROBLEMS MADE IT FOR YOU TO DO YOUR WORK, TAKE CARE OF THINGS AT HOME, OR GET ALONG WITH OTHER PEOPLE: NOT DIFFICULT AT ALL
2. NOT BEING ABLE TO STOP OR CONTROL WORRYING: NOT AT ALL
3. WORRYING TOO MUCH ABOUT DIFFERENT THINGS: NOT AT ALL

## 2022-05-06 ASSESSMENT — PATIENT HEALTH QUESTIONNAIRE - PHQ9
SUM OF ALL RESPONSES TO PHQ QUESTIONS 1-9: 6
5. POOR APPETITE OR OVEREATING: SEVERAL DAYS

## 2022-05-07 ASSESSMENT — ANXIETY QUESTIONNAIRES: GAD7 TOTAL SCORE: 3

## 2022-08-08 ENCOUNTER — LAB (OUTPATIENT)
Dept: LAB | Facility: CLINIC | Age: 42
End: 2022-08-08
Payer: COMMERCIAL

## 2022-08-08 ENCOUNTER — MYC MEDICAL ADVICE (OUTPATIENT)
Dept: FAMILY MEDICINE | Facility: CLINIC | Age: 42
End: 2022-08-08

## 2022-08-08 DIAGNOSIS — E78.5 HYPERLIPIDEMIA LDL GOAL <130: ICD-10-CM

## 2022-08-08 DIAGNOSIS — E78.5 HYPERLIPIDEMIA LDL GOAL <130: Primary | ICD-10-CM

## 2022-08-08 DIAGNOSIS — E78.1 HYPERTRIGLYCERIDEMIA: ICD-10-CM

## 2022-08-08 LAB
CHOLEST SERPL-MCNC: 250 MG/DL
FASTING STATUS PATIENT QL REPORTED: YES
HDLC SERPL-MCNC: 40 MG/DL
LDLC SERPL CALC-MCNC: 183 MG/DL
NONHDLC SERPL-MCNC: 210 MG/DL
TRIGL SERPL-MCNC: 135 MG/DL

## 2022-08-08 PROCEDURE — 36415 COLL VENOUS BLD VENIPUNCTURE: CPT

## 2022-08-08 PROCEDURE — 80061 LIPID PANEL: CPT

## 2022-08-09 RX ORDER — SIMVASTATIN 20 MG
20 TABLET ORAL AT BEDTIME
Qty: 90 TABLET | Refills: 3 | Status: SHIPPED | OUTPATIENT
Start: 2022-08-09 | End: 2023-08-16

## 2022-09-11 ENCOUNTER — HEALTH MAINTENANCE LETTER (OUTPATIENT)
Age: 42
End: 2022-09-11

## 2022-09-30 ENCOUNTER — OFFICE VISIT (OUTPATIENT)
Dept: ORTHOPEDICS | Facility: CLINIC | Age: 42
End: 2022-09-30

## 2022-09-30 ENCOUNTER — ANCILLARY PROCEDURE (OUTPATIENT)
Dept: GENERAL RADIOLOGY | Facility: CLINIC | Age: 42
End: 2022-09-30
Attending: PEDIATRICS
Payer: COMMERCIAL

## 2022-09-30 VITALS
BODY MASS INDEX: 26.11 KG/M2 | WEIGHT: 214 LBS | SYSTOLIC BLOOD PRESSURE: 119 MMHG | DIASTOLIC BLOOD PRESSURE: 85 MMHG | HEART RATE: 77 BPM

## 2022-09-30 DIAGNOSIS — M70.52 INFRAPATELLAR BURSITIS OF LEFT KNEE: ICD-10-CM

## 2022-09-30 DIAGNOSIS — M25.562 ACUTE PAIN OF LEFT KNEE: Primary | ICD-10-CM

## 2022-09-30 DIAGNOSIS — Z98.890 S/P KNEE SURGERY: ICD-10-CM

## 2022-09-30 DIAGNOSIS — M25.562 LEFT KNEE PAIN: ICD-10-CM

## 2022-09-30 PROCEDURE — 73562 X-RAY EXAM OF KNEE 3: CPT | Mod: TC | Performed by: RADIOLOGY

## 2022-09-30 PROCEDURE — 99203 OFFICE O/P NEW LOW 30 MIN: CPT | Performed by: PEDIATRICS

## 2022-09-30 NOTE — LETTER
9/30/2022         RE: James Blanco  [address]  [place] [address]        Dear Colleague,    Thank you for referring your patient, James Blanco, to the [facility] [place]. Please see a copy of my visit note below.    ASSESSMENT & PLAN    James was seen today for pain.    Diagnoses and all orders for this visit:    Acute pain of left knee  -     XR Knee Standing AP [place] Bilat Lat Left; Future    Infrapatellar bursitis of left knee    S/P knee surgery      This issue is acute and Unchanged.    Reviewed pathophysiology and natural course of this condition.  Discussed signs, symptoms and risks of infection.  Reviewed limited utility of aspiration. Avoid direct irritation and use compressive sleeve or ACE wrap as needed along with ice.  Recheck if not improving as expected      Plan:  - Today's Plan of Care:  Over the counter medication: Ibuprofen (Advil) would recommend 1-2 weeks of 600 mg three times a day with food (I reviewed the mechanism of action as well as risk/benefit profile of medications. Questions answered.)    Continue with relative rest and activity modification, Ice, Compression, and Elevation.  Can apply ice 10-15 minutes 3-4 times per day as needed. OTC medications as needed.    Limit kneeling, use knee pad    -We also discussed other future treatment options:  MRI or follow up if not improving  US guided drainage    Follow Up: 3-4 weeks if needed    Concerning signs and symptoms were reviewed.  The patient expressed understanding of this management plan and all questions were answered at this time.    Jena Mon MD [facility]  Sports Medicine Physician  [facility]      -----  Chief Complaint   Patient presents with     Left Knee - Pain       SUBJECTIVE  James Blanco is a/an 41 year old male who is seen as a self referral for evaluation of left knee swelling.     The patient is seen by themselves.    Onset: 2 week(s) ago. Reports  insidious onset without acute precipitating event. He spends a lot of time on his knees for work.  Location of Pain: left knee; anterior  Worsened by: flexion, kneeling  Better with: trying to move and flex the knee  Treatments tried: ice and ibuprofen  Associated symptoms: swelling, numbness and deformity of the anterior knee     Orthopedic/Surgical history: YES - Date: 2010 left knee surgery,  [facility] attempted to insert stem cells in the knee to re-grow the meniscus and cartilage behind knee cap  Social History/Occupation:     No family history pertinent to patient's problem today.    REVIEW OF SYSTEMS:  Review of Systems  Skin: no bruising, yes swelling  Musculoskeletal: as above  Neurologic: no numbness, paresthesias  Remainder of review of systems is negative including constitutional, CV, pulmonary, GI, except as noted in HPI or medical history.    OBJECTIVE:  /85   Pulse 77   Wt 97.1 kg (214 lb)   BMI 26.11 kg/m     General: healthy, alert and in no distress  HEENT: no scleral icterus or conjunctival erythema  Skin: no suspicious lesions or rash. No jaundice.  CV: distal perfusion intact  Resp: normal respiratory effort without conversational dyspnea   Psych: normal mood and affect  Gait: NORMAL  Neuro: Normal light sensory exam of lower extremity    Bilateral Knee exam    Inspection:      Moderate anterior knee bursa swelling left       Well healed scar  No erythema or redness    Patella:      Normal patellar tracking noted through range of motion bilateral       Crepitus noted in the patellofemoral joint bilateral    Tender:      Mild throughout patellar tendon    Non Tender:      remainder of knee area bilateral    Knee ROM:      Full active and passive ROM with flexion and extension bilateral  - some tightness with knee flexion on the left    Hip ROM:     Full active and passive ROM bilateral    Strength:      5/5 with knee extension bilateral  - SLR intact    Special Tests:     neg  (-) Vannessa left       neg (-) anterior drawer left       neg (-) posterior drawer left       neg (-) varus at 0 deg and 30 deg left       neg (-) valgus at 0 deg and 30 deg left    Gait:      normal    Neurovascular:      2+ peripheral pulses bilaterally and brisk capillary refill       sensation grossly intact    RADIOLOGY:  I independently ordered, visualized and reviewed these images with the patient  AP and sunrise bilateral and left lateral XR views of knees reviewed: no acute bony abnormality, mild degenerative change  - will follow official read    Review of the result(s) of each unique test - XR             Again, thank you for allowing me to participate in the care of your patient.        Sincerely,        Jena Mon MD

## 2022-09-30 NOTE — PROGRESS NOTES
ASSESSMENT & PLAN    James was seen today for pain.    Diagnoses and all orders for this visit:    Acute pain of left knee  -     XR Knee Standing AP [place] Bilat Lat Left; Future    Infrapatellar bursitis of left knee    S/P knee surgery      This issue is acute and Unchanged.    Reviewed pathophysiology and natural course of this condition.  Discussed signs, symptoms and risks of infection.  Reviewed limited utility of aspiration. Avoid direct irritation and use compressive sleeve or ACE wrap as needed along with ice.  Recheck if not improving as expected      Plan:  - Today's Plan of Care:  Over the counter medication: Ibuprofen (Advil) would recommend 1-2 weeks of 600 mg three times a day with food (I reviewed the mechanism of action as well as risk/benefit profile of medications. Questions answered.)    Continue with relative rest and activity modification, Ice, Compression, and Elevation.  Can apply ice 10-15 minutes 3-4 times per day as needed. OTC medications as needed.    Limit kneeling, use knee pad    -We also discussed other future treatment options:  MRI or follow up if not improving  US guided drainage    Follow Up: 3-4 weeks if needed    Concerning signs and symptoms were reviewed.  The patient expressed understanding of this management plan and all questions were answered at this time.    Jena Mon MD [facility]  Sports Medicine Physician  [facility]      -----  Chief Complaint   Patient presents with     Left Knee - Pain       SUBJECTIVE  James Blanco is a/an 41 year old male who is seen as a self referral for evaluation of left knee swelling.     The patient is seen by themselves.    Onset: 2 week(s) ago. Reports insidious onset without acute precipitating event. He spends a lot of time on his knees for work.  Location of Pain: left knee; anterior  Worsened by: flexion, kneeling  Better with: trying to move and flex the knee  Treatments tried: ice and ibuprofen  Associated  symptoms: swelling, numbness and deformity of the anterior knee     Orthopedic/Surgical history: YES - Date: 2010 left knee surgery, [facility] attempted to insert stem cells in the knee to re-grow the meniscus and cartilage behind knee cap  Social History/Occupation:     No family history pertinent to patient's problem today.    REVIEW OF SYSTEMS:  Review of Systems  Skin: no bruising, yes swelling  Musculoskeletal: as above  Neurologic: no numbness, paresthesias  Remainder of review of systems is negative including constitutional, CV, pulmonary, GI, except as noted in HPI or medical history.    OBJECTIVE:  /85   Pulse 77   Wt 97.1 kg (214 lb)   BMI 26.11 kg/m     General: healthy, alert and in no distress  HEENT: no scleral icterus or conjunctival erythema  Skin: no suspicious lesions or rash. No jaundice.  CV: distal perfusion intact  Resp: normal respiratory effort without conversational dyspnea   Psych: normal mood and affect  Gait: NORMAL  Neuro: Normal light sensory exam of lower extremity    Bilateral Knee exam    Inspection:      Moderate anterior knee bursa swelling left       Well healed scar  No erythema or redness    Patella:      Normal patellar tracking noted through range of motion bilateral       Crepitus noted in the patellofemoral joint bilateral    Tender:      Mild throughout patellar tendon    Non Tender:      remainder of knee area bilateral    Knee ROM:      Full active and passive ROM with flexion and extension bilateral  - some tightness with knee flexion on the left    Hip ROM:     Full active and passive ROM bilateral    Strength:      5/5 with knee extension bilateral  - SLR intact    Special Tests:     neg (-) Vannessa left       neg (-) anterior drawer left       neg (-) posterior drawer left       neg (-) varus at 0 deg and 30 deg left       neg (-) valgus at 0 deg and 30 deg left    Gait:      normal    Neurovascular:      2+ peripheral pulses bilaterally and brisk  capillary refill       sensation grossly intact    RADIOLOGY:  I independently ordered, visualized and reviewed these images with the patient  AP and sunrise bilateral and left lateral XR views of knees reviewed: no acute bony abnormality, mild degenerative change  - will follow official read    Review of the result(s) of each unique test - XR

## 2022-09-30 NOTE — PATIENT INSTRUCTIONS
Reviewed pathophysiology and natural course of this condition.  Discussed signs, symptoms and risks of infection.  Reviewed limited utility of aspiration. Avoid direct irritation and use compressive sleeve or ACE wrap as needed along with ice.  Recheck if not improving as expected      Plan:  - Today's Plan of Care:  Over the counter medication: Ibuprofen (Advil) would recommend 1-2 weeks of 600 mg three times a day with food (I reviewed the mechanism of action as well as risk/benefit profile of medications. Questions answered.)    Continue with relative rest and activity modification, Ice, Compression, and Elevation.  Can apply ice 10-15 minutes 3-4 times per day as needed. OTC medications as needed.    Limit kneeling, use knee pad    -We also discussed other future treatment options:  MRI or follow up if not improving  US guided drainage    Follow Up: 3-4 weeks if needed    If you have any further questions for your physician or physician s care team you can call [phone] and use option 3 to leave a voice message.

## 2022-10-19 ENCOUNTER — ANCILLARY PROCEDURE (OUTPATIENT)
Dept: GENERAL RADIOLOGY | Facility: CLINIC | Age: 42
End: 2022-10-19
Attending: PEDIATRICS
Payer: COMMERCIAL

## 2022-10-19 ENCOUNTER — OFFICE VISIT (OUTPATIENT)
Dept: ORTHOPEDICS | Facility: CLINIC | Age: 42
End: 2022-10-19
Payer: COMMERCIAL

## 2022-10-19 VITALS
HEART RATE: 89 BPM | SYSTOLIC BLOOD PRESSURE: 128 MMHG | DIASTOLIC BLOOD PRESSURE: 89 MMHG | WEIGHT: 214 LBS | BODY MASS INDEX: 26.11 KG/M2

## 2022-10-19 DIAGNOSIS — M25.511 CHRONIC RIGHT SHOULDER PAIN: Primary | ICD-10-CM

## 2022-10-19 DIAGNOSIS — M19.011 ARTHRITIS OF RIGHT ACROMIOCLAVICULAR JOINT: ICD-10-CM

## 2022-10-19 DIAGNOSIS — G89.29 CHRONIC RIGHT SHOULDER PAIN: ICD-10-CM

## 2022-10-19 DIAGNOSIS — M75.101 ROTATOR CUFF SYNDROME OF RIGHT SHOULDER: ICD-10-CM

## 2022-10-19 DIAGNOSIS — G89.29 CHRONIC RIGHT SHOULDER PAIN: Primary | ICD-10-CM

## 2022-10-19 DIAGNOSIS — M25.511 CHRONIC RIGHT SHOULDER PAIN: ICD-10-CM

## 2022-10-19 PROCEDURE — 99214 OFFICE O/P EST MOD 30 MIN: CPT | Performed by: PEDIATRICS

## 2022-10-19 PROCEDURE — 73030 X-RAY EXAM OF SHOULDER: CPT | Mod: TC | Performed by: RADIOLOGY

## 2022-10-19 NOTE — PATIENT INSTRUCTIONS
Low suspicion for rotator cuff tear given current history and exam.  Recommended rest from irritating activities coupled with physical therapy.  Would consider further imaging or treatment pending clinical course.  - Does have AC arthritis, likely impingement    Plan:  - Today's Plan of Care:  [phone]  Discussed activity considerations and other supportive care including Ice/Heat, OTC and other topical medications as needed.    -We also discussed other future treatment options:  Consideration of injections  Repeat MRI    Follow Up: 6 - 8 weeks    If you have any further questions for your physician or physician s care team you can call [phone] and use option 3 to leave a voice message.

## 2022-10-19 NOTE — LETTER
10/19/2022         RE: James Blanco  [address] [place] [address]        Dear Colleague,    Thank you for referring your patient, James Blanco, to the [facility] [place]. Please see a copy of my visit note below.    ASSESSMENT & PLAN    James was seen today for pain.    Diagnoses and all orders for this visit:    Chronic right shoulder pain  -     XR Shoulder Right G/E 3 Views; Future  -     Physical Therapy Referral; Future    Arthritis of right acromioclavicular joint  -     Physical Therapy Referral; Future    Rotator cuff syndrome of right shoulder  -     Physical Therapy Referral; Future      This issue is chronic and Unchanged.    Low suspicion for rotator cuff tear given current history and exam.  Recommended rest from irritating activities coupled with physical therapy.  Would consider further imaging or treatment pending clinical course.  - Does have AC arthritis, likely impingement    Plan:  - Today's Plan of Care:  Rehab: Physical Therapy: [facility] - [phone]  Discussed activity considerations and other supportive care including Ice/Heat, OTC and other topical medications as needed.    -We also discussed other future treatment options:  Consideration of injections  Repeat MRI    Follow Up: 6 - 8 weeks      Concerning signs and symptoms were reviewed.  The patient expressed understanding of this management plan and all questions were answered at this time.    Jena Mon MD [facility]  Sports Medicine Physician  [facility]      -----  Chief Complaint   Patient presents with     Right Shoulder - Pain       SUBJECTIVE  James Blanco is a/an 42 year old male who is seen as a self referral for evaluation of right shoulder.     The patient is seen by themselves.  The patient is Right handed    Onset: 4 month(s) ago. Reports insidious onset without acute precipitating event.  Location of Pain: right shoulder; anterior,  superior, feels deep   Worsened by: all movements, abduction, flexion, internal rotation, external rotation, overhead movents   Better with: icing, ibuprofen   Treatments tried: ice, ibuprofen and previous imaging (xray 08/16/2016)  Associated symptoms: weakness of right shoulder and feeling of instability    Orthopedic/Surgical history: YES - Date: 5 years ago with right shoulder, has MRI  Social History/Occupation:     No family history pertinent to patient's problem today.    REVIEW OF SYSTEMS:  Review of Systems  Skin: no bruising, no swelling  Musculoskeletal: as above  Neurologic: no numbness, paresthesias  Remainder of review of systems is negative including constitutional, CV, pulmonary, GI, except as noted in HPI or medical history.    OBJECTIVE:  /89   Pulse 89   Wt 97.1 kg (214 lb)   BMI 26.11 kg/m     General: healthy, alert and in no distress  HEENT: no scleral icterus or conjunctival erythema  Skin: no suspicious lesions or rash. No jaundice.  CV: distal perfusion intact  Resp: normal respiratory effort without conversational dyspnea   Psych: normal mood and affect  Gait: NORMAL  Neuro: Normal light sensory exam of upper extremity    Bilateral Shoulder exam    Inspection and Posture:       normal    Skin:        no visible deformities    Tender:        acromioclavicular joint right mild    Non Tender:       remainder of shoulder bilateral    ROM:        Full active and passive ROM with flexion, extension, abduction, internal and external rotation bilateral       asymmetric scapular motion    Painful motions:       end range flexion and elevation right    Strength:        abduction 5/5 bilateral       flexion 5/5 bilateral       internal rotation 5/5 bilateral       external rotation 5/5 bilateral    Impingement testing:       neg (-) Neer right       neg (-) Alexis right       positive (+) O'margarita right    Sensation:        normal sensation over shoulder and upper extremity      RADIOLOGY:  I independently ordered, visualized and reviewed these images with the patient  3 XR views of right shoulder reviewed: no acute bony abnormality,  AC joint degenerative change  - will follow official read    Reviewed prior MRI 8/2016 - mild subacromial bursitis    Review of the result(s) of each unique test - XR, MRI             Again, thank you for allowing me to participate in the care of your patient.        Sincerely,        Jena Mon MD

## 2022-10-19 NOTE — PROGRESS NOTES
ASSESSMENT & PLAN    James was seen today for pain.    Diagnoses and all orders for this visit:    Chronic right shoulder pain  -     XR Shoulder Right G/E 3 Views; Future  -     Physical Therapy Referral; Future    Arthritis of right acromioclavicular joint  -     Physical Therapy Referral; Future    Rotator cuff syndrome of right shoulder  -     Physical Therapy Referral; Future      This issue is chronic and Unchanged.    Low suspicion for rotator cuff tear given current history and exam.  Recommended rest from irritating activities coupled with physical therapy.  Would consider further imaging or treatment pending clinical course.  - Does have AC arthritis, likely impingement    Plan:  - Today's Plan of Care:  Rehab: Physical Therapy: [facility] - [phone]  Discussed activity considerations and other supportive care including Ice/Heat, OTC and other topical medications as needed.    -We also discussed other future treatment options:  Consideration of injections  Repeat MRI    Follow Up: 6 - 8 weeks      Concerning signs and symptoms were reviewed.  The patient expressed understanding of this management plan and all questions were answered at this time.    Jena Mon MD [facility]  Sports Medicine Physician  [facility]      -----  Chief Complaint   Patient presents with     Right Shoulder - Pain       SUBJECTIVE  James Blanco is a/an 42 year old male who is seen as a self referral for evaluation of right shoulder.     The patient is seen by themselves.  The patient is Right handed    Onset: 4 month(s) ago. Reports insidious onset without acute precipitating event.  Location of Pain: right shoulder; anterior, superior, feels deep   Worsened by: all movements, abduction, flexion, internal rotation, external rotation, overhead movents   Better with: icing, ibuprofen   Treatments tried: ice, ibuprofen and previous imaging (xray 08/16/2016)  Associated symptoms: weakness of right  shoulder and feeling of instability    Orthopedic/Surgical history: YES - Date: 5 years ago with right shoulder, has MRI  Social History/Occupation:     No family history pertinent to patient's problem today.    REVIEW OF SYSTEMS:  Review of Systems  Skin: no bruising, no swelling  Musculoskeletal: as above  Neurologic: no numbness, paresthesias  Remainder of review of systems is negative including constitutional, CV, pulmonary, GI, except as noted in HPI or medical history.    OBJECTIVE:  /89   Pulse 89   Wt 97.1 kg (214 lb)   BMI 26.11 kg/m     General: healthy, alert and in no distress  HEENT: no scleral icterus or conjunctival erythema  Skin: no suspicious lesions or rash. No jaundice.  CV: distal perfusion intact  Resp: normal respiratory effort without conversational dyspnea   Psych: normal mood and affect  Gait: NORMAL  Neuro: Normal light sensory exam of upper extremity    Bilateral Shoulder exam    Inspection and Posture:       normal    Skin:        no visible deformities    Tender:        acromioclavicular joint right mild    Non Tender:       remainder of shoulder bilateral    ROM:        Full active and passive ROM with flexion, extension, abduction, internal and external rotation bilateral       asymmetric scapular motion    Painful motions:       end range flexion and elevation right    Strength:        abduction 5/5 bilateral       flexion 5/5 bilateral       internal rotation 5/5 bilateral       external rotation 5/5 bilateral    Impingement testing:       neg (-) Neer right       neg (-) Alexis right       positive (+) O'margarita right    Sensation:        normal sensation over shoulder and upper extremity     RADIOLOGY:  I independently ordered, visualized and reviewed these images with the patient  3 XR views of right shoulder reviewed: no acute bony abnormality,  AC joint degenerative change  - will follow official read    Reviewed prior MRI 8/2016 - mild subacromial  bursitis    Review of the result(s) of each unique test - XR, MRI

## 2022-10-26 ENCOUNTER — HOSPITAL ENCOUNTER (OUTPATIENT)
Dept: PHYSICAL THERAPY | Facility: CLINIC | Age: 42
Setting detail: THERAPIES SERIES
Discharge: HOME | End: 2022-10-26
Attending: PEDIATRICS
Payer: COMMERCIAL

## 2022-10-26 DIAGNOSIS — M19.011 ARTHRITIS OF RIGHT ACROMIOCLAVICULAR JOINT: ICD-10-CM

## 2022-10-26 DIAGNOSIS — M75.101 ROTATOR CUFF SYNDROME OF RIGHT SHOULDER: ICD-10-CM

## 2022-10-26 DIAGNOSIS — G89.29 CHRONIC RIGHT SHOULDER PAIN: ICD-10-CM

## 2022-10-26 DIAGNOSIS — M25.511 CHRONIC RIGHT SHOULDER PAIN: ICD-10-CM

## 2022-10-26 PROCEDURE — 97161 PT EVAL LOW COMPLEX 20 MIN: CPT | Mod: GP | Performed by: PHYSICAL THERAPIST

## 2022-10-26 PROCEDURE — 97140 MANUAL THERAPY 1/> REGIONS: CPT | Mod: GP | Performed by: PHYSICAL THERAPIST

## 2022-10-26 PROCEDURE — 97110 THERAPEUTIC EXERCISES: CPT | Mod: GP | Performed by: PHYSICAL THERAPIST

## 2022-10-26 NOTE — PROGRESS NOTES
Pt to be discharged at this time. The pt failed to return for follow-ups and is being discharged at this time with this being the last known status of the pt.     10/26/22 0800   General Information   Type of Visit Initial OP Ortho PT Evaluation   Start of Care Date 10/26/22   Referring Physician Jena Bansal MD   Patient/Family Goals Statement decrease in pain to the shoulder   Orders Evaluate and Treat   Date of Order 10/19/22   Certification Required? No   Medical Diagnosis Chronic right shoulder pain (M25.511, G89.29)     Arthritis of right acromioclavicular joint (M19.011)     Rotator cuff syndrome of right shoulder (M75.101)   Body Part(s)   Body Part(s) Shoulder   Presentation and Etiology   Pertinent history of current problem (include personal factors and/or comorbidities that impact the POC) Pt is in R shoulder deep in the joint. The pt notes localized to the lateral point on the shoulder which is tender to touch. The pt notes no radiating Sx. Pain with overhead movements. Voltren gel and use of tylenol. Pt doesn't note any increase in pain to the anterior shoulder when lying flat on his back. Pt notes increased pain with sidelying b/l. 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 Increased stress placed on the shoulder. Previous injury to the area with resolving Sx.   Impairments A. Pain;D. Decreased ROM;F. Decreased strength and endurance   Functional Limitations perform required work activities   Symptom Location R shoulder;  deep joint pain on lateral side   How/Where did it occur With repetition/overuse   Onset date of current episode/exacerbation 10/19/22  (2+ months ago (july or august); unknown date and this is the order date)   Pain/symptoms exacerbated by C. Lifting;D. Carrying;G. Certain positions;H. Overhead reach;L. Work tasks   Pain/symptoms eased by F. Certain positions   Progression of symptoms since onset: Worsened   Current Level of Function    Patient role/employment history A. 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 No   Have you fallen and had an injury in the past year? No   Is patient a fall risk? No   Abuse Screen (yes response referral indicated)   Feels Unsafe at Home or Work/School no   Feels Threatened by Someone no   Does Anyone Try to Keep You From Having Contact with Others or Doing Things Outside Your Home? no   Physical Signs of Abuse Present no   Shoulder Objective Findings   Side (if bilateral, select both right and left) Right   Cervical Screen (ROM, quadrant) WFL   Thoracic Mobility Screen WFL   Scapulothoracic Rhythm winging of shoulder   Pec Minor (supine) Flexibility tight   Neer's Test -   Alexis-Red Test -   Coracoid Test -   Bursa Test -   Relocation Test -   Sulcus Test -   Shoulder Special Tests Comments + empty can test   Palpation tender to supraspinatus, infraspinatus, and mid delt   Accessory Motion/Joint Mobility slight posterior joint stiffness   Posture greater anterior scapular tilt on the R than the L   Right Shoulder Flexion AROM WFL with slight devation into scaption and IR at 160   Right Shoulder Abduction AROM WFL with pain when his UE is too far behind himself   Right Shoulder ER AROM WFL   Right Shoulder IR AROM reaches T10;  L reaches T4   Right Shoulder Flexion Strength 5/5 with pain   Right Shoulder Abduction Strength 5/5 with pain; weaker than L   Right Shoulder ER Strength 5/5   Right Shoulder IR Strength 5/5   Right Shoulder Extension Strength 4+/5   Right Mid Trapezius Strength 4+/5   Right Lower Trapezius Strength 3/5   Planned Therapy Interventions   Planned Therapy Interventions joint mobilization;manual therapy;neuromuscular re-education;ROM;strengthening;stretching   Planned Modality Interventions   Planned Modality Interventions Electrical stimulation;TENS;Traction;Ultrasound    Clinical Impression   Criteria for Skilled Therapeutic Interventions Met yes, treatment indicated   PT Diagnosis R shoulder pain   Influenced by the following impairments poor shoulder mechanics; weak scapular stabilizers   Functional limitations due to impairments reaching overhead; lifting; work tasks as rm   Clinical Presentation Stable/Uncomplicated   Clinical Presentation Rationale current condition with a gradual increase in discomfort to the area over time. Localized pain without radiating nor neurological Sx to the area.   Clinical Decision Making (Complexity) Low complexity   Therapy Frequency 1 time/week   Predicted Duration of Therapy Intervention (days/wks) for total of 6 visits but every to everyother week   Risk & Benefits of therapy have been explained Yes   Patient, Family & other staff in agreement with plan of care Yes   Clinical Impression Comments prognosis is good. Strain to the RC with mobility and position of the scapula.   ORTHO GOALS   PT Ortho Eval Goals 1;2;3   Ortho Goal 1   Goal Identifier ST goal   Goal Description Pt will have full shoulder strength without reproducing pain (0/10) in 4 weeks to decrease aggravated Sx for the pt.   Target Date 11/23/22   Ortho Goal 2   Goal Identifier ST goal   Goal Description Pt will have 5/5 scap ret and 4/5 scap dep to stabilize when lifting overhead in 6 weeks.   Target Date 01/04/23   Ortho Goal 3   Goal Identifier LT goal   Goal Description Pt will be independent in home strengthening program for self management of Sx in 6 weeks.   Target Date 01/04/23   Total Evaluation Time   PT Eval, Low Complexity Minutes (71501) 20       Ramila Dumont, PT, DPT

## 2022-11-20 ENCOUNTER — ANESTHESIA EVENT (OUTPATIENT)
Dept: SURGERY | Facility: CLINIC | Age: 42
End: 2022-11-20
Payer: COMMERCIAL

## 2022-11-20 ENCOUNTER — OFFICE VISIT (OUTPATIENT)
Dept: URGENT CARE | Facility: URGENT CARE | Age: 42
End: 2022-11-20
Payer: COMMERCIAL

## 2022-11-20 ENCOUNTER — ANESTHESIA (OUTPATIENT)
Dept: SURGERY | Facility: CLINIC | Age: 42
End: 2022-11-20
Payer: COMMERCIAL

## 2022-11-20 ENCOUNTER — HOSPITAL ENCOUNTER (OUTPATIENT)
Facility: CLINIC | Age: 42
Discharge: HOME | End: 2022-11-20
Attending: EMERGENCY MEDICINE | Admitting: SURGERY
Payer: COMMERCIAL

## 2022-11-20 ENCOUNTER — APPOINTMENT (OUTPATIENT)
Dept: CT IMAGING | Facility: CLINIC | Age: 42
End: 2022-11-20
Attending: EMERGENCY MEDICINE
Payer: COMMERCIAL

## 2022-11-20 VITALS
DIASTOLIC BLOOD PRESSURE: 80 MMHG | TEMPERATURE: 97 F | WEIGHT: 205 LBS | BODY MASS INDEX: 24.96 KG/M2 | RESPIRATION RATE: 16 BRPM | SYSTOLIC BLOOD PRESSURE: 130 MMHG | HEART RATE: 78 BPM | OXYGEN SATURATION: 99 % | HEIGHT: 76 IN

## 2022-11-20 VITALS
SYSTOLIC BLOOD PRESSURE: 127 MMHG | DIASTOLIC BLOOD PRESSURE: 80 MMHG | OXYGEN SATURATION: 100 % | WEIGHT: 205 LBS | BODY MASS INDEX: 25.02 KG/M2 | HEART RATE: 75 BPM | TEMPERATURE: 97.2 F

## 2022-11-20 DIAGNOSIS — R10.31 ACUTE RIGHT LOWER QUADRANT PAIN: Primary | ICD-10-CM

## 2022-11-20 DIAGNOSIS — R10.31 ABDOMINAL PAIN, RIGHT LOWER QUADRANT: ICD-10-CM

## 2022-11-20 DIAGNOSIS — K35.30 ACUTE APPENDICITIS WITH LOCALIZED PERITONITIS, WITHOUT PERFORATION, ABSCESS, OR GANGRENE: ICD-10-CM

## 2022-11-20 LAB
ALBUMIN SERPL BCG-MCNC: 4.6 G/DL (ref 3.5–5.2)
ALBUMIN UR-MCNC: NEGATIVE MG/DL
ALP SERPL-CCNC: 65 U/L (ref 40–129)
ALT SERPL W P-5'-P-CCNC: 21 U/L (ref 10–50)
ANION GAP SERPL CALCULATED.3IONS-SCNC: 9 MMOL/L (ref 7–15)
APPEARANCE UR: CLEAR
AST SERPL W P-5'-P-CCNC: 16 U/L (ref 10–50)
BASOPHILS # BLD AUTO: 0.1 10E3/UL (ref 0–0.2)
BASOPHILS NFR BLD AUTO: 0 %
BILIRUB SERPL-MCNC: 0.5 MG/DL
BILIRUB UR QL STRIP: NEGATIVE
BUN SERPL-MCNC: 15.2 MG/DL (ref 6–20)
CALCIUM SERPL-MCNC: 9.9 MG/DL (ref 8.6–10)
CHLORIDE SERPL-SCNC: 102 MMOL/L (ref 98–107)
COLOR UR AUTO: YELLOW
CREAT SERPL-MCNC: 1.04 MG/DL (ref 0.67–1.17)
DEPRECATED HCO3 PLAS-SCNC: 29 MMOL/L (ref 22–29)
EOSINOPHIL # BLD AUTO: 0.1 10E3/UL (ref 0–0.7)
EOSINOPHIL NFR BLD AUTO: 0 %
ERYTHROCYTE [DISTWIDTH] IN BLOOD BY AUTOMATED COUNT: 12.4 % (ref 10–15)
GFR SERPL CREATININE-BSD FRML MDRD: >90 ML/MIN/1.73M2
GLUCOSE SERPL-MCNC: 110 MG/DL (ref 70–99)
GLUCOSE UR STRIP-MCNC: NEGATIVE MG/DL
HCT VFR BLD AUTO: 43.1 % (ref 40–53)
HGB BLD-MCNC: 14.4 G/DL (ref 13.3–17.7)
HGB UR QL STRIP: NEGATIVE
IMM GRANULOCYTES # BLD: 0.1 10E3/UL
IMM GRANULOCYTES NFR BLD: 0 %
KETONES UR STRIP-MCNC: 5 MG/DL
LEUKOCYTE ESTERASE UR QL STRIP: NEGATIVE
LIPASE SERPL-CCNC: 18 U/L (ref 13–60)
LYMPHOCYTES # BLD AUTO: 2.1 10E3/UL (ref 0.8–5.3)
LYMPHOCYTES NFR BLD AUTO: 12 %
MCH RBC QN AUTO: 31 PG (ref 26.5–33)
MCHC RBC AUTO-ENTMCNC: 33.4 G/DL (ref 31.5–36.5)
MCV RBC AUTO: 93 FL (ref 78–100)
MONOCYTES # BLD AUTO: 1.5 10E3/UL (ref 0–1.3)
MONOCYTES NFR BLD AUTO: 9 %
MUCOUS THREADS #/AREA URNS LPF: PRESENT /LPF
NEUTROPHILS # BLD AUTO: 14 10E3/UL (ref 1.6–8.3)
NEUTROPHILS NFR BLD AUTO: 79 %
NITRATE UR QL: NEGATIVE
NRBC # BLD AUTO: 0 10E3/UL
NRBC BLD AUTO-RTO: 0 /100
PH UR STRIP: 7 [PH] (ref 5–7)
PLATELET # BLD AUTO: 264 10E3/UL (ref 150–450)
POTASSIUM SERPL-SCNC: 3.7 MMOL/L (ref 3.4–5.3)
PROT SERPL-MCNC: 7.3 G/DL (ref 6.4–8.3)
RBC # BLD AUTO: 4.65 10E6/UL (ref 4.4–5.9)
RBC URINE: 1 /HPF
SARS-COV-2 RNA RESP QL NAA+PROBE: NEGATIVE
SODIUM SERPL-SCNC: 140 MMOL/L (ref 136–145)
SP GR UR STRIP: 1.01 (ref 1–1.03)
SQUAMOUS EPITHELIAL: 1 /HPF
UROBILINOGEN UR STRIP-MCNC: NORMAL MG/DL
WBC # BLD AUTO: 17.8 10E3/UL (ref 4–11)
WBC URINE: <1 /HPF

## 2022-11-20 PROCEDURE — 250N000011 HC RX IP 250 OP 636: Performed by: EMERGENCY MEDICINE

## 2022-11-20 PROCEDURE — 250N000011 HC RX IP 250 OP 636

## 2022-11-20 PROCEDURE — 88304 TISSUE EXAM BY PATHOLOGIST: CPT | Mod: TC | Performed by: SURGERY

## 2022-11-20 PROCEDURE — 710N000012 HC RECOVERY PHASE 2, PER MINUTE: Performed by: SURGERY

## 2022-11-20 PROCEDURE — 360N000076 HC SURGERY LEVEL 3, PER MIN: Performed by: SURGERY

## 2022-11-20 PROCEDURE — 272N000001 HC OR GENERAL SUPPLY STERILE: Performed by: SURGERY

## 2022-11-20 PROCEDURE — 96376 TX/PRO/DX INJ SAME DRUG ADON: CPT | Performed by: EMERGENCY MEDICINE

## 2022-11-20 PROCEDURE — 250N000009 HC RX 250: Performed by: EMERGENCY MEDICINE

## 2022-11-20 PROCEDURE — 74177 CT ABD & PELVIS W/CONTRAST: CPT

## 2022-11-20 PROCEDURE — 96375 TX/PRO/DX INJ NEW DRUG ADDON: CPT | Performed by: EMERGENCY MEDICINE

## 2022-11-20 PROCEDURE — 83690 ASSAY OF LIPASE: CPT | Performed by: EMERGENCY MEDICINE

## 2022-11-20 PROCEDURE — 44970 LAPAROSCOPY APPENDECTOMY: CPT | Performed by: SURGERY

## 2022-11-20 PROCEDURE — 96365 THER/PROPH/DIAG IV INF INIT: CPT | Mod: 59 | Performed by: EMERGENCY MEDICINE

## 2022-11-20 PROCEDURE — 36415 COLL VENOUS BLD VENIPUNCTURE: CPT | Performed by: EMERGENCY MEDICINE

## 2022-11-20 PROCEDURE — 85025 COMPLETE CBC W/AUTO DIFF WBC: CPT | Performed by: EMERGENCY MEDICINE

## 2022-11-20 PROCEDURE — 88304 TISSUE EXAM BY PATHOLOGIST: CPT | Mod: 26 | Performed by: PATHOLOGY

## 2022-11-20 PROCEDURE — 250N000025 HC SEVOFLURANE, PER MIN: Performed by: SURGERY

## 2022-11-20 PROCEDURE — 710N000009 HC RECOVERY PHASE 1, LEVEL 1, PER MIN: Performed by: SURGERY

## 2022-11-20 PROCEDURE — 250N000009 HC RX 250: Performed by: SURGERY

## 2022-11-20 PROCEDURE — 99213 OFFICE O/P EST LOW 20 MIN: CPT | Performed by: FAMILY MEDICINE

## 2022-11-20 PROCEDURE — 258N000003 HC RX IP 258 OP 636: Performed by: EMERGENCY MEDICINE

## 2022-11-20 PROCEDURE — 250N000009 HC RX 250

## 2022-11-20 PROCEDURE — 81001 URINALYSIS AUTO W/SCOPE: CPT | Performed by: EMERGENCY MEDICINE

## 2022-11-20 PROCEDURE — C9803 HOPD COVID-19 SPEC COLLECT: HCPCS | Performed by: EMERGENCY MEDICINE

## 2022-11-20 PROCEDURE — 250N000013 HC RX MED GY IP 250 OP 250 PS 637: Performed by: SURGERY

## 2022-11-20 PROCEDURE — 370N000017 HC ANESTHESIA TECHNICAL FEE, PER MIN: Performed by: SURGERY

## 2022-11-20 PROCEDURE — 271N000001 HC OR GENERAL SUPPLY NON-STERILE: Performed by: SURGERY

## 2022-11-20 PROCEDURE — 99204 OFFICE O/P NEW MOD 45 MIN: CPT | Mod: 57 | Performed by: SURGERY

## 2022-11-20 PROCEDURE — 258N000003 HC RX IP 258 OP 636

## 2022-11-20 PROCEDURE — 99285 EMERGENCY DEPT VISIT HI MDM: CPT | Mod: 25 | Performed by: EMERGENCY MEDICINE

## 2022-11-20 PROCEDURE — 87635 SARS-COV-2 COVID-19 AMP PRB: CPT | Performed by: EMERGENCY MEDICINE

## 2022-11-20 PROCEDURE — 80053 COMPREHEN METABOLIC PANEL: CPT | Performed by: EMERGENCY MEDICINE

## 2022-11-20 PROCEDURE — 99285 EMERGENCY DEPT VISIT HI MDM: CPT | Performed by: EMERGENCY MEDICINE

## 2022-11-20 RX ORDER — NALOXONE HYDROCHLORIDE 0.4 MG/ML
0.2 INJECTION, SOLUTION INTRAMUSCULAR; INTRAVENOUS; SUBCUTANEOUS
Status: DISCONTINUED | OUTPATIENT
Start: 2022-11-20 | End: 2022-11-20 | Stop reason: HOSPADM

## 2022-11-20 RX ORDER — ONDANSETRON 4 MG/1
4 TABLET, ORALLY DISINTEGRATING ORAL EVERY 30 MIN PRN
Status: DISCONTINUED | OUTPATIENT
Start: 2022-11-20 | End: 2022-11-20 | Stop reason: HOSPADM

## 2022-11-20 RX ORDER — IOPAMIDOL 755 MG/ML
100 INJECTION, SOLUTION INTRAVASCULAR ONCE
Status: COMPLETED | OUTPATIENT
Start: 2022-11-20 | End: 2022-11-20

## 2022-11-20 RX ORDER — MEPERIDINE HYDROCHLORIDE 25 MG/ML
12.5 INJECTION INTRAMUSCULAR; INTRAVENOUS; SUBCUTANEOUS
Status: DISCONTINUED | OUTPATIENT
Start: 2022-11-20 | End: 2022-11-20 | Stop reason: HOSPADM

## 2022-11-20 RX ORDER — IBUPROFEN 200 MG
400 TABLET ORAL EVERY 4 HOURS PRN
COMMUNITY

## 2022-11-20 RX ORDER — OXYCODONE HYDROCHLORIDE 5 MG/1
5 TABLET ORAL EVERY 4 HOURS PRN
Qty: 16 TABLET | Refills: 0 | Status: SHIPPED | OUTPATIENT
Start: 2022-11-20 | End: 2022-11-23

## 2022-11-20 RX ORDER — HYDROMORPHONE HYDROCHLORIDE 1 MG/ML
0.5 INJECTION, SOLUTION INTRAMUSCULAR; INTRAVENOUS; SUBCUTANEOUS EVERY 30 MIN PRN
Status: COMPLETED | OUTPATIENT
Start: 2022-11-20 | End: 2022-11-20

## 2022-11-20 RX ORDER — KETOROLAC TROMETHAMINE 30 MG/ML
30 INJECTION, SOLUTION INTRAMUSCULAR; INTRAVENOUS ONCE
Status: COMPLETED | OUTPATIENT
Start: 2022-11-20 | End: 2022-11-20

## 2022-11-20 RX ORDER — KETOROLAC TROMETHAMINE 15 MG/ML
10 INJECTION, SOLUTION INTRAMUSCULAR; INTRAVENOUS
Status: COMPLETED | OUTPATIENT
Start: 2022-11-20 | End: 2022-11-20

## 2022-11-20 RX ORDER — CLINDAMYCIN PHOSPHATE 900 MG/50ML
900 INJECTION, SOLUTION INTRAVENOUS
Status: DISCONTINUED | OUTPATIENT
Start: 2022-11-20 | End: 2022-11-20 | Stop reason: ALTCHOICE

## 2022-11-20 RX ORDER — CIPROFLOXACIN 2 MG/ML
400 INJECTION, SOLUTION INTRAVENOUS ONCE
Status: COMPLETED | OUTPATIENT
Start: 2022-11-20 | End: 2022-11-20

## 2022-11-20 RX ORDER — NALOXONE HYDROCHLORIDE 0.4 MG/ML
0.4 INJECTION, SOLUTION INTRAMUSCULAR; INTRAVENOUS; SUBCUTANEOUS
Status: DISCONTINUED | OUTPATIENT
Start: 2022-11-20 | End: 2022-11-20 | Stop reason: HOSPADM

## 2022-11-20 RX ORDER — BUPIVACAINE HYDROCHLORIDE AND EPINEPHRINE 2.5; 5 MG/ML; UG/ML
INJECTION, SOLUTION EPIDURAL; INFILTRATION; INTRACAUDAL; PERINEURAL PRN
Status: DISCONTINUED | OUTPATIENT
Start: 2022-11-20 | End: 2022-11-20 | Stop reason: HOSPADM

## 2022-11-20 RX ORDER — ONDANSETRON 2 MG/ML
4 INJECTION INTRAMUSCULAR; INTRAVENOUS EVERY 30 MIN PRN
Status: DISCONTINUED | OUTPATIENT
Start: 2022-11-20 | End: 2022-11-20 | Stop reason: HOSPADM

## 2022-11-20 RX ORDER — ONDANSETRON 2 MG/ML
INJECTION INTRAMUSCULAR; INTRAVENOUS PRN
Status: DISCONTINUED | OUTPATIENT
Start: 2022-11-20 | End: 2022-11-20

## 2022-11-20 RX ORDER — KETAMINE HYDROCHLORIDE 10 MG/ML
INJECTION INTRAMUSCULAR; INTRAVENOUS PRN
Status: DISCONTINUED | OUTPATIENT
Start: 2022-11-20 | End: 2022-11-20

## 2022-11-20 RX ORDER — PROPOFOL 10 MG/ML
INJECTION, EMULSION INTRAVENOUS PRN
Status: DISCONTINUED | OUTPATIENT
Start: 2022-11-20 | End: 2022-11-20

## 2022-11-20 RX ORDER — CLINDAMYCIN PHOSPHATE 900 MG/50ML
900 INJECTION, SOLUTION INTRAVENOUS SEE ADMIN INSTRUCTIONS
Status: DISCONTINUED | OUTPATIENT
Start: 2022-11-20 | End: 2022-11-20 | Stop reason: ALTCHOICE

## 2022-11-20 RX ORDER — DEXAMETHASONE SODIUM PHOSPHATE 4 MG/ML
INJECTION, SOLUTION INTRA-ARTICULAR; INTRALESIONAL; INTRAMUSCULAR; INTRAVENOUS; SOFT TISSUE PRN
Status: DISCONTINUED | OUTPATIENT
Start: 2022-11-20 | End: 2022-11-20

## 2022-11-20 RX ORDER — FENTANYL CITRATE 50 UG/ML
25 INJECTION, SOLUTION INTRAMUSCULAR; INTRAVENOUS
Status: DISCONTINUED | OUTPATIENT
Start: 2022-11-20 | End: 2022-11-20 | Stop reason: HOSPADM

## 2022-11-20 RX ORDER — METRONIDAZOLE 500 MG/100ML
500 INJECTION, SOLUTION INTRAVENOUS ONCE
Status: COMPLETED | OUTPATIENT
Start: 2022-11-20 | End: 2022-11-20

## 2022-11-20 RX ORDER — OXYCODONE HYDROCHLORIDE 5 MG/1
10 TABLET ORAL EVERY 4 HOURS PRN
Status: DISCONTINUED | OUTPATIENT
Start: 2022-11-20 | End: 2022-11-20 | Stop reason: HOSPADM

## 2022-11-20 RX ORDER — HYDROMORPHONE HCL IN WATER/PF 6 MG/30 ML
0.4 PATIENT CONTROLLED ANALGESIA SYRINGE INTRAVENOUS EVERY 5 MIN PRN
Status: DISCONTINUED | OUTPATIENT
Start: 2022-11-20 | End: 2022-11-20 | Stop reason: HOSPADM

## 2022-11-20 RX ORDER — FENTANYL CITRATE 50 UG/ML
50 INJECTION, SOLUTION INTRAMUSCULAR; INTRAVENOUS EVERY 5 MIN PRN
Status: DISCONTINUED | OUTPATIENT
Start: 2022-11-20 | End: 2022-11-20 | Stop reason: HOSPADM

## 2022-11-20 RX ORDER — SODIUM CHLORIDE 9 MG/ML
1000 INJECTION, SOLUTION INTRAVENOUS CONTINUOUS
Status: DISCONTINUED | OUTPATIENT
Start: 2022-11-20 | End: 2022-11-20 | Stop reason: HOSPADM

## 2022-11-20 RX ORDER — ONDANSETRON 2 MG/ML
4 INJECTION INTRAMUSCULAR; INTRAVENOUS ONCE
Status: COMPLETED | OUTPATIENT
Start: 2022-11-20 | End: 2022-11-20

## 2022-11-20 RX ORDER — SODIUM CHLORIDE, SODIUM LACTATE, POTASSIUM CHLORIDE, CALCIUM CHLORIDE 600; 310; 30; 20 MG/100ML; MG/100ML; MG/100ML; MG/100ML
INJECTION, SOLUTION INTRAVENOUS CONTINUOUS
Status: DISCONTINUED | OUTPATIENT
Start: 2022-11-20 | End: 2022-11-20 | Stop reason: HOSPADM

## 2022-11-20 RX ORDER — OXYCODONE HYDROCHLORIDE 5 MG/1
5 TABLET ORAL EVERY 4 HOURS PRN
Status: DISCONTINUED | OUTPATIENT
Start: 2022-11-20 | End: 2022-11-20 | Stop reason: HOSPADM

## 2022-11-20 RX ORDER — FENTANYL CITRATE 0.05 MG/ML
INJECTION, SOLUTION INTRAMUSCULAR; INTRAVENOUS PRN
Status: DISCONTINUED | OUTPATIENT
Start: 2022-11-20 | End: 2022-11-20

## 2022-11-20 RX ADMIN — SUGAMMADEX 200 MG: 100 INJECTION, SOLUTION INTRAVENOUS at 19:03

## 2022-11-20 RX ADMIN — HYDROMORPHONE HYDROCHLORIDE 0.5 MG: 1 INJECTION, SOLUTION INTRAMUSCULAR; INTRAVENOUS; SUBCUTANEOUS at 14:09

## 2022-11-20 RX ADMIN — ONDANSETRON 4 MG: 2 INJECTION INTRAMUSCULAR; INTRAVENOUS at 14:17

## 2022-11-20 RX ADMIN — IOPAMIDOL 100 ML: 755 INJECTION, SOLUTION INTRAVENOUS at 14:29

## 2022-11-20 RX ADMIN — MIDAZOLAM HYDROCHLORIDE 1 MG: 1 INJECTION, SOLUTION INTRAMUSCULAR; INTRAVENOUS at 18:12

## 2022-11-20 RX ADMIN — OXYCODONE HYDROCHLORIDE 5 MG: 5 TABLET ORAL at 20:16

## 2022-11-20 RX ADMIN — SODIUM CHLORIDE 65 ML: 9 INJECTION, SOLUTION INTRAVENOUS at 14:30

## 2022-11-20 RX ADMIN — KETOROLAC TROMETHAMINE 30 MG: 30 INJECTION, SOLUTION INTRAMUSCULAR at 20:13

## 2022-11-20 RX ADMIN — CIPROFLOXACIN 400 MG: 2 INJECTION, SOLUTION INTRAVENOUS at 15:21

## 2022-11-20 RX ADMIN — METRONIDAZOLE 500 MG: 500 INJECTION, SOLUTION INTRAVENOUS at 16:44

## 2022-11-20 RX ADMIN — FENTANYL CITRATE 50 MCG: 50 INJECTION INTRAVENOUS at 18:40

## 2022-11-20 RX ADMIN — PROPOFOL 200 MG: 10 INJECTION, EMULSION INTRAVENOUS at 18:10

## 2022-11-20 RX ADMIN — ROCURONIUM BROMIDE 50 MG: 50 INJECTION, SOLUTION INTRAVENOUS at 18:12

## 2022-11-20 RX ADMIN — ONDANSETRON 4 MG: 2 INJECTION INTRAMUSCULAR; INTRAVENOUS at 18:56

## 2022-11-20 RX ADMIN — SODIUM CHLORIDE, POTASSIUM CHLORIDE, SODIUM LACTATE AND CALCIUM CHLORIDE: 600; 310; 30; 20 INJECTION, SOLUTION INTRAVENOUS at 18:05

## 2022-11-20 RX ADMIN — MIDAZOLAM HYDROCHLORIDE 2 MG: 1 INJECTION, SOLUTION INTRAMUSCULAR; INTRAVENOUS at 18:10

## 2022-11-20 RX ADMIN — KETOROLAC TROMETHAMINE 10 MG: 15 INJECTION, SOLUTION INTRAMUSCULAR; INTRAVENOUS at 14:08

## 2022-11-20 RX ADMIN — KETAMINE HYDROCHLORIDE 10 MG: 10 INJECTION, SOLUTION INTRAMUSCULAR; INTRAVENOUS at 18:45

## 2022-11-20 RX ADMIN — HYDROMORPHONE HYDROCHLORIDE 0.5 MG: 1 INJECTION, SOLUTION INTRAMUSCULAR; INTRAVENOUS; SUBCUTANEOUS at 16:47

## 2022-11-20 RX ADMIN — KETAMINE HYDROCHLORIDE 20 MG: 10 INJECTION, SOLUTION INTRAMUSCULAR; INTRAVENOUS at 18:10

## 2022-11-20 RX ADMIN — KETAMINE HYDROCHLORIDE 10 MG: 10 INJECTION, SOLUTION INTRAMUSCULAR; INTRAVENOUS at 18:27

## 2022-11-20 RX ADMIN — FENTANYL CITRATE 50 MCG: 50 INJECTION INTRAVENOUS at 18:17

## 2022-11-20 RX ADMIN — KETAMINE HYDROCHLORIDE 10 MG: 10 INJECTION, SOLUTION INTRAMUSCULAR; INTRAVENOUS at 18:41

## 2022-11-20 RX ADMIN — SUCCINYLCHOLINE CHLORIDE 160 MG: 20 INJECTION, SOLUTION INTRAMUSCULAR; INTRAVENOUS at 18:11

## 2022-11-20 RX ADMIN — FENTANYL CITRATE 50 MCG: 50 INJECTION INTRAVENOUS at 18:50

## 2022-11-20 RX ADMIN — MIDAZOLAM HYDROCHLORIDE 2 MG: 1 INJECTION, SOLUTION INTRAMUSCULAR; INTRAVENOUS at 18:05

## 2022-11-20 RX ADMIN — FENTANYL CITRATE 50 MCG: 50 INJECTION INTRAVENOUS at 18:34

## 2022-11-20 RX ADMIN — DEXAMETHASONE SODIUM PHOSPHATE 4 MG: 4 INJECTION, SOLUTION INTRA-ARTICULAR; INTRALESIONAL; INTRAMUSCULAR; INTRAVENOUS; SOFT TISSUE at 18:56

## 2022-11-20 RX ADMIN — SODIUM CHLORIDE 1000 ML: 9 INJECTION, SOLUTION INTRAVENOUS at 14:10

## 2022-11-20 RX ADMIN — FENTANYL CITRATE 50 MCG: 50 INJECTION INTRAVENOUS at 18:10

## 2022-11-20 ASSESSMENT — ENCOUNTER SYMPTOMS
RESPIRATORY NEGATIVE: 1
HEMATOLOGIC/LYMPHATIC NEGATIVE: 1
EYES NEGATIVE: 1
ABDOMINAL PAIN: 1
ENDOCRINE NEGATIVE: 1
CONSTITUTIONAL NEGATIVE: 1
NEUROLOGICAL NEGATIVE: 1
MUSCULOSKELETAL NEGATIVE: 1
ALLERGIC/IMMUNOLOGIC NEGATIVE: 1
VOMITING: 1
PSYCHIATRIC NEGATIVE: 1
CARDIOVASCULAR NEGATIVE: 1

## 2022-11-20 ASSESSMENT — ACTIVITIES OF DAILY LIVING (ADL)
ADLS_ACUITY_SCORE: 35

## 2022-11-20 ASSESSMENT — LIFESTYLE VARIABLES: TOBACCO_USE: 1

## 2022-11-20 ASSESSMENT — PAIN SCALES - GENERAL: PAINLEVEL: EXTREME PAIN (8)

## 2022-11-20 NOTE — ED PROVIDER NOTES
History     Chief Complaint   Patient presents with     Abdominal Pain     RLQ pain with vomiting. Fever at home. Concerned for appy from [facility].      HPI  James Blanco is a 42 year old male who was referred from [facility] for further evaluation with concern about acute appendicitis.  Patient presented with right lower quadrant pain and vomiting.    Patient's medical records show prior diagnosis of anxiety and hyperlipidemia, history of fatty alcoholic liver, history of PTSD and alcohol use disorder with reported dependence.  History of tobacco use disorder.    Patient is prescribed medications were reviewed    On examination patient arrived by car  accompanied by his wife Antonette from home in [place], [place]. Patient reports he works as a  and that yesterday he had [employer] where he had a meat with potatoes burrito.  Yesterday afternoon he developed periumbilical pain that is now more localized to the right lower quadrant.  He had some vomiting.  Pain was initially 8 out of 10 but currently 5 out of 10.  Pain does not seem to radiate.  No prior history of abdominal surgeries.  No urinary symptoms.  No back pain or flank pain.  Patient reports he smokes marijuana, and vapes nicotine cartridge about every 2 to 3 days.  Patient's had no chills and no other ill household contacts.  With persistent pain and evaluation in urgent care was referred for further diagnostic work-up and consideration of imaging.    Allergies:  Allergies   Allergen Reactions     Bees Swelling     Has epi-pen     Ceclor [Cefaclor]      Was an infant     Penicillins      Was an infant       Problem List:    Patient Active Problem List    Diagnosis Date Noted     Tobacco use disorder 05/11/2021     Priority: Medium     PTSD (post-traumatic stress disorder) 04/14/2021     Priority: Medium     Alcohol use disorder, moderate, dependence (H) 11/21/2020     Priority: Medium     Closed nondisplaced fracture of fifth metatarsal  bone of right foot, initial encounter 04/15/2020     Priority: Medium     Added automatically from request for surgery [id number]       Moderate major depression (H) 04/07/2020     Priority: Medium     Grief reaction 01/09/2020     Priority: Medium     Fatty liver, alcoholic 09/26/2018     Priority: Medium     Anxiety 09/01/2015     Priority: Medium     Mixed hyperlipidemia 09/01/2015     Priority: Medium        Past Medical History:    Past Medical History:   Diagnosis Date     Anxiety      Depressive disorder      Hyperlipidaemia LDL goal <130        Past Surgical History:    Past Surgical History:   Procedure Laterality Date     COLONOSCOPY N/A 3/21/2016    Procedure: COLONOSCOPY;  Surgeon: Tavo Wilcox MD;  Location: [facility]     OPEN REDUCTION INTERNAL FIXATION FOOT Right 4/17/2020    Procedure: Percutaneous intramedullary fixation, right fifth metatarsal fracture;  Surgeon: Amado Sanchez DPM;  Location:  OR     SURGICAL HISTORY OF -       plasty surgery on face, due to injury     SURGICAL HISTORY OF -       left elbow nerve decompression, sound like ulnar     SURGICAL HISTORY OF -       left knee surgery, scope and allograph for cartilege     SURGICAL HISTORY OF -       lasix surgery       Family History:    Family History   Problem Relation Age of Onset     Hyperlipidemia Mother      Coronary Artery Disease Father      Hyperlipidemia Father      Diabetes Father      Other Cancer Sister         lymphoma-non hodgkins     Depression Sister      Anxiety Disorder Sister      Substance Abuse Sister      Substance Abuse Brother      Bipolar Disorder Brother      Depression Brother        Social History:  Marital Status:   [2]  Social History     Tobacco Use     Smoking status: Every Day     Packs/day: 0.50     Years: 13.00     Pack years: 6.50     Types: Vaping Device, Cigarettes     Smokeless tobacco: Current   Vaping Use     Vaping Use: Every day     Substances: Nicotine     Devices: Disposable  "  Substance Use Topics     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     Drug use: Yes     Types: Marijuana     Comment: h/o street drug use 15 yrs ago        Medications:    EPINEPHrine (EPIPEN 2-CHAIM) 0.3 MG/0.3ML injection 2-pack  folic acid (FOLVITE) 1 MG tablet  multivitamin w/minerals (THERA-VIT-M) tablet  simvastatin (ZOCOR) 20 MG tablet  venlafaxine (EFFEXOR XR) 150 MG 24 hr capsule  vitamin B complex with vitamin C (STRESS TAB) tablet  Vitamin D3 (CHOLECALCIFEROL) 25 mcg (1000 units) tablet          Review of Systems   Constitutional: Negative.    HENT: Negative.    Eyes: Negative.    Respiratory: Negative.    Cardiovascular: Negative.    Gastrointestinal: Positive for abdominal pain and vomiting.   Endocrine: Negative.    Genitourinary: Negative.    Musculoskeletal: Negative.    Skin: Negative.    Allergic/Immunologic: Negative.    Neurological: Negative.    Hematological: Negative.    Psychiatric/Behavioral: Negative.    All other systems reviewed and are negative.      Physical Exam   BP: 137/83  Pulse: 77  Temp: 97  F (36.1  C)  Height: 193 cm (6' 4\")  Weight: 93 kg (205 lb)  SpO2: 99 %      Physical Exam  Constitutional:       General: He is not in acute distress.     Appearance: He is well-developed. He is not ill-appearing or diaphoretic.   HENT:      Head: Normocephalic and atraumatic.   Eyes:      Extraocular Movements: Extraocular movements intact.      Pupils: Pupils are equal, round, and reactive to light.   Cardiovascular:      Rate and Rhythm: Normal rate and regular rhythm.      Heart sounds: Normal heart sounds.   Pulmonary:      Effort: Pulmonary effort is normal. No respiratory distress.      Breath sounds: Normal breath sounds. No stridor. No wheezing, rhonchi or rales.   Chest:      Chest wall: No tenderness.   Abdominal:      General: Bowel sounds are decreased.      Palpations: Abdomen is soft.      Tenderness: There is abdominal tenderness in the right lower quadrant. " "      Skin:     Capillary Refill: Capillary refill takes less than 2 seconds.      Coloration: Skin is not cyanotic, jaundiced, mottled or pale.      Findings: No erythema or rash.   Neurological:      General: No focal deficit present.      Mental Status: He is alert and oriented to person, place, and time.   Psychiatric:         Mood and Affect: Mood normal. Mood is not anxious or depressed.         Behavior: Behavior normal.         ED Course                 Procedures              Critical Care time:  none               ED medications:  Medications   sodium chloride 0.9% infusion (has no administration in time range)   bupivacaine 0.25 % - EPINEPHrine 1:200,000 (PF) injection (90 mLs Other Given 11/20/22 1837)   0.9% sodium chloride BOLUS (1,000 mLs Intravenous New Bag 11/20/22 1410)   ketorolac (TORADOL) injection 10 mg (10 mg Intravenous Given 11/20/22 1408)   HYDROmorphone (PF) (DILAUDID) injection 0.5 mg (0.5 mg Intravenous Given 11/20/22 1647)   iopamidol (ISOVUE-370) solution 100 mL (100 mLs Intravenous Given 11/20/22 1429)   sodium chloride 0.9 % bag 500mL for CT scan flush use (65 mLs As instructed Given 11/20/22 1430)   ondansetron (ZOFRAN) injection 4 mg (4 mg Intravenous Given 11/20/22 1417)   ciprofloxacin (CIPRO) infusion 400 mg (0 mg Intravenous Stopped 11/20/22 1644)   metroNIDAZOLE (FLAGYL) infusion 500 mg (500 mg Intravenous New Bag 11/20/22 1644)       ED Vitals:  Vitals:    11/20/22 1233   BP: 137/83   Pulse: 77   Temp: 97  F (36.1  C)   TempSrc: Tympanic   SpO2: 99%   Weight: 93 kg (205 lb)   Height: 1.93 m (6' 4\")     ED labs and imaging:  Results for orders placed or performed during the hospital encounter of 11/20/22   CT Abdomen Pelvis w Contrast     Status: None    Narrative    EXAM: CT ABDOMEN PELVIS W CONTRAST  LOCATION: [facility]  DATE/TIME: 11/20/2022 2:39 PM    INDICATION: Periumbilical pain radiating to right lower quadrant.  Vomiting.  History of " alcoholic fatty liver. Evaluate for acute appendicitis vs diverticulitis vs other acute intra abdominal process.  COMPARISON: None.  TECHNIQUE: CT scan of the abdomen and pelvis was performed following injection of IV contrast. Multiplanar reformats were obtained. Dose reduction techniques were used.  CONTRAST: 100mL Isovue 370    FINDINGS:   LOWER CHEST: Normal.    HEPATOBILIARY: Normal.    PANCREAS: Normal.    SPLEEN: Normal.    ADRENAL GLANDS: Normal.    KIDNEYS/BLADDER: No hydronephrosis. Urinary bladder is unremarkable.    BOWEL: Dilated appendiceal lumen with wall edema and mild surrounding fat stranding. No evidence of bowel obstruction, pneumoperitoneum or drainable fluid collection.    LYMPH NODES: Normal.    VASCULATURE: Minimal calcified atherosclerosis.    PELVIC ORGANS: Normal.    MUSCULOSKELETAL: No acute bony abnormality.        Impression    IMPRESSION:   1.  Acute, uncomplicated appendicitis.      Finding was identified on 11/20/2022 2:40 PM.     Dr. Hui was contacted by me on 11/20/2022 2:46 PM and verbalized understanding of the result.    Comprehensive metabolic panel     Status: Abnormal   Result Value Ref Range    Sodium 140 136 - 145 mmol/L    Potassium 3.7 3.4 - 5.3 mmol/L    Chloride 102 98 - 107 mmol/L    Carbon Dioxide (CO2) 29 22 - 29 mmol/L    Anion Gap 9 7 - 15 mmol/L    Urea Nitrogen 15.2 6.0 - 20.0 mg/dL    Creatinine 1.04 0.67 - 1.17 mg/dL    Calcium 9.9 8.6 - 10.0 mg/dL    Glucose 110 (H) 70 - 99 mg/dL    Alkaline Phosphatase 65 40 - 129 U/L    AST 16 10 - 50 U/L    ALT 21 10 - 50 U/L    Protein Total 7.3 6.4 - 8.3 g/dL    Albumin 4.6 3.5 - 5.2 g/dL    Bilirubin Total 0.5 <=1.2 mg/dL    GFR Estimate >90 >60 mL/min/1.73m2   Lipase     Status: Normal   Result Value Ref Range    Lipase 18 13 - 60 U/L   CBC with platelets and differential     Status: Abnormal   Result Value Ref Range    WBC Count 17.8 (H) 4.0 - 11.0 10e3/uL    RBC Count 4.65 4.40 - 5.90 10e6/uL    Hemoglobin 14.4  13.3 - 17.7 g/dL    Hematocrit 43.1 40.0 - 53.0 %    MCV 93 78 - 100 fL    MCH 31.0 26.5 - 33.0 pg    MCHC 33.4 31.5 - 36.5 g/dL    RDW 12.4 10.0 - 15.0 %    Platelet Count 264 150 - 450 10e3/uL    % Neutrophils 79 %    % Lymphocytes 12 %    % Monocytes 9 %    % Eosinophils 0 %    % Basophils 0 %    % Immature Granulocytes 0 %    NRBCs per 100 WBC 0 <1 /100    Absolute Neutrophils 14.0 (H) 1.6 - 8.3 10e3/uL    Absolute Lymphocytes 2.1 0.8 - 5.3 10e3/uL    Absolute Monocytes 1.5 (H) 0.0 - 1.3 10e3/uL    Absolute Eosinophils 0.1 0.0 - 0.7 10e3/uL    Absolute Basophils 0.1 0.0 - 0.2 10e3/uL    Absolute Immature Granulocytes 0.1 <=0.4 10e3/uL    Absolute NRBCs 0.0 10e3/uL   CBC with platelets differential     Status: Abnormal    Narrative    The following orders were created for panel order CBC with platelets differential.  Procedure                               Abnormality         Status                     ---------                               -----------         ------                     CBC with platelets and d...[[id number]]  Abnormal            Final result                 Please view results for these tests on the individual orders.       Assessments & Plan (with Medical Decision Making)   Assessment Summary and Clinical Impression: 42-year-old male  referred from urgent care for further assessment with concern about acute right lower quadrant abdominal pain and vomiting.  Patient has a history of fatty liver and alcohol use disorder.  He arrived by car with spouse hemodynamically normal reporting vomiting periumbilical pain now radiating to the right lower quadrant since yesterday evening after having a meat and potato burrito from [facility].  No prior abdominal surgeries no chills or fever no urinary symptoms.  No back pain or flank pain or trauma.  He appeared nontoxic but had localized tenderness in the right lower quadrant with voluntary guarding without rebound.  He was offered therapies to manage his  pain discomfort is rated and imaging with contrast was done to evaluate for acute intra-abdominal process including acute appendicitis.  CT revealed acute uncomplicated appendicitis.  After consultation with general surgery plan is to proceed to the OR for further definitive management and care.     ED course and Plan:  Reviewed the medical record.  Urgent care visit prior to ED arrival. We reviewed and discussed possible causes for his discomfort and symptoms as reported. Patient was offered therapies to manage pain. Ct imaging with contrast obtained to evaluate for acute appendicitis and other competing diagnosis. Work up revealed leukocytosis with left shift.  Normal liver enzymes.  Normal lipase.   Spoke with Dr. KATERINE Cox- reading radiologist at 2.46pm who reported acute early appendicitis without appendicolith or perforation.  See details in radiology report.    With CT findings and discussion with radiology spoke with surgery- Dr Zeng at 3pm. Plan to transfer the OR for further definitive management and care.  Dr Zeng requested IV ciprofloxacin and metronidazole with patient reporting childhoodallergy to penicillin and cefaclor (exact reaction is unknown).  See surgery consult note for additional details of plan of care.           Pre-OP  Clearance     Please see the body of above note or dictation for the history and physical.      No medical contraindication to emergent surgery or anesthesia identified. No additional modifiable risk identified.  Informed consent deferred to surgeon.        Disclaimer: This note consists of symbols derived from keyboarding, dictation and/or voice recognition software. As a result, there may be errors in the script that have gone undetected. Please consider this when interpreting information found in this chart.  I have reviewed the nursing notes.    I have reviewed the findings, diagnosis, plan and need for follow up with the patient.       New Prescriptions    No  medications on file       Final diagnoses:   Acute appendicitis with localized peritonitis, without perforation, abscess, or gangrene   Abdominal pain, right lower quadrant - due to acute appendicitis       11/20/2022   [facility] EMERGENCY DEPT     Terrence Hui MD  11/20/22 1800

## 2022-11-20 NOTE — ED NOTES
Surgeon in room obtaining consent and going over surgery with pt and wife.  Pt now undressing and voiding and will be ready for surgeon.    PLAN:  Will hang antibiotics and await OR crew.

## 2022-11-20 NOTE — CONSULTS
Dear Dr. Harris  I have seen James Blanco and as you know his chief complaint is  right lower quadrant pain.   That started as lower abdomen pain and then had nausea and vomiting and ten had increasing pain in the right lower quadrant.     HPI:  Patient is a 42 year old male  with complaints see above    Review Of Systems  Bees, Ceclor [cefaclor], and Penicillins  Respiratory: No shortness of breath, dyspnea on exertion, cough, or hemoptysis  Cardiovascular: negative  Gastrointestinal: negative  Endocrine: negative  Bladder outlet obstructing symptoms No  Constipation symptoms No    Past Medical History:   Diagnosis Date     Anxiety      Depressive disorder      Hyperlipidaemia LDL goal <130        Past Surgical History:   Procedure Laterality Date     COLONOSCOPY N/A 3/21/2016    Procedure: COLONOSCOPY;  Surgeon: Tavo Wilcox MD;  Location: [facility]     OPEN REDUCTION INTERNAL FIXATION FOOT Right 4/17/2020    Procedure: Percutaneous intramedullary fixation, right fifth metatarsal fracture;  Surgeon: Amado Sanchez DPM;  Location: [facility]     SURGICAL HISTORY OF -       plasty surgery on face, due to injury     SURGICAL HISTORY OF -       left elbow nerve decompression, sound like ulnar     SURGICAL HISTORY OF -       left knee surgery, scope and allograph for cartilege     SURGICAL HISTORY OF -       lasix surgery       Social History     Socioeconomic History     Marital status:      Spouse name: Not on file     Number of children: Not on file     Years of education: Not on file     Highest education level: Not on file   Occupational History     Not on file   Tobacco Use     Smoking status: Every Day     Packs/day: 0.50     Years: 13.00     Pack years: 6.50     Types: Vaping Device, Cigarettes     Smokeless tobacco: Current   Vaping Use     Vaping Use: Every day     Substances: Nicotine     Devices: Disposable   Substance and Sexual Activity     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      Drug use: Yes     Types: Marijuana     Comment: h/o street drug use 15 yrs ago     Sexual activity: Not Currently     Partners: Female   Other Topics Concern     Parent/sibling w/ CABG, MI or angioplasty before 65F 55M? Yes   Social History Narrative     Not on file     Social Determinants of Health     Financial Resource Strain: Not on file   Food Insecurity: Not on file   Transportation Needs: Not on file   Physical Activity: Not on file   Stress: Not on file   Social Connections: Not on file   Intimate Partner Violence: Not on file   Housing Stability: Not on file        Current Outpatient Medications   Medication Sig Dispense Refill     EPINEPHrine (EPIPEN 2-CHAIM) 0.3 MG/0.3ML injection 2-pack Inject 0.3 mLs (0.3 mg) into the muscle as needed for anaphylaxis 0.6 mL 1     folic acid (FOLVITE) 1 MG tablet Take 1 tablet (1 mg) by mouth daily 90 tablet 1     multivitamin w/minerals (THERA-VIT-M) tablet Take 1 tablet by mouth daily       simvastatin (ZOCOR) 20 MG tablet Take 1 tablet (20 mg) by mouth At Bedtime 90 tablet 3     venlafaxine (EFFEXOR XR) 150 MG 24 hr capsule Take 1 capsule (150 mg) by mouth daily 90 capsule 3     vitamin B complex with vitamin C (STRESS TAB) tablet Take 1 tablet by mouth daily       Vitamin D3 (CHOLECALCIFEROL) 25 mcg (1000 units) tablet Take by mouth daily         Physical exam:    Head eyes, nose and mouth within normal limits.    Lungs are clear to auscultation  Heart is regular rate and rhythm with no murmur or thrills noted.  No costal vertebral angle tenderness noted.  Abdomen is abdomen is soft without significant tenderness except at the right lower quadrant.     Lower extremity edema is not present.    Ct scan shows: Result    Narrative & Impression   EXAM: CT ABDOMEN PELVIS W CONTRAST  LOCATION: [facility]  DATE/TIME: 11/20/2022 2:39 PM     INDICATION: Periumbilical pain radiating to right lower quadrant.  Vomiting.  History of alcoholic fatty  liver. Evaluate for acute appendicitis vs diverticulitis vs other acute intra abdominal process.  COMPARISON: None.  TECHNIQUE: CT scan of the abdomen and pelvis was performed following injection of IV contrast. Multiplanar reformats were obtained. Dose reduction techniques were used.  CONTRAST: 100mL Isovue 370     FINDINGS:   LOWER CHEST: Normal.     HEPATOBILIARY: Normal.     PANCREAS: Normal.     SPLEEN: Normal.     ADRENAL GLANDS: Normal.     KIDNEYS/BLADDER: No hydronephrosis. Urinary bladder is unremarkable.     BOWEL: Dilated appendiceal lumen with wall edema and mild surrounding fat stranding. No evidence of bowel obstruction, pneumoperitoneum or drainable fluid collection.     LYMPH NODES: Normal.     VASCULATURE: Minimal calcified atherosclerosis.     PELVIC ORGANS: Normal.     MUSCULOSKELETAL: No acute bony abnormality.                                                                         IMPRESSION:   1.  Acute, uncomplicated appendicitis.        Finding was identified on 11/20/2022 2:40 PM.       0 Result Notes   Component Ref Range & Units  2:13 PM     Lipase 13 - 60 U/L 18    Resulting              0 Result Notes    Component Ref Range & Units  2:13 PM   (11/20/22) 6 mo ago   (4/29/22) 2 yr ago   (11/4/20) 2 yr ago   (10/21/20) 3 yr ago   (4/5/19) 4 yr ago   (9/6/18) 4 yr ago   (1/2/18)    Sodium 136 - 145 mmol/L 140  141 R  143 R  138 R        Potassium 3.4 - 5.3 mmol/L 3.7           Chloride 98 - 107 mmol/L 102           Carbon Dioxide (CO2) 22 - 29 mmol/L 29           Anion Gap 7 - 15 mmol/L 9  5 R  3 R  7 R        Urea Nitrogen 6.0 - 20.0 mg/dL 15.2           Creatinine 0.67 - 1.17 mg/dL 1.04  1.18 R  1.14 R  1.22 R        Calcium 8.6 - 10.0 mg/dL 9.9  9.3 R  9.6 R  9.7 R        Glucose 70 - 99 mg/dL 110 High            Alkaline Phosphatase 40 - 129 U/L 65           AST 10 - 50 U/L 16   43 R  77 High  R  24 R  41 R  32 R     ALT 10 - 50 U/L 21   59 R  74 High  R  35 R  62 R  60 R     Protein  Total 6.4 - 8.3 g/dL 7.3   7.0 R  7.6 R  7.2 R  7.6 R  7.2 R     Albumin 3.5 - 5.2 g/dL 4.6           Bilirubin Total <=1.2 mg/dL 0.5   0.5 R  0.6 R  0.5 R  0.9 R  0.8 R     GFR Estimate >60 mL/min/1.73m2 >90  79 CM  79 R, CM  73 R, CM       Comment: Effective December 21, 2021 eGFRcr in adults is calculated using the 2021 CKD-EPI creatinine equation which includes         Contains abnormal data CBC with platelets and differential  Order: [id number] - Part of Panel Order [id number]   Status: Final result      Visible to patient: No (scheduled for 11/20/2022  3:42 PM)      0 Result Notes    Component Ref Range & Units  2:13 PM   (11/20/22) 1 yr ago   (5/17/21) 2 yr ago   (11/4/20) 2 yr ago   (10/21/20)    WBC Count 4.0 - 11.0 10e3/uL 17.8 High   6.1 R  4.6 R  5.2 R     RBC Count 4.40 - 5.90 10e6/uL 4.65  4.39 Low  R  4.48 R  4.22 Low  R     Hemoglobin 13.3 - 17.7 g/dL 14.4  13.8  14.5  13.6     Hematocrit 40.0 - 53.0 % 43.1  43.0  44.6  42.4     MCV 78 - 100 fL 93  98 R  100 R  101 High  R     MCH 26.5 - 33.0 pg 31.0  31.4  32.4  32.2     MCHC 31.5 - 36.5 g/dL 33.4  32.1  32.5  32.1     RDW 10.0 - 15.0 % 12.4  12.0  11.2  11.2     Platelet Count 150 - 450 10e3/uL 264  259 R  282 R  234 R     % Neutrophils % 79    49.7     % Lymphocytes % 12    33.0     % Monocytes % 9    12.9     % Eosinophils % 0    3.8     % Basophils % 0    0.6     % Immature Granulocytes % 0        NRBCs per 100 WBC <1 /100 0        Absolute Neutrophils 1.6 - 8.3 10e3/uL 14.0 High         Absolute Lymphocytes 0.8 - 5.3 10e3/uL 2.1        Absolute Monocytes 0.0 - 1.3 10e3/uL 1.5 High         Absolute Eosinophils 0.0 - 0.7 10e3/uL 0.1        Absolute Basophils 0.0 - 0.2 10e3/uL 0.1        Absolute Immature Granulocytes <=0.4 10e3                  Assessment:      Discussed options for appendicitis.  Usually a laparoscopic possible open appendectomy, is the best option.  If there are reasons for not doing surgery then sometimes this can be treated  with antibiotics.  But the recurrence rate especially with one with a fecalith is very likely to recur.          Risks of surgery include damage to nerves, bleeding, infection, damage to  Vessels, damage to bowel,  Hernias and possible open. And risk of  General anesthesia .   Consent form signed.  Plan is laparoscopic appendectomy.  I did discuss what to expect after surgery and discussed no lifting greater than 20 pounds for 3 to 6 weeks.  Questions were answered and explanation of the surgery was done.    Sincerely   Shubham Zeng MD    Consent form signed.

## 2022-11-20 NOTE — ED TRIAGE NOTES
C/o RLQ abd pain started yesterday afternoon, c/o nausea/vomiting.      Triage Assessment     Row Name 11/20/22 1234       Triage Assessment (Adult)    Airway WDL WDL       Respiratory WDL    Respiratory WDL WDL       Skin Circulation/Temperature WDL    Skin Circulation/Temperature WDL WDL       Cardiac WDL    Cardiac WDL WDL       Peripheral/Neurovascular WDL    Peripheral Neurovascular WDL WDL       Cognitive/Neuro/Behavioral WDL    Cognitive/Neuro/Behavioral WDL WDL

## 2022-11-20 NOTE — DISCHARGE INSTRUCTIONS
APPENDECTOMY DISCHARGE INSTRUCTIONS  DR. AKASH WEEKS    Please call [phone], for  [facility] or  for [facility], to schedule a follow up appointment in 2 weeks.       1. You may resume your regular diet when you feel you are ready to. DO NOT drink alcoholic beverages for 24 hours of while you are taking prescription medication.    2. Limit your activities for the first 48 hours. Gradually, increase them as tolerated. You may use stairs. I encourage you to walk as tolerated. No lifting greater that 20 pounds for 3 weeks.    3. You will have some discomfort at the incision sites. This is expected. This should improve over the next 2-3 days. Ice and pain medication will help with this pain. Use prescribed pain medication as instructed.    4. Bruising and mild swelling is normal after surgery. The area below and around the incision(s) will be hard and elevated. This is normal. I call it the healing ridge. This will resolve slowly over the next several months. If you feel the pain is increasing and cannot explain it by increasing activity please call us at [phone].    5. The dressing will often have some blood on it. You may shower 24 to 48 hours after surgery. Clean gently over incision site. If clear plastic covering or steri-strip comes off and there is still some bleeding or drainage then cover with gauze or band-aid. If no bleeding, there is no reason to cover site. The abdominal binder may be removed after 24 hours after surgery. You may continue to wear it however for comfort. I suggest  you wear an old t-shirt under the abdominal binder for a more comfortable wear.    6. Avoid Aspirin for the first 72 hours after the procedure. This medication may increase the tendency to bleed.    7. Use the following medications (in addition to your normal meds) as shown:  a. Percocet 5 mg 1-2 every 6 hours as needed for pain. This contains 500 mg of Tylenol (acetaminophen)  per tablet. Please do not take more than 4 grams of Tylenol (acetaminophen) per day. For example, you may take 1 Percocet and 1 Tylenol, or 2 Percocet and no Tylenol, or 2 Tylenol and no Percocet every 6 hours.  b. Tylenol (acetaminophen) 500 mg every 6 hours as needed for pain. Do not take more than 1000 mg every 6 hours. (see above)  c. Motrin (ibuprofen) 200-800 mg every 6 hours as needed for pain. Take with food.    8. Notify Dr. Zeng s clinic at [phone] if:  Your discomfort is not relieved by your pain medication.  You have signs of infection such as temperature above 100.5 degrees orally, chills, or increasing daily discomfort.  Incision site is becoming more red and/or there is purulent drainage.  You have questions or concerns.    9. Please call [phone]  for  [place] [facility] of  for [facility], to schedule a follow up appointment in 2 weeks.    10. When taking narcotics (pain medication more than Tylenol [acetaminophen] and Motrin [ibuprofen]) it is important to keep your stools soft to avoid constipation and pain with straining. This is best done by drinking fluids (non-alcoholic and non-caffeinated) and taking a stool softener (i.e. Metamucil or milk of magnesia). You may be able to use non-narcotics for pain relief especially by the 3rd post- operative day. Tylenol 500 mg  every 6 hours and/or Motrin (ibuprofen) 200-800 mg every 6 hours. Please do not take more than 4 grams of Tylenol (acetaminophen) per day. Remember your Percocet does have Tylenol (acetaminophen) already in it. Please take Motrin (ibuprofen) with food to help protect the stomach. If you have a history of stomach ulcers or stomach problems, do not take Motrin (ibuprofen).    11. Do not drive or operate heavy machinery for 24 hours after surgery or when taking narcotics. You may resume driving when feel that you can safely avoid an accident and are not taking narcotics. This is usually 5  to 7 days after surgery. You should not be alone for 24 hours after surgery.    12.  If you have questions after your procedure/surgery please contact Dr Zeng's primary clinic in [place]. You can call [phone], your other option is to send us a [employer] message through your [employer] portal to Dr Zeng's team. For urgent and non urgent matters these options are best. If your symptoms are emergent or cannot wait, please proceed to [facility] and let them know who you had surgery with.    Patient can receive pain medications that I have ordered and give the first dose in the recovery room as needed.

## 2022-11-20 NOTE — PROGRESS NOTES
"SUBJECTIVE  HPI:  James Blanco is a 42 year old male who presents with the CC of abdominal/pelvic pain.    Pain is located in the RLQ area, with radiation to None.  The pain is characterized as pressure and \"pain\", and at worst is a level  on a scale of 1-10.  Pain has been present for less than 24 history of  And has now localized to the rlq   and is slowly progressive.  EXACERBATING FACTORS: eating and movement/walking  RELIEVING FACTORS: nothing./standing is a little better   ASSOCIATED SX: anorexia, nausea, vomiting, constipation and fever.    Past Medical History:   Diagnosis Date     Anxiety      Depressive disorder      Hyperlipidaemia LDL goal <130      Current Outpatient Medications   Medication Sig Dispense Refill     EPINEPHrine (EPIPEN 2-CHAIM) 0.3 MG/0.3ML injection 2-pack Inject 0.3 mLs (0.3 mg) into the muscle as needed for anaphylaxis 0.6 mL 1     folic acid (FOLVITE) 1 MG tablet Take 1 tablet (1 mg) by mouth daily 90 tablet 1     simvastatin (ZOCOR) 20 MG tablet Take 1 tablet (20 mg) by mouth At Bedtime 90 tablet 3     venlafaxine (EFFEXOR XR) 150 MG 24 hr capsule Take 1 capsule (150 mg) by mouth daily 90 capsule 3     vitamin B complex with vitamin C (STRESS TAB) tablet Take 1 tablet by mouth daily       Vitamin D3 (CHOLECALCIFEROL) 25 mcg (1000 units) tablet Take by mouth daily       multivitamin w/minerals (THERA-VIT-M) tablet Take 1 tablet by mouth daily       Social History     Tobacco Use     Smoking status: Every Day     Packs/day: 0.50     Years: 13.00     Pack years: 6.50     Types: Vaping Device, Cigarettes     Smokeless tobacco: Current   Substance Use Topics     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    OBJECTIVE:  /80   Pulse 75   Temp 97.2  F (36.2  C)   Wt 93 kg (205 lb)   SpO2 100%   BMI 25.02 kg/m    GENERAL APPEARANCE: healthy, alert and no distress  RESP: lungs clear to auscultation - no " rales, rhonchi or wheezes  CV: regular rates and rhythm, normal S1 S2, no murmur noted  ABDOMEN: tenderness moderate RLQ, guarding  SKIN: no suspicious lesions or rashes    ASSESSMENT:    1. Acute right lower quadrant pain  After discussion with patient due to the location of his pain the anorexia fever at home and guarding he needs imaging today unfortunately that means heading to the emergency room.  He understands and his wife will drive him down to [place] I have already called and report to the [place] emergency room.Jacqueline Krueger M.D.    See Orders in Epic

## 2022-11-20 NOTE — LETTER
November 22, 2022      James Blanco  [address] [facility] [address]        Dear ,    We are writing to inform you of your test results.              November 22, 2022    Dear James,  This letter is to inform you of the results of your pathology report on your appendix.  If you have questions please feel free to call:  Please call [phone], for  [facility] or  for [facility], to schedule a follow up appointment in 2 weeks.       Your pathology report was:    Path: shows appendicitis no cancer.     To make an appointment call:  Please call [phone], for  [facility] or  for [facility], to schedule a follow up appointment in 2 weeks.     Sincerely,     Shubham Zeng M.D.  ___                    Resulted Orders   Surgical Pathology Exam   Result Value Ref Range    Case Report       Surgical Pathology Report                         Case: [id number]                                  Authorizing Provider:  Shubham Zeng MD Collected:           11/20/2022 06:38 PM          Ordering Location:     [facility]    Received:            11/20/2022 07:42 PM                                 Main OR                                                                      Pathologist:           Sandie Ramos MD                                                                           Specimen:    Appendix                                                                                   Final Diagnosis       Appendix, appendectomy:   -Acute appendicitis with serositis        Clinical Information       Procedure:  APPENDECTOMY, LAPAROSCOPIC  Pre-op Diagnosis: Acute appendicitis with localized peritonitis, without perforation, abscess, or gangrene [K35.30]  Post-op Diagnosis: K35.30 - Acute appendicitis with localized  "peritonitis, without perforation, abscess, or gangrene [ICD-10-CM]      Gross Description       A(1). Appendix, :  The specimen is received in formalin, labeled with the patient's name, medical record number and other identifying information designated \"appendix\". It consists of an 8.5 x 1.3-1.5 cm tan-pink appendix with an attached 6.5 x 2.0 x 0.7 cm mesoappendix.  The resection margin of the appendix is entirely inked black.  The specimen is sectioned, displays a focally dilated lumen, and a fatty replaced tip.  The appendiceal tip is submitted entirely, the remainder of the specimen is representatively sampled in 2 cassettes as follows:    A1-resection margin (inked black), perpendicular section of tip  A2-cross-section of lumen, perpendicular section of tip   (Abena Gale)      Microscopic Description       A formal microscopic examination has been performed      Performing Labs       The technical component of this testing was completed at [facility]      Case Images         If you have any questions or concerns, please call the clinic at the number listed above.       Sincerely,      Shubham Zeng MD          "

## 2022-11-20 NOTE — ED NOTES
Second antibiotic hung and pt medicated for comfort.  Awaiting OR.   Pt denies nausea at this time, and is resting comfortably with wife at bedside.

## 2022-11-20 NOTE — ANESTHESIA PREPROCEDURE EVALUATION
Anesthesia Pre-Procedure Evaluation    Patient: James Blanco   MRN: [id number] : 1980        Procedure : Procedure(s):  APPENDECTOMY, LAPAROSCOPIC          Past Medical History:   Diagnosis Date     Anxiety      Depressive disorder      Hyperlipidaemia LDL goal <130       Past Surgical History:   Procedure Laterality Date     COLONOSCOPY N/A 3/21/2016    Procedure: COLONOSCOPY;  Surgeon: Tavo Wilcox MD;  Location: [facility]     OPEN REDUCTION INTERNAL FIXATION FOOT Right 2020    Procedure: Percutaneous intramedullary fixation, right fifth metatarsal fracture;  Surgeon: Amado Sanchez DPM;  Location: [facility]     SURGICAL HISTORY OF -       plasty surgery on face, due to injury     SURGICAL HISTORY OF -       left elbow nerve decompression, sound like ulnar     SURGICAL HISTORY OF -       left knee surgery, scope and allograph for cartilege     SURGICAL HISTORY OF -       lasix surgery      Allergies   Allergen Reactions     Bees Swelling     Has epi-pen     Ceclor [Cefaclor]      Was an infant     Penicillins      Was an infant      Social History     Tobacco Use     Smoking status: Every Day     Packs/day: 0.50     Years: 13.00     Pack years: 6.50     Types: Vaping Device, Cigarettes     Smokeless tobacco: Current   Substance Use Topics     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      Wt Readings from Last 1 Encounters:   22 93 kg (205 lb)        Anesthesia Evaluation            ROS/MED HX  ENT/Pulmonary:     (+) tobacco use,     Neurologic:       Cardiovascular:     (+) Dyslipidemia -----    METS/Exercise Tolerance:     Hematologic:       Musculoskeletal:       GI/Hepatic:     (+) liver disease,     Renal/Genitourinary:       Endo:       Psychiatric/Substance Use:     (+) psychiatric history anxiety and depression alcohol abuse     Infectious Disease:       Malignancy:       Other:            Physical Exam    Airway        Mallampati: II   TM distance: > 3 FB   Neck ROM:  full   Mouth opening: > 3 cm    Respiratory Devices and Support  Comment: Not on oxygen currently       Dental  no notable dental history         Cardiovascular   cardiovascular exam normal          Pulmonary   pulmonary exam normal                OUTSIDE LABS:  CBC:   Lab Results   Component Value Date    WBC 17.8 (H) 11/20/2022    WBC 6.1 05/17/2021    HGB 14.4 11/20/2022    HGB 13.8 05/17/2021    HCT 43.1 11/20/2022    HCT 43.0 05/17/2021     11/20/2022     05/17/2021     BMP:   Lab Results   Component Value Date     11/20/2022     04/29/2022    POTASSIUM 3.7 11/20/2022    POTASSIUM 4.0 04/29/2022    CHLORIDE 102 11/20/2022    CHLORIDE 108 04/29/2022    CO2 29 11/20/2022    CO2 28 04/29/2022    BUN 15.2 11/20/2022    BUN 18 04/29/2022    CR 1.04 11/20/2022    CR 1.18 04/29/2022     (H) 11/20/2022    GLC 98 04/29/2022     COAGS: No results found for: PTT, INR, FIBR  POC: No results found for: BGM, HCG, HCGS  HEPATIC:   Lab Results   Component Value Date    ALBUMIN 4.6 11/20/2022    PROTTOTAL 7.3 11/20/2022    ALT 21 11/20/2022    AST 16 11/20/2022     (H) 09/28/2017    ALKPHOS 65 11/20/2022    BILITOTAL 0.5 11/20/2022     OTHER:   Lab Results   Component Value Date    DELORIS 9.9 11/20/2022    LIPASE 18 11/20/2022    SED 7 05/17/2021       Anesthesia Plan    ASA Status:  3, emergent       Anesthesia Type: General.   Induction: Intravenous, Propofol.   Maintenance: TIVA.        Consents    Anesthesia Plan(s) and associated risks, benefits, and realistic alternatives discussed. Questions answered and patient/representative(s) expressed understanding.     - Discussed: Risks, Benefits and Alternatives for BOTH SEDATION and the PROCEDURE were discussed     - Discussed with:  Patient         Postoperative Care    Pain management: IV analgesics, Oral pain medications, Multi-modal analgesia.   PONV prophylaxis: Ondansetron (or other 5HT-3), Dexamethasone or Solumedrol      Comments:    Other Comments: Patient aware of plan, what to expect, and potential risks. Timeout was performed before bringing the patient back to OR, and once again, patient was asked if they had any questions            ELHAM Tsang CRNA

## 2022-11-21 ENCOUNTER — TELEPHONE (OUTPATIENT)
Dept: SURGERY | Facility: CLINIC | Age: 42
End: 2022-11-21

## 2022-11-21 NOTE — TELEPHONE ENCOUNTER
Called patient and discussed refer shoulder strap pain and postioning to relieve as well as discontinue instructions for timed doses of meds avail for pain.    Evelyn WU   [facility]

## 2022-11-21 NOTE — ANESTHESIA POSTPROCEDURE EVALUATION
Patient: James Blanco    Procedure: Procedure(s):  APPENDECTOMY, LAPAROSCOPIC       Anesthesia Type:  General    Note:  Disposition: Outpatient   Postop Pain Control: Uneventful            Sign Out: Well controlled pain   PONV: No   Neuro/Psych: Uneventful            Sign Out: Acceptable/Baseline neuro status   Airway/Respiratory: Uneventful            Sign Out: Acceptable/Baseline resp. status   CV/Hemodynamics: Uneventful            Sign Out: Acceptable CV status; No obvious hypovolemia; No obvious fluid overload   Other NRE: NONE   DID A NON-ROUTINE EVENT OCCUR? No           Last vitals:  Vitals:    11/20/22 1921 11/20/22 1930 11/20/22 1945   BP: (!) 164/86 (!) 142/94 (!) 141/91   Pulse: 95 95 93   Resp: 16 16 16   Temp:      SpO2: 99% 99% 99%       Electronically Signed By: ELHAM Tsang CRNA  November 20, 2022  8:20 PM

## 2022-11-21 NOTE — TELEPHONE ENCOUNTER
Pt is calling regarding recent appendectomy with Dr. Zeng. States that he is looking for further input on pain management.     He spoke with a nurse at discharge who told him that he could take acetaminophen, but did not tell him how much he could take.     He was sent home with oxy 5 mg tablet. He is wondering if he is able to take the oxy and acetaminophen together? How much acetaminophen can he take?    He has pain radiating up his body and into the shoulder on just the right side. Starts in mid back and comes up the back up to the right shoulder.     No fever.     No concerns with incision sites: a little bit of leakage, otherwise they look okay. They do not appear red, swollen or inflamed.    He has 3 lap sites on stomach.    He is wondering, Is there anything else that pt can do for pain relief?     He has not started doing tylenol yet. He picked up 500 mg strength OTC tylenol. Writer advised to start with one for now and wait for call back from Dr. Zeng's team with further recommendations.      Can be reached at [phone].      Naheed Sheridan RN   [facility]

## 2022-11-21 NOTE — ANESTHESIA CARE TRANSFER NOTE
Patient: James Blanco    Procedure: Procedure(s):  APPENDECTOMY, LAPAROSCOPIC       Diagnosis: Acute appendicitis with localized peritonitis, without perforation, abscess, or gangrene [K35.30]  Diagnosis Additional Information: No value filed.    Anesthesia Type:   General     Note:    Oropharynx: oropharynx clear of all foreign objects  Level of Consciousness: awake      Independent Airway: airway patency satisfactory and stable  Dentition: dentition unchanged  Vital Signs Stable: post-procedure vital signs reviewed and stable  Report to RN Given: handoff report given  Patient transferred to: PACU    Handoff Report: Identifed the Patient, Identified the Reponsible Provider, Reviewed the pertinent medical history, Discussed the surgical course, Reviewed Intra-OP anesthesia mangement and issues during anesthesia, Set expectations for post-procedure period and Allowed opportunity for questions and acknowledgement of understanding      Vitals:  Vitals Value Taken Time   BP     Temp     Pulse     Resp     SpO2         Electronically Signed By: ELHAM Tsang CRNA  November 20, 2022  8:18 PM

## 2022-11-21 NOTE — TELEPHONE ENCOUNTER
Type of surgery: APPENDECTOMY, LAPAROSCOPIC (N/A)     Location of surgery: [place]  Date and time of surgery: 11/20/2022  Surgeon: DESI  Pre-Op Appt Date: SEEN IN THE ER  Post-Op Appt Date: TBD   Packet sent out: No  Pre-cert/Authorization completed:  No  Date:

## 2022-11-21 NOTE — OP NOTE
Vance Broderick     Date of Service: 11/20/2022     Surgeon:  Shubham Zeng MD     PREOPERATIVE DIAGNOSIS:   appendicitis     POSTOPERATIVE DIAGNOSIS:   Same  Inflamed not ruptured  Bilateral double hernias no bowel in the hernias     PROCEDURE PERFORMED:  Laparoscopic appendectomy     ANESTHESIA:  General anesthetic with 0.25%   Marcaine with epinephrine injected to sites prior to and at the end of the procedure.     ESTIMATED BLOOD LOSS:  Less than 10   mL.     .    INDICATIONS:  The patient is a 42 year old  male who started having abdominal pain and eventually it was right lower quadrant.  It just was not getting better.  CT scan shows appendicitis.  White count is 17,000   The patient  is  definitely tender in the right lower quadrant.  It was felt that a laparoscopic appendectomy, possible open, was in the patient's best interest.  We discussed risks and complications including bleeding, infection,  damage to bowel, hernias, possible conversion to open procedure,  especially with adhesions and what to expect afterwards.The patient understands and would like to proceed.     DESCRIPTION OF PROCEDURE:  The patient was brought to the OR, placed in the supine position.  After receiving general anesthesia, the abdomen was prepped and draped in a sterile manner.  Incision was made in the infraumbilical site with a knife after infiltrating the area with Marcaine.  A 5 mm trocar was placed to this site with a camera going through it, entering the abdomen without difficulty.  The abdomen was insufflated to 15 mmHg and then  a 10-12 trocar was placed in the left lower quadrant and another 5 mm trocar was placed suprapubic just off the midline.  I was able to get a hold of the cecum and looked at the base of the appendix.  I was able to create a space between the mesentery and the appendix and a blueload VAUGHN stapler was placed across and fired.  Then the appendiceal  mesentery was taking down with 1 white load  stapling, taking great care not to involve the  bowel.  After this was done, we had it freed up.  We placed an EndoCatch in and took it out through the 10-12 trocar site in the left side of the abdomen, with out having to dilate the tract a little bit with a clamp.  There was a little bit of bleeding when we first took off the larger part of the mesentery with the stapler.  Good hemostasis was achieved.    A clip was needed on the appendiceal mesentery.     I irrigated the area multiple times, irrigated the rest of the abdomen as needed, and suctioned up as much of the fluid as possible. I  looked at the area multiple times and again, there was no evidence of any more bleeding.  The 10-12 trocar fascia was closed with an 0 Vicryl suture x 1  using the Hussein-Anna equipment.  As much CO2 was removed as possible before removing  the trocars and then the 10-12 trocar subcutaneous fat was brought together with several interrupted 3-0 Vicryl sutures and the skin was closed at all sites with 4-0 Monocryl, covered with tincture of benzoin, Steri-Strips and Tegaderm, along with an abdominal binder.  The patient did receive antibiotics preoperatively.     The plan is to  discharge the patient  from the PACU and to follow up with me in 2 weeks in clinic..   The patient did receive antibiotics prior to the procedure.   Needle and instrument count was correct.   Patient has bilateral inguinal hernias           Shubham Zeng MD .

## 2022-11-22 LAB
PATH REPORT.COMMENTS IMP SPEC: NORMAL
PATH REPORT.COMMENTS IMP SPEC: NORMAL
PATH REPORT.FINAL DX SPEC: NORMAL
PATH REPORT.GROSS SPEC: NORMAL
PATH REPORT.MICROSCOPIC SPEC OTHER STN: NORMAL
PATH REPORT.RELEVANT HX SPEC: NORMAL
PHOTO IMAGE: NORMAL

## 2022-12-05 ENCOUNTER — OFFICE VISIT (OUTPATIENT)
Dept: SURGERY | Facility: CLINIC | Age: 42
End: 2022-12-05
Payer: COMMERCIAL

## 2022-12-05 VITALS
BODY MASS INDEX: 24.96 KG/M2 | DIASTOLIC BLOOD PRESSURE: 90 MMHG | WEIGHT: 205 LBS | TEMPERATURE: 96.9 F | HEART RATE: 88 BPM | HEIGHT: 76 IN | SYSTOLIC BLOOD PRESSURE: 134 MMHG

## 2022-12-05 DIAGNOSIS — K40.20 BILATERAL INGUINAL HERNIA WITHOUT OBSTRUCTION OR GANGRENE, RECURRENCE NOT SPECIFIED: ICD-10-CM

## 2022-12-05 DIAGNOSIS — Z90.49 S/P APPENDECTOMY: Primary | ICD-10-CM

## 2022-12-05 PROCEDURE — 99213 OFFICE O/P EST LOW 20 MIN: CPT | Mod: 24 | Performed by: SURGERY

## 2022-12-05 NOTE — LETTER
12/5/2022         RE: James Blanco  [address] [facility] [place] [address]        Dear Colleague,    Thank you for referring your patient, James Blanco, to the [facility]. Please see a copy of my visit note below.    James is a 42 year old male who is status post laparoscopic appendectomy  Inflamed not ruptured  Bilateral double hernias no bowel in the hernias with no chills and no fever.      Patient's  Pain is  improving.  Appetite is  improving.    Wound(s)  Is/are   clean dry and intact with no evidence of infection.    Questions were answered and discussion of no lifting more than 20 pounds for  3 weeks after surgery.        Path report shows:  Appendix, appendectomy:   -Acute appendicitis with serositis     I do not feel an obvious hernia but the laparoscopic look showed them.   When you want the bilateral inguinal hernia done or start having problems with them then I recommend we do this with  the robot    Risks of surgery include damage to nerves, bleeding, infection, damage to  Vessels, recurrence.  Although mesh is a better long term repair if it gets infected it must be removed.   If the patient has any bacterial infection the week before and is seen by their doctor and started on antibiotics, I can probably still do the surgery if they are vastly improved by the time of surgery, but if the infection starts closer to the surgery date it will be better to cancel and reschedule to a later date.  A cough will also be hard on the repair and uncomfortable post operative.  If the patient is a smoker I did discuss increase risk of recurrence and more pain with the cough.  If the patient is willing to quit smoking would encourage to do so and start at least a week before surgery.  However, if patient is not going to quit then must understand that his repair is more likely to fail.    Risks of surgery discussed including, but not limited to bleeding, infection, recurrence, damage  to nerves and what is in the hernia sac.  Risks of anesthesia also discussed.    Discussed massaging hernia back in and using ice if becomes more painful.  If not able to reduce then go to emergency room.  Also discussed hernia belt to use until able to get in for surgery.    Things you will need to do before surgery are getting a preoperative appointment with your primary care doctor to be cleared for surgery.    You will also need a COVID test before surgery and an appointment to see me usually about 2 weeks after the procedure to make sure you are doing well.   Fortunately, when the schedulers call you they will try to get all this set up for you at that one call.     If you have had a positive COVID test in a 90 day window that would include the date of the procedure, let us know that and will need to see proof of this.    Then you do not need a COVID test.  But if over 90 days you do.      Time spent with the patient with greater that 50% of the time in discussion was 15 minutes.  In discussing the bilateral inguinal hernia .      Shubham Zeng MD                  : Shubham Zeng MD (Physician)                 Vance Broderick     Date of Service: 11/20/2022     Surgeon:  Shubham Zeng MD     PREOPERATIVE DIAGNOSIS:   appendicitis     POSTOPERATIVE DIAGNOSIS:   Same  Inflamed not ruptured  Bilateral double hernias no bowel in the hernias     PROCEDURE PERFORMED:  Laparoscopic appendectomy     ANESTHESIA:  General anesthetic with 0.25%   Marcaine with epinephrine injected to sites prior to and at the end of the procedure.     ESTIMATED BLOOD LOSS:  Less than 10   mL.     .    INDICATIONS:  The patient is a 42 year old  male who started having abdominal pain and eventually it was right lower quadrant.  It just was not getting better.  CT scan shows appendicitis.  White count is 17,000   The patient  is  definitely tender in the right lower quadrant.  It was felt that a laparoscopic  appendectomy, possible open, was in the patient's best interest.  We discussed risks and complications including bleeding, infection,  damage to bowel, hernias, possible conversion to open procedure,  especially with adhesions and what to expect afterwards.The patient understands and would like to proceed.     DESCRIPTION OF PROCEDURE:  The patient was brought to the OR, placed in the supine position.  After receiving general anesthesia, the abdomen was prepped and draped in a sterile manner.  Incision was made in the infraumbilical site with a knife after infiltrating the area with Marcaine.  A 5 mm trocar was placed to this site with a camera going through it, entering the abdomen without difficulty.  The abdomen was insufflated to 15 mmHg and then  a 10-12 trocar was placed in the left lower quadrant and another 5 mm trocar was placed suprapubic just off the midline.  I was able to get a hold of the cecum and looked at the base of the appendix.  I was able to create a space between the mesentery and the appendix and a blueload VAUGHN stapler was placed across and fired.  Then the appendiceal  mesentery was taking down with 1 white load stapling, taking great care not to involve the  bowel.  After this was done, we had it freed up.  We placed an EndoCatch in and took it out through the 10-12 trocar site in the left side of the abdomen, with out having to dilate the tract a little bit with a clamp.  There was a little bit of bleeding when we first took off the larger part of the mesentery with the stapler.  Good hemostasis was achieved.    A clip was needed on the appendiceal mesentery.      I irrigated the area multiple times, irrigated the rest of the abdomen as needed, and suctioned up as much of the fluid as possible. I  looked at the area multiple times and again, there was no evidence of any more bleeding.  The 10-12 trocar fascia was closed with an 0 Vicryl suture x 1  using the Hussein-Anna equipment.  As  much CO2 was removed as possible before removing  the trocars and then the 10-12 trocar subcutaneous fat was brought together with several interrupted 3-0 Vicryl sutures and the skin was closed at all sites with 4-0 Monocryl, covered with tincture of benzoin, Steri-Strips and Tegaderm, along with an abdominal binder.  The patient did receive antibiotics preoperatively.     The plan is to  discharge the patient  from the PACU and to follow up with me in 2 weeks in clinic..   The patient did receive antibiotics prior to the procedure.   Needle and instrument count was correct.   Patient has bilateral inguinal hernias           Shubham Zeng MD              Again, thank you for allowing me to participate in the care of your patient.        Sincerely,        Shubham Zeng MD

## 2022-12-05 NOTE — NURSING NOTE
"Initial BP (!) 134/90 (BP Location: Right arm, Patient Position: Sitting, Cuff Size: Adult Regular)   Pulse 88   Temp 96.9  F (36.1  C) (Tympanic)   Ht 1.93 m (6' 4\")   Wt 93 kg (205 lb)   BMI 24.95 kg/m   Estimated body mass index is 24.95 kg/m  as calculated from the following:    Height as of this encounter: 1.93 m (6' 4\").    Weight as of this encounter: 93 kg (205 lb). .    Cat Romeo CMA    "

## 2022-12-05 NOTE — PROGRESS NOTES
James is a 42 year old male who is status post laparoscopic appendectomy  Inflamed not ruptured  Bilateral double hernias no bowel in the hernias with no chills and no fever.      Patient's  Pain is  improving.  Appetite is  improving.    Wound(s)  Is/are   clean dry and intact with no evidence of infection.    Questions were answered and discussion of no lifting more than 20 pounds for  3 weeks after surgery.        Path report shows:  Appendix, appendectomy:   -Acute appendicitis with serositis     I do not feel an obvious hernia but the laparoscopic look showed them.   When you want the bilateral inguinal hernia done or start having problems with them then I recommend we do this with  the robot    Risks of surgery include damage to nerves, bleeding, infection, damage to  Vessels, recurrence.  Although mesh is a better long term repair if it gets infected it must be removed.   If the patient has any bacterial infection the week before and is seen by their doctor and started on antibiotics, I can probably still do the surgery if they are vastly improved by the time of surgery, but if the infection starts closer to the surgery date it will be better to cancel and reschedule to a later date.  A cough will also be hard on the repair and uncomfortable post operative.  If the patient is a smoker I did discuss increase risk of recurrence and more pain with the cough.  If the patient is willing to quit smoking would encourage to do so and start at least a week before surgery.  However, if patient is not going to quit then must understand that his repair is more likely to fail.    Risks of surgery discussed including, but not limited to bleeding, infection, recurrence, damage to nerves and what is in the hernia sac.  Risks of anesthesia also discussed.    Discussed massaging hernia back in and using ice if becomes more painful.  If not able to reduce then go to emergency room.  Also discussed hernia belt to use until  able to get in for surgery.    Things you will need to do before surgery are getting a preoperative appointment with your primary care doctor to be cleared for surgery.    You will also need a COVID test before surgery and an appointment to see me usually about 2 weeks after the procedure to make sure you are doing well.   Fortunately, when the schedulers call you they will try to get all this set up for you at that one call.     If you have had a positive COVID test in a 90 day window that would include the date of the procedure, let us know that and will need to see proof of this.    Then you do not need a COVID test.  But if over 90 days you do.      Time spent with the patient with greater that 50% of the time in discussion was 15 minutes.  In discussing the bilateral inguinal hernia .      Shubham Zeng MD                  : Shubham Zeng MD (Physician)                 Vance Broderick     Date of Service: 11/20/2022     Surgeon:  Shubhma Zeng MD     PREOPERATIVE DIAGNOSIS:   appendicitis     POSTOPERATIVE DIAGNOSIS:   Same  Inflamed not ruptured  Bilateral double hernias no bowel in the hernias     PROCEDURE PERFORMED:  Laparoscopic appendectomy     ANESTHESIA:  General anesthetic with 0.25%   Marcaine with epinephrine injected to sites prior to and at the end of the procedure.     ESTIMATED BLOOD LOSS:  Less than 10   mL.     .    INDICATIONS:  The patient is a 42 year old  male who started having abdominal pain and eventually it was right lower quadrant.  It just was not getting better.  CT scan shows appendicitis.  White count is 17,000   The patient  is  definitely tender in the right lower quadrant.  It was felt that a laparoscopic appendectomy, possible open, was in the patient's best interest.  We discussed risks and complications including bleeding, infection,  damage to bowel, hernias, possible conversion to open procedure,  especially with adhesions and what to expect  afterwards.The patient understands and would like to proceed.     DESCRIPTION OF PROCEDURE:  The patient was brought to the OR, placed in the supine position.  After receiving general anesthesia, the abdomen was prepped and draped in a sterile manner.  Incision was made in the infraumbilical site with a knife after infiltrating the area with Marcaine.  A 5 mm trocar was placed to this site with a camera going through it, entering the abdomen without difficulty.  The abdomen was insufflated to 15 mmHg and then  a 10-12 trocar was placed in the left lower quadrant and another 5 mm trocar was placed suprapubic just off the midline.  I was able to get a hold of the cecum and looked at the base of the appendix.  I was able to create a space between the mesentery and the appendix and a blueload VAUGHN stapler was placed across and fired.  Then the appendiceal  mesentery was taking down with 1 white load stapling, taking great care not to involve the  bowel.  After this was done, we had it freed up.  We placed an EndoCatch in and took it out through the 10-12 trocar site in the left side of the abdomen, with out having to dilate the tract a little bit with a clamp.  There was a little bit of bleeding when we first took off the larger part of the mesentery with the stapler.  Good hemostasis was achieved.    A clip was needed on the appendiceal mesentery.      I irrigated the area multiple times, irrigated the rest of the abdomen as needed, and suctioned up as much of the fluid as possible. I  looked at the area multiple times and again, there was no evidence of any more bleeding.  The 10-12 trocar fascia was closed with an 0 Vicryl suture x 1  using the Hussein-Anna equipment.  As much CO2 was removed as possible before removing  the trocars and then the 10-12 trocar subcutaneous fat was brought together with several interrupted 3-0 Vicryl sutures and the skin was closed at all sites with 4-0 Monocryl, covered with tincture  of benzoin, Steri-Strips and Tegaderm, along with an abdominal binder.  The patient did receive antibiotics preoperatively.     The plan is to  discharge the patient  from the PACU and to follow up with me in 2 weeks in clinic..   The patient did receive antibiotics prior to the procedure.   Needle and instrument count was correct.   Patient has bilateral inguinal hernias           Shubham Zeng MD

## 2022-12-22 ENCOUNTER — IMMUNIZATION (OUTPATIENT)
Dept: FAMILY MEDICINE | Facility: CLINIC | Age: 42
End: 2022-12-22
Payer: COMMERCIAL

## 2022-12-22 PROCEDURE — 90686 IIV4 VACC NO PRSV 0.5 ML IM: CPT

## 2022-12-22 PROCEDURE — 91312 COVID-19 VACCINE BIVALENT BOOSTER 12+ (PFIZER): CPT

## 2022-12-22 PROCEDURE — 0124A COVID-19 VACCINE BIVALENT BOOSTER 12+ (PFIZER): CPT

## 2022-12-22 PROCEDURE — 90471 IMMUNIZATION ADMIN: CPT

## 2023-01-24 ENCOUNTER — TELEPHONE (OUTPATIENT)
Dept: PSYCHIATRY | Facility: CLINIC | Age: 43
End: 2023-01-24
Payer: COMMERCIAL

## 2023-01-24 DIAGNOSIS — Z15.89 HETEROZYGOUS FOR MTHFR GENE MUTATION: ICD-10-CM

## 2023-01-24 RX ORDER — FOLIC ACID 1 MG/1
1 TABLET ORAL DAILY
Qty: 90 TABLET | Refills: 1 | Status: SHIPPED | OUTPATIENT
Start: 2023-01-24 | End: 2023-07-17

## 2023-01-24 NOTE — TELEPHONE ENCOUNTER
Refill request through right fax for folic Acid 1 mg denied. Patient no longer under provider's care.    Patient Status:  The patient is being returned to the referring provider for ongoing care and medication prescribing.  The patient can be referred back to this service for further consultation as needed
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
## 2023-04-14 ENCOUNTER — MYC MEDICAL ADVICE (OUTPATIENT)
Dept: FAMILY MEDICINE | Facility: CLINIC | Age: 43
End: 2023-04-14
Payer: COMMERCIAL

## 2023-04-20 ENCOUNTER — PATIENT OUTREACH (OUTPATIENT)
Dept: CARE COORDINATION | Facility: CLINIC | Age: 43
End: 2023-04-20
Payer: COMMERCIAL

## 2023-06-12 ENCOUNTER — OFFICE VISIT (OUTPATIENT)
Dept: FAMILY MEDICINE | Facility: CLINIC | Age: 43
End: 2023-06-12
Payer: COMMERCIAL

## 2023-06-12 VITALS
SYSTOLIC BLOOD PRESSURE: 122 MMHG | RESPIRATION RATE: 20 BRPM | WEIGHT: 213.4 LBS | BODY MASS INDEX: 25.2 KG/M2 | TEMPERATURE: 96.1 F | DIASTOLIC BLOOD PRESSURE: 80 MMHG | HEART RATE: 88 BPM | HEIGHT: 77 IN

## 2023-06-12 DIAGNOSIS — L30.9 DERMATITIS: Primary | ICD-10-CM

## 2023-06-12 DIAGNOSIS — F10.20 ALCOHOL USE DISORDER, MODERATE, DEPENDENCE (H): ICD-10-CM

## 2023-06-12 DIAGNOSIS — R19.09 GROIN SWELLING: ICD-10-CM

## 2023-06-12 PROCEDURE — 99213 OFFICE O/P EST LOW 20 MIN: CPT | Performed by: FAMILY MEDICINE

## 2023-06-12 RX ORDER — CEPHALEXIN 500 MG/1
500 CAPSULE ORAL 3 TIMES DAILY
Qty: 30 CAPSULE | Refills: 0 | Status: SHIPPED | OUTPATIENT
Start: 2023-06-12 | End: 2023-06-22

## 2023-06-12 ASSESSMENT — ENCOUNTER SYMPTOMS
ENDOCRINE NEGATIVE: 1
CONSTITUTIONAL NEGATIVE: 1
RESPIRATORY NEGATIVE: 1
HEMATOLOGIC/LYMPHATIC NEGATIVE: 1
PSYCHIATRIC NEGATIVE: 1
NEUROLOGICAL NEGATIVE: 1
EYES NEGATIVE: 1
CARDIOVASCULAR NEGATIVE: 1
MUSCULOSKELETAL NEGATIVE: 1
GASTROINTESTINAL NEGATIVE: 1
ALLERGIC/IMMUNOLOGIC NEGATIVE: 1
WOUND: 1

## 2023-06-12 ASSESSMENT — PATIENT HEALTH QUESTIONNAIRE - PHQ9
10. IF YOU CHECKED OFF ANY PROBLEMS, HOW DIFFICULT HAVE THESE PROBLEMS MADE IT FOR YOU TO DO YOUR WORK, TAKE CARE OF THINGS AT HOME, OR GET ALONG WITH OTHER PEOPLE: NOT DIFFICULT AT ALL
SUM OF ALL RESPONSES TO PHQ QUESTIONS 1-9: 5
SUM OF ALL RESPONSES TO PHQ QUESTIONS 1-9: 5

## 2023-06-12 ASSESSMENT — PAIN SCALES - GENERAL: PAINLEVEL: NO PAIN (0)

## 2023-06-12 NOTE — PROGRESS NOTES
"  Assessment & Plan   Patient here for recurrent groin swelling with rash.   Recommend US of the right groin and also antibiotics.  Dermatitis  - cephALEXin (KEFLEX) 500 MG capsule; Take 1 capsule (500 mg) by mouth 3 times daily for 10 days    Groin swelling  - US Lower Extremity Non Vascular Right; Future    Alcohol use disorder, moderate, dependence (H)  In remission.        BMI:   Estimated body mass index is 25.64 kg/m  as calculated from the following:    Height as of this encounter: 1.943 m (6' 4.5\").    Weight as of this encounter: 96.8 kg (213 lb 6.4 oz).       FUTURE APPOINTMENTS:       - Follow-up visit in one month or sooner as needed.    Sebas Livingston MD  [facility]IL[facility] is a 42 year old, presenting for the following health issues:    Patient is a 42-year-old male presenting to the clinic for skin irritation in both groin area.  He reports that this happens every couple of months he has had 2 episodes that he can remember in the last few years.  He says he starts like a small pimple and then increases in size and distribution, soon becomes red,  Tender. There is no discharge from the swelling.  He denies any systemic symptoms when he has this rash.  No fevers, chills, no fatigue.  He says that they tend to resolve on their own and does not have to do much about it.  This particular episode has been there for several weeks without resolving.  Does not cause any hip pain for him.      Derm Problem    Red tender area on right hip. Has been improving. Has had similar red spots before.         6/12/2023     6:49 AM   Additional Questions   Roomed by Natalya HERRERA   Accompanied by self     History of Present Illness       Reason for visit:  Something on my hip  Symptom onset:  More than a month  Symptoms include:  Weird spot  Symptom intensity:  Mild  Symptom progression:  Worsening  Had these symptoms before:  Yes  Has tried/received treatment for these symptoms: " " No    He eats 0-1 servings of fruits and vegetables daily.He consumes 5 sweetened beverage(s) daily.     Today's PHQ-9         PHQ-9 Total Score: 5    PHQ-9 Q9 Thoughts of better off dead/self-harm past 2 weeks :   Not at all    How difficult have these problems made it for you to do your work, take care of things at home, or get along with other people: Not difficult at all             Review of Systems   Constitutional: Negative.    HENT: Negative.    Eyes: Negative.    Respiratory: Negative.    Cardiovascular: Negative.    Gastrointestinal: Negative.    Endocrine: Negative.    Genitourinary: Negative.    Musculoskeletal: Negative.    Skin: Positive for rash and wound.   Allergic/Immunologic: Negative.    Neurological: Negative.    Hematological: Negative.    Psychiatric/Behavioral: Negative.             Objective    /80 (BP Location: Left arm, Patient Position: Sitting, Cuff Size: Adult Regular)   Pulse 88   Temp (!) 96.1  F (35.6  C) (Tympanic)   Resp 20   Ht 1.943 m (6' 4.5\")   Wt 96.8 kg (213 lb 6.4 oz)   BMI 25.64 kg/m    Body mass index is 25.64 kg/m .  Physical Exam  Constitutional:       Appearance: Normal appearance.   HENT:      Head: Normocephalic and atraumatic.      Right Ear: Tympanic membrane normal.      Left Ear: Tympanic membrane normal.      Nose: Nose normal.   Eyes:      Extraocular Movements: Extraocular movements intact.      Pupils: Pupils are equal, round, and reactive to light.   Cardiovascular:      Rate and Rhythm: Normal rate and regular rhythm.      Pulses: Normal pulses.      Heart sounds: Normal heart sounds.   Pulmonary:      Effort: Pulmonary effort is normal.      Breath sounds: Normal breath sounds.   Abdominal:      General: Abdomen is flat. Bowel sounds are normal. There is no distension.      Palpations: Abdomen is soft. There is no mass.      Tenderness: There is no abdominal tenderness. There is no right CVA tenderness, guarding or rebound.      Hernia: No " hernia is present.   Musculoskeletal:         General: Swelling and tenderness present. Normal range of motion.      Cervical back: Normal range of motion and neck supple.   Lymphadenopathy:      Lower Body: Left inguinal adenopathy present.   Skin:     Findings: Erythema present.   Neurological:      Mental Status: He is alert and oriented to person, place, and time.   Psychiatric:         Mood and Affect: Mood normal.         Behavior: Behavior normal.

## 2023-06-13 ENCOUNTER — HOSPITAL ENCOUNTER (OUTPATIENT)
Dept: ULTRASOUND IMAGING | Facility: CLINIC | Age: 43
Discharge: HOME | End: 2023-06-13
Attending: FAMILY MEDICINE | Admitting: FAMILY MEDICINE
Payer: COMMERCIAL

## 2023-06-13 DIAGNOSIS — R19.09 GROIN SWELLING: ICD-10-CM

## 2023-06-13 PROCEDURE — 76882 US LMTD JT/FCL EVL NVASC XTR: CPT | Mod: RT

## 2023-06-14 NOTE — RESULT ENCOUNTER NOTE
Please inform patient that test result showed a cyst. This is a benign cyst and there is not much to do to this unless it becomes bigger and more painful.   Thank you.     Sebas Livingston M.D.

## 2023-07-14 DIAGNOSIS — Z15.89 HETEROZYGOUS FOR MTHFR GENE MUTATION: ICD-10-CM

## 2023-07-17 RX ORDER — FOLIC ACID 1 MG/1
TABLET ORAL
Qty: 90 TABLET | Refills: 0 | Status: SHIPPED | OUTPATIENT
Start: 2023-07-17 | End: 2024-02-27

## 2023-07-29 ENCOUNTER — HEALTH MAINTENANCE LETTER (OUTPATIENT)
Age: 43
End: 2023-07-29

## 2023-08-09 ENCOUNTER — LAB (OUTPATIENT)
Dept: LAB | Facility: CLINIC | Age: 43
End: 2023-08-09
Payer: COMMERCIAL

## 2023-08-09 DIAGNOSIS — E78.5 HYPERLIPIDEMIA LDL GOAL <130: ICD-10-CM

## 2023-08-09 DIAGNOSIS — E78.2 MIXED HYPERLIPIDEMIA: Primary | ICD-10-CM

## 2023-08-09 LAB
ALT SERPL W P-5'-P-CCNC: 27 U/L (ref 0–70)
CHOLEST SERPL-MCNC: 227 MG/DL
HDLC SERPL-MCNC: 38 MG/DL
LDLC SERPL CALC-MCNC: 159 MG/DL
NONHDLC SERPL-MCNC: 189 MG/DL
TRIGL SERPL-MCNC: 152 MG/DL

## 2023-08-09 PROCEDURE — 80061 LIPID PANEL: CPT

## 2023-08-09 PROCEDURE — 36415 COLL VENOUS BLD VENIPUNCTURE: CPT

## 2023-08-09 PROCEDURE — 84460 ALANINE AMINO (ALT) (SGPT): CPT

## 2023-08-09 ASSESSMENT — ENCOUNTER SYMPTOMS
COUGH: 1
FEVER: 0
CHILLS: 0
HEADACHES: 0
DYSURIA: 0
SHORTNESS OF BREATH: 0
SORE THROAT: 0
PALPITATIONS: 1
NERVOUS/ANXIOUS: 0
DIARRHEA: 0
HEARTBURN: 0
WEAKNESS: 0
JOINT SWELLING: 0
MYALGIAS: 0
HEMATOCHEZIA: 0
FREQUENCY: 0
EYE PAIN: 0
CONSTIPATION: 0
ARTHRALGIAS: 0
HEMATURIA: 0
DIZZINESS: 0
ABDOMINAL PAIN: 0
NAUSEA: 0
PARESTHESIAS: 0

## 2023-08-16 ENCOUNTER — OFFICE VISIT (OUTPATIENT)
Dept: FAMILY MEDICINE | Facility: CLINIC | Age: 43
End: 2023-08-16
Payer: COMMERCIAL

## 2023-08-16 VITALS
HEIGHT: 76 IN | OXYGEN SATURATION: 96 % | HEART RATE: 86 BPM | DIASTOLIC BLOOD PRESSURE: 78 MMHG | RESPIRATION RATE: 16 BRPM | BODY MASS INDEX: 25.52 KG/M2 | WEIGHT: 209.6 LBS | TEMPERATURE: 98 F | SYSTOLIC BLOOD PRESSURE: 102 MMHG

## 2023-08-16 DIAGNOSIS — F33.1 MODERATE EPISODE OF RECURRENT MAJOR DEPRESSIVE DISORDER (H): ICD-10-CM

## 2023-08-16 DIAGNOSIS — F17.200 NICOTINE DEPENDENCE, UNCOMPLICATED, UNSPECIFIED NICOTINE PRODUCT TYPE: ICD-10-CM

## 2023-08-16 DIAGNOSIS — F41.1 GAD (GENERALIZED ANXIETY DISORDER): ICD-10-CM

## 2023-08-16 DIAGNOSIS — E78.5 HYPERLIPIDEMIA LDL GOAL <130: ICD-10-CM

## 2023-08-16 DIAGNOSIS — Z00.00 ROUTINE GENERAL MEDICAL EXAMINATION AT A HEALTH CARE FACILITY: Primary | ICD-10-CM

## 2023-08-16 PROCEDURE — 99214 OFFICE O/P EST MOD 30 MIN: CPT | Mod: 25 | Performed by: FAMILY MEDICINE

## 2023-08-16 PROCEDURE — 90677 PCV20 VACCINE IM: CPT | Performed by: FAMILY MEDICINE

## 2023-08-16 PROCEDURE — 99396 PREV VISIT EST AGE 40-64: CPT | Mod: 25 | Performed by: FAMILY MEDICINE

## 2023-08-16 PROCEDURE — 90471 IMMUNIZATION ADMIN: CPT | Performed by: FAMILY MEDICINE

## 2023-08-16 RX ORDER — VARENICLINE TARTRATE 0.5 MG/1
TABLET, FILM COATED ORAL
Qty: 95 TABLET | Refills: 0 | Status: SHIPPED | OUTPATIENT
Start: 2023-08-16 | End: 2023-12-13

## 2023-08-16 RX ORDER — DESVENLAFAXINE 50 MG/1
50 TABLET, FILM COATED, EXTENDED RELEASE ORAL DAILY
Qty: 90 TABLET | Refills: 0 | Status: SHIPPED | OUTPATIENT
Start: 2023-08-16 | End: 2023-12-13

## 2023-08-16 RX ORDER — VENLAFAXINE HYDROCHLORIDE 75 MG/1
75 CAPSULE, EXTENDED RELEASE ORAL DAILY
Qty: 21 CAPSULE | Refills: 0 | Status: SHIPPED | OUTPATIENT
Start: 2023-08-16 | End: 2023-12-13

## 2023-08-16 RX ORDER — VARENICLINE TARTRATE 1 MG/1
1 TABLET, FILM COATED ORAL 2 TIMES DAILY
Qty: 180 TABLET | Refills: 0 | Status: SHIPPED | OUTPATIENT
Start: 2023-09-15 | End: 2023-12-13

## 2023-08-16 RX ORDER — SIMVASTATIN 20 MG
20 TABLET ORAL AT BEDTIME
Qty: 90 TABLET | Refills: 3 | Status: SHIPPED | OUTPATIENT
Start: 2023-08-16

## 2023-08-16 ASSESSMENT — ENCOUNTER SYMPTOMS
HEARTBURN: 0
DIZZINESS: 0
FREQUENCY: 0
COUGH: 1
SHORTNESS OF BREATH: 0
JOINT SWELLING: 0
MYALGIAS: 0
WEAKNESS: 0
HEADACHES: 0
PALPITATIONS: 1
HEMATOCHEZIA: 0
NERVOUS/ANXIOUS: 0
EYE PAIN: 0
HEMATURIA: 0
DYSURIA: 0
NAUSEA: 0
CHILLS: 0
ABDOMINAL PAIN: 0
DIARRHEA: 0
FEVER: 0
ARTHRALGIAS: 0
PARESTHESIAS: 0
CONSTIPATION: 0
SORE THROAT: 0

## 2023-08-16 ASSESSMENT — ANXIETY QUESTIONNAIRES
GAD7 TOTAL SCORE: 2
7. FEELING AFRAID AS IF SOMETHING AWFUL MIGHT HAPPEN: NOT AT ALL
1. FEELING NERVOUS, ANXIOUS, OR ON EDGE: NOT AT ALL
GAD7 TOTAL SCORE: 2
2. NOT BEING ABLE TO STOP OR CONTROL WORRYING: NOT AT ALL
3. WORRYING TOO MUCH ABOUT DIFFERENT THINGS: SEVERAL DAYS
6. BECOMING EASILY ANNOYED OR IRRITABLE: NOT AT ALL
IF YOU CHECKED OFF ANY PROBLEMS ON THIS QUESTIONNAIRE, HOW DIFFICULT HAVE THESE PROBLEMS MADE IT FOR YOU TO DO YOUR WORK, TAKE CARE OF THINGS AT HOME, OR GET ALONG WITH OTHER PEOPLE: NOT DIFFICULT AT ALL
5. BEING SO RESTLESS THAT IT IS HARD TO SIT STILL: SEVERAL DAYS

## 2023-08-16 ASSESSMENT — PATIENT HEALTH QUESTIONNAIRE - PHQ9
10. IF YOU CHECKED OFF ANY PROBLEMS, HOW DIFFICULT HAVE THESE PROBLEMS MADE IT FOR YOU TO DO YOUR WORK, TAKE CARE OF THINGS AT HOME, OR GET ALONG WITH OTHER PEOPLE: NOT DIFFICULT AT ALL
SUM OF ALL RESPONSES TO PHQ QUESTIONS 1-9: 4
SUM OF ALL RESPONSES TO PHQ QUESTIONS 1-9: 4
5. POOR APPETITE OR OVEREATING: NOT AT ALL

## 2023-08-16 ASSESSMENT — PAIN SCALES - GENERAL: PAINLEVEL: MILD PAIN (2)

## 2023-08-16 NOTE — NURSING NOTE
Prior to immunization administration, verified patients identity using patient s name and date of birth. Please see Immunization Activity for additional information.     Screening Questionnaire for Adult Immunization    Are you sick today?   No   Do you have allergies to medications, food, a vaccine component or latex?   No   Have you ever had a serious reaction after receiving a vaccination?   No   Do you have a long-term health problem with heart, lung, kidney, or metabolic disease (e.g., diabetes), asthma, a blood disorder, no spleen, complement component deficiency, a cochlear implant, or a spinal fluid leak?  Are you on long-term aspirin therapy?   No   Do you have cancer, leukemia, HIV/AIDS, or any other immune system problem?   No   Do you have a parent, brother, or sister with an immune system problem?   No   In the past 3 months, have you taken medications that affect  your immune system, such as prednisone, other steroids, or anticancer drugs; drugs for the treatment of rheumatoid arthritis, Crohn s disease, or psoriasis; or have you had radiation treatments?   No   Have you had a seizure, or a brain or other nervous system problem?   No   During the past year, have you received a transfusion of blood or blood    products, or been given immune (gamma) globulin or antiviral drug?   No   For women: Are you pregnant or is there a chance you could become       pregnant during the next month?   No   Have you received any vaccinations in the past 4 weeks?   No     Immunization questionnaire answers were all negative.      Patient instructed to remain in clinic for 15 minutes afterwards, and to report any adverse reactions.     Screening performed by Lulu Walker on 8/16/2023 at 3:42 PM.

## 2023-08-16 NOTE — PROGRESS NOTES
SUBJECTIVE:   CC: James is an 42 year old who presents for preventative health visit.       8/16/2023     2:46 PM   Additional Questions   Roomed by Jason LOMBARDI   Accompanied by self     Chief Complaint   Patient presents with    Physical    Smoking Cessation     Discuss quitting smoking and if he needs chest scan.    Palpitations    Derm Problem     Skin check Right hip    Lipids     Renew Simvastatin     Mental Health Problem     Discuss Effexor     Imm/Inj     Prevnar 20       Healthy Habits:     Getting at least 3 servings of Calcium per day:  NO    Bi-annual eye exam:  NO    Dental care twice a year:  Yes    Sleep apnea or symptoms of sleep apnea:  None    Diet:  Breakfast skipped    Frequency of exercise:  None    Taking medications regularly:  Yes    Medication side effects:  Other    Additional concerns today:  Yes      Today's PHQ-9 Score:       8/16/2023     2:41 PM   PHQ-9 SCORE   PHQ-9 Total Score MyChart 4 (Minimal depression)   PHQ-9 Total Score 4         Tickle in throat that causes cough.   Vaping  Not sinus draining from sinuses.        Concern - Skin Check Right Hip   Onset: x 6 months   Description:   Started as pimple, got deeper, radiated down into leg , top would get scales then would come off, area would rupture and discolored liquid would drain , now getting   Intensity: mild  Progression of Symptoms:  improving  Accompanying Signs & Symptoms:  See above   Previous history of similar problem:   None   Precipitating factors:   Worsened by: none   Alleviating factors:  Improved by: none     Therapies Tried and outcome: antibiotic ointment - some relief , oral antibiotic No relief     Palpitations (not addressed today)  Onset: x 3 months   Description:   Palpitations , notices these when he lays down   Intensity: moderate  Progression of Symptoms:  improving  Accompanying Signs & Symptoms:  Shortness of breath when it occurs   Previous history of similar problem:   None   Precipitating factors:  "  Worsened by: none   Alleviating factors:  Improved by: none     Therapies Tried and outcome: none         Smoking Cessation Discuss quitting smoking and if he needs chest scan.     Hyperlipidemia Follow-Up    Are you regularly taking any medication or supplement to lower your cholesterol?   Yes- Zocor   Are you having muscle aches or other side effects that you think could be caused by your cholesterol lowering medication?  No  Paternal uncle with CAD  Dad with CAD mid60s.    Depression and Anxiety Follow-Up  How are you doing with your depression since your last visit? Worsened   How are you doing with your anxiety since your last visit?  Worsened   Are you having other symptoms that might be associated with depression or anxiety? Yes:  palpitations, \"does not dream \" .effexor making him more depressant   Have you had a significant life event? No   Do you have any concerns with your use of alcohol or other drugs? No    Social History     Tobacco Use    Smoking status: Former     Packs/day: 1.50     Years: 13.00     Pack years: 19.50     Types: Cigarettes, Other    Smokeless tobacco: Current   Vaping Use    Vaping Use: Every day    Substances: Nicotine    Devices: Disposable   Substance Use Topics    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    Drug use: Yes     Types: Marijuana     Comment: h/o street drug use 15 yrs ago         5/6/2022     7:34 AM 6/12/2023     6:41 AM 8/16/2023     2:41 PM   PHQ   PHQ-9 Total Score 6 5 4   Q9: Thoughts of better off dead/self-harm past 2 weeks Not at all Not at all Not at all         11/29/2021     4:02 PM 5/6/2022     7:34 AM 8/16/2023     2:56 PM   LINNEA-7 SCORE   Total Score 14 3 2         8/16/2023     2:41 PM   Last PHQ-9   1.  Little interest or pleasure in doing things 0   2.  Feeling down, depressed, or hopeless 1   3.  Trouble falling or staying asleep, or sleeping too much 0   4.  Feeling tired or having little energy 1   5.  Poor appetite or overeating " 2   6.  Feeling bad about yourself 0   7.  Trouble concentrating 0   8.  Moving slowly or restless 0   Q9: Thoughts of better off dead/self-harm past 2 weeks 0   PHQ-9 Total Score 4         8/16/2023     2:56 PM   LINNEA-7    1. Feeling nervous, anxious, or on edge 0   2. Not being able to stop or control worrying 0   3. Worrying too much about different things 1   4. Trouble relaxing 0   5. Being so restless that it is hard to sit still 1   6. Becoming easily annoyed or irritable 0   7. Feeling afraid, as if something awful might happen 0   LINNEA-7 Total Score 2   If you checked any problems, how difficult have they made it for you to do your work, take care of things at home, or get along with other people? Not difficult at all       Suicide Assessment Five-step Evaluation and Treatment (SAFE-T)        Social History     Tobacco Use    Smoking status: Former     Packs/day: 1.50     Years: 13.00     Pack years: 19.50     Types: Cigarettes, Other    Smokeless tobacco: Current   Substance Use Topics    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 8/9/2023     5:25 PM   Alcohol Use   Prescreen: >3 drinks/day or >7 drinks/week? Not Applicable       Last PSA: No results found for: PSA    Reviewed orders with patient. Reviewed health maintenance and updated orders accordingly - Yes  BP Readings from Last 3 Encounters:   08/16/23 102/78   06/12/23 122/80   12/05/22 (!) 134/90    Wt Readings from Last 3 Encounters:   08/16/23 95.1 kg (209 lb 9.6 oz)   06/12/23 96.8 kg (213 lb 6.4 oz)   12/05/22 93 kg (205 lb)                    Reviewed and updated as needed this visit by clinical staff   Tobacco  Allergies  Meds              Reviewed and updated as needed this visit by Provider                     Review of Systems   Constitutional:  Negative for chills and fever.   HENT:  Negative for congestion, ear pain, hearing loss and sore throat.    Eyes:  Negative for pain and visual disturbance.   Respiratory:  " Positive for cough. Negative for shortness of breath.    Cardiovascular:  Positive for palpitations. Negative for chest pain and peripheral edema.   Gastrointestinal:  Negative for abdominal pain, constipation, diarrhea, heartburn, hematochezia and nausea.   Genitourinary:  Negative for dysuria, frequency, genital sores, hematuria, impotence, penile discharge and urgency.   Musculoskeletal:  Negative for arthralgias, joint swelling and myalgias.   Skin:  Negative for rash.   Neurological:  Negative for dizziness, weakness, headaches and paresthesias.   Psychiatric/Behavioral:  Positive for mood changes. The patient is not nervous/anxious.          OBJECTIVE:   /78 (BP Location: Right arm, Patient Position: Sitting, Cuff Size: Adult Regular)   Pulse 86   Temp 98  F (36.7  C) (Tympanic)   Resp 16   Ht 1.93 m (6' 4\")   Wt 95.1 kg (209 lb 9.6 oz)   SpO2 96%   BMI 25.51 kg/m      Physical Exam  GENERAL: healthy, alert and no distress  EYES: Eyes grossly normal to inspection, PERRL and conjunctivae and sclerae normal  HENT: ear canals and TM's normal, nose and mouth without ulcers or lesions  NECK: no adenopathy, no asymmetry, masses, or scars and thyroid normal to palpation  RESP: lungs clear to auscultation - no rales, rhonchi or wheezes  CV: regular rate and rhythm, normal S1 S2, no S3 or S4, no murmur, click or rub, no peripheral edema and peripheral pulses strong  MS: no gross musculoskeletal defects noted, no edema  SKIN: no suspicious lesions or rashes  NEURO: Normal strength and tone, mentation intact and speech normal  PSYCH: mentation appears normal, affect normal/bright    Diagnostic Test Results:  Labs reviewed in Epic    ASSESSMENT/PLAN:   James was seen today for physical, smoking cessation, palpitations, derm problem, lipids, mental health problem and imm/inj.    Diagnoses and all orders for this visit:    Routine general medical examination at a health care facility    Hyperlipidemia LDL " goal <130  -     simvastatin (ZOCOR) 20 MG tablet; Take 1 tablet (20 mg) by mouth At Bedtime    LINNEA (generalized anxiety disorder): well controlled, but not dreaming or recalling dreams which is depressing to him as this was a large part of life for him.  In reviewing the genesite testing from 7/21/21 discussed options like pristiq, trintellix, viibryd and plan to go with pristiq  -discussed risks, benefits, and side effects of this new medication. Patient understands and is in agreement.  Taper off venlafaxine and onto pristiq  Recheck in 8 weeks.  -     desvenlafaxine (PRISTIQ) 50 MG 24 hr tablet; Take 1 tablet (50 mg) by mouth daily  -     venlafaxine (EFFEXOR XR) 75 MG 24 hr capsule; Take 1 capsule (75 mg) by mouth daily for 21 days Then stop the medication.    Moderate episode of recurrent major depressive disorder (H): well controlled, but not dreaming or recalling dreams which is depressing to him as this was a large part of life for him.  In reviewing the genesite testing from 7/21/21 discussed options like pristiq, trintellix, viibryd and plan to go with pristiq  -discussed risks, benefits, and side effects of this new medication. Patient understands and is in agreement.  Taper off venlafaxine and onto pristiq  Recheck in 8 weeks.  -     desvenlafaxine (PRISTIQ) 50 MG 24 hr tablet; Take 1 tablet (50 mg) by mouth daily  -     venlafaxine (EFFEXOR XR) 75 MG 24 hr capsule; Take 1 capsule (75 mg) by mouth daily for 21 days Then stop the medication.    Nicotine dependence, uncomplicated, unspecified nicotine product type: quit in the past with chantix, would like to use this again.  -     Cancel: [place] Quit Partner Referral; Future  -     varenicline (CHANTIX) 0.5 MG tablet; Take 0.5 mg (1 tablet) once a day for 3 days, THEN 0.5 mg (1 tablet) twice daily for 4 days, THEN 1.0 mg (2 tablets) twice daily  -     varenicline (CHANTIX) 1 MG tablet; Take 1 tablet (1 mg) by mouth 2 times daily  -     SMOKING CESSATION  "COUNSELING 3-10 MIN    Other orders  -     Pneumococcal 20 Valent Conjugate (Prevnar 20)  -     PRIMARY CARE FOLLOW-UP SCHEDULING; Future        Patient has been advised of split billing requirements and indicates understanding: Yes      COUNSELING:   Reviewed preventive health counseling, as reflected in patient instructions       Regular exercise       Healthy diet/nutrition       Vision screening      BMI:   Estimated body mass index is 25.51 kg/m  as calculated from the following:    Height as of this encounter: 1.93 m (6' 4\").    Weight as of this encounter: 95.1 kg (209 lb 9.6 oz).         He reports that he has quit smoking. His smoking use included cigarettes and other. He has a 19.50 pack-year smoking history. He uses smokeless tobacco.  Tried chantix in past with success of quitting for years.  Had vivid dreams          Vance Broderick MD  [facility] InstituteAnswers submitted by the patient for this visit:  Patient Health Questionnaire (Submitted on 8/16/2023)  If you checked off any problems, how difficult have these problems made it for you to do your work, take care of things at home, or get along with other people?: Not difficult at all  PHQ9 TOTAL SCORE: 4    "

## 2023-08-16 NOTE — PATIENT INSTRUCTIONS
Week 1-3  Decrease effexor to 75mg daily  Start the pristiq (desvenlaxafine) 50mg once daily    Week 4 and on  Stop the effexor   Continue pristiq 50mg daily    Let me know at any point if side effects or concerns.    Recheck at week 8 with phone visit.  We'll recheck about the cough at that time as well.    To improve your cholesterol exercise 30 minutes per day five days a week, increase eating fresh or frozen fruits and vegetables at least 5 per day, increase plant protein instead of animal protein and when you do eat meats choose lean meats like fish, and avoid fried foods.          Chantix (varenicline) Oral Tablet     Uses  For quitting smoking.    Instructions  Take the medicine with 250 mL (1 cup) of water.    Take the medicine after eating a meal.    Keep the medicine at room temperature. Avoid heat and direct light.    Choose a date to quit smoking. Start this medicine 1 week before your chosen day to quit smoking.    It is important that you keep taking each dose of this medicine on time even if you are feeling well.    If you forget to take a dose on time, take it as soon as you remember. If it is almost time for the next dose, do not take the missed dose. Return to your normal dosing schedule. Do not take 2 doses of this medicine at one time.    Please tell your doctor and pharmacist about all the medicines you take. Include both prescription and over-the-counter medicines. Also tell them about any vitamins, herbal medicines, or anything else you take for your health.    Cautions  This medicine is not recommended for use in children younger than 16.    Tell your doctor and pharmacist if you ever had an allergic reaction to a medicine. Symptoms of an allergic reaction can include trouble breathing, skin rash, itching, swelling, or severe dizziness.    Speak with your doctor about how you feel after you quit smoking. Some people on this medicine may feel depressed, have trouble sleeping, become irritable,  or gain weight. Discuss these symptoms with your doctor. Your doctor may wish to adjust your medication dosage.    Do not use the medication any more than instructed.    Your ability to stay alert or to react quickly may be impaired by this medicine. Do not drive or operate machinery until you know how this medicine will affect you.    Call the doctor if there are any signs of confusion or unusual changes in behavior.    Tell the doctor or pharmacist if you are pregnant, planning to be pregnant, or breastfeeding.    Ask your pharmacist if this medicine can interact with any of your other medicines. Be sure to tell them about all the medicines you take.    Do not start or stop any other medicines without first speaking to your doctor or pharmacist.    Do not share this medicine with anyone who has not been prescribed this medicine.    This medicine can cause serious side effects in some patients. Important information from the [place] Food and Drug Administration (FDA) is available from your pharmacist. Please review it carefully with your pharmacist to understand the risks associated with this medicine.    Side Effects  The following is a list of some common side effects from this medicine. Please speak with your doctor about what you should do if you experience these or other side effects.    agitated feeling or trouble sleeping  constipation  drowsiness or sedation  excess gas  headaches  nausea  vivid dreams or nightmares  vomiting  Call your doctor or get medical help right away if you notice any of these more serious side effects:    change in behavior  chest pain  changes in memory, mood, or thinking  depression or feeling sad  hallucinations (unusual thoughts, seeing or hearing things that are not real)  mood changes  shortness of breath  symptoms of stroke (such as one-sided weakness, slurred speech, confusion)    A few people may have an allergic reaction to this medicine. Symptoms can include difficulty  breathing, skin rash, itching, swelling, or severe dizziness. If you notice any of these symptoms, seek medical help quickly.    Extra  Please speak with your doctor, nurse, or pharmacist if you have any questions about this medicine.       [email or web address]  IMPORTANT NOTE: This document tells you briefly how to take your medicine, but it does not tell you all there is to know about it. Your doctor or pharmacist may give you other documents about your medicine. Please talk to them if you have any questions. Always follow their advice. There is a more complete description of this medicine available in English. Scan this code on your smartphone or tablet or use the web address below. You can also ask your pharmacist for a printout. If you have any questions, please ask your pharmacist.   2021 [employer].      0163-7194 [employer]. All rights reserved. This information is not intended as a substitute for professional medical care. Always follow your healthcare professional's instructions.    Preventive Health Recommendations  Male Ages 40 to 49    Yearly exam:             See your health care provider every year in order to  o   Review health changes.   o   Discuss preventive care.    o   Review your medicines if your doctor has prescribed any.  You should be tested each year for STDs (sexually transmitted diseases) if you re at risk.   Have a cholesterol test every 5 years.   Have a colonoscopy (test for colon cancer) if someone in your family has had colon cancer or polyps before age 50.   After age 45, have a diabetes test (fasting glucose). If you are at risk for diabetes, you should have this test every 3 years.    Talk with your health care provider about whether or not a prostate cancer screening test (PSA) is right for you.    Shots: Get a flu shot each year. Get a tetanus shot every 10 years.     Nutrition:  Eat at least 5 servings of fruits and vegetables  daily.   Eat whole-grain bread, whole-wheat pasta and brown rice instead of white grains and rice.   Get adequate Calcium and Vitamin D.     Lifestyle  Exercise for at least 150 minutes a week (30 minutes a day, 5 days a week). This will help you control your weight and prevent disease.   Limit alcohol to one drink per day.   No smoking.   Wear sunscreen to prevent skin cancer.   See your dentist every six months for an exam and cleaning.

## 2023-08-30 ENCOUNTER — MYC MEDICAL ADVICE (OUTPATIENT)
Dept: FAMILY MEDICINE | Facility: CLINIC | Age: 43
End: 2023-08-30
Payer: COMMERCIAL

## 2023-08-30 DIAGNOSIS — F41.1 GAD (GENERALIZED ANXIETY DISORDER): ICD-10-CM

## 2023-08-30 RX ORDER — VENLAFAXINE HYDROCHLORIDE 150 MG/1
150 CAPSULE, EXTENDED RELEASE ORAL DAILY
Qty: 90 CAPSULE | Refills: 0 | Status: SHIPPED | OUTPATIENT
Start: 2023-08-30 | End: 2023-12-13

## 2023-09-22 ENCOUNTER — APPOINTMENT (OUTPATIENT)
Dept: GENERAL RADIOLOGY | Facility: CLINIC | Age: 43
End: 2023-09-22
Payer: COMMERCIAL

## 2023-09-22 ENCOUNTER — HOSPITAL ENCOUNTER (EMERGENCY)
Facility: CLINIC | Age: 43
Discharge: HOME | End: 2023-09-22
Payer: COMMERCIAL

## 2023-09-22 VITALS
HEIGHT: 76 IN | DIASTOLIC BLOOD PRESSURE: 84 MMHG | RESPIRATION RATE: 16 BRPM | BODY MASS INDEX: 25.57 KG/M2 | TEMPERATURE: 98.4 F | SYSTOLIC BLOOD PRESSURE: 136 MMHG | OXYGEN SATURATION: 98 % | HEART RATE: 64 BPM | WEIGHT: 210 LBS

## 2023-09-22 DIAGNOSIS — S69.92XA INJURY OF FINGER OF LEFT HAND, INITIAL ENCOUNTER: ICD-10-CM

## 2023-09-22 DIAGNOSIS — S60.10XA SUBUNGUAL HEMATOMA OF DIGIT OF HAND, INITIAL ENCOUNTER: ICD-10-CM

## 2023-09-22 PROCEDURE — 11740 EVACUATION SUBUNGUAL HMTMA: CPT | Mod: F2

## 2023-09-22 PROCEDURE — G0463 HOSPITAL OUTPT CLINIC VISIT: HCPCS

## 2023-09-22 PROCEDURE — 99213 OFFICE O/P EST LOW 20 MIN: CPT | Mod: 25

## 2023-09-22 PROCEDURE — 73140 X-RAY EXAM OF FINGER(S): CPT | Mod: LT

## 2023-09-22 ASSESSMENT — ENCOUNTER SYMPTOMS: ARTHRALGIAS: 1

## 2023-09-23 ENCOUNTER — HOSPITAL ENCOUNTER (EMERGENCY)
Facility: CLINIC | Age: 43
Discharge: LEFT BEFORE DISCHARGE | End: 2023-09-23
Payer: COMMERCIAL

## 2023-09-23 VITALS
DIASTOLIC BLOOD PRESSURE: 88 MMHG | HEART RATE: 81 BPM | OXYGEN SATURATION: 99 % | HEIGHT: 76 IN | BODY MASS INDEX: 25.57 KG/M2 | SYSTOLIC BLOOD PRESSURE: 126 MMHG | WEIGHT: 210 LBS

## 2023-09-23 PROCEDURE — 99281 EMR DPT VST MAYX REQ PHY/QHP: CPT

## 2023-09-23 NOTE — ED PROVIDER NOTES
History     Chief Complaint   Patient presents with    Finger     Hit left middle finger with a hammer on Wednesday - here for continued pain and discomfort     HPI  James Blanco is a 42 year old male who presents to the  for concern of left 3rd finger injury. Patient states 2 days ago he hit his finger with a hammer.  He has been using a splint at home and been trying to apply ice as tolerated.  Has been taking Tylenol and ibuprofen for pain.  Patient is able to bend the finger without any significant difficulty.  He has noted some bruising under the fingernail.  Pain is located mostly in the distal aspect of the digit.  Full use of the hand otherwise and no other injuries.  He denies any other new concerns today.      Allergies:  Allergies   Allergen Reactions    Bees Swelling     Has epi-pen    Ceclor [Cefaclor]      Was an infant    Penicillins      Was an infant       Problem List:    Patient Active Problem List    Diagnosis Date Noted    Tobacco use disorder 05/11/2021     Priority: Medium    PTSD (post-traumatic stress disorder) 04/14/2021     Priority: Medium    Alcohol use disorder, moderate, dependence (H) 11/21/2020     Priority: Medium    Closed nondisplaced fracture of fifth metatarsal bone of right foot, initial encounter 04/15/2020     Priority: Medium     Added automatically from request for surgery [id number]      Moderate major depression (H) 04/07/2020     Priority: Medium    Grief reaction 01/09/2020     Priority: Medium    Fatty liver, alcoholic 09/26/2018     Priority: Medium    Anxiety 09/01/2015     Priority: Medium    Mixed hyperlipidemia 09/01/2015     Priority: Medium        Past Medical History:    Past Medical History:   Diagnosis Date    Anxiety     Depressive disorder     Hyperlipidaemia LDL goal <130        Past Surgical History:    Past Surgical History:   Procedure Laterality Date    COLONOSCOPY N/A 3/21/2016    Procedure: COLONOSCOPY;  Surgeon: Tavo Wilcox MD;  Location: [facility]    LAPAROSCOPIC APPENDECTOMY N/A 11/20/2022    Procedure: APPENDECTOMY, LAPAROSCOPIC;  Surgeon: Shubham Zeng MD;  Location: [facility]    OPEN REDUCTION INTERNAL FIXATION FOOT Right 4/17/2020    Procedure: Percutaneous intramedullary fixation, right fifth metatarsal fracture;  Surgeon: Amado Sanchez DPM;  Location: [facility]    SURGICAL HISTORY OF -       plasty surgery on face, due to injury    SURGICAL HISTORY OF -       left elbow nerve decompression, sound like ulnar    SURGICAL HISTORY OF -       left knee surgery, scope and allograph for cartilege    SURGICAL HISTORY OF -       lasix surgery       Family History:    Family History   Problem Relation Age of Onset    Hyperlipidemia Mother     Coronary Artery Disease Father     Hyperlipidemia Father     Diabetes Father     Other Cancer Sister         lymphoma-non hodgkins    Depression Sister     Anxiety Disorder Sister     Substance Abuse Sister     Substance Abuse Brother     Bipolar Disorder Brother     Depression Brother        Social History:  Marital Status:   [2]  Social History     Tobacco Use    Smoking status: Former     Packs/day: 1.50     Years: 13.00     Pack years: 19.50     Types: Cigarettes, Other    Smokeless tobacco: Current   Vaping Use    Vaping Use: Every day    Substances: Nicotine    Devices: Disposable   Substance Use Topics    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    Drug use: Yes     Types: Marijuana     Comment: h/o street drug use 15 yrs ago        Medications:    desvenlafaxine (PRISTIQ) 50 MG 24 hr tablet  EPINEPHrine (EPIPEN 2-CHAIM) 0.3 MG/0.3ML injection 2-pack  folic acid (FOLVITE) 1 MG tablet  ibuprofen (ADVIL/MOTRIN) 200 MG tablet  MELATONIN PO  simvastatin (ZOCOR) 20 MG tablet  varenicline (CHANTIX) 0.5 MG tablet  varenicline (CHANTIX) 1 MG tablet  venlafaxine (EFFEXOR XR) 150 MG 24 hr capsule  venlafaxine (EFFEXOR XR) 75 MG 24 hr capsule  vitamin B complex with vitamin C (STRESS TAB)  "tablet  Vitamin D3 (CHOLECALCIFEROL) 25 mcg (1000 units) tablet          Review of Systems   Musculoskeletal:  Positive for arthralgias.       Physical Exam   BP: 136/84  Pulse: 64  Temp: 98.4  F (36.9  C)  Resp: 16  Height: 193 cm (6' 4\")  Weight: 95.3 kg (210 lb)  SpO2: 98 %      Physical Exam  Constitutional:       General: He is not in acute distress.     Appearance: He is well-developed. He is not diaphoretic.   HENT:      Head: Normocephalic and atraumatic.   Eyes:      General: No scleral icterus.  Cardiovascular:      Rate and Rhythm: Normal rate.   Pulmonary:      Effort: Pulmonary effort is normal.   Abdominal:      General: Abdomen is flat.   Musculoskeletal:      Cervical back: Normal range of motion and neck supple.   Skin:     General: Skin is warm and dry.      Capillary Refill: Capillary refill takes less than 2 seconds.      Findings: No rash.   Neurological:      Mental Status: He is alert and oriented to person, place, and time.         ED Course                 [facility]    PROCEDURE: -Incision/Drainage    Date/Time: 9/22/2023 9:48 PM    Performed by: Cal Menard APRN CNP  Authorized by: Cal Menard APRN CNP    Risks, benefits and alternatives discussed.      LOCATION:      Type:  Subungual hematoma    PROCEDURE TYPE:     Complexity:  Simple    ANESTHESIA (see MAR for exact dosages):     Anesthesia method:  None    PROCEDURE DETAILS:     Needle aspiration: no      Incision types:  Stab incision (18g straight needle)    Drainage:  Bloody and serous           Results for orders placed or performed during the hospital encounter of 09/22/23 (from the past 24 hour(s))   Fingers XR, 2-3 views, left    Narrative    EXAM: XR FINGER LEFT G/E 2 VIEWS  LOCATION: [facility]  DATE: 9/22/2023    INDICATION: Hit with hammer.  COMPARISON: None.      Impression    IMPRESSION: Normal joint spaces and alignment. Smoothly marginated, well-corticated " ossific fragments adjacent to the tuft of the distal phalanx which may be sequela of more remote trauma. No definite fracture. Soft tissue edema about the distal third   digit.         Medications - No data to display    Assessments & Plan (with Medical Decision Making)   Patient is afebrile on arrival and vital sign are otherwise reassuring.  There are no fractures or dislocations noted on x-ray today.  CMS remains intact, normal capillary refill and peripheral pulses.  Patient is noted to have a small subungual hematoma which I did drain today.  To the above procedure note.  Patient should continue with current cares including splinting the affected finger as needed, Tylenol, ibuprofen, and ice.  Return precautions were reviewed.  Patient was discharged in good condition and is agreeable to the plan.  Should follow-up with primary doctor as needed for ongoing symptoms.      I have reviewed the nursing notes.    I have reviewed the findings, diagnosis, plan and need for follow up with the patient.        Discharge Medication List as of 9/22/2023  8:20 PM          Final diagnoses:   Injury of finger of left hand, initial encounter   Subungual hematoma of digit of hand, initial encounter       9/22/2023   [facility] EMERGENCY DEPT    Disclaimer:  This note consists of symbols derived from keyboarding, dictation and/or voice recognition software.  As a result, there may be errors in the script that have gone undetected.  Please consider this when interpreting information found in this chart.         Cal Menard APRN CNP  09/22/23 [id number]

## 2023-09-23 NOTE — DISCHARGE INSTRUCTIONS
Continue with cares as you've been doing. Finger splint, Tylenol, ibuprofen, and ice.  Return for new concerns.

## 2023-09-23 NOTE — ED TRIAGE NOTES
Bee sting to inside of mouth at noon today.  Swelling to right lower lip.  No trouble breathing.     Triage Assessment       Row Name 09/23/23 0810       Triage Assessment (Adult)    Airway WDL WDL       Respiratory WDL    Respiratory WDL WDL       Skin Circulation/Temperature WDL    Skin Circulation/Temperature WDL WDL       Cardiac WDL    Cardiac WDL WDL       Peripheral/Neurovascular WDL    Peripheral Neurovascular WDL WDL       Cognitive/Neuro/Behavioral WDL    Cognitive/Neuro/Behavioral WDL WDL

## 2023-09-29 ENCOUNTER — MYC MEDICAL ADVICE (OUTPATIENT)
Dept: FAMILY MEDICINE | Facility: CLINIC | Age: 43
End: 2023-09-29
Payer: COMMERCIAL

## 2023-10-15 ENCOUNTER — HOSPITAL ENCOUNTER (EMERGENCY)
Facility: CLINIC | Age: 43
Discharge: HOME | End: 2023-10-15
Attending: PHYSICIAN ASSISTANT | Admitting: PHYSICIAN ASSISTANT
Payer: COMMERCIAL

## 2023-10-15 VITALS — OXYGEN SATURATION: 97 % | HEART RATE: 91 BPM | TEMPERATURE: 97.2 F | RESPIRATION RATE: 16 BRPM

## 2023-10-15 DIAGNOSIS — S61.012A LACERATION OF LEFT THUMB WITHOUT FOREIGN BODY WITHOUT DAMAGE TO NAIL, INITIAL ENCOUNTER: ICD-10-CM

## 2023-10-15 PROCEDURE — 99213 OFFICE O/P EST LOW 20 MIN: CPT | Mod: 25 | Performed by: PHYSICIAN ASSISTANT

## 2023-10-15 PROCEDURE — G0463 HOSPITAL OUTPT CLINIC VISIT: HCPCS | Performed by: PHYSICIAN ASSISTANT

## 2023-10-15 PROCEDURE — 12002 RPR S/N/AX/GEN/TRNK2.6-7.5CM: CPT | Performed by: PHYSICIAN ASSISTANT

## 2023-10-15 ASSESSMENT — ENCOUNTER SYMPTOMS
CONSTITUTIONAL NEGATIVE: 1
MUSCULOSKELETAL NEGATIVE: 1
WOUND: 1

## 2023-10-15 NOTE — ED TRIAGE NOTES
Pt reports laceration to the left thumb from utility knife. Incident happened today.   Pt denies taking blood thinning medications.   Most recent TDAP Oct 2015 per [facility] record.

## 2023-10-15 NOTE — ED PROVIDER NOTES
History     Chief Complaint   Patient presents with    Laceration     HPI  James Blanco is a 43 year old male who presents to Urgent Care with complaints of left thumb laceration.  Patient accidentally cut himself while cutting drywall with a utility knife.  This occurred just prior to arrival.  He is moving his thumb without difficulties.  Bleeding is controlled.  Last tetanus was in 2015.      Allergies:  Allergies   Allergen Reactions    Bees Swelling     Has epi-pen    Ceclor [Cefaclor]      Was an infant    Penicillins      Was an infant       Problem List:    Patient Active Problem List    Diagnosis Date Noted    Tobacco use disorder 05/11/2021     Priority: Medium    PTSD (post-traumatic stress disorder) 04/14/2021     Priority: Medium    Alcohol use disorder, moderate, dependence (H) 11/21/2020     Priority: Medium    Closed nondisplaced fracture of fifth metatarsal bone of right foot, initial encounter 04/15/2020     Priority: Medium     Added automatically from request for surgery [id number]      Moderate major depression (H) 04/07/2020     Priority: Medium    Grief reaction 01/09/2020     Priority: Medium    Fatty liver, alcoholic 09/26/2018     Priority: Medium    Anxiety 09/01/2015     Priority: Medium    Mixed hyperlipidemia 09/01/2015     Priority: Medium        Past Medical History:    Past Medical History:   Diagnosis Date    Anxiety     Depressive disorder     Hyperlipidaemia LDL goal <130        Past Surgical History:    Past Surgical History:   Procedure Laterality Date    COLONOSCOPY N/A 3/21/2016    Procedure: COLONOSCOPY;  Surgeon: Tavo Wilcox MD;  Location: [facility]    LAPAROSCOPIC APPENDECTOMY N/A 11/20/2022    Procedure: APPENDECTOMY, LAPAROSCOPIC;  Surgeon: Shubham Zeng MD;  Location: [facility] OR    OPEN REDUCTION INTERNAL FIXATION FOOT Right 4/17/2020    Procedure: Percutaneous intramedullary fixation, right fifth metatarsal fracture;  Surgeon: Amado Sanchez DPM;  Location:  [facility] OR    SURGICAL HISTORY OF -       plasty surgery on face, due to injury    SURGICAL HISTORY OF -       left elbow nerve decompression, sound like ulnar    SURGICAL HISTORY OF -       left knee surgery, scope and allograph for cartilege    SURGICAL HISTORY OF -       lasix surgery       Family History:    Family History   Problem Relation Age of Onset    Hyperlipidemia Mother     Coronary Artery Disease Father     Hyperlipidemia Father     Diabetes Father     Other Cancer Sister         lymphoma-non hodgkins    Depression Sister     Anxiety Disorder Sister     Substance Abuse Sister     Substance Abuse Brother     Bipolar Disorder Brother     Depression Brother        Social History:  Marital Status:   [2]  Social History     Tobacco Use    Smoking status: Former     Packs/day: 1.50     Years: 13.00     Additional pack years: 0.00     Total pack years: 19.50     Types: Cigarettes, Other    Smokeless tobacco: Current   Vaping Use    Vaping Use: Every day    Substances: Nicotine    Devices: Disposable   Substance Use Topics    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    Drug use: Yes     Types: Marijuana     Comment: h/o street drug use 15 yrs ago        Medications:    desvenlafaxine (PRISTIQ) 50 MG 24 hr tablet  EPINEPHrine (EPIPEN 2-CHAIM) 0.3 MG/0.3ML injection 2-pack  folic acid (FOLVITE) 1 MG tablet  ibuprofen (ADVIL/MOTRIN) 200 MG tablet  MELATONIN PO  simvastatin (ZOCOR) 20 MG tablet  varenicline (CHANTIX) 0.5 MG tablet  varenicline (CHANTIX) 1 MG tablet  venlafaxine (EFFEXOR XR) 150 MG 24 hr capsule  venlafaxine (EFFEXOR XR) 75 MG 24 hr capsule  vitamin B complex with vitamin C (STRESS TAB) tablet  Vitamin D3 (CHOLECALCIFEROL) 25 mcg (1000 units) tablet          Review of Systems   Constitutional: Negative.    Musculoskeletal: Negative.    Skin:  Positive for wound.   All other systems reviewed and are negative.      Physical Exam   Pulse: 91  Temp: 97.2  F (36.2  C)  Resp: 16  SpO2: 97  %      Physical Exam  Constitutional:       General: He is not in acute distress.     Appearance: Normal appearance. He is well-developed. He is not ill-appearing, toxic-appearing or diaphoretic.   HENT:      Head: Normocephalic and atraumatic.   Eyes:      Conjunctiva/sclera: Conjunctivae normal.   Cardiovascular:      Pulses: Normal pulses.   Pulmonary:      Effort: Pulmonary effort is normal.   Musculoskeletal:         General: Normal range of motion.      Left wrist: Normal.      Left hand: Swelling, laceration and tenderness present. No bony tenderness. Normal range of motion. Normal strength. Normal sensation. There is no disruption of two-point discrimination. Normal capillary refill. Normal pulse.      Cervical back: Neck supple.      Comments: ~6 cm laceration to overlying lateral and dorsal left mid-thumb crossing over IP joint.  No tendon involvement.  Full active and passive ROM of thumb at all joints.  Full strength of thumb joints with flexion and extension against resistance.  Sensation intact.   Skin:     General: Skin is warm and dry.      Capillary Refill: Capillary refill takes less than 2 seconds.   Neurological:      General: No focal deficit present.      Mental Status: He is alert.      Sensory: Sensation is intact.      Motor: Motor function is intact.         ED [facility]    -Laceration Repair    Date/Time: 10/15/2023 12:07 PM    Performed by: Juany Morris PA-C  Authorized by: Juany Morris PA-C    Risks, benefits and alternatives discussed.      ANESTHESIA (see MAR for exact dosages):     Anesthesia method:  Local infiltration    Local anesthetic:  Lidocaine 1% w/o epi  LACERATION DETAILS     Location:  Finger    Finger location:  L thumb    Length (cm):  6    REPAIR TYPE:     Repair type:  Simple    EXPLORATION:     Hemostasis achieved with:  Direct pressure    Wound exploration: wound explored through full range of motion       Contaminated: no      TREATMENT:     Area cleansed with:  Saline    Amount of cleaning:  Standard    SKIN REPAIR     Repair method:  Sutures    Suture size:  4-0    Suture material:  Nylon    Suture technique:  Simple interrupted    Number of sutures:  8    APPROXIMATION     Approximation:  Close    POST-PROCEDURE DETAILS     Dressing:  Antibiotic ointment and adhesive bandage      PROCEDURE    Patient Tolerance:  Patient tolerated the procedure well with no immediate complications        No results found for this or any previous visit (from the past 24 hour(s)).    Medications - No data to display    Assessments & Plan (with Medical Decision Making)     Pt is a 43 year old male who presents to Urgent Care with complaints of left thumb laceration.  Patient accidentally cut himself while cutting drywall with a utility knife.  This occurred just prior to arrival.  He is moving his thumb without difficulties.  Bleeding is controlled.  Last tetanus was in 2015.    Pt is afebrile on arrival.  Exam as above.  Laceration was cleaned and repaired (see above procedure note).  Tube gauze dressing placed.  Plan provided to go home with for protection.  Encouraged continued wound care and symptomatic treatments at home.  Return precautions were reviewed.  Hand-outs were provided.    Patient was instructed to follow-up with PCP in 10 days for suture removal and for continued care and management.  He is to return to the ED for persistent and/or worsening symptoms.  Patient expressed understanding of the diagnosis and plan and was discharged home in good condition.    I have reviewed the nursing notes.    I have reviewed the findings, diagnosis, plan and need for follow up with the patient.    Discharge Medication List as of 10/15/2023 12:09 PM          Final diagnoses:   Laceration of left thumb without foreign body without damage to nail, initial encounter       10/15/2023   [facility] EMERGENCY  DEPT      Disclaimer:  This note consists of symbols derived from keyboarding, dictation and/or voice recognition software.  As a result, there may be errors in the script that have gone undetected.  Please consider this when interpreting information found in this chart.       Juany Morris PA-C  10/15/23 124

## 2023-10-16 ENCOUNTER — MYC MEDICAL ADVICE (OUTPATIENT)
Dept: FAMILY MEDICINE | Facility: CLINIC | Age: 43
End: 2023-10-16
Payer: COMMERCIAL

## 2023-10-16 ENCOUNTER — HOSPITAL ENCOUNTER (EMERGENCY)
Facility: CLINIC | Age: 43
Discharge: HOME | End: 2023-10-16
Attending: PHYSICIAN ASSISTANT | Admitting: PHYSICIAN ASSISTANT
Payer: COMMERCIAL

## 2023-10-16 ENCOUNTER — APPOINTMENT (OUTPATIENT)
Dept: GENERAL RADIOLOGY | Facility: CLINIC | Age: 43
End: 2023-10-16
Attending: PHYSICIAN ASSISTANT
Payer: COMMERCIAL

## 2023-10-16 VITALS
TEMPERATURE: 98.3 F | RESPIRATION RATE: 16 BRPM | SYSTOLIC BLOOD PRESSURE: 133 MMHG | OXYGEN SATURATION: 100 % | DIASTOLIC BLOOD PRESSURE: 95 MMHG | HEART RATE: 87 BPM

## 2023-10-16 DIAGNOSIS — M79.645 PAIN OF LEFT THUMB: ICD-10-CM

## 2023-10-16 PROCEDURE — 99213 OFFICE O/P EST LOW 20 MIN: CPT | Performed by: PHYSICIAN ASSISTANT

## 2023-10-16 PROCEDURE — G0463 HOSPITAL OUTPT CLINIC VISIT: HCPCS | Performed by: PHYSICIAN ASSISTANT

## 2023-10-16 PROCEDURE — 73140 X-RAY EXAM OF FINGER(S): CPT | Mod: LT

## 2023-10-16 RX ORDER — CEPHALEXIN 500 MG/1
500 CAPSULE ORAL 4 TIMES DAILY
Qty: 28 CAPSULE | Refills: 0 | Status: SHIPPED | OUTPATIENT
Start: 2023-10-16 | End: 2023-10-23

## 2023-10-16 RX ORDER — OXYCODONE HYDROCHLORIDE 5 MG/1
5 TABLET ORAL EVERY 6 HOURS PRN
Qty: 10 TABLET | Refills: 0 | Status: SHIPPED | OUTPATIENT
Start: 2023-10-16 | End: 2023-10-19

## 2023-10-16 ASSESSMENT — ENCOUNTER SYMPTOMS
CONSTITUTIONAL NEGATIVE: 1
WOUND: 1

## 2023-10-16 NOTE — ED PROVIDER NOTES
History   No chief complaint on file.    HPI  James Blanco is a 43 year old male who presents to Urgent Care with complaints of worsening left thumb pain since an injury yesterday.  Patient was evaluated in urgent care yesterday after he sustained a laceration of the left thumb while cutting drywall with a utility knife.  8 sutures were placed at that visit.  No evidence of tendon injury.  He was able to fully move this thumb without difficulties.  Patient states since the injury, he has had worsening left thumb pain.  He states he was unable to get much sleep last night due to the pain.  The pain shoots from the left thumb into the left hand.  Denies fevers or chills.      Allergies:  Allergies   Allergen Reactions    Bees Swelling     Has epi-pen    Ceclor [Cefaclor]      Was an infant    Penicillins      Was an infant       Problem List:    Patient Active Problem List    Diagnosis Date Noted    Tobacco use disorder 05/11/2021     Priority: Medium    PTSD (post-traumatic stress disorder) 04/14/2021     Priority: Medium    Alcohol use disorder, moderate, dependence (H) 11/21/2020     Priority: Medium    Closed nondisplaced fracture of fifth metatarsal bone of right foot, initial encounter 04/15/2020     Priority: Medium     Added automatically from request for surgery [id number]      Moderate major depression (H) 04/07/2020     Priority: Medium    Grief reaction 01/09/2020     Priority: Medium    Fatty liver, alcoholic 09/26/2018     Priority: Medium    Anxiety 09/01/2015     Priority: Medium    Mixed hyperlipidemia 09/01/2015     Priority: Medium        Past Medical History:    Past Medical History:   Diagnosis Date    Anxiety     Depressive disorder     Hyperlipidaemia LDL goal <130        Past Surgical History:    Past Surgical History:   Procedure Laterality Date    COLONOSCOPY N/A 3/21/2016    Procedure: COLONOSCOPY;  Surgeon: Tavo Wilcox MD;  [facility]    LAPAROSCOPIC APPENDECTOMY N/A 11/20/2022     Procedure: APPENDECTOMY, LAPAROSCOPIC;  Surgeon: Shubham Zeng MD;  Location: [facility]    OPEN REDUCTION INTERNAL FIXATION FOOT Right 4/17/2020    Procedure: Percutaneous intramedullary fixation, right fifth metatarsal fracture;  Surgeon: Amado Sanchez DPM;  Location: [facility] OR    SURGICAL HISTORY OF -       plasty surgery on face, due to injury    SURGICAL HISTORY OF -       left elbow nerve decompression, sound like ulnar    SURGICAL HISTORY OF -       left knee surgery, scope and allograph for cartilege    SURGICAL HISTORY OF -       lasix surgery       Family History:    Family History   Problem Relation Age of Onset    Hyperlipidemia Mother     Coronary Artery Disease Father     Hyperlipidemia Father     Diabetes Father     Other Cancer Sister         lymphoma-non hodgkins    Depression Sister     Anxiety Disorder Sister     Substance Abuse Sister     Substance Abuse Brother     Bipolar Disorder Brother     Depression Brother        Social History:  Marital Status:   [2]  Social History     Tobacco Use    Smoking status: Former     Packs/day: 1.50     Years: 13.00     Additional pack years: 0.00     Total pack years: 19.50     Types: Cigarettes, Other    Smokeless tobacco: Current   Vaping Use    Vaping Use: Every day    Substances: Nicotine    Devices: Disposable   Substance Use Topics    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    Drug use: Yes     Types: Marijuana     Comment: h/o street drug use 15 yrs ago        Medications:    cephALEXin (KEFLEX) 500 MG capsule  oxyCODONE (ROXICODONE) 5 MG tablet  desvenlafaxine (PRISTIQ) 50 MG 24 hr tablet  EPINEPHrine (EPIPEN 2-CHAIM) 0.3 MG/0.3ML injection 2-pack  folic acid (FOLVITE) 1 MG tablet  ibuprofen (ADVIL/MOTRIN) 200 MG tablet  MELATONIN PO  simvastatin (ZOCOR) 20 MG tablet  varenicline (CHANTIX) 0.5 MG tablet  varenicline (CHANTIX) 1 MG tablet  venlafaxine (EFFEXOR XR) 150 MG 24 hr capsule  venlafaxine (EFFEXOR XR) 75 MG 24 hr  capsule  vitamin B complex with vitamin C (STRESS TAB) tablet  Vitamin D3 (CHOLECALCIFEROL) 25 mcg (1000 units) tablet          Review of Systems   Constitutional: Negative.    Musculoskeletal:         Worsening left thumb pain   Skin:  Positive for wound.   All other systems reviewed and are negative.      Physical Exam   BP: (!) 133/95  Pulse: 87  Temp: 98.3  F (36.8  C)  Resp: 16  SpO2: 100 %      Physical Exam  Constitutional:       General: He is not in acute distress.     Appearance: He is well-developed. He is not ill-appearing, toxic-appearing or diaphoretic.   HENT:      Head: Normocephalic and atraumatic.   Eyes:      Conjunctiva/sclera: Conjunctivae normal.   Cardiovascular:      Pulses: Normal pulses.   Pulmonary:      Effort: Pulmonary effort is normal.   Musculoskeletal:      Left hand: Swelling, laceration (with sutures in place), tenderness and bony tenderness present. Decreased range of motion. Normal strength. Normal sensation. There is no disruption of two-point discrimination. Normal capillary refill. Normal pulse.      Cervical back: Neck supple.      Comments: Patient is noted to have a laceration to the dorsal left mid thumb with 8 sutures in place.  There is associated swelling of the left thumb.  No associated erythema or purulent drainage.  Patient has diffuse tenderness of the thumb.  No bleeding from the site.   Skin:     General: Skin is warm and dry.      Capillary Refill: Capillary refill takes less than 2 seconds.   Neurological:      General: No focal deficit present.      Mental Status: He is alert.      Sensory: Sensation is intact.      Motor: Motor function is intact.         ED Course                 Procedures      Results for orders placed or performed during the hospital encounter of 10/16/23 (from the past 24 hour(s))   Fingers XR, 2-3 views, left    Narrative    XR FINGER LEFT G/E 2 VIEWS   10/16/2023 1:55 PM     HISTORY: worsening thumb pain after laceration repair  yesterday  COMPARISON: None.       Impression    IMPRESSION: Normal joint spaces and alignment. No fracture. No  radiopaque foreign body.    FRANCISCO GARCIA MD         SYSTEM ID:  [id number]       Medications - No data to display    Assessments & Plan (with Medical Decision Making)     Pt is a 43 year old male who presents to Urgent Care with complaints of worsening left thumb pain since an injury yesterday.  Patient was evaluated in urgent care yesterday after he sustained a laceration of the left thumb while cutting drywall with a utility knife.  8 sutures were placed at that visit.  No evidence of tendon injury.  He was able to fully move this thumb without difficulties.  Patient states since the injury, he has had worsening left thumb pain.  He states he was unable to get much sleep last night due to the pain.  The pain shoots from the left thumb into the left hand.  No fevers.    Pt is afebrile on arrival.  Exam as above.  On exam, patient is noted to have a laceration to the dorsal left mid thumb with 8 sutures in place.  There is associated swelling of the left thumb.  No associated erythema or purulent drainage.  Patient has diffuse tenderness of the thumb.  No bleeding from the site.  X-rays of the left thumb were obtained and were negative for fracture or foreign body.  Given patient's acutely worsening pain throughout the left thumb, orthopedic referral placed.  I suspect the worsening pain patient is experiencing may be nerve related as well as due to inflammatory response of the injury/laceration site.  Recommended continued elevation of the hand as well as protecting the area with a splint.  Recommended continued use of ibuprofen/anti-inflammatories taken with food.  Wound does not appear erythematous or infected however it does feel slightly warm and is swollen and given his worsening pain, will also treat with antibiotics for coverage of possible early secondary infection. Pain medications prescribed  as needed.  Instructed to not drink alcohol, work, or operate machinery while taking these.  Encouraged symptomatic treatments at home.  Return precautions were reviewed.  Hand-outs were provided.    Patient was instructed to follow-up with orthopedics for continued care and management.  He is to return to the ED for persistent and/or worsening symptoms.  Patient expressed understanding of the diagnosis and plan and was discharged home in good condition.    I have reviewed the nursing notes.    I have reviewed the findings, diagnosis, plan and need for follow up with the patient.      Discharge Medication List as of 10/16/2023  2:18 PM        START taking these medications    Details   cephALEXin (KEFLEX) 500 MG capsule Take 1 capsule (500 mg) by mouth 4 times daily for 7 days, Disp-28 capsule, R-0, E-Prescribe      oxyCODONE (ROXICODONE) 5 MG tablet Take 1 tablet (5 mg) by mouth every 6 hours as needed for pain, Disp-10 tablet, R-0, Local Print             Final diagnoses:   Pain of left thumb       10/16/2023   [facility] EMERGENCY DEPT      Disclaimer:  This note consists of symbols derived from keyboarding, dictation and/or voice recognition software.  As a result, there may be errors in the script that have gone undetected.  Please consider this when interpreting information found in this chart.     Juany Morris PA-C  10/16/23 [id number]

## 2023-10-16 NOTE — DISCHARGE INSTRUCTIONS
I suspect the pain you are experiencing is likely nerve related and due to the inflammation involving the injury/laceration site.  Continue to elevate the hand.  You may protect the area with a splint.  Continue to take ibuprofen to reduce inflammation.  Your wound does not appear red but does feel slightly warm and is swollen, will therefore treat with antibiotics for coverage of possible early secondary infection.  Pain medications as needed.  Do not drink alcohol, work, or operate machinery while taking these.  Please follow-up with orthopedics per referral.    Return should he develop any fevers, worsening pain, or any other symptoms of concern.

## 2023-10-25 ENCOUNTER — ALLIED HEALTH/NURSE VISIT (OUTPATIENT)
Dept: FAMILY MEDICINE | Facility: CLINIC | Age: 43
End: 2023-10-25
Payer: COMMERCIAL

## 2023-10-25 DIAGNOSIS — Z48.02 VISIT FOR SUTURE REMOVAL: Primary | ICD-10-CM

## 2023-10-25 PROCEDURE — 99207 PR NO CHARGE NURSE ONLY: CPT

## 2023-10-25 NOTE — PROGRESS NOTES
James Blanco presents to the clinic for removal of sutures. The patient has had sutures in place for 10 days. There has been no patient reported signs or symptoms of infection or drainage. 8  sutures are seen and located on the L thumb. Tetanus status is up to date. All sutures were easily removed today. Wound appears to be healing well with no signs of infection.  Steri Strips placed for security while still healing, since laceration is over knuckle. Routine wound care discussed by the RN. The patient will follow up as needed and has a referral to ortho as well.    Nelli Tobar RN  [facility]

## 2023-12-06 ASSESSMENT — ANXIETY QUESTIONNAIRES
GAD7 TOTAL SCORE: 5
2. NOT BEING ABLE TO STOP OR CONTROL WORRYING: SEVERAL DAYS
4. TROUBLE RELAXING: NOT AT ALL
1. FEELING NERVOUS, ANXIOUS, OR ON EDGE: SEVERAL DAYS
5. BEING SO RESTLESS THAT IT IS HARD TO SIT STILL: NOT AT ALL
GAD7 TOTAL SCORE: 5
6. BECOMING EASILY ANNOYED OR IRRITABLE: SEVERAL DAYS
7. FEELING AFRAID AS IF SOMETHING AWFUL MIGHT HAPPEN: SEVERAL DAYS
IF YOU CHECKED OFF ANY PROBLEMS ON THIS QUESTIONNAIRE, HOW DIFFICULT HAVE THESE PROBLEMS MADE IT FOR YOU TO DO YOUR WORK, TAKE CARE OF THINGS AT HOME, OR GET ALONG WITH OTHER PEOPLE: SOMEWHAT DIFFICULT
3. WORRYING TOO MUCH ABOUT DIFFERENT THINGS: SEVERAL DAYS

## 2023-12-12 ASSESSMENT — PATIENT HEALTH QUESTIONNAIRE - PHQ9
SUM OF ALL RESPONSES TO PHQ QUESTIONS 1-9: 8
10. IF YOU CHECKED OFF ANY PROBLEMS, HOW DIFFICULT HAVE THESE PROBLEMS MADE IT FOR YOU TO DO YOUR WORK, TAKE CARE OF THINGS AT HOME, OR GET ALONG WITH OTHER PEOPLE: SOMEWHAT DIFFICULT
SUM OF ALL RESPONSES TO PHQ QUESTIONS 1-9: 8

## 2023-12-13 ENCOUNTER — OFFICE VISIT (OUTPATIENT)
Dept: FAMILY MEDICINE | Facility: CLINIC | Age: 43
End: 2023-12-13
Attending: FAMILY MEDICINE
Payer: COMMERCIAL

## 2023-12-13 VITALS
BODY MASS INDEX: 25.62 KG/M2 | TEMPERATURE: 96.9 F | WEIGHT: 217 LBS | HEIGHT: 77 IN | HEART RATE: 78 BPM | OXYGEN SATURATION: 97 % | DIASTOLIC BLOOD PRESSURE: 82 MMHG | SYSTOLIC BLOOD PRESSURE: 124 MMHG | RESPIRATION RATE: 16 BRPM

## 2023-12-13 DIAGNOSIS — F33.1 MODERATE EPISODE OF RECURRENT MAJOR DEPRESSIVE DISORDER (H): Primary | ICD-10-CM

## 2023-12-13 DIAGNOSIS — F41.1 GAD (GENERALIZED ANXIETY DISORDER): ICD-10-CM

## 2023-12-13 PROCEDURE — 90471 IMMUNIZATION ADMIN: CPT | Performed by: FAMILY MEDICINE

## 2023-12-13 PROCEDURE — 91320 SARSCV2 VAC 30MCG TRS-SUC IM: CPT | Performed by: FAMILY MEDICINE

## 2023-12-13 PROCEDURE — 99214 OFFICE O/P EST MOD 30 MIN: CPT | Mod: 25 | Performed by: FAMILY MEDICINE

## 2023-12-13 PROCEDURE — 90480 ADMN SARSCOV2 VAC 1/ONLY CMP: CPT | Performed by: FAMILY MEDICINE

## 2023-12-13 PROCEDURE — 90686 IIV4 VACC NO PRSV 0.5 ML IM: CPT | Performed by: FAMILY MEDICINE

## 2023-12-13 RX ORDER — VILAZODONE HYDROCHLORIDE 10 MG/1
10 TABLET ORAL DAILY
Qty: 7 TABLET | Refills: 0 | Status: SHIPPED | OUTPATIENT
Start: 2023-12-13 | End: 2023-12-28

## 2023-12-13 RX ORDER — VILAZODONE HYDROCHLORIDE 20 MG/1
20 TABLET ORAL DAILY
Qty: 30 TABLET | Refills: 1 | Status: SHIPPED | OUTPATIENT
Start: 2023-12-20 | End: 2023-12-28 | Stop reason: SINTOL

## 2023-12-13 ASSESSMENT — PAIN SCALES - GENERAL: PAINLEVEL: MILD PAIN (3)

## 2023-12-13 NOTE — PROGRESS NOTES
"  Assessment & Plan     Moderate episode of recurrent major depressive disorder (H)  Not well controlled  Discussed switching from desvenlaxafine to viibryd  Add SAD light if able  - vilazodone (VIIBRYD) 10 MG TABS tablet; Take 1 tablet (10 mg) by mouth daily for 7 days  - vilazodone (VIIBRYD) 20 MG TABS tablet; Take 1 tablet (20 mg) by mouth daily    LINNEA (generalized anxiety disorder)  Not well controlled with the lower mood lately       BMI:   Estimated body mass index is 26.07 kg/m  as calculated from the following:    Height as of this encounter: 1.943 m (6' 4.5\").    Weight as of this encounter: 98.4 kg (217 lb).   Weight management plan: Discussed healthy diet and exercise guidelines    See Patient Instructions    Vance Broderick MD  [facility] FRANCISCO J Ramirez is a 43 year old, presenting for the following health issues:  Recheck Medication      12/13/2023     6:49 AM   Additional Questions   Roomed by Lulu Walker   Accompanied by self         12/13/2023     6:49 AM   Patient Reported Additional Medications   Patient reports taking the following new medications none       History of Present Illness       Mental Health Follow-up:  Patient presents to follow-up on Depression & Anxiety.Patient's depression since last visit has been:  Medium  The patient is having other symptoms associated with depression.  Patient's anxiety since last visit has been:  Worse  The patient is having other symptoms associated with anxiety.  Any significant life events: No and financial concerns  Patient is feeling anxious or having panic attacks.  Patient has no concerns about alcohol or drug use.    He eats 0-1 servings of fruits and vegetables daily.He consumes 5 sweetened beverage(s) daily.He exercises with enough effort to increase his heart rate 9 or less minutes per day.  He exercises with enough effort to increase his heart rate 3 or less days per week. He is missing 2 dose(s) of " "medications per week.  He is not taking prescribed medications regularly due to remembering to take.   Still not dreaming but doesn't care on the desvenlafaxine, is a little more apathetic in general. It did help at first, then he increased to 100mg daily. But the last 3-4 weeks feeling more down.  Usually does get some lower mood but usually toward the end of winter.      6/12/2023     6:41 AM 8/16/2023     2:41 PM 12/12/2023     9:46 AM   PHQ   PHQ-9 Total Score 5 4 8   Q9: Thoughts of better off dead/self-harm past 2 weeks Not at all Not at all Not at all          5/6/2022     7:34 AM 8/16/2023     2:56 PM 12/6/2023     9:20 AM   LINNEA-7 SCORE   Total Score   5 (mild anxiety)   Total Score 3 2 5             Review of Systems         Objective    /82 (BP Location: Right arm, Patient Position: Sitting, Cuff Size: Adult Large)   Pulse 78   Temp 96.9  F (36.1  C) (Tympanic)   Resp 16   Ht 1.943 m (6' 4.5\")   Wt 98.4 kg (217 lb)   SpO2 97%   BMI 26.07 kg/m    Body mass index is 26.07 kg/m .  Physical Exam   GENERAL: healthy, alert and no distress  MS: no gross musculoskeletal defects noted, no edema  PSYCH: mentation appears normal, affect normal/bright                    "

## 2023-12-13 NOTE — PATIENT INSTRUCTIONS
Seasonal Affective disorder light (full spectrum light for 30-60 minutes in the morning)  Vitamin D and B    Week 1  Decrease the desvenlafaxine (pristiq) to 50mg daily  Start the Viibryd 10mg daily    Week 2  Decrease pristiq to 50mg every other day  Increase viibryd to 20mg daily    Week 3 and on  Stop the pristiq  Continue viibryd 20mg daily      Phone visit follow up in 6-8 weeks  Other options could be going back to effexor  Or adding buspirone (buspar)

## 2024-02-05 ASSESSMENT — ANXIETY QUESTIONNAIRES
7. FEELING AFRAID AS IF SOMETHING AWFUL MIGHT HAPPEN: NOT AT ALL
5. BEING SO RESTLESS THAT IT IS HARD TO SIT STILL: SEVERAL DAYS
GAD7 TOTAL SCORE: 3
8. IF YOU CHECKED OFF ANY PROBLEMS, HOW DIFFICULT HAVE THESE MADE IT FOR YOU TO DO YOUR WORK, TAKE CARE OF THINGS AT HOME, OR GET ALONG WITH OTHER PEOPLE?: NOT DIFFICULT AT ALL
4. TROUBLE RELAXING: SEVERAL DAYS
6. BECOMING EASILY ANNOYED OR IRRITABLE: NOT AT ALL
3. WORRYING TOO MUCH ABOUT DIFFERENT THINGS: NOT AT ALL
1. FEELING NERVOUS, ANXIOUS, OR ON EDGE: SEVERAL DAYS
GAD7 TOTAL SCORE: 3
7. FEELING AFRAID AS IF SOMETHING AWFUL MIGHT HAPPEN: NOT AT ALL
GAD7 TOTAL SCORE: 3
IF YOU CHECKED OFF ANY PROBLEMS ON THIS QUESTIONNAIRE, HOW DIFFICULT HAVE THESE PROBLEMS MADE IT FOR YOU TO DO YOUR WORK, TAKE CARE OF THINGS AT HOME, OR GET ALONG WITH OTHER PEOPLE: NOT DIFFICULT AT ALL
2. NOT BEING ABLE TO STOP OR CONTROL WORRYING: NOT AT ALL

## 2024-02-05 ASSESSMENT — PATIENT HEALTH QUESTIONNAIRE - PHQ9
10. IF YOU CHECKED OFF ANY PROBLEMS, HOW DIFFICULT HAVE THESE PROBLEMS MADE IT FOR YOU TO DO YOUR WORK, TAKE CARE OF THINGS AT HOME, OR GET ALONG WITH OTHER PEOPLE: SOMEWHAT DIFFICULT
SUM OF ALL RESPONSES TO PHQ QUESTIONS 1-9: 4
SUM OF ALL RESPONSES TO PHQ QUESTIONS 1-9: 4

## 2024-02-06 ENCOUNTER — VIRTUAL VISIT (OUTPATIENT)
Dept: FAMILY MEDICINE | Facility: CLINIC | Age: 44
End: 2024-02-06
Payer: COMMERCIAL

## 2024-02-06 DIAGNOSIS — F33.1 MODERATE EPISODE OF RECURRENT MAJOR DEPRESSIVE DISORDER (H): ICD-10-CM

## 2024-02-06 DIAGNOSIS — F41.1 GAD (GENERALIZED ANXIETY DISORDER): ICD-10-CM

## 2024-02-06 PROBLEM — F10.20 ALCOHOL USE DISORDER, MODERATE, DEPENDENCE (H): Status: RESOLVED | Noted: 2020-11-21 | Resolved: 2024-02-06

## 2024-02-06 PROCEDURE — 99214 OFFICE O/P EST MOD 30 MIN: CPT | Mod: 95 | Performed by: FAMILY MEDICINE

## 2024-02-06 NOTE — PROGRESS NOTES
James is a 43 year old who is being evaluated via a billable video visit.      How would you like to obtain your AVS? MyChart  If the video visit is dropped, the invitation should be resent by: Text to cell phone: [phone]  Will anyone else be joining your video visit? No          Assessment & Plan     Moderate episode of recurrent major depressive disorder (H)  Stable, refill  - vortioxetine (TRINTELLIX) 20 MG tablet; Take 1 tablet (20 mg) by mouth daily    LINNEA (generalized anxiety disorder)  Stable, refill  - vortioxetine (TRINTELLIX) 20 MG tablet; Take 1 tablet (20 mg) by mouth daily          See Patient Instructions    Subjective   James is a 43 year old, presenting for the following health issues:  Depression (Follow up)      2/6/2024    12:50 PM   Additional Questions   Roomed by Lulu Walker   Accompanied by self         2/6/2024    12:50 PM   Patient Reported Additional Medications   Patient reports taking the following new medications none     History of Present Illness       Mental Health Follow-up:  Patient presents to follow-up on Depression & Anxiety.Patient's depression since last visit has been:  Better  The patient is having other symptoms associated with depression.  Patient's anxiety since last visit has been:  Better  The patient is not having other symptoms associated with anxiety.  Any significant life events: job concerns  Patient is not feeling anxious or having panic attacks.  Patient has no concerns about alcohol or drug use.    He eats 0-1 servings of fruits and vegetables daily.He consumes 4 sweetened beverage(s) daily.He exercises with enough effort to increase his heart rate 9 or less minutes per day.  He exercises with enough effort to increase his heart rate 3 or less days per week. He is missing 2 dose(s) of medications per week.     Feeling good with the vilazodone 20mg daily.    Tried venlafaxine with no mood and apathy.  Tried prozac in past with feeling emptiness in  gut  Wellbutrin panic attack.    Did genesite testing.    Therapy: in past, not currently.        Objective    Vitals - Patient Reported  Pain Score: No Pain (0)        Physical Exam   GENERAL: alert and no distress  EYES: Eyes grossly normal to inspection.  No discharge or erythema, or obvious scleral/conjunctival abnormalities.  RESP: No audible wheeze, cough, or visible cyanosis.    SKIN: Visible skin clear. No significant rash, abnormal pigmentation or lesions.  NEURO: Cranial nerves grossly intact.  Mentation and speech appropriate for age.  PSYCH: Appropriate affect, tone, and pace of words        Video-Visit Details    Type of service:  Video Visit     Originating Location (pt. Location): Home    Distant Location (provider location):  On-site  Platform used for Video Visit: Arely  Signed Electronically by: Vance Broderick MD

## 2024-02-25 DIAGNOSIS — Z15.89 HETEROZYGOUS FOR MTHFR GENE MUTATION: ICD-10-CM

## 2024-02-27 RX ORDER — FOLIC ACID 1 MG/1
TABLET ORAL
Qty: 90 TABLET | Refills: 0 | Status: SHIPPED | OUTPATIENT
Start: 2024-02-27

## 2024-04-05 NOTE — PROGRESS NOTES
Injectable Influenza Immunization Documentation    1.  Is the person to be vaccinated sick today?   No    2. Does the person to be vaccinated have an allergy to a component   of the vaccine?   No  Egg Allergy Algorithm Link    3. Has the person to be vaccinated ever had a serious reaction   to influenza vaccine in the past?   No    4. Has the person to be vaccinated ever had Guillain-Barré syndrome?   No    Form completed by patient  KIM Turner MA            Addended by: JUJU BONILLA on: 4/5/2024 04:11 PM     Modules accepted: Orders

## 2024-04-16 ENCOUNTER — OFFICE VISIT (OUTPATIENT)
Dept: URGENT CARE | Facility: URGENT CARE | Age: 44
End: 2024-04-16
Payer: COMMERCIAL

## 2024-04-16 ENCOUNTER — ANCILLARY PROCEDURE (OUTPATIENT)
Dept: GENERAL RADIOLOGY | Facility: CLINIC | Age: 44
End: 2024-04-16
Attending: PHYSICIAN ASSISTANT
Payer: COMMERCIAL

## 2024-04-16 VITALS
TEMPERATURE: 97.1 F | BODY MASS INDEX: 26.31 KG/M2 | DIASTOLIC BLOOD PRESSURE: 86 MMHG | HEART RATE: 62 BPM | OXYGEN SATURATION: 98 % | RESPIRATION RATE: 16 BRPM | WEIGHT: 219 LBS | SYSTOLIC BLOOD PRESSURE: 121 MMHG

## 2024-04-16 DIAGNOSIS — S69.92XA FINGER INJURY, LEFT, INITIAL ENCOUNTER: ICD-10-CM

## 2024-04-16 DIAGNOSIS — S62.637A CLOSED DISPLACED FRACTURE OF DISTAL PHALANX OF LEFT LITTLE FINGER, INITIAL ENCOUNTER: Primary | ICD-10-CM

## 2024-04-16 PROCEDURE — 73140 X-RAY EXAM OF FINGER(S): CPT | Mod: TC | Performed by: RADIOLOGY

## 2024-04-16 PROCEDURE — 99214 OFFICE O/P EST MOD 30 MIN: CPT | Performed by: PHYSICIAN ASSISTANT

## 2024-04-16 RX ORDER — OXYCODONE HYDROCHLORIDE 5 MG/1
5 TABLET ORAL EVERY 6 HOURS PRN
Qty: 10 TABLET | Refills: 0 | Status: SHIPPED | OUTPATIENT
Start: 2024-04-16 | End: 2024-04-19

## 2024-04-16 NOTE — PROGRESS NOTES
Assessment & Plan     Closed fracture of distal phalanx of left little finger, initial encounter  Immobilized with finger splint. Continue with tylenol/ibuprofen as needed. Can take oxycodone for pain at night. Follow up with orthopedics in 1 week.    - oxyCODONE (ROXICODONE) 5 MG tablet; Take 1 tablet (5 mg) by mouth every 6 hours as needed for pain    Finger injury, left, initial encounter    - XR Finger Left G/E 2 Views; Future  - Orthopedic  Referral; Future                  Return in about 1 week (around 4/23/2024) for Follow up.            Subjective   Chief Complaint   Patient presents with    Hand Injury     Left pinky injury, got hammered by rubber mallet yesterday morning. Swollen and red.       HPI       Hand injury   Onset of symptoms was 1 day(s) ago.  Course of illness is same.    Severity moderate  Current and Associated symptoms: left pinky injured by mallet   Treatment measures tried include None tried.  Predisposing factors include None.                    Objective    /86   Pulse 62   Temp 97.1  F (36.2  C) (Tympanic)   Resp 16   Wt 99.3 kg (219 lb)   SpO2 98%   BMI 26.31 kg/m    Body mass index is 26.31 kg/m .  Physical Exam  Constitutional:       General: He is not in acute distress.  Musculoskeletal:      Comments: Left 5th finger has swelling and tenderness and DIP joint. Limited ROM due to pain. Good capillary refill.    Neurological:      Mental Status: He is alert.            Xray - Reviewed and interpreted by me.  Fracture at left 5th DIP joint         Signed Electronically by: Sandrine Thompson PA-C

## 2024-05-03 ENCOUNTER — TELEPHONE (OUTPATIENT)
Dept: ORTHOPEDICS | Facility: CLINIC | Age: 44
End: 2024-05-03

## 2024-05-03 NOTE — TELEPHONE ENCOUNTER
[facility] Call Center    Phone Message    May a detailed message be left on voicemail: yes     Reason for Call: Other: DOI 04/22 / XR / [facility] / Splint      Action Taken: Message routed to:  [facility] Orthopedics    Travel Screening: Not Applicable

## 2024-05-03 NOTE — TELEPHONE ENCOUNTER
Orthopedic/Sports Medicine Fracture Triage    Incoming call/or message from call center member.    Fracture type: Finger.    The patient is in a  splint.    Date of injury 4/16/24.    Triaged by: JASWINDER Salcido .    Determined to be managed Non operatively.    Needs to be seen within 1 week.    Additional Comments/information:     James Farah, ATC

## 2024-05-15 ENCOUNTER — ANCILLARY PROCEDURE (OUTPATIENT)
Dept: GENERAL RADIOLOGY | Facility: CLINIC | Age: 44
End: 2024-05-15
Attending: PEDIATRICS
Payer: COMMERCIAL

## 2024-05-15 ENCOUNTER — OFFICE VISIT (OUTPATIENT)
Dept: ORTHOPEDICS | Facility: CLINIC | Age: 44
End: 2024-05-15
Attending: PHYSICIAN ASSISTANT
Payer: COMMERCIAL

## 2024-05-15 DIAGNOSIS — S62.637A CLOSED DISPLACED FRACTURE OF DISTAL PHALANX OF LEFT LITTLE FINGER, INITIAL ENCOUNTER: ICD-10-CM

## 2024-05-15 DIAGNOSIS — S69.92XA FINGER INJURY, LEFT, INITIAL ENCOUNTER: ICD-10-CM

## 2024-05-15 DIAGNOSIS — M20.012 MALLET FINGER OF LEFT HAND: Primary | ICD-10-CM

## 2024-05-15 PROCEDURE — 73140 X-RAY EXAM OF FINGER(S): CPT | Mod: TC | Performed by: RADIOLOGY

## 2024-05-15 PROCEDURE — 99213 OFFICE O/P EST LOW 20 MIN: CPT | Performed by: PEDIATRICS

## 2024-05-15 NOTE — PROGRESS NOTES
ASSESSMENT & PLAN    James was seen today for injury.    Diagnoses and all orders for this visit:    Mallet finger of left hand  -     Orthopedic  Referral; Future    Finger injury, left, initial encounter  -     Orthopedic  Referral  -     XR Finger LT G/E 2 vw  -     Orthopedic  Referral; Future    Closed displaced fracture of distal phalanx of left little finger, initial encounter  -     Orthopedic  Referral; Future      This issue is acute and Unchanged.        ICD-10-CM    1. Mallet finger of left hand  M20.012 Orthopedic  Referral      2. Finger injury, left, initial encounter  S69.92XA Orthopedic  Referral     XR Finger LT G/E 2 vw     Orthopedic  Referral      3. Closed displaced fracture of distal phalanx of left little finger, initial encounter  S62.637A Orthopedic  Referral        Patient Instructions   We discussed these other possible diagnosis: Given bony mallet ~ 1 month out and displacement, will refer to hand surgery.    Plan:  - Today's Plan of Care:  Staxx splint    We would like you to consult with Orthopedic Surgery, Dr. Jasso at [facility] [place].  Please schedule this appointment by contacting [phone].    Rashaad Jasso      Follow Up: as needed with me    Concerning signs and symptoms were reviewed and all questions were answered at this time.    Jena Mon MD [facility]  Sports Medicine Physician  [facility]    -----  Chief Complaint   Patient presents with    Left Little Finger - Injury       SUBJECTIVE  James Blanco is a/an 43 year old male who is seen as an Urgent Care referral for evaluation of left little finger.    The patient is seen by themselves.  The patient is Right handed    Onset: 4/14/24, patient describes injury as hurt the finger attempting to disassemble a shed.  He thinks he hit the finger with a rubber mallet  Location of Pain: left little finger; DIP    Worsened by:  any use, flexion   Better with: oxycodone   Treatments tried: ice, ibuprofen, other medications: Hydrocodone/Acetaminophen (Vicodin/Norco), previous imaging (xray 4/16/24), and casting/splinting/bracing  Associated symptoms: swelling, weakness of left little finger , feeling of instability, and deformity   - went to the  initially, has been in an alumafoam splint most of the time    Orthopedic/Surgical history: YES - previously seen for shoulder pain  Social History/Occupation: self employed      REVIEW OF SYSTEMS:  Review of Systems    OBJECTIVE:  There were no vitals taken for this visit.   General: healthy, alert and in no distress  Skin: no suspicious lesions or rash.  CV: distal perfusion intact   Resp: normal respiratory effort without conversational dyspnea   Psych: normal mood and affect  Gait: NORMAL  Neuro: Normal light sensory exam of upper extremity    Bilateral Wrist and Hand exam    Inspection:       Swelling: left 5th finger DIP joint    Tender:       Left 5th finger DIP joint    Non Tender:       Remainder of the Wrist and Hand bilateral    ROM/Strength:       Unable to fully extend left 5th PIP joint    Neurovascular:       2+ radial pulses bilaterally with brisk capillary refill and      normal sensation to light touch in the radial, median and ulnar nerve distributions      RADIOLOGY:  Final results and radiologist's interpretation, available in the [facility] health record.  Images were reviewed with the patient in the office today.  My personal interpretation of the performed imaging:    3 XR views of left finger reviewed and compared to  4/16/2024: increased displacement dorsal base of distal phalanx 5th finger, no significant degenerative change  - will follow official read    Results for orders placed or performed in visit on 04/16/24   XR Finger Left G/E 2 Views    Narrative    XR FINGER LEFT G/E 2 VIEWS   4/16/2024 2:46 PM     HISTORY: Finger injury, left, initial  encounter  COMPARISON: None.       Impression    IMPRESSION: Normal joint spaces and alignment. Minimally displaced  fracture of the dorsal base of the distal phalanx little finger  (mallet finger).    FRANCISCO GARCIA MD         SYSTEM ID:  [id number]       Review of prior external note(s) from -   Review of the result(s) of each unique test - XR

## 2024-05-15 NOTE — LETTER
5/15/2024         RE: James Blanco  [address] [place] [address]        Dear Colleague,    Thank you for referring your patient, James Blanco, to the [facility] [place]. Please see a copy of my visit note below.    ASSESSMENT & PLAN    James was seen today for injury.    Diagnoses and all orders for this visit:    Mallet finger of left hand  -     Orthopedic  Referral; Future    Finger injury, left, initial encounter  -     Orthopedic  Referral  -     XR Finger LT G/E 2 vw  -     Orthopedic  Referral; Future    Closed displaced fracture of distal phalanx of left little finger, initial encounter  -     Orthopedic  Referral; Future      This issue is acute and Unchanged.        ICD-10-CM    1. Mallet finger of left hand  M20.012 Orthopedic  Referral      2. Finger injury, left, initial encounter  S69.92XA Orthopedic  Referral     XR Finger LT G/E 2 vw     Orthopedic  Referral      3. Closed displaced fracture of distal phalanx of left little finger, initial encounter  S62.637A Orthopedic  Referral        Patient Instructions   We discussed these other possible diagnosis: Given bony mallet ~ 1 month out and displacement, will refer to hand surgery.    Plan:  - Today's Plan of Care:  Staxx splint    We would like you to consult with Orthopedic Surgery, Dr. Jasso at [facility] [place].  Please schedule this appointment by contacting [phone].    Rashaad Jasso      Follow Up: as needed with me    Concerning signs and symptoms were reviewed and all questions were answered at this time.    Jena Mon MD [facility]  Sports Medicine Physician  [facility]    -----  Chief Complaint   Patient presents with     Left Little Finger - Injury       SUBJECTIVE  James Blanco is a/an 43 year old male who is seen as an Urgent Care referral for evaluation of left little  finger.    The patient is seen by themselves.  The patient is Right handed    Onset: 4/14/24, patient describes injury as hurt the finger attempting to disassemble a shed.  He thinks he hit the finger with a rubber mallet  Location of Pain: left little finger; DIP   Worsened by:  any use, flexion   Better with: oxycodone   Treatments tried: ice, ibuprofen, other medications: Hydrocodone/Acetaminophen (Vicodin/Norco), previous imaging (xray 4/16/24), and casting/splinting/bracing  Associated symptoms: swelling, weakness of left little finger , feeling of instability, and deformity   - went to the  initially, has been in an alumafoam splint most of the time    Orthopedic/Surgical history: YES - previously seen for shoulder pain  Social History/Occupation: self employed      REVIEW OF SYSTEMS:  Review of Systems    OBJECTIVE:  There were no vitals taken for this visit.   General: healthy, alert and in no distress  Skin: no suspicious lesions or rash.  CV: distal perfusion intact   Resp: normal respiratory effort without conversational dyspnea   Psych: normal mood and affect  Gait: NORMAL  Neuro: Normal light sensory exam of upper extremity    Bilateral Wrist and Hand exam    Inspection:       Swelling: left 5th finger DIP joint    Tender:       Left 5th finger DIP joint    Non Tender:       Remainder of the Wrist and Hand bilateral    ROM/Strength:       Unable to fully extend left 5th PIP joint    Neurovascular:       2+ radial pulses bilaterally with brisk capillary refill and      normal sensation to light touch in the radial, median and ulnar nerve distributions      RADIOLOGY:  Final results and radiologist's interpretation, available in the [facility] health record.  Images were reviewed with the patient in the office today.  My personal interpretation of the performed imaging:    3 XR views of left finger reviewed and compared to  4/16/2024: increased displacement dorsal base of distal phalanx 5th finger, no  significant degenerative change  - will follow official read    Results for orders placed or performed in visit on 04/16/24   XR Finger Left G/E 2 Views    Narrative    XR FINGER LEFT G/E 2 VIEWS   4/16/2024 2:46 PM     HISTORY: Finger injury, left, initial encounter  COMPARISON: None.       Impression    IMPRESSION: Normal joint spaces and alignment. Minimally displaced  fracture of the dorsal base of the distal phalanx little finger  (mallet finger).    FRANCISCO GARCIA MD         SYSTEM ID:  [id number]       Review of prior external note(s) from -   Review of the result(s) of each unique test - XR             Again, thank you for allowing me to participate in the care of your patient.        Sincerely,        Jena Mon MD

## 2024-05-15 NOTE — PATIENT INSTRUCTIONS
We discussed these other possible diagnosis: Given bony mallet ~ 1 month out and displacement, will refer to hand surgery.    Plan:  - Today's Plan of Care:  Staxx splint    We would like you to consult with Orthopedic Surgery, Dr. Jasso at [facility] [place].  Please schedule this appointment by contacting [phone].    Rashaad Jasso      Follow Up: as needed with me    If you have any further questions for your physician or physician s care team you can call [phone] and use option 3 to leave a voice message.

## 2024-06-25 ENCOUNTER — OFFICE VISIT (OUTPATIENT)
Dept: URGENT CARE | Facility: URGENT CARE | Age: 44
End: 2024-06-25
Payer: COMMERCIAL

## 2024-06-25 VITALS
HEART RATE: 72 BPM | SYSTOLIC BLOOD PRESSURE: 117 MMHG | DIASTOLIC BLOOD PRESSURE: 72 MMHG | BODY MASS INDEX: 26.07 KG/M2 | WEIGHT: 217 LBS | OXYGEN SATURATION: 98 % | TEMPERATURE: 97.4 F | RESPIRATION RATE: 14 BRPM

## 2024-06-25 DIAGNOSIS — H66.91 ACUTE INFECTION OF RIGHT EAR: Primary | ICD-10-CM

## 2024-06-25 PROCEDURE — 99213 OFFICE O/P EST LOW 20 MIN: CPT | Performed by: PHYSICIAN ASSISTANT

## 2024-06-25 RX ORDER — CEFDINIR 300 MG/1
300 CAPSULE ORAL 2 TIMES DAILY
Qty: 14 CAPSULE | Refills: 0 | Status: SHIPPED | OUTPATIENT
Start: 2024-06-25 | End: 2024-07-02

## 2024-06-25 RX ORDER — VARENICLINE TARTRATE 0.5 (11)-1
KIT ORAL
COMMUNITY
Start: 2023-08-16

## 2024-06-25 RX ORDER — NEOMYCIN SULFATE, POLYMYXIN B SULFATE, HYDROCORTISONE 3.5; 10000; 1 MG/ML; [USP'U]/ML; MG/ML
3 SOLUTION/ DROPS AURICULAR (OTIC) 4 TIMES DAILY
Qty: 10 ML | Refills: 0 | Status: SHIPPED | OUTPATIENT
Start: 2024-06-25 | End: 2024-07-02

## 2024-06-25 NOTE — PROGRESS NOTES
"  Assessment & Plan     Acute infection of right ear  Will treat with cortisporin and cefdinir. Keep ear dry. Return to clinic if symptoms worsen or do not improve; otherwise follow up as needed      - neomycin-polymyxin-hydrocortisone (CORTISPORIN) 3.5-88219-1 otic solution; Place 3 drops into the right ear 4 times daily for 7 days  - cefdinir (OMNICEF) 300 MG capsule; Take 1 capsule (300 mg) by mouth 2 times daily for 7 days          BMI  Estimated body mass index is 26.07 kg/m  as calculated from the following:    Height as of 12/13/23: 1.943 m (6' 4.5\").    Weight as of this encounter: 98.4 kg (217 lb).             No follow-ups on file.            Subjective   Chief Complaint   Patient presents with    Ear Problem     Pt just getting over a cold, now has Right ear pain since yesterday.       HPI     Ear problem     Onset of symptoms was 1 day(s) ago.  Course of illness is same.    Severity moderate  Current and Associated symptoms: right ear pain  Treatment measures tried include Tylenol/Ibuprofen.  Predisposing factors include tried flushing.                      Objective    /72   Pulse 72   Temp 97.4  F (36.3  C) (Tympanic)   Resp 14   Wt 98.4 kg (217 lb)   SpO2 98%   BMI 26.07 kg/m    Body mass index is 26.07 kg/m .  Physical Exam  Constitutional:       General: He is not in acute distress.  HENT:      Left Ear: Tympanic membrane and ear canal normal.      Ears:      Comments: There is swelling of right canal and mild redness. Unable to see right TM.   Neurological:      Mental Status: He is alert.                    Signed Electronically by: Sandrine Thompson PA-C    "

## 2024-10-19 ENCOUNTER — APPOINTMENT (OUTPATIENT)
Dept: GENERAL RADIOLOGY | Facility: CLINIC | Age: 44
End: 2024-10-19
Attending: FAMILY MEDICINE
Payer: COMMERCIAL

## 2024-10-19 ENCOUNTER — HOSPITAL ENCOUNTER (EMERGENCY)
Facility: CLINIC | Age: 44
Discharge: HOME | End: 2024-10-19
Attending: FAMILY MEDICINE | Admitting: FAMILY MEDICINE
Payer: COMMERCIAL

## 2024-10-19 VITALS
OXYGEN SATURATION: 96 % | WEIGHT: 215 LBS | SYSTOLIC BLOOD PRESSURE: 128 MMHG | BODY MASS INDEX: 25.83 KG/M2 | RESPIRATION RATE: 18 BRPM | TEMPERATURE: 98.3 F | DIASTOLIC BLOOD PRESSURE: 85 MMHG | HEART RATE: 76 BPM

## 2024-10-19 DIAGNOSIS — M94.0 COSTOCHONDRITIS: ICD-10-CM

## 2024-10-19 DIAGNOSIS — R07.81 PLEURITIC CHEST PAIN: ICD-10-CM

## 2024-10-19 DIAGNOSIS — R53.83 OTHER FATIGUE: ICD-10-CM

## 2024-10-19 DIAGNOSIS — E78.2 MIXED HYPERLIPIDEMIA: ICD-10-CM

## 2024-10-19 LAB
ALBUMIN SERPL BCG-MCNC: 4.5 G/DL (ref 3.5–5.2)
ALP SERPL-CCNC: 73 U/L (ref 40–150)
ALT SERPL W P-5'-P-CCNC: 16 U/L (ref 0–70)
ANION GAP SERPL CALCULATED.3IONS-SCNC: 10 MMOL/L (ref 7–15)
AST SERPL W P-5'-P-CCNC: 18 U/L (ref 0–45)
BASOPHILS # BLD AUTO: 0.1 10E3/UL (ref 0–0.2)
BASOPHILS NFR BLD AUTO: 1 %
BILIRUB SERPL-MCNC: 0.3 MG/DL
BUN SERPL-MCNC: 11.8 MG/DL (ref 6–20)
CALCIUM SERPL-MCNC: 9.4 MG/DL (ref 8.8–10.4)
CHLORIDE SERPL-SCNC: 103 MMOL/L (ref 98–107)
CREAT SERPL-MCNC: 1.05 MG/DL (ref 0.67–1.17)
D DIMER PPP FEU-MCNC: <0.27 UG/ML FEU (ref 0–0.5)
EGFRCR SERPLBLD CKD-EPI 2021: 89 ML/MIN/1.73M2
EOSINOPHIL # BLD AUTO: 0.3 10E3/UL (ref 0–0.7)
EOSINOPHIL NFR BLD AUTO: 4 %
ERYTHROCYTE [DISTWIDTH] IN BLOOD BY AUTOMATED COUNT: 12 % (ref 10–15)
GLUCOSE SERPL-MCNC: 109 MG/DL (ref 70–99)
HCO3 SERPL-SCNC: 26 MMOL/L (ref 22–29)
HCT VFR BLD AUTO: 42.4 % (ref 40–53)
HGB BLD-MCNC: 14.7 G/DL (ref 13.3–17.7)
HOLD SPECIMEN: NORMAL
IMM GRANULOCYTES # BLD: 0 10E3/UL
IMM GRANULOCYTES NFR BLD: 0 %
LIPASE SERPL-CCNC: 26 U/L (ref 13–60)
LYMPHOCYTES # BLD AUTO: 2.6 10E3/UL (ref 0.8–5.3)
LYMPHOCYTES NFR BLD AUTO: 36 %
MCH RBC QN AUTO: 32.1 PG (ref 26.5–33)
MCHC RBC AUTO-ENTMCNC: 34.7 G/DL (ref 31.5–36.5)
MCV RBC AUTO: 93 FL (ref 78–100)
MONOCYTES # BLD AUTO: 0.7 10E3/UL (ref 0–1.3)
MONOCYTES NFR BLD AUTO: 10 %
NEUTROPHILS # BLD AUTO: 3.6 10E3/UL (ref 1.6–8.3)
NEUTROPHILS NFR BLD AUTO: 50 %
NRBC # BLD AUTO: 0 10E3/UL
NRBC BLD AUTO-RTO: 0 /100
PLATELET # BLD AUTO: 254 10E3/UL (ref 150–450)
POTASSIUM SERPL-SCNC: 3.7 MMOL/L (ref 3.4–5.3)
PROT SERPL-MCNC: 7 G/DL (ref 6.4–8.3)
RBC # BLD AUTO: 4.58 10E6/UL (ref 4.4–5.9)
SODIUM SERPL-SCNC: 139 MMOL/L (ref 135–145)
TROPONIN T SERPL HS-MCNC: <6 NG/L
WBC # BLD AUTO: 7.3 10E3/UL (ref 4–11)

## 2024-10-19 PROCEDURE — 99285 EMERGENCY DEPT VISIT HI MDM: CPT | Mod: 25 | Performed by: FAMILY MEDICINE

## 2024-10-19 PROCEDURE — 36415 COLL VENOUS BLD VENIPUNCTURE: CPT | Performed by: FAMILY MEDICINE

## 2024-10-19 PROCEDURE — 93010 ELECTROCARDIOGRAM REPORT: CPT | Performed by: FAMILY MEDICINE

## 2024-10-19 PROCEDURE — 93005 ELECTROCARDIOGRAM TRACING: CPT | Performed by: FAMILY MEDICINE

## 2024-10-19 PROCEDURE — 84484 ASSAY OF TROPONIN QUANT: CPT | Performed by: FAMILY MEDICINE

## 2024-10-19 PROCEDURE — 96374 THER/PROPH/DIAG INJ IV PUSH: CPT | Performed by: FAMILY MEDICINE

## 2024-10-19 PROCEDURE — 82040 ASSAY OF SERUM ALBUMIN: CPT | Performed by: FAMILY MEDICINE

## 2024-10-19 PROCEDURE — 80061 LIPID PANEL: CPT

## 2024-10-19 PROCEDURE — 85379 FIBRIN DEGRADATION QUANT: CPT | Performed by: FAMILY MEDICINE

## 2024-10-19 PROCEDURE — 76604 US EXAM CHEST: CPT | Mod: 26 | Performed by: FAMILY MEDICINE

## 2024-10-19 PROCEDURE — 76604 US EXAM CHEST: CPT | Performed by: FAMILY MEDICINE

## 2024-10-19 PROCEDURE — 85025 COMPLETE CBC W/AUTO DIFF WBC: CPT | Performed by: FAMILY MEDICINE

## 2024-10-19 PROCEDURE — 83690 ASSAY OF LIPASE: CPT | Performed by: FAMILY MEDICINE

## 2024-10-19 PROCEDURE — 250N000011 HC RX IP 250 OP 636: Performed by: FAMILY MEDICINE

## 2024-10-19 PROCEDURE — 84443 ASSAY THYROID STIM HORMONE: CPT

## 2024-10-19 PROCEDURE — 86140 C-REACTIVE PROTEIN: CPT

## 2024-10-19 PROCEDURE — 71046 X-RAY EXAM CHEST 2 VIEWS: CPT

## 2024-10-19 PROCEDURE — 93005 ELECTROCARDIOGRAM TRACING: CPT | Mod: 76 | Performed by: FAMILY MEDICINE

## 2024-10-19 RX ORDER — KETOROLAC TROMETHAMINE 15 MG/ML
15 INJECTION, SOLUTION INTRAMUSCULAR; INTRAVENOUS ONCE
Status: COMPLETED | OUTPATIENT
Start: 2024-10-19 | End: 2024-10-19

## 2024-10-19 RX ADMIN — KETOROLAC TROMETHAMINE 15 MG: 15 INJECTION, SOLUTION INTRAMUSCULAR; INTRAVENOUS at 15:07

## 2024-10-19 ASSESSMENT — ENCOUNTER SYMPTOMS
DYSURIA: 0
BLOOD IN STOOL: 0
HEADACHES: 0
SHORTNESS OF BREATH: 0
DIAPHORESIS: 0
CHILLS: 0
PALPITATIONS: 0
SINUS PRESSURE: 0
CONSTIPATION: 0
SORE THROAT: 0
NAUSEA: 0
FEVER: 0
VOMITING: 0
COUGH: 0
WHEEZING: 0
DIARRHEA: 0
ABDOMINAL PAIN: 0
FREQUENCY: 0

## 2024-10-19 ASSESSMENT — ACTIVITIES OF DAILY LIVING (ADL)
ADLS_ACUITY_SCORE: 35
ADLS_ACUITY_SCORE: 35

## 2024-10-19 ASSESSMENT — COLUMBIA-SUICIDE SEVERITY RATING SCALE - C-SSRS
6. HAVE YOU EVER DONE ANYTHING, STARTED TO DO ANYTHING, OR PREPARED TO DO ANYTHING TO END YOUR LIFE?: NO
1. IN THE PAST MONTH, HAVE YOU WISHED YOU WERE DEAD OR WISHED YOU COULD GO TO SLEEP AND NOT WAKE UP?: NO
2. HAVE YOU ACTUALLY HAD ANY THOUGHTS OF KILLING YOURSELF IN THE PAST MONTH?: NO

## 2024-10-19 NOTE — ED PROVIDER NOTES
History     Chief Complaint   Patient presents with    Chest Pain     Ongoing chest pain X 1 week, constant. Worse with mvmt, deep breath.        HPI  James Blanco is a 44 year old male who presents with 1 week of chest pain pleuritic.  History of anxiety.   PTSD.  Fatty liver.  Hyperlipidemia.  He has a history of alcohol use disorder in the past now in remission, prior tobacco abuse and vaping is now quit that.  Still uses marijuana daily.  Family history of coronary disease.  No known VTE risk.  Presents with anterior chest pain right side onset about 5 to 6 days ago.  Does work construction heavier lifting.  No obvious injury.  Some pain with deep breathing torso movement.  No associated fever cough congestion upper respiratory symptoms no associated nausea vomiting or sweats.  No significant abdominal pain not associated with food intake.    Denies recent prolonged travel (>3 hours) by car or plane, history or FHx of venous thromboembolism, recent surgery (last 4 weeks), active cancer history, hypercoagulable state, estrogen or other medications/conditions causing VTE or  new unilateral swelling or pain in the legs or calves.      Allergies:  Allergies   Allergen Reactions    Bees Swelling     Has epi-pen    Ceclor [Cefaclor]      Was an infant    Penicillins      Was an infant       Problem List:    Patient Active Problem List    Diagnosis Date Noted    Tobacco use disorder 05/11/2021     Priority: Medium    PTSD (post-traumatic stress disorder) 04/14/2021     Priority: Medium    Closed nondisplaced fracture of fifth metatarsal bone of right foot, initial encounter 04/15/2020     Priority: Medium     Added automatically from request for surgery [id number]      Moderate major depression (H) 04/07/2020     Priority: Medium    Grief reaction 01/09/2020     Priority: Medium    Fatty liver, alcoholic 09/26/2018     Priority: Medium    Anxiety 09/01/2015     Priority: Medium    Mixed hyperlipidemia 09/01/2015      Priority: Medium        Past Medical History:    Past Medical History:   Diagnosis Date    Anxiety     Depressive disorder     Hyperlipidaemia LDL goal <130        Past Surgical History:    Past Surgical History:   Procedure Laterality Date    COLONOSCOPY N/A 3/21/2016    Procedure: COLONOSCOPY;  Surgeon: Tavo Wilcox MD;  Location: [facility]    LAPAROSCOPIC APPENDECTOMY N/A 11/20/2022    Procedure: APPENDECTOMY, LAPAROSCOPIC;  Surgeon: Shubham Zeng MD;  Location: [facility]    OPEN REDUCTION INTERNAL FIXATION FOOT Right 4/17/2020    Procedure: Percutaneous intramedullary fixation, right fifth metatarsal fracture;  Surgeon: Amado Sanchez DPM;  Location:  OR    SURGICAL HISTORY OF -       plasty surgery on face, due to injury    SURGICAL HISTORY OF -       left elbow nerve decompression, sound like ulnar    SURGICAL HISTORY OF -       left knee surgery, scope and allograph for cartilege    SURGICAL HISTORY OF -       lasix surgery       Family History:    Family History   Problem Relation Age of Onset    Hyperlipidemia Mother     Coronary Artery Disease Father     Hyperlipidemia Father     Diabetes Father     Other Cancer Sister         lymphoma-non hodgkins    Depression Sister     Anxiety Disorder Sister     Substance Abuse Sister     Substance Abuse Brother     Bipolar Disorder Brother     Depression Brother        Social History:  Marital Status:   [2]  Social History     Tobacco Use    Smoking status: Former     Current packs/day: 1.50     Average packs/day: 1.5 packs/day for 13.0 years (19.5 ttl pk-yrs)     Types: Cigarettes, Other    Smokeless tobacco: Former   Vaping Use    Vaping status: Former    Substances: Nicotine    Devices: Disposable   Substance Use Topics    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    Drug use: Yes     Types: Marijuana     Comment: h/o street drug use 15 yrs ago        Medications:    EPINEPHrine (EPIPEN 2-CHAIM) 0.3 MG/0.3ML injection 2-pack  folic  acid (FOLVITE) 1 MG tablet  ibuprofen (ADVIL/MOTRIN) 200 MG tablet  MELATONIN PO  simvastatin (ZOCOR) 20 MG tablet  varenicline (CHANTIX CHAIM) 0.5 MG X 11 & 1 MG X 42 tablet  vitamin B complex with vitamin C (STRESS TAB) tablet  Vitamin D3 (CHOLECALCIFEROL) 25 mcg (1000 units) tablet  vortioxetine (TRINTELLIX) 20 MG tablet          Review of Systems   Constitutional:  Negative for chills, diaphoresis and fever.   HENT:  Negative for ear pain, sinus pressure and sore throat.    Eyes:  Negative for visual disturbance.   Respiratory:  Negative for cough, shortness of breath and wheezing.    Cardiovascular:  Positive for chest pain. Negative for palpitations.   Gastrointestinal:  Negative for abdominal pain, blood in stool, constipation, diarrhea, nausea and vomiting.   Genitourinary:  Negative for dysuria, frequency and urgency.   Skin:  Negative for rash.   Neurological:  Negative for headaches.   All other systems reviewed and are negative.      Physical Exam   BP: 130/83  Pulse: 69  Temp: 98.3  F (36.8  C)  Resp: 18  Weight: 97.5 kg (215 lb)  SpO2: 97 %      Physical Exam  Constitutional:       General: He is in acute distress.      Appearance: He is not diaphoretic.   Eyes:      Conjunctiva/sclera: Conjunctivae normal.   Cardiovascular:      Rate and Rhythm: Normal rate and regular rhythm.      Heart sounds: No murmur heard.  Pulmonary:      Effort: No respiratory distress.      Breath sounds: No stridor. No wheezing or rhonchi.   Chest:      Chest wall: No tenderness.   Abdominal:      General: Abdomen is flat. There is no distension.      Palpations: Abdomen is soft. There is no mass.      Tenderness: There is no abdominal tenderness. There is no guarding.   Musculoskeletal:      Cervical back: Neck supple.      Right lower leg: No edema.      Left lower leg: No edema.   Skin:     Coloration: Skin is not pale.      Findings: No rash.   Neurological:      Mental Status: He is alert.      Motor: No weakness.     The  trapezius region is nontender.  Some tenderness palpation C7 region no trauma.  No rashes present.    ED Course        Procedures                EKG Interpretation:      Interpreted by Akil Rucker MD  EKG done at 1440 hrs. demonstrates a sinus rhythm 67 bpm normal axis.  There is no ST change.  No T wave changes.  Normal R progression and no Q waves.  Normal intervals.  Normal conduction.  No ectopy.  Impression sinus rhythm 67 bpm no acute change.      Critical Care time:  none              Results for orders placed or performed during the hospital encounter of 10/19/24 (from the past 24 hour(s))   [facility] Draw    Narrative    The following orders were created for panel order [facility] Draw.  Procedure                               Abnormality         Status                     ---------                               -----------         ------                     Extra Blue Top Tube[[id number]]                              Final result               Extra Red Top Tube[[id number]]                               Final result               Extra Green Top (Lithium...[[id number]]                      Final result               Extra Purple Top Tube[[id number]]                            Final result                 Please view results for these tests on the individual orders.   Extra Blue Top Tube   Result Value Ref Range    Hold Specimen JIC    Extra Red Top Tube   Result Value Ref Range    Hold Specimen JIC    Extra Green Top (Lithium Heparin) Tube   Result Value Ref Range    Hold Specimen JIC    Extra Purple Top Tube   Result Value Ref Range    Hold Specimen JIC    CBC with platelets differential    Narrative    The following orders were created for panel order CBC with platelets differential.  Procedure                               Abnormality         Status                     ---------                               -----------         ------                     CBC with platelets and d...[[id number]]                       Final result                 Please view results for these tests on the individual orders.   Comprehensive metabolic panel   Result Value Ref Range    Sodium 139 135 - 145 mmol/L    Potassium 3.7 3.4 - 5.3 mmol/L    Carbon Dioxide (CO2) 26 22 - 29 mmol/L    Anion Gap 10 7 - 15 mmol/L    Urea Nitrogen 11.8 6.0 - 20.0 mg/dL    Creatinine 1.05 0.67 - 1.17 mg/dL    GFR Estimate 89 >60 mL/min/1.73m2    Calcium 9.4 8.8 - 10.4 mg/dL    Chloride 103 98 - 107 mmol/L    Glucose 109 (H) 70 - 99 mg/dL    Alkaline Phosphatase 73 40 - 150 U/L    AST 18 0 - 45 U/L    ALT 16 0 - 70 U/L    Protein Total 7.0 6.4 - 8.3 g/dL    Albumin 4.5 3.5 - 5.2 g/dL    Bilirubin Total 0.3 <=1.2 mg/dL   Troponin T, High Sensitivity   Result Value Ref Range    Troponin T, High Sensitivity <6 <=22 ng/L   Lipase   Result Value Ref Range    Lipase 26 13 - 60 U/L   CBC with platelets and differential   Result Value Ref Range    WBC Count 7.3 4.0 - 11.0 10e3/uL    RBC Count 4.58 4.40 - 5.90 10e6/uL    Hemoglobin 14.7 13.3 - 17.7 g/dL    Hematocrit 42.4 40.0 - 53.0 %    MCV 93 78 - 100 fL    MCH 32.1 26.5 - 33.0 pg    MCHC 34.7 31.5 - 36.5 g/dL    RDW 12.0 10.0 - 15.0 %    Platelet Count 254 150 - 450 10e3/uL    % Neutrophils 50 %    % Lymphocytes 36 %    % Monocytes 10 %    % Eosinophils 4 %    % Basophils 1 %    % Immature Granulocytes 0 %    NRBCs per 100 WBC 0 <1 /100    Absolute Neutrophils 3.6 1.6 - 8.3 10e3/uL    Absolute Lymphocytes 2.6 0.8 - 5.3 10e3/uL    Absolute Monocytes 0.7 0.0 - 1.3 10e3/uL    Absolute Eosinophils 0.3 0.0 - 0.7 10e3/uL    Absolute Basophils 0.1 0.0 - 0.2 10e3/uL    Absolute Immature Granulocytes 0.0 <=0.4 10e3/uL    Absolute NRBCs 0.0 10e3/uL   D dimer quantitative   Result Value Ref Range    D-Dimer Quantitative <0.27 0.00 - 0.50 ug/mL FEU    Narrative    This D-dimer assay is intended for use in conjunction with a clinical pretest probability assessment model to exclude pulmonary embolism (PE) and deep venous  thrombosis (DVT) in outpatients suspected of PE or DVT. The cut-off value is 0.50 ug/mL FEU.   POC US CHEST B-SCAN    Impression    [facility] Procedure Note      Limited Bedside ED Cardiac Ultrasound:    PROCEDURE: PERFORMED BY: Dr. Akil Rucker MD  INDICATIONS/SYMPTOM:  Chest Pain  PROBE: Cardiac phased array probe and High frequency linear probe  BODY LOCATION: Chest  FINDINGS:   The ultrasound was performed utilizing the subcostal, parasternal long axis, parasternal short axis, and apical 4 chamber views.  Cardiac contractility:  Present  Gross estimation of cardiac kinesis: normal  Pericardial Effusion:  ?Trace  RV:LV ratio: LV > RV  IVC:    Diameter:  IVC diameter expiration (IVCe) 2 cm                                                   IVC diameter inspiration (IVCi) 0-1 cm                                                       Collapsibility:  IVC collapses > 50% with inspiration  INTERPRETATION:    Chamber size and motion were grossly normal with LV > RV, normal cardiac kinesis.  No pericardial effusion was found.  IVC visualized and findings indicate normovolemia.  IMAGE DOCUMENTATION: Images were archived to PACs system.       [facility] Procedure Note      Limited Bedside ED Ultrasound of Thorax:    PROCEDURE: PERFORMED BY: Dr. Akil Rucker MD  INDICATIONS/SYMPTOM:  shortness of breath  PROBE: High frequency linear probe  BODY LOCATION: Chest  FINDINGS:  Images of both lung hemithoracies taken in 2D in multiple rib spaces        Right side:  Lung sliding artifact  Present     Comet tail artifacts  Absent   Left side:  Lung sliding artifact  Present     Comet tail artifacts  Absent   Hemothorax: Right side Absent     Left side Absent   Pleural effusion: Right side Absent      Left side Absent    INTERPRETATION: The exam was normal. There was no free fluid identified in the chest cavity. No evidence of pneumothorax, hemothorax or pleural effusion.  IMAGE DOCUMENTATION: Images were  archived to PACs system.         Chest XR,  PA & LAT    Narrative    EXAM: XR CHEST 2 VIEWS  LOCATION: [facility]  DATE: 10/19/2024    INDICATION: chest pain  COMPARISON: None.      Impression    IMPRESSION: Negative chest.  The lungs are clear and there are no pleural effusions. Normal heart size.       Medications   ketorolac (TORADOL) injection 15 mg (15 mg Intravenous $Given 10/19/24 150)       Assessments & Plan (with Medical Decision Making)     MDM: James Blanco is a 44 year old male senting with anterior chest pain right side for the last week.  Pleuritic.  No trauma.  Works construction.  No known VTE risk but does have pleuritic pain.  Recommended obtaining troponin EKG comprehensive panel CBC and also obtain D-dimer.  Bedside ultrasound reassuring.  Laboratory testing and EKG imaging all reassuring.  We discussed   findings and most likely to be chest wall related symptoms but I have asked him also to follow-up in clinic.  He does have family history of coronary disease.  He uses regular marijuana and previously has used tobacco and vaping.  I recommended that he also decrease marijuana use as there can be complications from this as well.  Precautions are given for return.    I have reviewed the nursing notes.    I have reviewed the findings, diagnosis, plan and need for follow up with the patient.           Medical Decision Making  The patient's presentation was of moderate complexity (an acute illness with systemic symptoms).    The patient's evaluation involved:  ordering and/or review of 3+ test(s) in this encounter (see separate area of note for details)    The patient's management necessitated moderate risk (prescription drug management including medications given in the ED).        New Prescriptions    No medications on file       Final diagnoses:   Pleuritic chest pain - reassuring eval.  follow-up clinic.  no signs DVT, pericarditis.  negative d-dimer an dnortmal  tropoinin.  chest xray negative for pneumonia.  follow-up clinic.  take ibuprofen,m tylenol as needed. lidocaine 4% patch to the chest       10/19/2024   [facility] EMERGENCY DEPT       Akil Rucker MD  10/19/24 [id number]

## 2024-10-19 NOTE — ED TRIAGE NOTES
Ongoing chest pain X 1 week, constant. Worse with mvmt, deep breath. Hx of anxiety, has not been taking anxiety meds since summer.      Triage Assessment (Adult)       Row Name 10/19/24 1424          Triage Assessment    Airway WDL WDL        Respiratory WDL    Respiratory WDL WDL        Skin Circulation/Temperature WDL    Skin Circulation/Temperature WDL WDL        Cardiac WDL    Cardiac WDL WDL        Peripheral/Neurovascular WDL    Peripheral Neurovascular WDL WDL        Cognitive/Neuro/Behavioral WDL    Cognitive/Neuro/Behavioral WDL WDL

## 2024-10-19 NOTE — DISCHARGE INSTRUCTIONS
ICD-10-CM    1. Pleuritic chest pain  R07.81     reassuring eval.  follow-up clinic.  no signs DVT, pericarditis.  negative d-dimer an dnortmal tropoinin.  chest xray negative for pneumonia.  follow-up clinic.  take ibuprofen,m tylenol as needed. lidocaine 4% patch to the chest

## 2024-10-25 ENCOUNTER — OFFICE VISIT (OUTPATIENT)
Dept: FAMILY MEDICINE | Facility: CLINIC | Age: 44
End: 2024-10-25
Payer: COMMERCIAL

## 2024-10-25 VITALS
DIASTOLIC BLOOD PRESSURE: 79 MMHG | SYSTOLIC BLOOD PRESSURE: 118 MMHG | TEMPERATURE: 98.2 F | HEIGHT: 76 IN | RESPIRATION RATE: 20 BRPM | WEIGHT: 216 LBS | BODY MASS INDEX: 26.3 KG/M2 | OXYGEN SATURATION: 98 % | HEART RATE: 79 BPM

## 2024-10-25 DIAGNOSIS — R53.83 OTHER FATIGUE: ICD-10-CM

## 2024-10-25 DIAGNOSIS — E78.2 MIXED HYPERLIPIDEMIA: ICD-10-CM

## 2024-10-25 DIAGNOSIS — M94.0 COSTOCHONDRITIS: Primary | ICD-10-CM

## 2024-10-25 LAB
CHOLEST SERPL-MCNC: 269 MG/DL
CRP SERPL-MCNC: <3 MG/L
HDLC SERPL-MCNC: 33 MG/DL
LDLC SERPL CALC-MCNC: 204 MG/DL
NONHDLC SERPL-MCNC: 236 MG/DL
TRIGL SERPL-MCNC: 161 MG/DL
TSH SERPL DL<=0.005 MIU/L-ACNC: 2.63 UIU/ML (ref 0.3–4.2)

## 2024-10-25 PROCEDURE — 99214 OFFICE O/P EST MOD 30 MIN: CPT | Performed by: FAMILY MEDICINE

## 2024-10-25 RX ORDER — NAPROXEN 500 MG/1
500 TABLET ORAL 2 TIMES DAILY WITH MEALS
Qty: 56 TABLET | Refills: 0 | Status: SHIPPED | OUTPATIENT
Start: 2024-10-25 | End: 2024-11-22

## 2024-10-25 ASSESSMENT — PAIN SCALES - GENERAL: PAINLEVEL_OUTOF10: MILD PAIN (2)

## 2024-10-25 NOTE — PROGRESS NOTES
"  Assessment & Plan     Costochondritis  Symptoms and exam consistent for costochondritis  Reviewed ED visit: d-dimer, troponin, EKG, CXR unremarkable  Orders as below  Take with meals  - naproxen (NAPROSYN) 500 MG tablet; Take 1 tablet (500 mg) by mouth 2 times daily (with meals) for 28 days. Discontinue prior to 4 weeks if symptoms resolved  - CRP, inflammation; Future    Mixed hyperlipidemia  Not taking statin  - Lipid panel reflex to direct LDL Non-fasting; Future    Other fatigue  Generalized fatigue no weight loss no fevers  - TSH with free T4 reflex; Future      Follow up in 1-2 months for acute follow up and other chronic conditions    BMI  Estimated body mass index is 25.95 kg/m  as calculated from the following:    Height as of this encounter: 1.943 m (6' 4.5\").    Weight as of this encounter: 98 kg (216 lb).             Anibal Ramirez is a 44 year old, presenting for the following health issues:  No chief complaint on file.        10/25/2024     9:20 AM   Additional Questions   Roomed by Akiko JUAREZ CMA     History of Present Illness       Reason for visit:  Post ER checkup. Looking for new primary doctor    He eats 0-1 servings of fruits and vegetables daily.He consumes 5 sweetened beverage(s) daily.He exercises with enough effort to increase his heart rate 9 or less minutes per day.  He exercises with enough effort to increase his heart rate 3 or less days per week. He is missing 7 dose(s) of medications per week.  He is not taking prescribed medications regularly due to other.     Chest pain started 2 weeks ago. 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    Has anxiety that show symptoms of palpitation, but denied current palpitation.     No alcohol, history of tobacco use  Currently smoking THC - daily       Sick contacts 2 weeks ago he did not have symptoms          ED/UC Followup:    Facility:[facility] Emergency Dept   Date of visit: 10/19/2024 (2 hours)   Reason for visit: Pleuritic chest pain   Current Status: no change             Objective    There were no vitals taken for this visit.  There is no height or weight on file to calculate BMI.  Physical Exam  Vitals reviewed.   HENT:      Right Ear: Tympanic membrane normal.      Left Ear: Tympanic membrane normal.   Cardiovascular:      Rate and Rhythm: Normal rate and regular rhythm.   Pulmonary:      Effort: Pulmonary effort is normal.      Breath sounds: Normal breath sounds.   Chest:      Chest wall: Tenderness (sternum) present. No swelling, crepitus or edema.   Neurological:      Mental Status: He is alert.                    Signed Electronically by: Naheed Calle MD

## 2024-10-25 NOTE — NURSING NOTE
"Chief Complaint   Patient presents with    emergency room follow up      Chest pain        Initial There were no vitals taken for this visit. Estimated body mass index is 25.83 kg/m  as calculated from the following:    Height as of 12/13/23: 1.943 m (6' 4.5\").    Weight as of 10/19/24: 97.5 kg (215 lb).    Patient presents to the clinic using No DME    Is there anyone who you would like to be able to receive your results? No  If yes have patient fill out HA      "

## 2024-11-06 ENCOUNTER — MYC MEDICAL ADVICE (OUTPATIENT)
Dept: FAMILY MEDICINE | Facility: CLINIC | Age: 44
End: 2024-11-06
Payer: COMMERCIAL

## 2024-11-06 DIAGNOSIS — E78.5 HYPERLIPIDEMIA LDL GOAL <130: ICD-10-CM

## 2024-11-18 RX ORDER — SIMVASTATIN 20 MG
40 TABLET ORAL AT BEDTIME
Qty: 180 TABLET | Refills: 3 | Status: SHIPPED | OUTPATIENT
Start: 2024-11-18

## 2024-11-24 ENCOUNTER — HEALTH MAINTENANCE LETTER (OUTPATIENT)
Age: 44
End: 2024-11-24

## 2024-11-25 ENCOUNTER — OFFICE VISIT (OUTPATIENT)
Dept: FAMILY MEDICINE | Facility: CLINIC | Age: 44
End: 2024-11-25
Payer: COMMERCIAL

## 2024-11-25 VITALS
HEART RATE: 86 BPM | BODY MASS INDEX: 25.62 KG/M2 | OXYGEN SATURATION: 98 % | TEMPERATURE: 97.6 F | WEIGHT: 217 LBS | SYSTOLIC BLOOD PRESSURE: 120 MMHG | HEIGHT: 77 IN | RESPIRATION RATE: 18 BRPM | DIASTOLIC BLOOD PRESSURE: 80 MMHG

## 2024-11-25 DIAGNOSIS — R07.9 CHEST PAIN, UNSPECIFIED TYPE: ICD-10-CM

## 2024-11-25 DIAGNOSIS — F33.1 MODERATE EPISODE OF RECURRENT MAJOR DEPRESSIVE DISORDER (H): Primary | ICD-10-CM

## 2024-11-25 DIAGNOSIS — F41.9 ANXIETY: ICD-10-CM

## 2024-11-25 DIAGNOSIS — Z87.891 PERSONAL HISTORY OF TOBACCO USE, PRESENTING HAZARDS TO HEALTH: ICD-10-CM

## 2024-11-25 DIAGNOSIS — E78.2 MIXED HYPERLIPIDEMIA: ICD-10-CM

## 2024-11-25 DIAGNOSIS — R06.02 SOB (SHORTNESS OF BREATH) ON EXERTION: ICD-10-CM

## 2024-11-25 DIAGNOSIS — R53.83 OTHER FATIGUE: ICD-10-CM

## 2024-11-25 PROCEDURE — 91320 SARSCV2 VAC 30MCG TRS-SUC IM: CPT | Performed by: FAMILY MEDICINE

## 2024-11-25 PROCEDURE — 90471 IMMUNIZATION ADMIN: CPT | Performed by: FAMILY MEDICINE

## 2024-11-25 PROCEDURE — 90656 IIV3 VACC NO PRSV 0.5 ML IM: CPT | Performed by: FAMILY MEDICINE

## 2024-11-25 PROCEDURE — 90480 ADMN SARSCOV2 VAC 1/ONLY CMP: CPT | Performed by: FAMILY MEDICINE

## 2024-11-25 PROCEDURE — 99214 OFFICE O/P EST MOD 30 MIN: CPT | Mod: 25 | Performed by: FAMILY MEDICINE

## 2024-11-25 ASSESSMENT — PAIN SCALES - GENERAL: PAINLEVEL_OUTOF10: MODERATE PAIN (4)

## 2024-11-25 NOTE — PROGRESS NOTES
Assessment & Plan     Moderate episode of recurrent major depressive disorder (H)  Restart vortioxetine for anxiety and depression  Has done pharmacogenetic testing  - vortioxetine (TRINTELLIX) 5 MG tablet; Take 1 tablet (5 mg) by mouth daily.    Anxiety  As above  - vortioxetine (TRINTELLIX) 5 MG tablet; Take 1 tablet (5 mg) by mouth daily.    SOB (shortness of breath) on exertion  45 yo M with LDL >200, previous smoker, family history of cardiovascular disease who presents for follow up of chest pain. He went to the ED on 10/19/2024, saw me on 10/25/2024 where I prescribed a course of naproxen 500mg BID for costochondritis. He returns today with minimal improvement, endorses dyspnea on exertion, chest pain. Chest pain continues to be reproducible on palpation. Due to his cardiovascular risk factors and persistent symptoms, I will order a stress test and referral to cardiology for further evaluation. Of note he works as a  with a physical job, the symptoms do interfere somewhat with his work.  - Exercise Stress Test - Adult; Future  - Adult Cardiology Eval  Referral; Future    Chest pain, unspecified type  As above  - Exercise Stress Test - Adult; Future  - Adult Cardiology Eval  Referral; Future    Personal history of tobacco use, presenting hazards to health  As above  - Exercise Stress Test - Adult; Future  - Adult Cardiology Eval  Referral; Future    Mixed hyperlipidemia  As above  - Exercise Stress Test - Adult; Future  - Adult Cardiology Eval  Referral; Future    Other fatigue  As above  - Exercise Stress Test - Adult; Future  - Adult Cardiology Eval  Referral; Future    The longitudinal plan of care for the diagnosis(es)/condition(s) as documented were addressed during this visit. Due to the added complexity in care, I will continue to support James in the subsequent management and with ongoing continuity of care.      BMI  Estimated body mass index is  "26.07 kg/m  as calculated from the following:    Height as of this encounter: 1.943 m (6' 4.5\").    Weight as of this encounter: 98.4 kg (217 lb).             Anibal Ramirez is a 44 year old, presenting for the following health issues:  Chest Pain        11/25/2024     8:11 AM   Additional Questions   Roomed by Marcie VEGA   Accompanied by self     History of Present Illness       Reason for visit:  Revisit for chest and talk about meds    He eats 0-1 servings of fruits and vegetables daily.He consumes 4 sweetened beverage(s) daily.He exercises with enough effort to increase his heart rate 9 or less minutes per day.  He exercises with enough effort to increase his heart rate 3 or less days per week. He is missing 1 dose(s) of medications per week.  He is not taking prescribed medications regularly due to remembering to take.     Patient return for chest pain that has no improved and still the same since last visit. Patient went to the ER on 10/19/24 for chest pain after garage work and follow up with provider on 10/24/24 received naproxen for 28 days which helped a little but does not take the pain away completely. Patient is worse at evening, but tolerable in the morning. Laying on the floor on side used to cause severe pain and now laying down does not cause severe pain. Pain is on the right side. Patient has history of anxiety and currently feeling anxious but stated that this is a different type of chest pain. Patient described as feeling like something heavy is sitting on the right chest. Patient has SOB and pain with exertion. Pleuritic pain. Patient has history of gastric reflux and reported has not cause chest pain like this. Denied feeling of fainting or complete black out. Patient denied palpitation. Patient has been on naproxen and omeprazole.     Dad had history of multiple heart attack at ages late 60s.     Patient would like to discuss about anti-depressant. Patient stated his last best " "antidepressant that has helped was venlafaxine. Patient had life event couple of years ago and \"it has been a ramirez\" with antidepressants. Patient has not been taking medication in the spring. Patient feel low energy since stop the medication and worsen at night. Feeling more angry lately.       Concern - Chest pain follow up  Onset: 10/24  Description: Upper right chest pain  Intensity: mild, moderate, severe  Progression of Symptoms:  constant and waxing and waning  Accompanying Signs & Symptoms: Can have SOB  Previous history of similar problem: No before 10/24  Precipitating factors:        Worsened by: Movement he is rt handed   Alleviating factors:        Improved by: nothing  Therapies tried and outcome: Naproxen helps some             Objective    /80   Pulse 86   Temp 97.6  F (36.4  C) (Tympanic)   Resp 18   Ht 1.943 m (6' 4.5\")   Wt 98.4 kg (217 lb)   SpO2 98%   BMI 26.07 kg/m    Body mass index is 26.07 kg/m .  Physical Exam   GENERAL: alert and no distress  NECK: no adenopathy, no asymmetry, masses, or scars  RESP: lungs clear to auscultation - no rales, rhonchi or wheezes  CV: regular rate and rhythm, normal S1 S2, no S3 or S4, no murmur, click or rub, no peripheral edema  ABDOMEN: soft, nontender, no hepatosplenomegaly, no masses and bowel sounds normal  MS: no gross musculoskeletal defects noted, no edema            Signed Electronically by: Naheed Calle MD    "

## 2024-11-25 NOTE — NURSING NOTE
"Chief Complaint   Patient presents with    Chest Pain       Initial /80   Pulse 86   Temp 97.6  F (36.4  C) (Tympanic)   Resp 18   Ht 1.943 m (6' 4.5\")   Wt 98.4 kg (217 lb)   SpO2 98%   BMI 26.07 kg/m   Estimated body mass index is 26.07 kg/m  as calculated from the following:    Height as of this encounter: 1.943 m (6' 4.5\").    Weight as of this encounter: 98.4 kg (217 lb).    Patient presents to the clinic using No DME    Is there anyone who you would like to be able to receive your results? No  If yes have patient fill out HA      "

## 2024-11-26 ENCOUNTER — TELEPHONE (OUTPATIENT)
Dept: CARDIOLOGY | Facility: CLINIC | Age: 44
End: 2024-11-26
Payer: COMMERCIAL

## 2024-11-26 NOTE — TELEPHONE ENCOUNTER
Health Call Center    Phone Message    May a detailed message be left on voicemail: yes     Reason for Call: Other: Patient called back to coordinate stress test and consult. Unable to get through to [place] priority line. Please call the patient back to schedule appointments. Thank you     Action Taken: Other: cardiology    Travel Screening: Not Applicable  Thank you!  [facility]       Date of Service:

## 2024-12-02 ENCOUNTER — TELEPHONE (OUTPATIENT)
Dept: CARDIOLOGY | Facility: CLINIC | Age: 44
End: 2024-12-02
Payer: COMMERCIAL

## 2024-12-02 NOTE — TELEPHONE ENCOUNTER
Tiffany opened slot on Dr Montelongo's [place] calendar for Wednesday 12/4/24 at 8:45am. LM for patient to call back to discuss. Slot is being held for this patient. Florence Guzman RN Cardiology December 2, 2024, 11:18 AM

## 2024-12-02 NOTE — TELEPHONE ENCOUNTER
----- Message from Earnest Montelongo sent at 11/29/2024 11:32 AM CST -----  Regarding: RE: Stress Test Order  Lets add a virtual appointment on Wednesday, after 845.  Thanks.  ----- Message -----  From: Khadra Guzman RN  Sent: 11/29/2024   7:56 AM CST  To: Earnest Montelongo MD  Subject: RE: Stress Test Order                            Dr Montelongo,   You're here in [place] on Thursday 12/5 but you're full. Would you like to add this patient on at the end of the day? You have a touch-base meeting over lunch. Or see if Tiffany can add him virtually to either Wed or Friday when you are CT TAVR/MRI reader?  Thanks in advance  Florence  ----- Message -----  From: Earnest Montelongo MD  Sent: 11/27/2024   4:42 PM CST  To: Alexia Ascencio RN; Khadra Guzman RN; #  Subject: RE: Stress Test Order                            Lets cancel the stress test.  Lets see if I can see him either virtually or in person in the next week or so to decide what kind of test to presume.  Thanks.  ----- Message -----  From: Darek Mcguire EP  Sent: 11/27/2024   4:08 PM CST  To: Earnest Montelongo MD  Subject: Stress Test Order                                Danieley Dr. Montelongo    James is a patient that you will be seeing for the first time on 1/23/25. He recently seen his PCP for HERNANDEZ and what seems to be periodic exertional chest pain. He works as a , and per the PCP note, it sounds like his symptoms can inhibit his ability to perform his job requirements at times.  Additionally, the patient has several cardiac risk factors including hx of tobacco use, family hx of CAD, and mixed hyperlipidemia (LDL >200).     His PCP ordered a GXT for him and he's on my schedule for next Tuesday 12/3. Wondering if you'd prefer a stress test with imaging instead before he sees you? I'm just not sure if I do the GXT that it will give you much info when you meet with him in a few months.     I apologize for sending this message so late the day before a holiday,  but he just got put on my schedule and I just happened to catch it here at the end of the day.     If you get a chance, let me know how you'd like to proceed and I can connect with Alexia if new orders need to be placed.     Thanks, and have a wonderful Thanksgiving!    Darek Mcguire MS/Exercise Physiologist  [facility]   [phone]

## 2024-12-03 NOTE — TELEPHONE ENCOUNTER
Stress test cancelled and pt scheduled with Dr. Montelongo tomorrow for virtual 12/4/24    Alexia Ascencio RN

## 2024-12-04 ENCOUNTER — VIRTUAL VISIT (OUTPATIENT)
Dept: CARDIOLOGY | Facility: CLINIC | Age: 44
End: 2024-12-04
Attending: FAMILY MEDICINE
Payer: COMMERCIAL

## 2024-12-04 DIAGNOSIS — Z87.891 PERSONAL HISTORY OF TOBACCO USE, PRESENTING HAZARDS TO HEALTH: ICD-10-CM

## 2024-12-04 DIAGNOSIS — E78.2 MIXED HYPERLIPIDEMIA: ICD-10-CM

## 2024-12-04 DIAGNOSIS — R06.02 SOB (SHORTNESS OF BREATH) ON EXERTION: ICD-10-CM

## 2024-12-04 DIAGNOSIS — R07.9 CHEST PAIN, UNSPECIFIED TYPE: ICD-10-CM

## 2024-12-04 DIAGNOSIS — R53.83 OTHER FATIGUE: ICD-10-CM

## 2024-12-04 RX ORDER — OMEPRAZOLE 20 MG/1
20 TABLET, DELAYED RELEASE ORAL DAILY
COMMUNITY

## 2024-12-04 RX ORDER — METOPROLOL TARTRATE 50 MG
50 TABLET ORAL PRN
Qty: 2 TABLET | Refills: 0 | Status: SHIPPED | OUTPATIENT
Start: 2024-12-04

## 2024-12-04 RX ORDER — IBUPROFEN 200 MG
200 TABLET ORAL EVERY 4 HOURS PRN
COMMUNITY

## 2024-12-04 NOTE — LETTER
12/4/2024    Vance Broderick MD  [address] [facility] [address]    RE: James Blanco       Dear Colleague,     I had the pleasure of seeing James Blanco in the [facility].  James is a 44 year old who is being evaluated via a billable video visit.    How would you like to obtain your AVS? MyChart  If the video visit is dropped, the invitation should be resent by: Text to cell phone: [phone]  Will anyone else be joining your video visit? No    Video-Visit Details    Type of service:  Video Visit   Originating Location (pt. Location): Home    Distant Location (provider location):  On-site  Platform used for Video Visit: Doximity    Patient unable to assess vitals  Shu Bhardwaj LPN      Reason for Consultation: Chest discomfort      History of Presenting Illness: This is a 44-year-old gentleman with a past medical history significant for dyslipidemia for which he has previously been on statin therapy and a family history of coronary artery disease who was seen in the emergency department on 10/19/2020 for for chest discomfort.    He describes the chest discomfort as an aching sensation in the right side of his chest that occurred after he had completed a job as a .  The symptoms were essentially constant for a week and there was a slight pleuritic component prompting emergency department evaluation.    His emergency department evaluation was unremarkable and included measurement of a D-dimer, troponin and CRP all of which were within normal limits.  A lipid panel demonstrated a markedly elevated LDL level of 204 mg/dL which he attributes to noncompliance with statin therapy over the past year and a half.  A chest x-ray was essentially normal and a bedside cardiac ultrasound demonstrated normal left ventricular systolic function.      Since then he states that he feels about the same overall from a cardiovascular standpoint.  His chest discomfort persists but has been  improving slowly.  He continues to work as a  and the symptoms have not disrupted his sleep or let him to cut back on his working hours.          PMH, FH, SH, Allergies and Meds: As outlined below.  He does smoke marijuana.    Physical Exam:    General:  no apparent distress, normal body habitus, sitting upright.  ENT/Mouth:  membranes moist, no nasal discharge.  Normal head shape, no apparent injury or laceration.  Eyes:  no scleral icterus, normal conjunctivae.  No observed jaundice.  Neck:  no apparent neck swelling.   Chest/Lungs:  No breathing difficulty while speaking.  No audible wheezing.  No cough during conversation.  Cardiovascular:  No obviously elevated jugular venous pressure.  No apparent edema bilaterally in LE.   Abdomen:  no obvious abdominal distention.   Extremities:  no apparent cyanosis.  Skin:  no xanthelasma.  No facial lacerations.  Neurologic:  Normal arm motion bilateral, no tremors.    Psychiatric:  Alert and oriented x3, calm demeanor    The rest of the comprehensive physical examination is deferred due to public health emergency video visit restrictions.              EKG: His EKG dated 10/19/2024 was independently reviewed and interpreted and demonstrated normal sinus rhythm with no acute ST-T wave abnormalities.    Labs: Lipid panel on 10/19/2024 demonstrated total cholesterol 269, LDL of 204, triglycerides 161 and HDL of 33.    Other Cardiac Testing:    I also personally reviewed and independently interpreted his bedside ultrasound images and agree that cardiac contractility appears to be within normal limits.      IMPRESSION:    Chest discomfort with both typical and atypical features as described above.  2.   Dyslipidemia with recent noncompliance with statin therapy.  He has subsequently resumed simvastatin 40 mg daily.  3.   Family history of coronary artery disease.          PLAN:    Mr. Blanco presents for an evaluation regarding chest discomfort that has been ongoing for  the past 6 weeks.  As described above, his symptoms have both typical and atypical components and he does have a significant risk factor profile.    At this point I have recommended that we proceed with an echocardiogram for baseline left ventricular systolic/valvular function.  We will then proceed with a CT coronary angiogram for definitive evaluation for coronary artery disease.  This will also help us establish his lipid goals.    Finally, I will order an LP(a) for additional risk stratification.    It was a pleasure seeing him in consultation today.          Earnest Montelongo M.D.       Thank you for allowing me to participate in the care of your patient.      Sincerely,     Earnest Montelongo MD     [facility]  cc:   Naheed Calle MD  [address] [place] [id number]

## 2024-12-04 NOTE — PROGRESS NOTES
James is a 44 year old who is being evaluated via a billable video visit.    How would you like to obtain your AVS? MyChart  If the video visit is dropped, the invitation should be resent by: Text to cell phone: [phone]  Will anyone else be joining your video visit? No    Video-Visit Details    Type of service:  Video Visit   Originating Location (pt. Location): Home    Distant Location (provider location):  On-site  [facility]    Patient unable to assess vitals  Shu Bhardwaj LPN      Reason for Consultation: Chest discomfort      History of Presenting Illness: This is a 44-year-old gentleman with a past medical history significant for dyslipidemia for which he has previously been on statin therapy and a family history of coronary artery disease who was seen in the emergency department on 10/19/2020 for for chest discomfort.    He describes the chest discomfort as an aching sensation in the right side of his chest that occurred after he had completed a job as a .  The symptoms were essentially constant for a week and there was a slight pleuritic component prompting emergency department evaluation.    His emergency department evaluation was unremarkable and included measurement of a D-dimer, troponin and CRP all of which were within normal limits.  A lipid panel demonstrated a markedly elevated LDL level of 204 mg/dL which he attributes to noncompliance with statin therapy over the past year and a half.  A chest x-ray was essentially normal and a bedside cardiac ultrasound demonstrated normal left ventricular systolic function.      Since then he states that he feels about the same overall from a cardiovascular standpoint.  His chest discomfort persists but has been improving slowly.  He continues to work as a  and the symptoms have not disrupted his sleep or let him to cut back on his working hours.          PMH, FH, SH, Allergies and Meds: As outlined below.  He  does smoke marijuana.    Physical Exam:    General:  no apparent distress, normal body habitus, sitting upright.  ENT/Mouth:  membranes moist, no nasal discharge.  Normal head shape, no apparent injury or laceration.  Eyes:  no scleral icterus, normal conjunctivae.  No observed jaundice.  Neck:  no apparent neck swelling.   Chest/Lungs:  No breathing difficulty while speaking.  No audible wheezing.  No cough during conversation.  Cardiovascular:  No obviously elevated jugular venous pressure.  No apparent edema bilaterally in LE.   Abdomen:  no obvious abdominal distention.   Extremities:  no apparent cyanosis.  Skin:  no xanthelasma.  No facial lacerations.  Neurologic:  Normal arm motion bilateral, no tremors.    Psychiatric:  Alert and oriented x3, calm demeanor    The rest of the comprehensive physical examination is deferred due to public health emergency video visit restrictions.              EKG: His EKG dated 10/19/2024 was independently reviewed and interpreted and demonstrated normal sinus rhythm with no acute ST-T wave abnormalities.    Labs: Lipid panel on 10/19/2024 demonstrated total cholesterol 269, LDL of 204, triglycerides 161 and HDL of 33.    Other Cardiac Testing:    I also personally reviewed and independently interpreted his bedside ultrasound images and agree that cardiac contractility appears to be within normal limits.      IMPRESSION:    Chest discomfort with both typical and atypical features as described above.  2.   Dyslipidemia with recent noncompliance with statin therapy.  He has subsequently resumed simvastatin 40 mg daily.  3.   Family history of coronary artery disease.          PLAN:    Mr. Blanco presents for an evaluation regarding chest discomfort that has been ongoing for the past 6 weeks.  As described above, his symptoms have both typical and atypical components and he does have a significant risk factor profile.    At this point I have recommended that we proceed with an  echocardiogram for baseline left ventricular systolic/valvular function.  We will then proceed with a CT coronary angiogram for definitive evaluation for coronary artery disease.  This will also help us establish his lipid goals.    Finally, I will order an LP(a) for additional risk stratification.    It was a pleasure seeing him in consultation today.          Earnest Montelnogo M.D.

## 2024-12-11 ENCOUNTER — MYC MEDICAL ADVICE (OUTPATIENT)
Dept: CARDIOLOGY | Facility: CLINIC | Age: 44
End: 2024-12-11
Payer: COMMERCIAL

## 2024-12-12 NOTE — TELEPHONE ENCOUNTER
My chart message from patient:  Hi Doctor Reginald,     Wanted to let you know, I still haven't received a call to schedule the scans you wanted. Also, I did not  the meds you sent in. Let me know how to proceed.     ===    Fernandojason, Mr. Blanco,    Dr. Montelongo ordered a lab test, an echo and then a CTA angiogram. Please call scheduling at [phone] to set up these appointments if you want to use the [facility].    The echo and lab test can be done in [place] but you need to call there directly to schedule at that site.    The metoprolol tartrate is to be used with the CTA scan to be sure you have a slow and steady heart rate for the imaging. The camera syncs to your heart beat, so if it is too fast we can't get the proper images.   The dose is 50mg the morning before the test and 50mg the morning of the test before you come in.    Thank you for checking  Team 2 R.N.s  [phone]

## 2025-01-07 ENCOUNTER — DOCUMENTATION ONLY (OUTPATIENT)
Dept: CARDIOLOGY | Facility: CLINIC | Age: 45
End: 2025-01-07
Payer: COMMERCIAL

## 2025-01-07 DIAGNOSIS — R07.9 CHEST PAIN, UNSPECIFIED TYPE: Primary | ICD-10-CM

## 2025-01-07 DIAGNOSIS — R06.02 SOB (SHORTNESS OF BREATH) ON EXERTION: ICD-10-CM

## 2025-01-07 NOTE — PROGRESS NOTES
Message from  Financial, CTA coronary requires prior authorization which was submitted, however low likelihood of approval. Reviewed with Dr Montelongo, recommendation is to change to stress echo and to wait to do resting echo until we know if stress echo is approved. If stress echo is approved then can cancel resting echo. Orders placed, scheduling messaged          From: Odilia Weinberg   Sent: 1/7/2025  11:50 AM CST   To: Earnest Montelongo MD; *   Subject: CTA on 1-08-25 is pending denial                 Dr Montelongo,     Patient James Blanco is scheduled for a CTA ANGIOGRAM CORONARY ARTERY on 1-08-25.   He requires a prior authorization for this test.     I submitted the request.  It will most likely be denied.     They are telling me:     Advanced cardiac imaging is medically necessary for evaluation of patients who are unable to walk on a treadmill for reasons other than obesity or for patients with the following resting EKG abnormalities:   ST segment depression of one (1) mm or more (within the past 30 days)   Left bundle branch block (LBBB)   Digoxin effect   Left ventricular hypertrophy (LVH) with secondary ST segment changes (repolarization abnormalities)   Preexcitation syndrome (eg, Pinky-Parkinson-White)   Ventricular paced rhythm (pacemaker)       It looks like an echo and an exercise stress test were ordered, but never completed.     Please advise, and thank you.     Odilia Ordonez Prior Authorization

## 2025-01-13 ENCOUNTER — LAB (OUTPATIENT)
Dept: LAB | Facility: CLINIC | Age: 45
End: 2025-01-13
Payer: COMMERCIAL

## 2025-01-13 ENCOUNTER — OFFICE VISIT (OUTPATIENT)
Dept: FAMILY MEDICINE | Facility: CLINIC | Age: 45
End: 2025-01-13
Payer: COMMERCIAL

## 2025-01-13 ENCOUNTER — TELEPHONE (OUTPATIENT)
Dept: CARDIOLOGY | Facility: CLINIC | Age: 45
End: 2025-01-13

## 2025-01-13 VITALS
DIASTOLIC BLOOD PRESSURE: 84 MMHG | SYSTOLIC BLOOD PRESSURE: 120 MMHG | TEMPERATURE: 97 F | OXYGEN SATURATION: 100 % | RESPIRATION RATE: 12 BRPM | HEART RATE: 69 BPM

## 2025-01-13 DIAGNOSIS — R06.02 SOB (SHORTNESS OF BREATH) ON EXERTION: ICD-10-CM

## 2025-01-13 DIAGNOSIS — H60.502 ACUTE OTITIS EXTERNA OF LEFT EAR, UNSPECIFIED TYPE: Primary | ICD-10-CM

## 2025-01-13 DIAGNOSIS — E78.2 MIXED HYPERLIPIDEMIA: ICD-10-CM

## 2025-01-13 LAB
APO A-I SERPL-MCNC: 38 MG/DL
CHOLEST SERPL-MCNC: 173 MG/DL
FASTING STATUS PATIENT QL REPORTED: YES
HDLC SERPL-MCNC: 38 MG/DL
LDLC SERPL CALC-MCNC: 114 MG/DL
NONHDLC SERPL-MCNC: 135 MG/DL
TRIGL SERPL-MCNC: 104 MG/DL

## 2025-01-13 PROCEDURE — 83695 ASSAY OF LIPOPROTEIN(A): CPT | Performed by: INTERNAL MEDICINE

## 2025-01-13 PROCEDURE — 99213 OFFICE O/P EST LOW 20 MIN: CPT | Performed by: FAMILY MEDICINE

## 2025-01-13 PROCEDURE — 80061 LIPID PANEL: CPT

## 2025-01-13 PROCEDURE — 36415 COLL VENOUS BLD VENIPUNCTURE: CPT

## 2025-01-13 RX ORDER — OFLOXACIN 3 MG/ML
5 SOLUTION AURICULAR (OTIC) DAILY
Qty: 5 ML | Refills: 0 | Status: SHIPPED | OUTPATIENT
Start: 2025-01-13 | End: 2025-01-18

## 2025-01-13 ASSESSMENT — PAIN SCALES - GENERAL: PAINLEVEL_OUTOF10: MODERATE PAIN (4)

## 2025-01-13 NOTE — TELEPHONE ENCOUNTER
Lp(a) 1/13/2025 noted. Ordered with chest pain work up.  Lipids were ordered by PCP for OV on 1/17/2025    Stress echo is scheduled for 1/28/2025 in place of CTA angiogram (denied)  If stress echo is approved, then echo on 1/30/2025 will be canceled.    Patient agreed to restart simvastatin 40mg daily  Component      Latest Ref Rng 10/19/2024  2:34 PM 1/13/2025  7:19 AM   Cholesterol      <200 mg/dL 269 (H)  173    Triglycerides      <150 mg/dL 161 (H)  104    HDL Cholesterol      >=40 mg/dL 33 (L)  38 (L)    LDL Cholesterol Calculated      <100 mg/dL 204 (H)  114 (H)    Non HDL Cholesterol      <130 mg/dL 236 (H)  135 (H)    Patient Fasting?  Yes    Lipoprotein (a)      <30 mg/dL  38 (H)       Will message Dr. Montelongo to review

## 2025-01-13 NOTE — PROGRESS NOTES
"    James was seen today for ear problem.    Diagnoses and all orders for this visit:    Acute otitis externa of left ear, unspecified type  -     ofloxacin (FLOXIN) 0.3 % otic solution; Place 5 drops Into the left ear daily for 5 days.        -      Patient reassured, recommend ear drops.        Subjective   James is a 44 year old, presenting for the following health issues:    Patient is a 44 yr old male here for left ear pain. Started yesterday and has gradually increased in intensity. No drainage but ear feels \"blocked\"  No recent URI symptoms.   Ear Problem        1/13/2025     8:00 AM   Additional Questions   Roomed by Hortencia HERRERA   Accompanied by self     Ear Problem           Acute Illness  Acute illness concerns: ear pain  Onset/Duration: 2 days  Symptoms:  Fever: No  Chills/Sweats: No  Headache (location?): No  Sinus Pressure: No  Conjunctivitis:  No  Ear Pain: YES: left  Rhinorrhea: No  Congestion: No  Sore Throat: No  Cough: no  Wheeze: No  Decreased Appetite: No  Nausea: No  Vomiting: No  Diarrhea: No  Dysuria/Freq.: No  Dysuria or Hematuria: No  Fatigue/Achiness: No  Sick/Strep Exposure: YES- over the holidays  Therapies tried and outcome: ibuprofen         Review of Systems  CONSTITUTIONAL: NEGATIVE for fever, chills, change in weight  INTEGUMENTARY/SKIN: NEGATIVE for worrisome rashes, moles or lesions  ENT/MOUTH: POSITIVE for ear pain left and earache left and ear pain left  RESP: NEGATIVE for significant cough or SOB  CV: NEGATIVE for chest pain, palpitations or peripheral edema  MUSCULOSKELETAL: NEGATIVE for significant arthralgias or myalgia  HEME/ALLERGY/IMMUNE: NEGATIVE for bleeding problems  PSYCHIATRIC: NEGATIVE for changes in mood or affect      Objective    /84 (BP Location: Right arm, Patient Position: Chair, Cuff Size: Adult Large)   Pulse 69   Temp 97  F (36.1  C) (Tympanic)   Resp 12   SpO2 100%   There is no height or weight on file to calculate BMI.  Physical Exam   GENERAL: " alert and no distress  EYES: Eyes grossly normal to inspection, PERRL and conjunctivae and sclerae normal  HENT: normal cephalic/atraumatic, right ear: normal: no effusions, no erythema, normal landmarks, left ear: purulent drainage in canal, nose and mouth without ulcers or lesions, oropharynx clear, and oral mucous membranes moist  SKIN: no suspicious lesions or rashes  NEURO: Normal strength and tone, mentation intact and speech normal  PSYCH: mentation appears normal, affect normal/bright            Signed Electronically by: Sebas Livingston MD

## 2025-01-13 NOTE — NURSING NOTE
Patient identified using two patient identifiers.  Ear exam showing wax occlusion completed by provider.  Solution: warm water was placed in the left ear(s) via  syringe .  No wax removed. Notified Provider. Jason LANE CMA

## 2025-01-14 NOTE — TELEPHONE ENCOUNTER
Patient called to inform that Dr. Montelongo has reviewed results of blood work and recommendation to switch him to rosuvastatin 40 MG daily and recheck LDL in 2 month. No answer, VM left asking patient to please return call to team 2 nurse line to discuss.

## 2025-01-17 NOTE — TELEPHONE ENCOUNTER
My chart message sent to patient:    Stephan, Mr. Blanco,    We have been unable to connect with you by phone.    Dr. Montelongo reviewed your lab results. Your LDL is better at 114, but it is still not as low as Dr. Montelongo wants to achieve.    Dr. Montelongo asks that we change your statin from simvastatin to rosuvastatin 40mg daily. We would check labs in 2 months.    Please call the nurse team at [phone] to order the new medication.  Labs can be scheduled by calling your [place] clinic team.    We are still waiting for insurance approval to do the stress echo on 1/28/2025.    Thank you for your help  Team 2 R.N.s  [phone]

## 2025-01-21 ENCOUNTER — MYC MEDICAL ADVICE (OUTPATIENT)
Dept: CARDIOLOGY | Facility: CLINIC | Age: 45
End: 2025-01-21
Payer: COMMERCIAL

## 2025-01-21 DIAGNOSIS — R07.9 CHEST PAIN, UNSPECIFIED TYPE: ICD-10-CM

## 2025-01-21 DIAGNOSIS — E78.2 MIXED HYPERLIPIDEMIA: Primary | ICD-10-CM

## 2025-01-21 RX ORDER — ROSUVASTATIN CALCIUM 40 MG/1
40 TABLET, COATED ORAL DAILY
Qty: 90 TABLET | Refills: 3 | Status: SHIPPED | OUTPATIENT
Start: 2025-01-21

## 2025-01-21 NOTE — TELEPHONE ENCOUNTER
"Per Dr. Montelongo's note on 1/13/25 \"Let's switch him to rosuvastatin 40 MG daily and recheck LDL in 2 months.\" Patient has replied via mychart that he is agreeable to plan. Orders entered and patient instructed to call scheduling to make lab apt.  "

## 2025-01-21 NOTE — TELEPHONE ENCOUNTER
Stress echo has been approved.  Patient opened the my chart regarding changing to crestor, but did not call back.    1000 attempted to contact patient to let him know the stress echo was approved and that we need a reply to the crestor plan. My chart message sent:    Stephan, Mr. Blanco,    The approval to do the stress echo on 1/8/2025 was approved. Thank you for your patience.    We are waiting for your reply to Dr. Montelongo's plan to increase your cholesterol medication to rosuvastatin 40mg daily with labs in 2 months.   Please call or my chart with your OK to order the medication and labs. What pharmacy are you using in 2025?    Thank you  Team 2 R.N.s  [phone]

## 2025-01-23 ENCOUNTER — TELEPHONE (OUTPATIENT)
Dept: CARDIOLOGY | Facility: CLINIC | Age: 45
End: 2025-01-23

## 2025-01-23 NOTE — TELEPHONE ENCOUNTER
Stress echo for 1/28/2025 has been approved.    Per notes from 1/6/2025:  Reviewed with Dr Montelongo, recommendation is to change to stress echo and to wait to do resting echo until we know if stress echo is approved. If stress echo is approved then can cancel resting echo     Patient is scheduled for echo on 1/30/2025 @ [place]. This is not needed.    1045 left patient a message that he does not currently need the complete echo on 1/30/2025.   Message sent to [facility] to cancel the echo.

## 2025-01-28 ENCOUNTER — HOSPITAL ENCOUNTER (OUTPATIENT)
Dept: CARDIOLOGY | Facility: CLINIC | Age: 45
Discharge: HOME | End: 2025-01-28
Attending: INTERNAL MEDICINE
Payer: COMMERCIAL

## 2025-01-28 DIAGNOSIS — R07.9 CHEST PAIN, UNSPECIFIED TYPE: ICD-10-CM

## 2025-01-28 DIAGNOSIS — R06.02 SOB (SHORTNESS OF BREATH) ON EXERTION: ICD-10-CM

## 2025-01-28 PROCEDURE — 93325 DOPPLER ECHO COLOR FLOW MAPG: CPT | Mod: TC

## 2025-01-28 PROCEDURE — 93350 STRESS TTE ONLY: CPT | Mod: TC

## 2025-01-28 PROCEDURE — 93016 CV STRESS TEST SUPVJ ONLY: CPT | Performed by: STUDENT IN AN ORGANIZED HEALTH CARE EDUCATION/TRAINING PROGRAM

## 2025-02-03 ENCOUNTER — OFFICE VISIT (OUTPATIENT)
Dept: FAMILY MEDICINE | Facility: CLINIC | Age: 45
End: 2025-02-03
Payer: COMMERCIAL

## 2025-02-03 ENCOUNTER — MYC MEDICAL ADVICE (OUTPATIENT)
Dept: CARDIOLOGY | Facility: CLINIC | Age: 45
End: 2025-02-03

## 2025-02-03 VITALS
OXYGEN SATURATION: 99 % | HEART RATE: 66 BPM | HEIGHT: 77 IN | WEIGHT: 224 LBS | TEMPERATURE: 97.8 F | SYSTOLIC BLOOD PRESSURE: 122 MMHG | BODY MASS INDEX: 26.45 KG/M2 | DIASTOLIC BLOOD PRESSURE: 80 MMHG

## 2025-02-03 DIAGNOSIS — Z87.891 PERSONAL HISTORY OF TOBACCO USE, PRESENTING HAZARDS TO HEALTH: ICD-10-CM

## 2025-02-03 DIAGNOSIS — R07.9 CHEST PAIN, UNSPECIFIED TYPE: Primary | ICD-10-CM

## 2025-02-03 PROCEDURE — 99213 OFFICE O/P EST LOW 20 MIN: CPT | Performed by: FAMILY MEDICINE

## 2025-02-03 RX ORDER — FAMOTIDINE 20 MG/1
20 TABLET, FILM COATED ORAL PRN
COMMUNITY

## 2025-02-03 ASSESSMENT — PAIN SCALES - GENERAL: PAINLEVEL_OUTOF10: MODERATE PAIN (6)

## 2025-02-03 NOTE — TELEPHONE ENCOUNTER
My chart message from patient:  James Blanco [facility] Heart Team 2  Phone Number: [phone]     My primary is wondering if you plan on doing a ct scan of my heart now and if we could look at my lungs at the same time?  =========    Stephan, Mr. Blanco,    CT scans do give us some incidental information about the lungs. However, a CT for the heart includes contrast dye. A CT the lung does not use contrast. So these are really opposite scans when looking at the chest.    Thank you for checking  Team 2 R.N.s  [phone]

## 2025-02-03 NOTE — PROGRESS NOTES
"  Assessment & Plan     Chest pain, unspecified type  Personal history of tobacco use, presenting hazards to health  Persistent right sided chest pain ongoing since October 2024. See notes 10/19, 10/25, 11/25, 12/04  Previous work up includes: Neg CXR, D-dimer wnl, crp wnl, ED cardiac ultrasound unremarkable, Stress test unremarkable, following with cardiology  Tobacco history 2 packs a day for 20 years, quit 4 years ago. Current marijuana smoker.  Plan: CT chest  - CT Chest w/o Contrast; Future          BMI  Estimated body mass index is 26.91 kg/m  as calculated from the following:    Height as of this encounter: 1.943 m (6' 4.5\").    Weight as of this encounter: 101.6 kg (224 lb).             Anibal Ramirez is a 44 year old, presenting for the following health issues:  No chief complaint on file.        2/3/2025     3:08 PM   Additional Questions   Roomed by Marcie VEGA   Accompanied by self     History of Present Illness       Reason for visit:  Chest pain    He eats 0-1 servings of fruits and vegetables daily.He consumes 5 sweetened beverage(s) daily.He exercises with enough effort to increase his heart rate 9 or less minutes per day.  He exercises with enough effort to increase his heart rate 3 or less days per week.   He is taking medications regularly.         Chest Pain  Onset/Duration: 10/2024  Description:   Location: right side  Character: sharp, achey, tight, and heavy  Radiation: into chest and sternum  Duration: waxing and waning and intermittent   Intensity: mild, moderate, severe  Progression of Symptoms: intermittent and waxing and waning  Accompanying Signs & Symptoms:  Shortness of breath: No  Sweating: No  Nausea/vomiting: No  Lightheadedness: occ  Palpitations: occ  Fever/Chills: No  Cough: No           Heartburn: YES  History:   Family history of heart disease: YES  Tobacco use: No  Previous similar symptoms: YES  Precipitating factors:   Worse with exertion: YES but not always  Worse with " "deep breaths: uncomfortable            Related to eating: No           Better with burping: No  Alleviating factors:   Therapies tried and outcome: Stress test   Ibuprofen 200 mg tablet 4 tabs once to twice daily.            Objective    /80   Pulse 66   Temp 97.8  F (36.6  C) (Tympanic)   Ht 1.943 m (6' 4.5\")   Wt 101.6 kg (224 lb)   SpO2 99%   BMI 26.91 kg/m    Body mass index is 26.91 kg/m .  Physical Exam  Vitals reviewed.   Cardiovascular:      Rate and Rhythm: Normal rate and regular rhythm.   Pulmonary:      Effort: Pulmonary effort is normal.      Breath sounds: Normal breath sounds.   Neurological:      Mental Status: He is alert.                    Signed Electronically by: Naheed Calle MD    "

## 2025-02-11 ENCOUNTER — ANCILLARY PROCEDURE (OUTPATIENT)
Dept: CT IMAGING | Facility: CLINIC | Age: 45
End: 2025-02-11
Attending: FAMILY MEDICINE
Payer: COMMERCIAL

## 2025-02-11 DIAGNOSIS — R07.9 CHEST PAIN, UNSPECIFIED TYPE: ICD-10-CM

## 2025-02-11 DIAGNOSIS — Z87.891 PERSONAL HISTORY OF TOBACCO USE, PRESENTING HAZARDS TO HEALTH: ICD-10-CM

## 2025-02-11 PROCEDURE — 71250 CT THORAX DX C-: CPT | Mod: TC | Performed by: RADIOLOGY

## 2025-02-12 ENCOUNTER — MYC MEDICAL ADVICE (OUTPATIENT)
Dept: FAMILY MEDICINE | Facility: CLINIC | Age: 45
End: 2025-02-12
Payer: COMMERCIAL

## 2025-03-19 ENCOUNTER — MYC MEDICAL ADVICE (OUTPATIENT)
Dept: FAMILY MEDICINE | Facility: CLINIC | Age: 45
End: 2025-03-19
Payer: COMMERCIAL

## 2025-03-19 NOTE — TELEPHONE ENCOUNTER
"1/17/25: \"Moderate episode of recurrent major depressive disorder (H)  Follow up 11/25/24  Improving, could be better  Increase from vortioxetine from 5mg to 10mg as below  - vortioxetine (TRINTELLIX) 10 MG tablet; Take 1 tablet (10 mg) by mouth daily.\"    Please advise.  Shireen Carlton RN on 3/19/2025 at 5:13 PM    "

## 2025-03-20 ASSESSMENT — ANXIETY QUESTIONNAIRES
3. WORRYING TOO MUCH ABOUT DIFFERENT THINGS: SEVERAL DAYS
8. IF YOU CHECKED OFF ANY PROBLEMS, HOW DIFFICULT HAVE THESE MADE IT FOR YOU TO DO YOUR WORK, TAKE CARE OF THINGS AT HOME, OR GET ALONG WITH OTHER PEOPLE?: SOMEWHAT DIFFICULT
GAD7 TOTAL SCORE: 7
1. FEELING NERVOUS, ANXIOUS, OR ON EDGE: SEVERAL DAYS
6. BECOMING EASILY ANNOYED OR IRRITABLE: SEVERAL DAYS
IF YOU CHECKED OFF ANY PROBLEMS ON THIS QUESTIONNAIRE, HOW DIFFICULT HAVE THESE PROBLEMS MADE IT FOR YOU TO DO YOUR WORK, TAKE CARE OF THINGS AT HOME, OR GET ALONG WITH OTHER PEOPLE: SOMEWHAT DIFFICULT
GAD7 TOTAL SCORE: 7
4. TROUBLE RELAXING: SEVERAL DAYS
2. NOT BEING ABLE TO STOP OR CONTROL WORRYING: SEVERAL DAYS
7. FEELING AFRAID AS IF SOMETHING AWFUL MIGHT HAPPEN: SEVERAL DAYS
7. FEELING AFRAID AS IF SOMETHING AWFUL MIGHT HAPPEN: SEVERAL DAYS
5. BEING SO RESTLESS THAT IT IS HARD TO SIT STILL: SEVERAL DAYS
GAD7 TOTAL SCORE: 7

## 2025-03-25 ENCOUNTER — LAB (OUTPATIENT)
Dept: LAB | Facility: CLINIC | Age: 45
End: 2025-03-25
Payer: COMMERCIAL

## 2025-03-25 DIAGNOSIS — E78.2 MIXED HYPERLIPIDEMIA: ICD-10-CM

## 2025-03-25 LAB
ALT SERPL W P-5'-P-CCNC: 50 U/L (ref 0–70)
CHOLEST SERPL-MCNC: 146 MG/DL
FASTING STATUS PATIENT QL REPORTED: YES
HDLC SERPL-MCNC: 40 MG/DL
LDLC SERPL CALC-MCNC: 80 MG/DL
NONHDLC SERPL-MCNC: 106 MG/DL
TRIGL SERPL-MCNC: 132 MG/DL

## 2025-04-19 DIAGNOSIS — F33.1 MODERATE EPISODE OF RECURRENT MAJOR DEPRESSIVE DISORDER (H): ICD-10-CM

## 2025-04-21 RX ORDER — VORTIOXETINE 10 MG/1
10 TABLET, FILM COATED ORAL DAILY
Qty: 90 TABLET | Refills: 0 | Status: SHIPPED | OUTPATIENT
Start: 2025-04-21

## 2025-07-08 ASSESSMENT — PATIENT HEALTH QUESTIONNAIRE - PHQ9
10. IF YOU CHECKED OFF ANY PROBLEMS, HOW DIFFICULT HAVE THESE PROBLEMS MADE IT FOR YOU TO DO YOUR WORK, TAKE CARE OF THINGS AT HOME, OR GET ALONG WITH OTHER PEOPLE: SOMEWHAT DIFFICULT
SUM OF ALL RESPONSES TO PHQ QUESTIONS 1-9: 2
SUM OF ALL RESPONSES TO PHQ QUESTIONS 1-9: 2

## 2025-07-08 ASSESSMENT — ANXIETY QUESTIONNAIRES
4. TROUBLE RELAXING: SEVERAL DAYS
3. WORRYING TOO MUCH ABOUT DIFFERENT THINGS: SEVERAL DAYS
2. NOT BEING ABLE TO STOP OR CONTROL WORRYING: SEVERAL DAYS
7. FEELING AFRAID AS IF SOMETHING AWFUL MIGHT HAPPEN: NOT AT ALL
GAD7 TOTAL SCORE: 6
5. BEING SO RESTLESS THAT IT IS HARD TO SIT STILL: NOT AT ALL
7. FEELING AFRAID AS IF SOMETHING AWFUL MIGHT HAPPEN: NOT AT ALL
6. BECOMING EASILY ANNOYED OR IRRITABLE: MORE THAN HALF THE DAYS
8. IF YOU CHECKED OFF ANY PROBLEMS, HOW DIFFICULT HAVE THESE MADE IT FOR YOU TO DO YOUR WORK, TAKE CARE OF THINGS AT HOME, OR GET ALONG WITH OTHER PEOPLE?: SOMEWHAT DIFFICULT
GAD7 TOTAL SCORE: 6
GAD7 TOTAL SCORE: 6
1. FEELING NERVOUS, ANXIOUS, OR ON EDGE: SEVERAL DAYS
IF YOU CHECKED OFF ANY PROBLEMS ON THIS QUESTIONNAIRE, HOW DIFFICULT HAVE THESE PROBLEMS MADE IT FOR YOU TO DO YOUR WORK, TAKE CARE OF THINGS AT HOME, OR GET ALONG WITH OTHER PEOPLE: SOMEWHAT DIFFICULT

## 2025-07-09 ENCOUNTER — OFFICE VISIT (OUTPATIENT)
Dept: FAMILY MEDICINE | Facility: CLINIC | Age: 45
End: 2025-07-09
Payer: COMMERCIAL

## 2025-07-09 ENCOUNTER — TELEPHONE (OUTPATIENT)
Dept: CARDIOLOGY | Facility: CLINIC | Age: 45
End: 2025-07-09

## 2025-07-09 ENCOUNTER — LAB (OUTPATIENT)
Dept: LAB | Facility: CLINIC | Age: 45
End: 2025-07-09
Payer: COMMERCIAL

## 2025-07-09 VITALS
HEIGHT: 77 IN | HEART RATE: 66 BPM | DIASTOLIC BLOOD PRESSURE: 70 MMHG | WEIGHT: 216 LBS | BODY MASS INDEX: 25.5 KG/M2 | TEMPERATURE: 97.1 F | SYSTOLIC BLOOD PRESSURE: 110 MMHG | OXYGEN SATURATION: 99 % | RESPIRATION RATE: 18 BRPM

## 2025-07-09 DIAGNOSIS — E78.2 MIXED HYPERLIPIDEMIA: ICD-10-CM

## 2025-07-09 DIAGNOSIS — R07.89 OTHER CHEST PAIN: ICD-10-CM

## 2025-07-09 DIAGNOSIS — F33.1 MODERATE EPISODE OF RECURRENT MAJOR DEPRESSIVE DISORDER (H): Primary | ICD-10-CM

## 2025-07-09 LAB
ALT SERPL W P-5'-P-CCNC: 31 U/L (ref 0–70)
CHOLEST SERPL-MCNC: 140 MG/DL
FASTING STATUS PATIENT QL REPORTED: YES
HDLC SERPL-MCNC: 34 MG/DL
HOLD SPECIMEN: NORMAL
LDLC SERPL CALC-MCNC: 81 MG/DL
NONHDLC SERPL-MCNC: 106 MG/DL
TRIGL SERPL-MCNC: 126 MG/DL

## 2025-07-09 PROCEDURE — 3074F SYST BP LT 130 MM HG: CPT | Performed by: FAMILY MEDICINE

## 2025-07-09 PROCEDURE — 99214 OFFICE O/P EST MOD 30 MIN: CPT | Performed by: FAMILY MEDICINE

## 2025-07-09 PROCEDURE — 36415 COLL VENOUS BLD VENIPUNCTURE: CPT

## 2025-07-09 PROCEDURE — 80061 LIPID PANEL: CPT

## 2025-07-09 PROCEDURE — 3078F DIAST BP <80 MM HG: CPT | Performed by: FAMILY MEDICINE

## 2025-07-09 PROCEDURE — 84460 ALANINE AMINO (ALT) (SGPT): CPT

## 2025-07-09 NOTE — PROGRESS NOTES
"  Assessment & Plan     Moderate episode of recurrent major depressive disorder (H)  Stable, continue current management  - vortioxetine (TRINTELLIX) 20 MG tablet; Take 1 tablet (20 mg) by mouth daily.    Other chest pain  Stable  Work up with cardiology unremarkable  CT chest unremarkable  Consider PT    BMI  Estimated body mass index is 25.95 kg/m  as calculated from the following:    Height as of this encounter: 1.943 m (6' 4.5\").    Weight as of this encounter: 98 kg (216 lb).           Anibal Ramirez is a 44 year old, presenting for the following health issues:  Anxiety        7/9/2025     8:24 AM   Additional Questions   Roomed by Marcie VEGA   Accompanied by self     History of Present Illness       Mental Health Follow-up:  Patient presents to follow-up on Depression & Anxiety.Patient's depression since last visit has been:  Good  The patient is not having other symptoms associated with depression.  Patient's anxiety since last visit has been:  Medium  The patient is not having other symptoms associated with anxiety.  Any significant life events: job concerns and housing concerns  Patient is feeling anxious or having panic attacks.  Patient has no concerns about alcohol or drug use.    He eats 0-1 servings of fruits and vegetables daily.He consumes 6 sweetened beverage(s) daily.He exercises with enough effort to increase his heart rate 9 or less minutes per day.  He exercises with enough effort to increase his heart rate 3 or less days per week. He is missing 2 dose(s) of medications per week.  He is not taking prescribed medications regularly due to remembering to take.                      Objective    /70   Pulse 66   Temp 97.1  F (36.2  C)   Resp 18   Ht 1.943 m (6' 4.5\")   Wt 98 kg (216 lb)   SpO2 99%   BMI 25.95 kg/m    Body mass index is 25.95 kg/m .  Physical Exam  Vitals reviewed.   Cardiovascular:      Rate and Rhythm: Normal rate.   Pulmonary:      Effort: Pulmonary effort is " normal.                    Signed Electronically by: Naheed Calle MD

## 2025-07-09 NOTE — NURSING NOTE
"Chief Complaint   Patient presents with    Anxiety       Initial /70   Pulse 66   Temp 97.1  F (36.2  C)   Resp 18   Ht 1.943 m (6' 4.5\")   Wt 98 kg (216 lb)   SpO2 99%   BMI 25.95 kg/m   Estimated body mass index is 25.95 kg/m  as calculated from the following:    Height as of this encounter: 1.943 m (6' 4.5\").    Weight as of this encounter: 98 kg (216 lb).    Patient presents to the clinic using No DME    Is there anyone who you would like to be able to receive your results? No  If yes have patient fill out HA      "

## 2025-07-09 NOTE — TELEPHONE ENCOUNTER
Below labs ordered to follow up after simvastatin 40 mg was changed to rosuvastatin 40 mg daily in January 2025. Patient called to assess if he has made medication change as directed. No answer, detailed voicemail left and patient asked to please return call to team 2 nurse line at [phone].        Component      Latest Ref Rng 3/25/2025  6:57 AM 7/9/2025  8:15 AM   Cholesterol      <200 mg/dL 146  140    Triglycerides      <150 mg/dL 132  126    HDL Cholesterol      >=40 mg/dL 40  34 (L)    LDL Cholesterol Calculated      <100 mg/dL 80  81    Non HDL Cholesterol      <130 mg/dL 106  106    Patient Fasting? Yes  Yes    ALT      0 - 70 U/L 50  31

## 2025-07-14 NOTE — TELEPHONE ENCOUNTER
Third attempt at reaching patient with no answer. Voicemail left asking patient to please return call to team 2 nurse line. Results routed to Dr. Montelongo for review.

## 2025-07-23 ENCOUNTER — MYC MEDICAL ADVICE (OUTPATIENT)
Dept: CARDIOLOGY | Facility: CLINIC | Age: 45
End: 2025-07-23
Payer: COMMERCIAL

## 2025-07-23 DIAGNOSIS — E78.2 MIXED HYPERLIPIDEMIA: Primary | ICD-10-CM

## 2025-07-23 NOTE — TELEPHONE ENCOUNTER
Mychart update received below from patient. New orders placed for repeat labs once taking consistently for 2mos per previous plan.       James Blanco to MASSIEL Montes [facility] Heart Team 2 (supporting Earnest Montelongo MD) (Selected Message)    7/23/25 10:27 AM  Edward hightower. I switched to the new meds right away but have not been very good at taking them. Probably only every other day. I've been taking them more regularly lately

## 2025-08-12 ENCOUNTER — OFFICE VISIT (OUTPATIENT)
Dept: FAMILY MEDICINE | Facility: CLINIC | Age: 45
End: 2025-08-12
Payer: COMMERCIAL

## 2025-08-12 VITALS
HEART RATE: 70 BPM | HEIGHT: 76 IN | DIASTOLIC BLOOD PRESSURE: 74 MMHG | RESPIRATION RATE: 16 BRPM | BODY MASS INDEX: 26.06 KG/M2 | WEIGHT: 214 LBS | SYSTOLIC BLOOD PRESSURE: 112 MMHG | TEMPERATURE: 97.6 F | OXYGEN SATURATION: 99 %

## 2025-08-12 DIAGNOSIS — E78.2 MIXED HYPERLIPIDEMIA: ICD-10-CM

## 2025-08-12 DIAGNOSIS — Z00.00 ROUTINE GENERAL MEDICAL EXAMINATION AT A HEALTH CARE FACILITY: Primary | ICD-10-CM

## 2025-08-12 DIAGNOSIS — N32.81 OVERACTIVE BLADDER: ICD-10-CM

## 2025-08-12 DIAGNOSIS — G25.81 RESTLESS LEGS SYNDROME (RLS): ICD-10-CM

## 2025-08-12 PROCEDURE — 3078F DIAST BP <80 MM HG: CPT | Performed by: FAMILY MEDICINE

## 2025-08-12 PROCEDURE — 99396 PREV VISIT EST AGE 40-64: CPT | Performed by: FAMILY MEDICINE

## 2025-08-12 PROCEDURE — 99214 OFFICE O/P EST MOD 30 MIN: CPT | Mod: 25 | Performed by: FAMILY MEDICINE

## 2025-08-12 PROCEDURE — 3074F SYST BP LT 130 MM HG: CPT | Performed by: FAMILY MEDICINE

## 2025-08-12 RX ORDER — EZETIMIBE 10 MG/1
10 TABLET ORAL DAILY
Qty: 90 TABLET | Refills: 3 | Status: SHIPPED | OUTPATIENT
Start: 2025-08-12

## 2025-08-12 RX ORDER — ROSUVASTATIN CALCIUM 40 MG/1
40 TABLET, COATED ORAL DAILY
Qty: 90 TABLET | Refills: 3 | Status: SHIPPED | OUTPATIENT
Start: 2025-08-12

## 2025-08-12 RX ORDER — GABAPENTIN 300 MG/1
300 CAPSULE ORAL AT BEDTIME
Qty: 90 CAPSULE | Refills: 3 | Status: SHIPPED | OUTPATIENT
Start: 2025-08-12

## 2025-08-12 SDOH — HEALTH STABILITY: PHYSICAL HEALTH: ON AVERAGE, HOW MANY DAYS PER WEEK DO YOU ENGAGE IN MODERATE TO STRENUOUS EXERCISE (LIKE A BRISK WALK)?: 0 DAYS

## 2025-08-12 SDOH — HEALTH STABILITY: PHYSICAL HEALTH: ON AVERAGE, HOW MANY MINUTES DO YOU ENGAGE IN EXERCISE AT THIS LEVEL?: 0 MIN

## 2025-08-12 ASSESSMENT — SOCIAL DETERMINANTS OF HEALTH (SDOH): HOW OFTEN DO YOU GET TOGETHER WITH FRIENDS OR RELATIVES?: PATIENT DECLINED

## (undated) DEVICE — ENDO TROCAR FIRST ENTRY KII FIOS ADV FIX 12X100MM CFF73

## (undated) DEVICE — ESU GROUND PAD ADULT W/CORD E7507

## (undated) DEVICE — DRAPE C-ARM MINI 5423

## (undated) DEVICE — SYR 10ML FINGER CONTROL W/O NDL 309695

## (undated) DEVICE — BAG CLEAR TRASH 1.3M 39X33" P4040C

## (undated) DEVICE — PAD CHUX UNDERPAD 30X36" P3036C

## (undated) DEVICE — ESU SUCTION/IRRIGATION SYSTEM PISTOL GRIP

## (undated) DEVICE — BNDG ELASTIC 4"X5YDS UNSTERILE 6611-40

## (undated) DEVICE — PREP CHLORAPREP 26ML TINTED ORANGE  260815

## (undated) DEVICE — SU ETHILON 4-0 PS-2 18" BLACK 1667H

## (undated) DEVICE — SU VICRYL 3-0 SH 27" UND J416H

## (undated) DEVICE — SOL ADH LIQUID BENZOIN SWAB 0.6ML C1544

## (undated) DEVICE — CAST PADDING 4" STERILE 9044S

## (undated) DEVICE — NDL 25GA 1.5" 305127

## (undated) DEVICE — SU VICRYL 4-0 PS-2 18" UND J496H

## (undated) DEVICE — DRSG TEGADERM 2 3/8X2 3/4" 1624W

## (undated) DEVICE — Device

## (undated) DEVICE — LINEN ORTHO ACL PACK 5447

## (undated) DEVICE — ENDO TROCAR FIRST ENTRY KII FIOS Z-THRD 05X100MM CTF03

## (undated) DEVICE — SU VICRYL 0 TIE 54" UND J287G

## (undated) DEVICE — ESU CORD MONOPOLAR 10'  E0510

## (undated) DEVICE — STPL ENDO LINEAR CUT ARTICULATING 45MM ATS45

## (undated) DEVICE — SU VICRYL 3-0 SH 27" J316H

## (undated) DEVICE — ENDO TROCAR SHIELDED BLADED KII Z-THRD 05X100MM CTB03

## (undated) DEVICE — SOL NACL 0.9% INJ 1000ML BAG 07983-09

## (undated) DEVICE — LINEN FULL SHEET 5511

## (undated) DEVICE — ESU HOLSTER PLASTIC DISP E2400

## (undated) DEVICE — BNDG KLING 4" 2236

## (undated) DEVICE — SU MONOCRYL 4-0 PS-2 18" UND Y496G

## (undated) DEVICE — CAST PADDING 4" UNSTERILE 9044

## (undated) DEVICE — BLADE KNIFE SURG 15 371115

## (undated) DEVICE — SYSTEM LAPAROVUE VISIBILITY LAPVUE10

## (undated) DEVICE — GLOVE PROTEXIS POWDER FREE 7.5 ORTHOPEDIC 2D73ET75

## (undated) DEVICE — STPL ENDO RELOAD 45X3.5MM 6R45B

## (undated) DEVICE — SOL NACL 0.9% IRRIG 1000ML BOTTLE 2F7124

## (undated) DEVICE — ENDO POUCH UNIVERSAL RETRIEVAL SYSTEM INZII 5MM CD003

## (undated) DEVICE — GOWN XLG DISP 9545

## (undated) DEVICE — NDL BLUNT 18GA 1" W/O FILTER 305181

## (undated) DEVICE — DRAPE TIBURON GENERAL ENDOSCOPY 9458

## (undated) DEVICE — DRSG KERLIX FLUFFS X5

## (undated) DEVICE — DEVICE SUTURE PASSER 14GA WECK EFX EFXSP2

## (undated) DEVICE — PACK LOWER EXTREMITY RIDGES

## (undated) DEVICE — TOURNIQUET CUFF 30" REPRO BLUE 60-7070-105

## (undated) DEVICE — CLIP APPLIER ENDO 5MM M/L LIGAMAX EL5ML

## (undated) DEVICE — STPL ENDO RELOAD 45X2.5MM VASC TR45W

## (undated) DEVICE — STOCKING SLEEVE COMPRESSION CALF MED

## (undated) DEVICE — SYR 20ML LL W/O NDL 302830

## (undated) DEVICE — DRAPE POUCH INSTRUMENT 3 POCKET 1018L

## (undated) DEVICE — ESU HOOK TIP 5MM CONMED

## (undated) DEVICE — SUCTION CANISTER MEDIVAC LINER 3000ML W/LID 65651-530

## (undated) DEVICE — LINEN HALF SHEET 5512

## (undated) DEVICE — GLOVE PROTEXIS W/NEU-THERA 7.5  2D73TE75

## (undated) DEVICE — DRSG STERI STRIP 1/2X4" R1547

## (undated) DEVICE — DECANTER VIAL 2006S

## (undated) RX ORDER — FENTANYL CITRATE 50 UG/ML
INJECTION, SOLUTION INTRAMUSCULAR; INTRAVENOUS
Status: DISPENSED
Start: 2020-04-17

## (undated) RX ORDER — BUPIVACAINE HYDROCHLORIDE AND EPINEPHRINE 2.5; 5 MG/ML; UG/ML
INJECTION, SOLUTION EPIDURAL; INFILTRATION; INTRACAUDAL; PERINEURAL
Status: DISPENSED
Start: 2022-11-20

## (undated) RX ORDER — DEXAMETHASONE SODIUM PHOSPHATE 4 MG/ML
INJECTION, SOLUTION INTRA-ARTICULAR; INTRALESIONAL; INTRAMUSCULAR; INTRAVENOUS; SOFT TISSUE
Status: DISPENSED
Start: 2022-11-20

## (undated) RX ORDER — ONDANSETRON 2 MG/ML
INJECTION INTRAMUSCULAR; INTRAVENOUS
Status: DISPENSED
Start: 2022-11-20

## (undated) RX ORDER — BUPIVACAINE HYDROCHLORIDE 2.5 MG/ML
INJECTION, SOLUTION EPIDURAL; INFILTRATION; INTRACAUDAL
Status: DISPENSED
Start: 2020-04-17

## (undated) RX ORDER — KETOROLAC TROMETHAMINE 30 MG/ML
INJECTION, SOLUTION INTRAMUSCULAR; INTRAVENOUS
Status: DISPENSED
Start: 2020-04-17

## (undated) RX ORDER — CLINDAMYCIN PHOSPHATE 900 MG/50ML
INJECTION, SOLUTION INTRAVENOUS
Status: DISPENSED
Start: 2020-04-17

## (undated) RX ORDER — LIDOCAINE HYDROCHLORIDE 10 MG/ML
INJECTION, SOLUTION EPIDURAL; INFILTRATION; INTRACAUDAL; PERINEURAL
Status: DISPENSED
Start: 2020-04-17

## (undated) RX ORDER — PROPOFOL 10 MG/ML
INJECTION, EMULSION INTRAVENOUS
Status: DISPENSED
Start: 2022-11-20

## (undated) RX ORDER — FENTANYL CITRATE 50 UG/ML
INJECTION, SOLUTION INTRAMUSCULAR; INTRAVENOUS
Status: DISPENSED
Start: 2022-11-20

## (undated) RX ORDER — ONDANSETRON 2 MG/ML
INJECTION INTRAMUSCULAR; INTRAVENOUS
Status: DISPENSED
Start: 2020-04-17

## (undated) RX ORDER — OXYCODONE HYDROCHLORIDE 5 MG/1
TABLET ORAL
Status: DISPENSED
Start: 2020-04-17